# Patient Record
Sex: MALE | Race: WHITE | NOT HISPANIC OR LATINO | Employment: OTHER | URBAN - METROPOLITAN AREA
[De-identification: names, ages, dates, MRNs, and addresses within clinical notes are randomized per-mention and may not be internally consistent; named-entity substitution may affect disease eponyms.]

---

## 2017-01-03 ENCOUNTER — CLINICAL SUPPORT (OUTPATIENT)
Dept: AUDIOLOGY | Facility: CLINIC | Age: 82
End: 2017-01-03
Payer: MEDICARE

## 2017-01-03 DIAGNOSIS — Z46.1 FITTING AND ADJUSTMENT OF HEARING AID: Primary | ICD-10-CM

## 2017-01-03 NOTE — PROGRESS NOTES
Good Murillo Jr. was seen on 01/03/2017 for a hearing aid fitting.  Settings were set to prescribed level 2 based on current audiogram.  Patient was counseled on insertion, cleaning and maintenance. Patient was given a copy of the hearing aid purchase agreement and will pay in full today.  Patient's one month trail period will begin today. Patient has been scheduled for a hearing aid follow up in two weeks.      :  [employer]   Model:  [employer] [id number]  Color:champagne  Speaker link: 1xs  Right aid sn#: [id number]  Left aid sn#:[id number]  Dome size: L open  Repair warranty 3-  L/D warranty: 3-

## 2017-01-11 ENCOUNTER — OFFICE VISIT (OUTPATIENT)
Dept: CARDIOLOGY | Facility: CLINIC | Age: 82
End: 2017-01-11
Payer: MEDICARE

## 2017-01-11 VITALS
BODY MASS INDEX: 21.31 KG/M2 | HEART RATE: 88 BPM | DIASTOLIC BLOOD PRESSURE: 64 MMHG | HEIGHT: 68 IN | WEIGHT: 140.63 LBS | SYSTOLIC BLOOD PRESSURE: 126 MMHG

## 2017-01-11 DIAGNOSIS — E11.69 HYPERLIPIDEMIA ASSOCIATED WITH TYPE 2 DIABETES MELLITUS: ICD-10-CM

## 2017-01-11 DIAGNOSIS — J43.1 PANLOBULAR EMPHYSEMA: ICD-10-CM

## 2017-01-11 DIAGNOSIS — E11.40 TYPE 2 DIABETES, CONTROLLED, WITH NEUROPATHY: ICD-10-CM

## 2017-01-11 DIAGNOSIS — R94.31 ABNORMAL EKG: ICD-10-CM

## 2017-01-11 DIAGNOSIS — C61 PROSTATE CA: ICD-10-CM

## 2017-01-11 DIAGNOSIS — I65.23 BILATERAL CAROTID ARTERY STENOSIS: ICD-10-CM

## 2017-01-11 DIAGNOSIS — E78.5 HYPERLIPIDEMIA ASSOCIATED WITH TYPE 2 DIABETES MELLITUS: ICD-10-CM

## 2017-01-11 DIAGNOSIS — M79.18 MYOFASCIAL PAIN: ICD-10-CM

## 2017-01-11 DIAGNOSIS — I15.2 HYPERTENSION ASSOCIATED WITH DIABETES: Primary | ICD-10-CM

## 2017-01-11 DIAGNOSIS — E11.59 HYPERTENSION ASSOCIATED WITH DIABETES: Primary | ICD-10-CM

## 2017-01-11 DIAGNOSIS — I73.9 PVD (PERIPHERAL VASCULAR DISEASE): ICD-10-CM

## 2017-01-11 PROBLEM — J84.10 GRANULOMATOUS LUNG DISEASE: Status: ACTIVE | Noted: 2017-01-11

## 2017-01-11 PROBLEM — I70.0 CALCIFICATION OF AORTA: Status: ACTIVE | Noted: 2017-01-11

## 2017-01-11 PROBLEM — I27.9 PULMONARY HEART DISEASE: Status: ACTIVE | Noted: 2017-01-11

## 2017-01-11 PROBLEM — I77.1 TORTUOUS AORTA: Status: ACTIVE | Noted: 2017-01-11

## 2017-01-11 PROCEDURE — 1157F ADVNC CARE PLAN IN RCRD: CPT | Mod: S$GLB,,, | Performed by: INTERNAL MEDICINE

## 2017-01-11 PROCEDURE — 99499 UNLISTED E&M SERVICE: CPT | Mod: S$GLB,,, | Performed by: INTERNAL MEDICINE

## 2017-01-11 PROCEDURE — 1160F RVW MEDS BY RX/DR IN RCRD: CPT | Mod: S$GLB,,, | Performed by: INTERNAL MEDICINE

## 2017-01-11 PROCEDURE — 1159F MED LIST DOCD IN RCRD: CPT | Mod: S$GLB,,, | Performed by: INTERNAL MEDICINE

## 2017-01-11 PROCEDURE — 99999 PR PBB SHADOW E&M-EST. PATIENT-LVL III: CPT | Mod: PBBFAC,,, | Performed by: INTERNAL MEDICINE

## 2017-01-11 PROCEDURE — 1126F AMNT PAIN NOTED NONE PRSNT: CPT | Mod: S$GLB,,, | Performed by: INTERNAL MEDICINE

## 2017-01-11 PROCEDURE — 99214 OFFICE O/P EST MOD 30 MIN: CPT | Mod: S$GLB,,, | Performed by: INTERNAL MEDICINE

## 2017-01-11 RX ORDER — OMEPRAZOLE 40 MG/1
40 CAPSULE, DELAYED RELEASE ORAL DAILY
Qty: 30 CAPSULE | Refills: 11 | Status: SHIPPED | OUTPATIENT
Start: 2017-01-11 | End: 2017-01-17 | Stop reason: SDUPTHER

## 2017-01-11 RX ORDER — OMEPRAZOLE 40 MG/1
40 CAPSULE, DELAYED RELEASE ORAL DAILY
COMMUNITY
Start: 2016-12-10 | End: 2017-01-11 | Stop reason: SDUPTHER

## 2017-01-11 NOTE — MR AVS SNAPSHOT
[facility]  [address] [place] [address]  Phone: [phone]  Fax: [phone]                  Good Murillo Jr.   2017 10:45 AM   Office Visit    Description:  Male : 1927   Provider:  Asuncion Goodwin MD   Department:  [facility] - Cardiology           Reason for Visit     Hypertension     Hyperlipidemia     Peripheral Vascular Disease           Diagnoses this Visit        Comments    Hypertension associated with diabetes    -  Primary     Myofascial pain         Abnormal EKG         Hyperlipidemia associated with type 2 diabetes mellitus         PVD (peripheral vascular disease)         Type 2 diabetes, controlled, with neuropathy         Prostate CA         Panlobular emphysema         Bilateral carotid artery stenosis                To Do List           Future Appointments        Provider Department Dept Phone    2017 1:40 PM FIELDS, VISUAL-ONE [facility] [phone]    2017 2:15 PM Patricio Morris MD [facility] [phone]    2017 3:00 PM Devan Eastman MD [facility] [phone]    2017 11:00 AM HRVICK MONTALVO [facility] [phone]    2017 1:30 PM HEARING AIDSPAULETTE [facility] [phone]      Goals (5 Years of Data)     None      Follow-Up and Disposition     Return in about 6 months (around 2017).      [facility]     [facility] On Call Nurse Care Line - 24/7 Assistance  Registered nurses in the [facility] On Call Center provide clinical advisement, health education, appointment booking, and other advisory services.  Call for this free service at [phone].             Medications           Message regarding Medications     Verify the changes and/or additions to your medication regime listed below are the same as discussed with your clinician today.  If any of these changes or additions are incorrect, please notify your healthcare provider.             Verify that the  below list of medications is an accurate representation of the medications you are currently taking.  If none reported, the list may be blank. If incorrect, please contact your healthcare provider. Carry this list with you in case of emergency.           Current Medications     albuterol 90 mcg/actuation inhaler Inhale 2 puffs into the lungs every 6 (six) hours.    alendronate (FOSAMAX) 70 MG tablet Take 1 tablet (70 mg total) by mouth every 7 days.    aspirin 81 mg Tab Take 1 tablet by mouth Daily. Over counter. To help prevent stroke/ heart attack    blood sugar diagnostic Strp 1 strip by Misc.(Non-Drug; Combo Route) route 3 (three) times daily. True Metrix test strips    calcium carbonate (OS-TANGELA) 500 mg calcium (1,250 mg) tablet Take 1 tablet by mouth once daily.    insulin glargine (LANTUS) 100 unit/mL injection Inject 10 Units into the skin once daily.    ipratropium (ATROVENT) 0.06 % nasal spray 2 sprays by Nasal route 3 (three) times daily.    lancets 33 gauge Misc 1 lancet by Misc.(Non-Drug; Combo Route) route 3 (three) times daily.    levothyroxine (SYNTHROID) 25 MCG tablet     loratadine (CLARITIN) 10 mg tablet Take 1 tablet by mouth Once daily as needed.    metformin (GLUCOPHAGE) 1000 MG tablet TAKE ONE TABLET BY MOUTH TWICE DAILY    nateglinide (STARLIX) 60 MG tablet Take 1 tablet (60 mg total) by mouth 3 (three) times daily before meals.    omeprazole (PRILOSEC) 40 MG capsule Take 40 mg by mouth once daily.    pravastatin (PRAVACHOL) 40 MG tablet Take 1 tablet (40 mg total) by mouth once daily.    tamsulosin (FLOMAX) 0.4 mg Cp24 Take 1 capsule (0.4 mg total) by mouth once daily.    TENS unit and electrodes Cmpk 1 Device by Misc.(Non-Drug; Combo Route) route daily as needed.    tiotropium (SPIRIVA WITH HANDIHALER) 18 mcg inhalation capsule Inhale 1 capsule (18 mcg total) into the lungs once daily.           Clinical Reference Information           Vital Signs - Last Recorded  Most recent update:  "1/11/2017 11:04 AM by Natalia Wong MA    BP Pulse Ht Wt BMI    126/64 88 5' 8" (1.727 m) 63.8 kg (140 lb 10.5 oz) 21.39 kg/m2      Blood Pressure          Most Recent Value    BP  126/64      Allergies as of 1/11/2017     Gabapentin      Immunizations Administered on Date of Encounter - 1/11/2017     None      Orders Placed During Today's Visit     Future Labs/Procedures Expected by Expires    CAR Ultrasound doppler carotid bliateral  1/11/2017 (Approximate) 1/11/2018    Comprehensive metabolic panel  7/13/2017 (Approximate) 1/11/2018    Lipid panel  7/13/2017 (Approximate) 1/11/2018    CAR US Ankle Brachial Indices Ext LTD WO Str  As directed 1/11/2018      "

## 2017-01-11 NOTE — PROGRESS NOTES
Subjective:   Patient ID:  Good Murillo Jr. is a 89 y.o. male who presents for follow up of Hypertension; Hyperlipidemia; and Peripheral Vascular Disease      HPI  A 88 yo male with neuropathy htn diabetes hlp  pvd is here for f/u pvd. He ahs been seeing chiropractor for relief. He ahs no other complaints except numbness in legs he ahs no weakness in his legs. He has no other issues with leg pain with ambulation.he ahs chronic back pain he lost weight. Has no chest pain no change in shortness of breath . He feels some uneasiness in his chest at times. He has no other complaints chronically.no discoloration in feet.he ahs significant inflow disease on the rt .michael unchanged.uses inhalers for copd and oxygen intermittently  Past Medical History   Diagnosis Date    Abnormal EKG 8/22/2014    Allergy     COPD (chronic obstructive pulmonary disease)     Diverticulitis     Diverticulosis      hosp/divert bleed    Ex-smoker     Heart murmur     History of pneumothorax      bilat    HTN (hypertension)     Hyperlipidemia     Hyperlipidemia 8/22/2014    Hypothyroid     Iron deficiency anemia      nathan    Osteoporosis, unspecified 1/14 zeenat 1/16    Prostate CA     PVD (peripheral vascular disease) 8/22/2014    Type 2 diabetes, controlled, with neuropathy Diagnosed 1994       Past Surgical History   Procedure Laterality Date    Prostate surgery       brachytherapy    Cataract extraction      Pciol ou Bilateral 01/12/2011 OD/12/08/2010 OS     DR. DELGADILLO       Social History   Substance Use Topics    Smoking status: Former Smoker     Quit date: 8/28/1993    Smokeless tobacco: Never Used    Alcohol use Yes      Comment: Champagne on Sunday       Family History   Problem Relation Age of Onset    Diabetes Mother     Eczema Neg Hx     Lupus Neg Hx     Psoriasis Neg Hx     Melanoma Neg Hx        Current Outpatient Prescriptions   Medication Sig    albuterol 90 mcg/actuation inhaler Inhale 2 puffs into  the lungs every 6 (six) hours.    alendronate (FOSAMAX) 70 MG tablet Take 1 tablet (70 mg total) by mouth every 7 days.    aspirin 81 mg Tab Take 1 tablet by mouth Daily. Over counter. To help prevent stroke/ heart attack    blood sugar diagnostic Strp 1 strip by Misc.(Non-Drug; Combo Route) route 3 (three) times daily. True Metrix test strips    calcium carbonate (OS-TANGELA) 500 mg calcium (1,250 mg) tablet Take 1 tablet by mouth once daily.    insulin glargine (LANTUS) 100 unit/mL injection Inject 10 Units into the skin once daily.    ipratropium (ATROVENT) 0.06 % nasal spray 2 sprays by Nasal route 3 (three) times daily.    lancets 33 gauge Misc 1 lancet by Misc.(Non-Drug; Combo Route) route 3 (three) times daily.    levothyroxine (SYNTHROID) 25 MCG tablet     loratadine (CLARITIN) 10 mg tablet Take 1 tablet by mouth Once daily as needed.    metformin (GLUCOPHAGE) 1000 MG tablet TAKE ONE TABLET BY MOUTH TWICE DAILY    nateglinide (STARLIX) 60 MG tablet Take 1 tablet (60 mg total) by mouth 3 (three) times daily before meals.    omeprazole (PRILOSEC) 40 MG capsule Take 40 mg by mouth once daily.    pravastatin (PRAVACHOL) 40 MG tablet Take 1 tablet (40 mg total) by mouth once daily.    tamsulosin (FLOMAX) 0.4 mg Cp24 Take 1 capsule (0.4 mg total) by mouth once daily.    TENS unit and electrodes Cmpk 1 Device by Misc.(Non-Drug; Combo Route) route daily as needed.    tiotropium (SPIRIVA WITH HANDIHALER) 18 mcg inhalation capsule Inhale 1 capsule (18 mcg total) into the lungs once daily.     No current facility-administered medications for this visit.      Current Outpatient Prescriptions on File Prior to Visit   Medication Sig    albuterol 90 mcg/actuation inhaler Inhale 2 puffs into the lungs every 6 (six) hours.    alendronate (FOSAMAX) 70 MG tablet Take 1 tablet (70 mg total) by mouth every 7 days.    aspirin 81 mg Tab Take 1 tablet by mouth Daily. Over counter. To help prevent stroke/ heart attack     blood sugar diagnostic Strp 1 strip by Misc.(Non-Drug; Combo Route) route 3 (three) times daily. True Metrix test strips    calcium carbonate (OS-TANGELA) 500 mg calcium (1,250 mg) tablet Take 1 tablet by mouth once daily.    insulin glargine (LANTUS) 100 unit/mL injection Inject 10 Units into the skin once daily.    ipratropium (ATROVENT) 0.06 % nasal spray 2 sprays by Nasal route 3 (three) times daily.    lancets 33 gauge Misc 1 lancet by Misc.(Non-Drug; Combo Route) route 3 (three) times daily.    levothyroxine (SYNTHROID) 25 MCG tablet     loratadine (CLARITIN) 10 mg tablet Take 1 tablet by mouth Once daily as needed.    metformin (GLUCOPHAGE) 1000 MG tablet TAKE ONE TABLET BY MOUTH TWICE DAILY    nateglinide (STARLIX) 60 MG tablet Take 1 tablet (60 mg total) by mouth 3 (three) times daily before meals.    pravastatin (PRAVACHOL) 40 MG tablet Take 1 tablet (40 mg total) by mouth once daily.    tamsulosin (FLOMAX) 0.4 mg Cp24 Take 1 capsule (0.4 mg total) by mouth once daily.    TENS unit and electrodes Cmpk 1 Device by Misc.(Non-Drug; Combo Route) route daily as needed.    tiotropium (SPIRIVA WITH HANDIHALER) 18 mcg inhalation capsule Inhale 1 capsule (18 mcg total) into the lungs once daily.     No current facility-administered medications on file prior to visit.      Review of patient's allergies indicates:   Allergen Reactions    Gabapentin      Didn't like starting at 300mg       ROS  Constitution: Negative for weakness and malaise/fatigue. Positive weight loss with decrease appetite.  HENT: Negative for headaches and nosebleeds.   Eyes: Negative for blurred vision and redness.   Cardiovascular: Positive for dyspnea on exertion. Negative for chest pain   claudication , irregular heartbeat, leg swelling, near-syncope, orthopnea, palpitations, paroxysmal nocturnal dyspnia and syncope.   Respiratory: Positive for cough and shortness of breath. Negative for wheezing.   Endocrine: Negative for cold  "intolerance and heat intolerance.   Hematologic/Lymphatic: Negative for bleeding problem. Does not bruise/bleed easily.   Skin: Negative for color change, poor wound healing and rash.   Musculoskeletal: Positive for back pain. Negative for muscle cramps and muscle weakness.   Gastrointestinal: Negative for abdominal pain and heartburn.   Genitourinary: Positive for nocturia. Negative for dysuria and hematuria.   Neurological: Positive for numbness and paresthesias. Negative for dizziness, focal weakness, light-headedness and loss of balance.   Psychiatric/Behavioral: Negative for memory loss. The patient does not have insomnia.   Allergic/Immunologic: Negative for hives.  Objective:   Physical Exam  Vitals:    01/11/17 1101   BP: 126/64   Pulse: 88   Weight: 63.8 kg (140 lb 10.5 oz)   Height: 5' 8" (1.727 m)   Constitutional: He is oriented to person, place, and time. He appears well-developed and well-nourished. No distress.   HENT:   Head: Normocephalic and atraumatic.   Eyes: EOM are normal. Pupils are equal, round, and reactive to light. Right eye exhibits no discharge. Left eye exhibits no discharge.   Neck: Neck supple. No JVD present. No thyromegaly present.   Cardiovascular: Normal rate, irregular rhythm, normal heart sounds . Exam reveals no gallop and no friction rub.   No murmur heard.  Pulses:  Carotid pulses are on the right side with bruit, and on the left side with bruit.  Femoral pulses are 1+ on the right side with bruit, and 3+ on the left side with bruit.  Popliteal pulses are 0+ on the right side, and 2+ on the left side.   Dorsalis pedis pulses are 0 on the right side, and 1+ on the left side.   Posterior tibial pulses are 0 on the right side, and 1+ on the left side.   SUBCLAVIAN BRUITS    Pulmonary/Chest: Effort normal and breath sounds normal. No respiratory distress. He has no wheezes. He has no rales. He exhibits no tenderness.   HYPERINFLATED LUNGS    Abdominal: Soft. Bowel sounds are " normal. He exhibits no distension. There is no tenderness.   Musculoskeletal: Normal range of motion. He exhibits trace edema.   Neurological: He is alert and oriented to person, place, and time. No cranial nerve deficit.   Skin: Skin is warm and dry. No rash noted. He is not diaphoretic.   Psychiatric: He has a normal mood and affect.   Lab Results   Component Value Date    CHOL 169 04/20/2016    CHOL 144 02/05/2015    CHOL 144 02/05/2015     Lab Results   Component Value Date    HDL 42 04/20/2016    HDL 39 (L) 02/05/2015    HDL 39 (L) 02/05/2015     Lab Results   Component Value Date    LDLCALC 107.4 04/20/2016    LDLCALC 89.8 02/05/2015    LDLCALC 89.8 02/05/2015     Lab Results   Component Value Date    TRIG 98 04/20/2016    TRIG 76 02/05/2015    TRIG 76 02/05/2015     Lab Results   Component Value Date    CHOLHDL 24.9 04/20/2016    CHOLHDL 27.1 02/05/2015    CHOLHDL 27.1 02/05/2015       Chemistry        Component Value Date/Time     04/20/2016 0752    K 4.5 04/20/2016 0752     04/20/2016 0752    CO2 25 04/20/2016 0752    BUN 22 04/20/2016 0752    CREATININE 1.2 12/14/2016 1213     (H) 04/20/2016 0752        Component Value Date/Time    CALCIUM 9.3 04/20/2016 0752    ALKPHOS 65 09/04/2013 1135    AST 13 09/04/2013 1135    ALT 10 09/04/2013 1135    BILITOT 0.3 09/04/2013 1135          Lab Results   Component Value Date    TSH 2.212 06/07/2016     Lab Results   Component Value Date    INR 1.0 08/27/2014     Lab Results   Component Value Date    WBC 7.22 04/20/2016    HGB 11.0 (L) 04/20/2016    HCT 34.8 (L) 04/20/2016    MCV 94 04/20/2016     04/20/2016     BMP  Sodium   Date Value Ref Range Status   04/20/2016 142 136 - 145 mmol/L Final     Potassium   Date Value Ref Range Status   04/20/2016 4.5 3.5 - 5.1 mmol/L Final     Chloride   Date Value Ref Range Status   04/20/2016 106 95 - 110 mmol/L Final     CO2   Date Value Ref Range Status   04/20/2016 25 23 - 29 mmol/L Final     BUN, Bld    Date Value Ref Range Status   04/20/2016 22 8 - 23 mg/dL Final     Creatinine   Date Value Ref Range Status   12/14/2016 1.2 0.5 - 1.4 mg/dL Final     Calcium   Date Value Ref Range Status   04/20/2016 9.3 8.7 - 10.5 mg/dL Final     Anion Gap   Date Value Ref Range Status   04/20/2016 11 8 - 16 mmol/L Final     eGFR if    Date Value Ref Range Status   12/14/2016 >60 >60 mL/min/1.73 m^2 Final     eGFR if non    Date Value Ref Range Status   12/14/2016 53 (A) >60 mL/min/1.73 m^2 Final     Comment:     Calculation used to obtain the estimated glomerular filtration  rate (eGFR) is the CKD-EPI equation. Since race is unknown   in our information system, the eGFR values for   -American and Non--American patients are given   for each creatinine result.       CrCl cannot be calculated (Patient has no serum creatinine result on file.).    Assessment:     1. Hypertension associated with diabetes    2. Myofascial pain    3. Abnormal EKG    4. Hyperlipidemia associated with type 2 diabetes mellitus    5. PVD (peripheral vascular disease)    6. Type 2 diabetes, controlled, with neuropathy    7. Prostate CA    8. Panlobular emphysema      Stable clinically no change in exam or pattern of symptoms has no claudication.  Plan:   Carotid michael on f/iu in 6 months   Reassure about leg swelling leg elevation advised.  Continue current therapy  Cardiac low salt diet.  Risk factor modification and excercise program.  F/u in 6 months with lipid cmp

## 2017-01-12 ENCOUNTER — OFFICE VISIT (OUTPATIENT)
Dept: INTERNAL MEDICINE | Facility: CLINIC | Age: 82
End: 2017-01-12
Payer: MEDICARE

## 2017-01-12 ENCOUNTER — OFFICE VISIT (OUTPATIENT)
Dept: OPHTHALMOLOGY | Facility: CLINIC | Age: 82
End: 2017-01-12
Payer: MEDICARE

## 2017-01-12 VITALS
TEMPERATURE: 97 F | HEART RATE: 67 BPM | HEIGHT: 69 IN | SYSTOLIC BLOOD PRESSURE: 158 MMHG | DIASTOLIC BLOOD PRESSURE: 80 MMHG | BODY MASS INDEX: 21 KG/M2 | WEIGHT: 141.75 LBS | OXYGEN SATURATION: 94 %

## 2017-01-12 DIAGNOSIS — D50.9 IRON DEFICIENCY ANEMIA, UNSPECIFIED IRON DEFICIENCY ANEMIA TYPE: ICD-10-CM

## 2017-01-12 DIAGNOSIS — E11.59 HYPERTENSION ASSOCIATED WITH DIABETES: Chronic | ICD-10-CM

## 2017-01-12 DIAGNOSIS — H40.013 OPEN ANGLE WITH BORDERLINE FINDINGS, LOW RISK, BILATERAL: ICD-10-CM

## 2017-01-12 DIAGNOSIS — Z96.1 PSEUDOPHAKIA OF BOTH EYES: ICD-10-CM

## 2017-01-12 DIAGNOSIS — I15.2 HYPERTENSION ASSOCIATED WITH DIABETES: Chronic | ICD-10-CM

## 2017-01-12 DIAGNOSIS — J43.1 PANLOBULAR EMPHYSEMA: ICD-10-CM

## 2017-01-12 DIAGNOSIS — E03.4 HYPOTHYROIDISM DUE TO ACQUIRED ATROPHY OF THYROID: Chronic | ICD-10-CM

## 2017-01-12 DIAGNOSIS — M81.0 OSTEOPOROSIS: ICD-10-CM

## 2017-01-12 DIAGNOSIS — E11.9 DIABETES MELLITUS TYPE 2 WITHOUT RETINOPATHY: Primary | ICD-10-CM

## 2017-01-12 DIAGNOSIS — E11.42 TYPE 2 DIABETES MELLITUS WITH DIABETIC POLYNEUROPATHY, WITH LONG-TERM CURRENT USE OF INSULIN: Primary | ICD-10-CM

## 2017-01-12 DIAGNOSIS — Z79.4 TYPE 2 DIABETES MELLITUS WITH DIABETIC POLYNEUROPATHY, WITH LONG-TERM CURRENT USE OF INSULIN: Primary | ICD-10-CM

## 2017-01-12 DIAGNOSIS — Z85.46 HISTORY OF PROSTATE CANCER: ICD-10-CM

## 2017-01-12 PROCEDURE — 92014 COMPRE OPH EXAM EST PT 1/>: CPT | Mod: S$GLB,,, | Performed by: OPHTHALMOLOGY

## 2017-01-12 PROCEDURE — 92083 EXTENDED VISUAL FIELD XM: CPT | Mod: S$GLB,,, | Performed by: OPHTHALMOLOGY

## 2017-01-12 PROCEDURE — 99499 UNLISTED E&M SERVICE: CPT | Mod: S$GLB,,, | Performed by: OPHTHALMOLOGY

## 2017-01-12 PROCEDURE — 1160F RVW MEDS BY RX/DR IN RCRD: CPT | Mod: S$GLB,,, | Performed by: FAMILY MEDICINE

## 2017-01-12 PROCEDURE — 1157F ADVNC CARE PLAN IN RCRD: CPT | Mod: S$GLB,,, | Performed by: FAMILY MEDICINE

## 2017-01-12 PROCEDURE — 92250 FUNDUS PHOTOGRAPHY W/I&R: CPT | Mod: S$GLB,,, | Performed by: OPHTHALMOLOGY

## 2017-01-12 PROCEDURE — 1159F MED LIST DOCD IN RCRD: CPT | Mod: S$GLB,,, | Performed by: FAMILY MEDICINE

## 2017-01-12 PROCEDURE — 99999 PR PBB SHADOW E&M-EST. PATIENT-LVL III: CPT | Mod: PBBFAC,,, | Performed by: FAMILY MEDICINE

## 2017-01-12 PROCEDURE — 99499 UNLISTED E&M SERVICE: CPT | Mod: S$GLB,,, | Performed by: FAMILY MEDICINE

## 2017-01-12 PROCEDURE — 99999 PR PBB SHADOW E&M-EST. PATIENT-LVL II: CPT | Mod: PBBFAC,,, | Performed by: OPHTHALMOLOGY

## 2017-01-12 PROCEDURE — 99213 OFFICE O/P EST LOW 20 MIN: CPT | Mod: S$GLB,,, | Performed by: FAMILY MEDICINE

## 2017-01-12 NOTE — PROGRESS NOTES
SUBJECTIVE:   Good Murillo Jr. is a 89 y.o. male   Corrected distance visual acuity was 20/30 +2 in the right eye and 20/30 +2 in the left eye.   Chief Complaint   Patient presents with    Glaucoma Suspect        HPI:  HPI     Patient is here for his annual eye exam w/ hvf review and dilated exam ,   patient denies any vision changes.Patient was seen in urgent care  on   12/2016 for acute conjunctivitis  Ou and was given polytrim he is still   using tid ou .        GLAUCOMA SUSPECT  PCIOL OD 01/12/2011 SN60WF+24.0  PCIOL OS 12/08/2010 SN60WF +24.5  DM X 15 YEARS        polytrim tid ou        Last edited by CARLEE Horton on 1/12/2017  2:49 PM.     Assessment /Plan :  1. Diabetes mellitus type 2 without retinopathy No diabetic retinopathy at this time. Reviewed diabetic eye precautions including avoiding tobacco use,  Good glucose control, and importance of regular follow up.    2. Open angle with borderline findings, low risk, bilateral No evidence of glaucoma at this time but based on risk factors recommend to continue monitoring.   3. Pseudophakia of both eyes doing well     Return to clinic in 1 year  or as needed.  With 24-2 HVF, Dilation and SDP's

## 2017-01-12 NOTE — MR AVS SNAPSHOT
[facility]  [address] [place] [address]  Phone: [phone]  Fax: [phone]                  Good Murillo    2017 2:15 PM   Office Visit    Description:  Male : 1927   Provider:  Patricio Morris MD   Department:  [facility] - Ophthalmology           Reason for Visit     Glaucoma Suspect           Diagnoses this Visit        Comments    Diabetes mellitus type 2 without retinopathy    -  Primary     Open angle with borderline findings, low risk, bilateral         Pseudophakia of both eyes                To Do List           Future Appointments        Provider Department Dept Phone    2017 11:00 AM HRA [facility] [phone]    2017 1:30 PM HEARING AIDSPAULETTE [facility] [phone]    2017 12:30 PM Damian Issa Jr., MORENA [facility] - Diabetes Management [phone]    3/20/2017 2:40 PM Carmita Jones DPM [facility] [phone]    2017 8:05 AM LABORATORY, [facility] [phone]      Goals (5 Years of Data)     None      Follow-Up and Disposition     Return in about 1 year (around 2018).      [facility]     [facility] Care [facility] - 24/7 Assistance  Registered nurses in the [facility] Center provide clinical advisement, health education, appointment booking, and other advisory services.  Call for this free service at [phone].             Medications           Message regarding Medications     Verify the changes and/or additions to your medication regime listed below are the same as discussed with your clinician today.  If any of these changes or additions are incorrect, please notify your healthcare provider.             Verify that the below list of medications is an accurate representation of the medications you are currently taking.  If none reported, the list may be blank. If incorrect, please contact your healthcare provider. Carry this list  with you in case of emergency.           Current Medications     albuterol 90 mcg/actuation inhaler Inhale 2 puffs into the lungs every 6 (six) hours.    alendronate (FOSAMAX) 70 MG tablet Take 1 tablet (70 mg total) by mouth every 7 days.    aspirin 81 mg Tab Take 1 tablet by mouth Daily. Over counter. To help prevent stroke/ heart attack    blood sugar diagnostic Strp 1 strip by Misc.(Non-Drug; Combo Route) route 3 (three) times daily. True Metrix test strips    calcium carbonate (OS-TANGELA) 500 mg calcium (1,250 mg) tablet Take 1 tablet by mouth once daily.    insulin glargine (LANTUS) 100 unit/mL injection Inject 10 Units into the skin once daily.    ipratropium (ATROVENT) 0.06 % nasal spray 2 sprays by Nasal route 3 (three) times daily.    lancets 33 gauge Misc 1 lancet by Misc.(Non-Drug; Combo Route) route 3 (three) times daily.    levothyroxine (SYNTHROID) 25 MCG tablet     loratadine (CLARITIN) 10 mg tablet Take 1 tablet by mouth Once daily as needed.    metformin (GLUCOPHAGE) 1000 MG tablet TAKE ONE TABLET BY MOUTH TWICE DAILY    nateglinide (STARLIX) 60 MG tablet Take 1 tablet (60 mg total) by mouth 3 (three) times daily before meals.    omeprazole (PRILOSEC) 40 MG capsule Take 1 capsule (40 mg total) by mouth once daily.    pravastatin (PRAVACHOL) 40 MG tablet Take 1 tablet (40 mg total) by mouth once daily.    tamsulosin (FLOMAX) 0.4 mg Cp24 Take 1 capsule (0.4 mg total) by mouth once daily.    TENS unit and electrodes Cmpk 1 Device by Misc.(Non-Drug; Combo Route) route daily as needed.    tiotropium (SPIRIVA WITH HANDIHALER) 18 mcg inhalation capsule Inhale 1 capsule (18 mcg total) into the lungs once daily.           Clinical Reference Information           Allergies as of 1/12/2017     Gabapentin      Immunizations Administered on Date of Encounter - 1/12/2017     None      Orders Placed During Today's Visit      Normal Orders This Visit    Color Fundus Photography - OU - Both Eyes     [place] Visual  Field - OU - Extended - Both Eyes

## 2017-01-12 NOTE — MR AVS SNAPSHOT
[facility] Internal Medicine  [address] [place] [address]  Phone: [phone]  Fax: [phone]                  Good Murillo Jr.   2017 3:00 PM   Office Visit    Description:  Male : 1927   Provider:  Devan Eastman MD   Department:  [facility] Internal Medicine                To Do List           Future Appointments        Provider Department Dept Phone    2017 11:00 AM TITI [facility] Internal Medicine [phone]    2017 1:30 PM HEARING AIDSPAULETTE [facility] Hearing Aids [phone]    2017 12:30 PM Damian Issa Jr., PA-C [facility] Diabetes Management [phone]    3/20/2017 1:20 PM Devan Eastman MD [facility] Internal Medicine [phone]    3/20/2017 2:40 PM Carmita Jones DPM [facility] Podiatry [phone]      Goals (5 Years of Data)     None      [facility] On Call     [facility] On Call Nurse Care Line -  Assistance  Registered nurses in the [facility] On Call Center provide clinical advisement, health education, appointment booking, and other advisory services.  Call for this free service at [phone].             Medications           Message regarding Medications     Verify the changes and/or additions to your medication regime listed below are the same as discussed with your clinician today.  If any of these changes or additions are incorrect, please notify your healthcare provider.             Verify that the below list of medications is an accurate representation of the medications you are currently taking.  If none reported, the list may be blank. If incorrect, please contact your healthcare provider. Carry this list with you in case of emergency.           Current Medications     albuterol 90 mcg/actuation inhaler Inhale 2 puffs into the lungs every 6 (six) hours.    alendronate (FOSAMAX) 70 MG tablet Take 1 tablet (70 mg total) by mouth every 7 days.    aspirin 81 mg Tab Take 1 tablet by mouth Daily. Over counter. To help prevent stroke/ heart  "attack    blood sugar diagnostic Strp 1 strip by Misc.(Non-Drug; Combo Route) route 3 (three) times daily. True Metrix test strips    calcium carbonate (OS-TANGELA) 500 mg calcium (1,250 mg) tablet Take 1 tablet by mouth once daily.    insulin glargine (LANTUS) 100 unit/mL injection Inject 10 Units into the skin once daily.    ipratropium (ATROVENT) 0.06 % nasal spray 2 sprays by Nasal route 3 (three) times daily.    lancets 33 gauge Misc 1 lancet by Misc.(Non-Drug; Combo Route) route 3 (three) times daily.    levothyroxine (SYNTHROID) 25 MCG tablet     loratadine (CLARITIN) 10 mg tablet Take 1 tablet by mouth Once daily as needed.    metformin (GLUCOPHAGE) 1000 MG tablet TAKE ONE TABLET BY MOUTH TWICE DAILY    nateglinide (STARLIX) 60 MG tablet Take 1 tablet (60 mg total) by mouth 3 (three) times daily before meals.    omeprazole (PRILOSEC) 40 MG capsule Take 1 capsule (40 mg total) by mouth once daily.    pravastatin (PRAVACHOL) 40 MG tablet Take 1 tablet (40 mg total) by mouth once daily.    tamsulosin (FLOMAX) 0.4 mg Cp24 Take 1 capsule (0.4 mg total) by mouth once daily.    TENS unit and electrodes Cmpk 1 Device by Misc.(Non-Drug; Combo Route) route daily as needed.    tiotropium (SPIRIVA WITH HANDIHALER) 18 mcg inhalation capsule Inhale 1 capsule (18 mcg total) into the lungs once daily.           Clinical Reference Information           Vital Signs - Last Recorded  Most recent update: 1/12/2017  3:59 PM by Leslie Cummings LPN    BP Pulse Temp Ht    (!) 158/80 (BP Location: Right arm, Patient Position: Sitting, BP Method: Manual) 67 97.4 °F (36.3 °C) (Tympanic) 5' 9" (1.753 m)    Wt SpO2 BMI    64.3 kg (141 lb 12.1 oz) (!) 94% 20.93 kg/m2      Blood Pressure          Most Recent Value    BP  (!)  158/80      Allergies as of 1/12/2017     Gabapentin      Immunizations Administered on Date of Encounter - 1/12/2017     None      "

## 2017-01-16 ENCOUNTER — TELEPHONE (OUTPATIENT)
Dept: CARDIOLOGY | Facility: CLINIC | Age: 82
End: 2017-01-16

## 2017-01-17 ENCOUNTER — CLINICAL SUPPORT (OUTPATIENT)
Dept: AUDIOLOGY | Facility: CLINIC | Age: 82
End: 2017-01-17
Payer: MEDICARE

## 2017-01-17 ENCOUNTER — OFFICE VISIT (OUTPATIENT)
Dept: INTERNAL MEDICINE | Facility: CLINIC | Age: 82
End: 2017-01-17
Payer: MEDICARE

## 2017-01-17 VITALS
HEIGHT: 69 IN | DIASTOLIC BLOOD PRESSURE: 75 MMHG | SYSTOLIC BLOOD PRESSURE: 139 MMHG | OXYGEN SATURATION: 96 % | HEART RATE: 101 BPM | BODY MASS INDEX: 20.42 KG/M2 | WEIGHT: 137.88 LBS

## 2017-01-17 DIAGNOSIS — G89.29 CHRONIC MIDLINE LOW BACK PAIN WITHOUT SCIATICA: ICD-10-CM

## 2017-01-17 DIAGNOSIS — I27.9 PULMONARY HEART DISEASE: ICD-10-CM

## 2017-01-17 DIAGNOSIS — M54.50 CHRONIC MIDLINE LOW BACK PAIN WITHOUT SCIATICA: ICD-10-CM

## 2017-01-17 DIAGNOSIS — I70.0 CALCIFICATION OF AORTA: ICD-10-CM

## 2017-01-17 DIAGNOSIS — Z85.46 HISTORY OF PROSTATE CANCER: ICD-10-CM

## 2017-01-17 DIAGNOSIS — Z91.89 POTENTIAL FOR COGNITIVE IMPAIRMENT: ICD-10-CM

## 2017-01-17 DIAGNOSIS — Z46.1 FITTING AND ADJUSTMENT OF HEARING AID: Primary | ICD-10-CM

## 2017-01-17 DIAGNOSIS — E11.59 HYPERTENSION ASSOCIATED WITH DIABETES: Chronic | ICD-10-CM

## 2017-01-17 DIAGNOSIS — E78.5 HYPERLIPIDEMIA ASSOCIATED WITH TYPE 2 DIABETES MELLITUS: ICD-10-CM

## 2017-01-17 DIAGNOSIS — R63.4 WEIGHT LOSS, NON-INTENTIONAL: ICD-10-CM

## 2017-01-17 DIAGNOSIS — M81.0 OSTEOPOROSIS: ICD-10-CM

## 2017-01-17 DIAGNOSIS — J84.10 GRANULOMATOUS LUNG DISEASE: ICD-10-CM

## 2017-01-17 DIAGNOSIS — Z00.00 ENCOUNTER FOR PREVENTIVE HEALTH EXAMINATION: Primary | ICD-10-CM

## 2017-01-17 DIAGNOSIS — E03.4 HYPOTHYROIDISM DUE TO ACQUIRED ATROPHY OF THYROID: Chronic | ICD-10-CM

## 2017-01-17 DIAGNOSIS — I70.203 ATHEROSCLEROSIS OF NATIVE ARTERY OF BOTH LOWER EXTREMITIES, WITH UNSPECIFIED PRESENCE OF CLINICAL MANIFESTATION: ICD-10-CM

## 2017-01-17 DIAGNOSIS — E11.42 TYPE 2 DIABETES MELLITUS WITH DIABETIC POLYNEUROPATHY, WITH LONG-TERM CURRENT USE OF INSULIN: ICD-10-CM

## 2017-01-17 DIAGNOSIS — Z79.4 TYPE 2 DIABETES MELLITUS WITH DIABETIC POLYNEUROPATHY, WITH LONG-TERM CURRENT USE OF INSULIN: ICD-10-CM

## 2017-01-17 DIAGNOSIS — D50.9 IRON DEFICIENCY ANEMIA, UNSPECIFIED IRON DEFICIENCY ANEMIA TYPE: ICD-10-CM

## 2017-01-17 DIAGNOSIS — J43.1 PANLOBULAR EMPHYSEMA: ICD-10-CM

## 2017-01-17 DIAGNOSIS — E11.69 HYPERLIPIDEMIA ASSOCIATED WITH TYPE 2 DIABETES MELLITUS: ICD-10-CM

## 2017-01-17 DIAGNOSIS — I15.2 HYPERTENSION ASSOCIATED WITH DIABETES: Chronic | ICD-10-CM

## 2017-01-17 DIAGNOSIS — H40.013 OPEN ANGLE WITH BORDERLINE FINDINGS, LOW RISK, BILATERAL: ICD-10-CM

## 2017-01-17 DIAGNOSIS — I77.9 BILATERAL CAROTID ARTERY DISEASE: ICD-10-CM

## 2017-01-17 PROCEDURE — 99999 PR PBB SHADOW E&M-EST. PATIENT-LVL IV: CPT | Mod: PBBFAC,,, | Performed by: PHYSICIAN ASSISTANT

## 2017-01-17 PROCEDURE — 99499 UNLISTED E&M SERVICE: CPT | Mod: S$GLB,,, | Performed by: PHYSICIAN ASSISTANT

## 2017-01-17 PROCEDURE — G0439 PPPS, SUBSEQ VISIT: HCPCS | Mod: S$GLB,,, | Performed by: PHYSICIAN ASSISTANT

## 2017-01-17 RX ORDER — OMEPRAZOLE 40 MG/1
40 CAPSULE, DELAYED RELEASE ORAL DAILY
Qty: 30 CAPSULE | Refills: 3 | Status: SHIPPED | OUTPATIENT
Start: 2017-01-17 | End: 2017-06-05

## 2017-01-17 NOTE — MR AVS SNAPSHOT
[facility] Internal Medicine  [address] [place] [address]  Phone: [phone]  Fax: [phone]                  Good Murillo    2017 11:00 AM   Office Visit    Description:  Male : 1927   Provider:  Raulito Wetzel PA-C   Department:  [facility] - Internal Medicine           Reason for Visit     Health Risk Assessment           Diagnoses this Visit        Comments    Encounter for preventive health examination    -  Primary     Type 2 diabetes mellitus with diabetic polyneuropathy, with long-term current use of insulin         Pulmonary heart disease         Hypertension associated with diabetes         Osteoporosis         Hyperlipidemia associated with type 2 diabetes mellitus         Granulomatous lung disease         Panlobular emphysema         Bilateral carotid artery disease         Atherosclerosis of native artery of both lower extremities, with unspecified presence of clinical manifestation         Hypothyroidism due to acquired atrophy of thyroid         Calcification of aorta         Iron deficiency anemia, unspecified iron deficiency anemia type         History of prostate cancer         Urinary complication         Weight loss, non-intentional         Chronic midline low back pain without sciatica                To Do List           Future Appointments        Provider Department Dept Phone    2017 1:30 PM HEARING PAULETTE MUÑOZ [facility] Hearing Aids [phone]    2017 10:15 [phone]    2017 10:45 [facility] [phone]    2017 12:30 PM Damian Issa Jr., MORENA [facility] - Diabetes Management [phone]    3/20/2017 1:20 PM Devan Eastman MD [facility] Internal Medicine [phone]      Goals (5 Years of Data)     None       These Medications        Disp Refills Start End    omeprazole (PRILOSEC) 40 MG capsule 30 capsule 3 2017     Take 1 capsule (40 mg total) by mouth  once daily. - Oral    Pharmacy: [address] #: [phone]         [facility] On Call     [facility] On Call Nurse Care Line - 24/7 Assistance  Registered nurses in the [facility] On Call Center provide clinical advisement, health education, appointment booking, and other advisory services.  Call for this free service at [phone].             Medications           Message regarding Medications     Verify the changes and/or additions to your medication regime listed below are the same as discussed with your clinician today.  If any of these changes or additions are incorrect, please notify your healthcare provider.             Verify that the below list of medications is an accurate representation of the medications you are currently taking.  If none reported, the list may be blank. If incorrect, please contact your healthcare provider. Carry this list with you in case of emergency.           Current Medications     albuterol 90 mcg/actuation inhaler Inhale 2 puffs into the lungs every 6 (six) hours.    alendronate (FOSAMAX) 70 MG tablet Take 1 tablet (70 mg total) by mouth every 7 days.    aspirin 81 mg Tab Take 1 tablet by mouth Daily. Over counter. To help prevent stroke/ heart attack    blood sugar diagnostic Strp 1 strip by Misc.(Non-Drug; Combo Route) route 3 (three) times daily. True Metrix test strips    calcium carbonate (OS-TANGELA) 500 mg calcium (1,250 mg) tablet Take 1 tablet by mouth once daily.    insulin glargine (LANTUS) 100 unit/mL injection Inject 10 Units into the skin once daily.    ipratropium (ATROVENT) 0.06 % nasal spray 2 sprays by Nasal route 3 (three) times daily.    lancets 33 gauge Misc 1 lancet by Misc.(Non-Drug; Combo Route) route 3 (three) times daily.    levothyroxine (SYNTHROID) 25 MCG tablet     loratadine (CLARITIN) 10 mg tablet Take 1 tablet by mouth Once daily as needed.    metformin (GLUCOPHAGE) 1000 MG tablet TAKE ONE  "TABLET BY MOUTH TWICE DAILY    nateglinide (STARLIX) 60 MG tablet Take 1 tablet (60 mg total) by mouth 3 (three) times daily before meals.    omeprazole (PRILOSEC) 40 MG capsule Take 1 capsule (40 mg total) by mouth once daily.    pravastatin (PRAVACHOL) 40 MG tablet Take 1 tablet (40 mg total) by mouth once daily.    tamsulosin (FLOMAX) 0.4 mg Cp24 Take 1 capsule (0.4 mg total) by mouth once daily.    TENS unit and electrodes Cmpk 1 Device by Misc.(Non-Drug; Combo Route) route daily as needed.    tiotropium (SPIRIVA WITH HANDIHALER) 18 mcg inhalation capsule Inhale 1 capsule (18 mcg total) into the lungs once daily.           Clinical Reference Information           Vital Signs - Last Recorded  Most recent update: 1/17/2017 11:18 AM by Francisca Perry MA    BP Pulse Ht Wt SpO2 BMI    139/75 101 5' 9" (1.753 m) 62.5 kg (137 lb 14.4 oz) 96% 20.36 kg/m2      Blood Pressure          Most Recent Value    BP  139/75      Allergies as of 1/17/2017     Gabapentin      Immunizations Administered on Date of Encounter - 1/17/2017     None      Instructions      Counseling and Referral of Other Preventative  (Italic type indicates deductible and co-insurance are waived)    Patient Name: Good Murillo  Today's Date: 1/17/2017      SERVICE LIMITATIONS RECOMMENDATION    Vaccines    · Pneumococcal (once after 65)    · Influenza (annually)    · Hepatitis B (if medium/high risk)    · Prevnar 13      Hepatitis B medium/high risk factors:       - End-stage renal disease       - Hemophiliacs who received Factor VII or         IX concentrates       - Clients of institutions for the mentally             retarded       - Persons who live in the same house as          a HepB carrier       - Homosexual men       - Illicit injectable drug abusers     Pneumococcal:5/20/2007 Done, no repeat necessary     Influenza:12/6/2016 Done, repeat in one year     Hepatitis B: N/A Follow with PCP     Prevnar 13:Done 10/28/15    Prostate cancer " screening (annually to age 75)     Prostate specific antigen (PSA) Shared decision making with Provider. Sometimes a co-pay may be required if the patient decides to have this test. The USPSTF no longer recommends prostate cancer screening routinely in medicine: every 1 year    Colorectal cancer screening (to age 75)    · Fecal occult blood test (annual)  · Flexible sigmoidoscopy (5y)  · Screening colonoscopy (10y)  · Barium enema   Sigmoidoscopy 5/16/13. Continue to follow with PCP GI    Diabetes self-management training (no USPSTF recommendations)  Requires referral by treating physician for patient with diabetes or renal disease. 10 hours of initial DSMT sessions of no less than 30 minutes each in a continuous 12-month period. 2 hours of follow-up DSMT in subsequent years.  Continue to follow with PCP    Glaucoma screening (no USPSTF recommendation)  Diabetes mellitus, family history   , age 50 or over    American, age 65 or over  Continue to follow with юлия Morris    Medical nutrition therapy for diabetes or renal disease (no recommended schedule)  Requires referral by treating physician for patient with diabetes or renal disease or kidney transplant within the past 3 years.  Can be provided in same year as diabetes self-management training (DSMT), and CMS recommends medical nutrition therapy take place after DSMT. Up to 3 hours for initial year and 2 hours in subsequent years.  Continue to follow with PCP    Cardiovascular screening blood tests (every 5 years)  · Fasting lipid panel  Order as a panel if possible  Continue to follow with PCP    Diabetes screening tests (at least every 3 years, Medicare covers annually or at 6-month intervals for prediabetic patients)  · Fasting blood sugar (FBS) or glucose tolerance test (GTT)  Patient must be diagnosed with one of the following:       - Hypertension       - Dyslipidemia       - Obesity (BMI 30kg/m2)       - Previous elevated  impaired FBS or GTT       ... or any two of the following:       - Overweight (BMI 25 but <30)       - Family history of diabetes       - Age 65 or older       - History of gestational diabetes or birth of baby weighing more than 9 pounds Continue to follow with PCP    Abdominal aortic aneurysm screening (once)  · Sonogram   Limited to patients who meet one of the following criteria:       - Men who are 65-75 years old and have smoked more than 100 cigarette in their lifetime       - Anyone with a family history of abdominal aortic aneurysm       - Anyone recommended for screening by the USPSTF  Continue to follow with PCP    HIV screening (annually for increased risk patients)  · HIV-1 and HIV-2 by EIA, or SHANIQUA, rapid antibody test or oral mucosa transudate  Patients must be at increased risk for HIV infection per USPSTF guidelines or pregnant. Tests covered annually for patient at increased risk or as requested by the patient. Pregnant patients may receive up to 3 tests during pregnancy.  Risks discussed, screening is not recommended    Smoking cessation counseling (up to 8 sessions per year)  Patients must be asymptomatic of tobacco-related conditions to receive as a preventative service.  does not smoke    Subsequent annual wellness visit  At least 12 months since last AWV  Return in one year     The following information is provided to all patients.  This information is to help you find resources for any of the problems found today that may be affecting your health:                Living healthy guide: www.[facility].[place].gov      Understanding Diabetes: www.diabetes.org      Eating healthy: www.cdc.gov/healthyweight      CDC home safety checklist: www.cdc.gov/steadi/patient.html      Agency on Aging: www.goea.[place].[facility]      Alcoholics anonymous (AA): www.aa.org      Physical Activity: www.ana.nih.gov/kj3pqdi      Tobacco use: www.quitwithusla.org

## 2017-01-17 NOTE — PROGRESS NOTES
"Good Murillo presented for a  Medicare AWV and comprehensive Health Risk Assessment today. The following components were reviewed and updated:    · Medical history  · Family History  · Social history  · Allergies and Current Medications  · Health Risk Assessment  · Health Maintenance  · Care Team     ** See Completed Assessments for Annual Wellness Visit within the encounter summary.**       The following assessments were completed:  · Living Situation  · CAGE  · Depression Screening  · Timed Get Up and Go  · Whisper Test  · Cognitive Function Screening  · Nutrition Screening  · ADL Screening  · PAQ Screening    Vitals:    01/17/17 1113   BP: 139/75   Pulse: 101   SpO2: 96%   Weight: 62.5 kg (137 lb 14.4 oz)   Height: 5' 9" (1.753 m)     Body mass index is 20.36 kg/(m^2).  Physical Exam   Constitutional: He appears well-developed. He is cooperative. No distress.   HENT:   Head: Normocephalic and atraumatic.   Eyes: Conjunctivae and EOM are normal. Right eye exhibits no discharge. Left eye exhibits no discharge.   Neck: Normal range of motion. Neck supple. No tracheal deviation present. No thyromegaly present.   Cardiovascular: Normal rate and regular rhythm.    Pulses:       Radial pulses are 2+ on the right side, and 2+ on the left side.   Pulmonary/Chest: Effort normal and breath sounds normal. No respiratory distress. He has no wheezes.   Abdominal: Soft. Bowel sounds are normal. He exhibits no distension. There is no tenderness. There is no rebound and no guarding.   Musculoskeletal: He exhibits no edema or tenderness.   Mid back pain, - SLR   Neurological: He displays no tremor. No cranial nerve deficit.   Grasp equal both hands,No tremors, or muscle fasciculations noted. Toes downgoing, Paresthesia bilateral LE. Gait: No ataxia. Using rollator   Skin: Skin is warm and dry. No rash noted. He is not diaphoretic. No erythema.   Psychiatric: He has a normal mood and affect. His behavior is normal. Judgment and " thought content normal.         Diagnoses and health risks identified today and associated recommendations/orders:    1. Encounter for preventive health examination    2. Type 2 diabetes mellitus with diabetic polyneuropathy, with long-term current use of insulin  This problem is currently not controlled. ON Metformin, Starlix, Insulin Glargine. Following diabetes education. Bilateral LE paresthesia.   Please follow up with your PCP and diabetes education as planned to discuss adjustments to your treatment plan. appt 2/16/17    3. Pulmonary heart disease  2D echo 5/31/16- est PA systolic pressure 45 mmhg.  Stable. Continue current treatment plan as previously prescribed with your cardiologist, Dr. Goodwin.    4. Hypertension associated with diabetes  Stable.  Continue current treatment plan as previously prescribed with your PCP.    5. Osteoporosis  Dexa 12/15/16. On Fosamax. Please continue to follow with PCP.    6. Hyperlipidemia associated with type 2 diabetes mellitus  Stable. On Pravastatin. Continue current treatment plan as previously prescribed with your PCP.    7. Bilateral carotid artery disease  US carotid 5/31/16- There is 50 - 59% right Internal Carotid stenosis. There is 20 - 39% left Internal Carotid stenosis.  Stable. Continue current treatment plan as previously prescribed with your PCP and cardiology. Updated US scheduled 2/1/17    8. Granulomatous lung disease  CTof chest 4/8/15 . Prior granulomatous disease and a few subpleural nodules left lower lobe.  Stable. Continue current treatment plan as previously prescribed with your pulmonologist, Dr. Dean.    9. Panlobular emphysema  Stable. Continue current treatment plan as previously prescribed with Dr. Dean. Appt 6/15/17    10. Atherosclerosis of native artery of both lower extremities, with unspecified presence of clinical manifestation  ARABELLA 5/31/16. The right ankle brachial index was 0.82 which suggests mild right lower extremity arterial  disease.   The left ankle brachial index was 0.90 which suggests minimal left lower extremity arterial disease.   Continue to follow with PCP and cardiology. Updated ARABELLA scheduled 2/1/17    11. Calcification of aorta, tortuous  CT chest 12/14/16, chest xray 8/25/16. Documented atherosclerosis of the aorta. Pt denies chest pains, sob or palpations.  Stable. Continue current treatment plan as previously prescribed with your PCP.    12. Hypothyroidism due to acquired atrophy of thyroid  Stable. Continue current treatment plan as previously prescribed with your PCP.    13. Iron deficiency anemia, unspecified iron deficiency anemia type  Stable. Continue current treatment plan as previously prescribed with your PCP.    14. History of prostate cancer  Has a history of prostate cancer almost 20 years ago with brachytherapy and XRT with current PSA 0.36.  Stable. Continue current treatment plan as previously prescribed with your PCP and Dr. Montes De Oca.    15. Urinary complication  Intermittent urine leakage. Wears briefs. Pt self cath approx three times per day.  Please continue to follow with Dr. Montes De Oca.    16. Weight loss, non-intentional - due to esophageal stricture  Please continue to follow with PCP and GI Dr. Travis.    17. Chronic midline low back pain without sciatica  Chronic back pain. Hx of compression/ insufficiency fractures. Reports intermittent pain, seen chiropractor.   Please continue follow with PCP.    18. Potential for cognitive impairment  Cognitive function screen score  3. Which includes Clock drawing test and Min-Cog 3 minute recall.  This is a new problem that has been identified during today's visit. Please follow up with your PCP to discuss the next steps.    19. Open angle with borderline findings, low risk, bilateral  Stable. Continue current treatment plan as previously prescribed with your ophthalmologist, Dr. Morris.    Provided [facility] with a 5-10 year written screening schedule and personal  prevention plan. Recommendations were developed using the USPSTF age appropriate recommendations. Education, counseling, and referrals were provided as needed. After Visit Summary printed and given to patient which includes a list of additional screenings\tests needed. Timed Get up an go test was > 12 sec. Pt's time (20 sec) which may indicate increase risk of falls.  Continue to follow with your PCP as scheduled 3/20/17 or sooner if necessary.        Raulito Wetzel PA-C

## 2017-01-17 NOTE — PATIENT INSTRUCTIONS
Counseling and Referral of Other Preventative  (Italic type indicates deductible and co-insurance are waived)    Patient Name: Good Murillo  Today's Date: 1/17/2017      SERVICE LIMITATIONS RECOMMENDATION    Vaccines    · Pneumococcal (once after 65)    · Influenza (annually)    · Hepatitis B (if medium/high risk)    · Prevnar 13      Hepatitis B medium/high risk factors:       - End-stage renal disease       - Hemophiliacs who received Factor VII or         IX concentrates       - Clients of institutions for the mentally             retarded       - Persons who live in the same house as          a HepB carrier       - Homosexual men       - Illicit injectable drug abusers     Pneumococcal:5/20/2007 Done, no repeat necessary     Influenza:12/6/2016 Done, repeat in one year     Hepatitis B: N/A Follow with PCP     Prevnar 13:Done 10/28/15    Prostate cancer screening (annually to age 75)     Prostate specific antigen (PSA) Shared decision making with Provider. Sometimes a co-pay may be required if the patient decides to have this test. The USPSTF no longer recommends prostate cancer screening routinely in medicine: every 1 year    Colorectal cancer screening (to age 75)    · Fecal occult blood test (annual)  · Flexible sigmoidoscopy (5y)  · Screening colonoscopy (10y)  · Barium enema   Sigmoidoscopy 5/16/13. Continue to follow with PCP GI    Diabetes self-management training (no USPSTF recommendations)  Requires referral by treating physician for patient with diabetes or renal disease. 10 hours of initial DSMT sessions of no less than 30 minutes each in a continuous 12-month period. 2 hours of follow-up DSMT in subsequent years.  Continue to follow with PCP    Glaucoma screening (no USPSTF recommendation)  Diabetes mellitus, family history   , age 50 or over    American, age 65 or over  Continue to follow with юлия Morris    Medical nutrition therapy for diabetes or renal disease (no  recommended schedule)  Requires referral by treating physician for patient with diabetes or renal disease or kidney transplant within the past 3 years.  Can be provided in same year as diabetes self-management training (DSMT), and CMS recommends medical nutrition therapy take place after DSMT. Up to 3 hours for initial year and 2 hours in subsequent years.  Continue to follow with PCP    Cardiovascular screening blood tests (every 5 years)  · Fasting lipid panel  Order as a panel if possible  Continue to follow with PCP    Diabetes screening tests (at least every 3 years, Medicare covers annually or at 6-month intervals for prediabetic patients)  · Fasting blood sugar (FBS) or glucose tolerance test (GTT)  Patient must be diagnosed with one of the following:       - Hypertension       - Dyslipidemia       - Obesity (BMI 30kg/m2)       - Previous elevated impaired FBS or GTT       ... or any two of the following:       - Overweight (BMI 25 but <30)       - Family history of diabetes       - Age 65 or older       - History of gestational diabetes or birth of baby weighing more than 9 pounds Continue to follow with PCP    Abdominal aortic aneurysm screening (once)  · Sonogram   Limited to patients who meet one of the following criteria:       - Men who are 65-75 years old and have smoked more than 100 cigarette in their lifetime       - Anyone with a family history of abdominal aortic aneurysm       - Anyone recommended for screening by the USPSTF  Continue to follow with PCP    HIV screening (annually for increased risk patients)  · HIV-1 and HIV-2 by EIA, or SHANIQUA, rapid antibody test or oral mucosa transudate  Patients must be at increased risk for HIV infection per USPSTF guidelines or pregnant. Tests covered annually for patient at increased risk or as requested by the patient. Pregnant patients may receive up to 3 tests during pregnancy.  Risks discussed, screening is not recommended    Smoking cessation  counseling (up to 8 sessions per year)  Patients must be asymptomatic of tobacco-related conditions to receive as a preventative service.  does not smoke    Subsequent annual wellness visit  At least 12 months since last AWV  Return in one year     The following information is provided to all patients.  This information is to help you find resources for any of the problems found today that may be affecting your health:                Living healthy guide: www.[facility].[place]      Understanding Diabetes: www.diabetes.org      Eating healthy: www.cdc.gov/healthyweight      [facility] home safety checklist: www.cdc.gov/steadi/patient.html      Agency on Aging: www.goea.[place]      Alcoholics anonymous (AA): www.aa.org      Physical Activity: www.ana.nih.gov/xi1vfpt      Tobacco use: www.quitwithusla.org

## 2017-01-17 NOTE — PROGRESS NOTES
Pt was not able to change batteries due to dexterity issues.      Exchanged B70's for [id number].    SN:  [id number]          [id number]    Follow up in 2 weeks

## 2017-01-23 NOTE — PROGRESS NOTES
Subjective:       Patient ID: Good Murillo Jr. is a 89 y.o. male.    Chief Complaint: No chief complaint on file.    HPIf.u prob list and wt loss. Mood better as well as weight w remeron see last note. utd pulm  Back pain resolved  Overall feeling kendrick    Past Medical History   Diagnosis Date    Abnormal EKG 2014    Allergy     Arthritis      back    Bilateral carotid artery disease 2017    Cholelithiasis      US retroperitoneal 2016    COPD (chronic obstructive pulmonary disease)     Diabetes with neurologic complications     Diverticulitis     Diverticulosis      hosp/divert bleed    Ex-smoker     Glaucoma      suspect    Heart murmur     History of pneumothorax      bilat    HTN (hypertension)     Hyperlipidemia     Hyperlipidemia 2014    Hypothyroid     Iron deficiency anemia      nathan    Osteoporosis, unspecified  zeenat     Polyneuropathy     Prostate CA     PVD (peripheral vascular disease) 2014    Type 2 diabetes with peripheral circulatory disorder, controlled     Type 2 diabetes, controlled, with neuropathy Diagnosed     Urinary incontinence      Past Surgical History   Procedure Laterality Date    Cataract extraction      Pciol ou Bilateral 2011 OD/2010 OS     DR. DELGADILLO     section      Prostate surgery   approx     brachytherapy     Family History   Problem Relation Age of Onset    Diabetes Mother     Cancer Father      prostate    Stroke Father     Eczema Neg Hx     Lupus Neg Hx     Psoriasis Neg Hx     Melanoma Neg Hx      Social History     Social History    Marital status:      Spouse name: N/A    Number of children: 2    Years of education: N/A     Occupational History    retired      self employed     Social History Main Topics    Smoking status: Former Smoker     Quit date: 1993    Smokeless tobacco: Never Used    Alcohol use Yes      Comment: Champagne on     Drug use: No     Sexual activity: No     Other Topics Concern    None     Social History Narrative    Patient is a  and lives with his friend and sister. He is retired he was self employed.       Review of Systems  Cardiovascular: no chest pain  Chest: no shortness of breath  Abd: no abd pain  Remainder review of systems negative    Objective:      Physical Exam   Constitutional: He appears well-developed and well-nourished.   Cardiovascular: Normal rate and regular rhythm.    Pulmonary/Chest: Effort normal and breath sounds normal.       Assessment:       1. Type 2 diabetes mellitus with diabetic polyneuropathy, with long-term current use of insulin    2. Osteoporosis    3. Iron deficiency anemia, unspecified iron deficiency anemia type    4. Hypothyroidism due to acquired atrophy of thyroid    5. Hypertension associated with diabetes    6. History of prostate cancer    7. Panlobular emphysema        Plan:       **F/u me march same day other appt*  ; monitor wt and if still doing well then extend interval   Cont same meds  See last note at f/u

## 2017-02-01 ENCOUNTER — CLINICAL SUPPORT (OUTPATIENT)
Dept: CARDIOLOGY | Facility: CLINIC | Age: 82
End: 2017-02-01
Payer: MEDICARE

## 2017-02-01 DIAGNOSIS — E11.40 TYPE 2 DIABETES, CONTROLLED, WITH NEUROPATHY: ICD-10-CM

## 2017-02-01 DIAGNOSIS — I65.23 BILATERAL CAROTID ARTERY STENOSIS: ICD-10-CM

## 2017-02-01 DIAGNOSIS — I73.9 PVD (PERIPHERAL VASCULAR DISEASE): ICD-10-CM

## 2017-02-01 LAB
INTERNAL CAROTID STENOSIS: ABNORMAL
VASCULAR ANKLE BRACHIAL INDEX (ABI) RIGHT: 0.86 (ref 0.9–1.2)

## 2017-02-01 PROCEDURE — 93880 EXTRACRANIAL BILAT STUDY: CPT | Mod: S$GLB,,, | Performed by: INTERNAL MEDICINE

## 2017-02-01 PROCEDURE — 93922 UPR/L XTREMITY ART 2 LEVELS: CPT | Mod: S$GLB,,, | Performed by: INTERNAL MEDICINE

## 2017-02-02 ENCOUNTER — TELEPHONE (OUTPATIENT)
Dept: CARDIOLOGY | Facility: CLINIC | Age: 82
End: 2017-02-02

## 2017-02-02 NOTE — TELEPHONE ENCOUNTER
----- Message from Asuncion Goodwin MD sent at 2/1/2017 11:25 PM CST -----  50% rt carotid stenosis will observe  His pvd is unchanged.

## 2017-02-02 NOTE — TELEPHONE ENCOUNTER
Informed patient of carotid u/s results.  All questions were answered, and patient verbalized understanding.

## 2017-02-20 ENCOUNTER — OFFICE VISIT (OUTPATIENT)
Dept: URGENT CARE | Facility: CLINIC | Age: 82
End: 2017-02-20
Payer: MEDICARE

## 2017-02-20 ENCOUNTER — HOSPITAL ENCOUNTER (OUTPATIENT)
Dept: RADIOLOGY | Facility: HOSPITAL | Age: 82
Discharge: HOME | End: 2017-02-20
Attending: NURSE PRACTITIONER
Payer: MEDICARE

## 2017-02-20 VITALS
SYSTOLIC BLOOD PRESSURE: 110 MMHG | BODY MASS INDEX: 19.46 KG/M2 | DIASTOLIC BLOOD PRESSURE: 62 MMHG | TEMPERATURE: 102 F | HEART RATE: 81 BPM | HEIGHT: 69 IN | OXYGEN SATURATION: 93 % | WEIGHT: 131.38 LBS

## 2017-02-20 DIAGNOSIS — R68.89 FLU-LIKE SYMPTOMS: ICD-10-CM

## 2017-02-20 DIAGNOSIS — J11.1 INFLUENZA-LIKE ILLNESS: Primary | ICD-10-CM

## 2017-02-20 LAB
CTP QC/QA: YES
FLUAV AG NPH QL: NEGATIVE
FLUBV AG NPH QL: NEGATIVE

## 2017-02-20 PROCEDURE — 1125F AMNT PAIN NOTED PAIN PRSNT: CPT | Mod: S$GLB,,, | Performed by: NURSE PRACTITIONER

## 2017-02-20 PROCEDURE — 99999 PR PBB SHADOW E&M-EST. PATIENT-LVL V: CPT | Mod: PBBFAC,,, | Performed by: NURSE PRACTITIONER

## 2017-02-20 PROCEDURE — 71020 XR CHEST PA AND LATERAL: CPT | Mod: TC,PO

## 2017-02-20 PROCEDURE — 87804 INFLUENZA ASSAY W/OPTIC: CPT | Mod: QW,S$GLB,, | Performed by: NURSE PRACTITIONER

## 2017-02-20 PROCEDURE — 1159F MED LIST DOCD IN RCRD: CPT | Mod: S$GLB,,, | Performed by: NURSE PRACTITIONER

## 2017-02-20 PROCEDURE — 71020 XR CHEST PA AND LATERAL: CPT | Mod: 26,,, | Performed by: RADIOLOGY

## 2017-02-20 PROCEDURE — 99214 OFFICE O/P EST MOD 30 MIN: CPT | Mod: S$GLB,,, | Performed by: NURSE PRACTITIONER

## 2017-02-20 PROCEDURE — 1157F ADVNC CARE PLAN IN RCRD: CPT | Mod: S$GLB,,, | Performed by: NURSE PRACTITIONER

## 2017-02-20 RX ORDER — CODEINE PHOSPHATE AND GUAIFENESIN 10; 100 MG/5ML; MG/5ML
5 SOLUTION ORAL EVERY 6 HOURS PRN
Qty: 150 ML | Refills: 0 | Status: SHIPPED | OUTPATIENT
Start: 2017-02-20 | End: 2017-03-02

## 2017-02-20 RX ORDER — OSELTAMIVIR PHOSPHATE 75 MG/1
75 CAPSULE ORAL 2 TIMES DAILY
Qty: 10 CAPSULE | Refills: 0 | Status: SHIPPED | OUTPATIENT
Start: 2017-02-20 | End: 2017-02-25

## 2017-02-20 RX ORDER — ACETAMINOPHEN 325 MG/1
975 TABLET ORAL
Status: COMPLETED | OUTPATIENT
Start: 2017-02-20 | End: 2017-02-20

## 2017-02-20 RX ORDER — ACETAMINOPHEN 500 MG
1000 TABLET ORAL
Status: DISCONTINUED | OUTPATIENT
Start: 2017-02-20 | End: 2017-02-20

## 2017-02-20 RX ADMIN — ACETAMINOPHEN 975 MG: 325 TABLET ORAL at 03:02

## 2017-02-20 NOTE — PATIENT INSTRUCTIONS
· Your symptoms are caused by the influenza virus. Viral infections will not improve with antibiotics. If your symptoms persist >10 days without improvement or if you have any new or worsening symptoms, follow up with your primary care provider.  · You are considered contagious for up to 24 hours until after your fever is resolved. Fever is considered any reading > 100.4. Fever many times lasts between 3-7 days with flu viruses. It is very important to remain home from work/school until you are no longer contagious in order to help prevent spreading of the flu virus. Also make sure to take part in frequent hand washing and always cover your cough.  · Getting plenty of rest is very important to fighting infections.  · Increase fluids.   · May apply warm compresses as needed for facial pain and congestion.   · Saline nasal spray to loosen nasal congestion.  · Flonase or Nasacort to reduce inflammation in the sinus cavities.  · You may take an over the counter antihistamine for allergy symptoms such as sneezing, itchy/watery eyes, scratchy throat, or congestion.  · Take Tylenol or Ibuprofen as needed for sore throat, body aches, or fever (fever is considered any temperature > 100.4).  · Don't drive, drink alcohol, or take any other medication or substance that causes sedation while taking cough syrup.   · Go to the ER for any fever that does not improve with Tylenol/Ibuprofen, neck stiffness, rash, severe headache, vision changes, shortness of breath, chest pain, facial swelling, severe facial pain, or any other new and concerning symptoms.       Influenza (Adult)    Influenza is also called the flu. It is a viral illness that affects the air passages of your lungs. It is different from the common cold. The flu can easily be passed from one to person to another. It may be spread through the air by coughing and sneezing. Or it can be spread by touching the sick person and then touching your own eyes, nose, or  mouth.  The flu starts 1 to 3 days after you are exposed to the flu virus. It may last for 1 to 2 weeks. You usually dont need to take antibiotics unless you have a complication. This might be an ear or sinus infection or pneumonia.  Symptoms of the flu may be mild or severe. They can include extreme tiredness (wanting to stay in bed all day), chills, fevers, muscle aches, soreness with eye movement, headache, and a dry, hacking cough.  Home care  Follow these guidelines when caring for yourself at home:  · Avoid being around cigarette smoke, whether yours or other peoples.  · Acetaminophen or ibuprofen will help ease your fever, muscle aches, and headache. Dont give aspirin to anyone younger than 18 who has the flu. Aspirin can harm the liver.  · Nausea and loss of appetite are common with the flu. Eat light meals. Drink 6 to 8 glasses of liquids every day. Good choices are water, sport drinks, soft drinks without caffeine, juices, tea, and soup. Extra fluids will also help loosen secretions in your nose and lungs.  · Over-the-counter cold medicines will not make the flu go away faster. But the medicines may help with coughing, sore throat, and congestion in your nose and sinuses. Dont use a decongestant if you have high blood pressure.  · Stay home until your fever has been gone for at least 24 hours without using medicine to reduce fever.  Follow-up care  Follow up with your healthcare provider, or as advised, if you are not getting better over the next week.  If you are 65 or older, talk with your provider about getting a pneumococcal vaccine every 5 years. You should also get this vaccine if you have chronic asthma or COPD. All adults should get a flu vaccine every fall. Ask your provider about this.  When to seek medical advice  Call your healthcare provider right away if any of these occur:  · Cough with lots of colored sputum (mucus) or blood in your sputum  · Chest pain, shortness of breath, wheezing,  or difficulty breathing  · Severe headache, or face, neck, or ear pain  · New rash with fever  · Fever of 100.4°F (38°C) or higher, or as directed by your healthcare provider  · Confusion, behavior change, or seizure  · Severe weakness or dizziness  · You get a fever or cough after getting better for a few days  Date Last Reviewed: 12/23/2014  © 0443-6247 [employer]. [address] [place] [address], [place] [address]. All rights reserved. This information is not intended as a substitute for professional medical care. Always follow your healthcare professional's instructions.

## 2017-02-20 NOTE — PROGRESS NOTES
"Subjective:      Patient ID: Good Murillo Jr. is a 89 y.o. male.    Chief Complaint: Generalized Body Aches    Influenza   This is a new problem. Episode onset: 2 days ago. The problem occurs constantly. The problem has been unchanged. Associated symptoms include a change in bowel habit (few loose stools 2-3 per day), chills, congestion, coughing (mild, non-productive), fatigue, a fever, headaches and myalgias. Pertinent negatives include no nausea, rash, sore throat or vomiting. Nothing aggravates the symptoms. He has tried acetaminophen for the symptoms. The treatment provided mild relief.     Review of Systems   Constitutional: Positive for chills, fatigue and fever.   HENT: Positive for congestion. Negative for sore throat.    Respiratory: Positive for cough (mild, non-productive).    Gastrointestinal: Positive for change in bowel habit (few loose stools 2-3 per day). Negative for nausea and vomiting.   Musculoskeletal: Positive for myalgias.   Skin: Negative for rash.   Neurological: Positive for headaches.       Objective:     Visit Vitals    /62    Pulse 81    Temp (!) 101.5 °F (38.6 °C)    Ht 5' 9" (1.753 m)    Wt 59.6 kg (131 lb 6.3 oz)    SpO2 (!) 93%    BMI 19.4 kg/m2     Physical Exam   Constitutional: He is oriented to person, place, and time. He appears well-developed and well-nourished. He is active and cooperative. No distress.   HENT:   Head: Normocephalic and atraumatic.   Right Ear: Tympanic membrane normal.   Left Ear: Tympanic membrane normal.   Nose: Nose normal.   Mouth/Throat: Uvula is midline, oropharynx is clear and moist and mucous membranes are normal.   Eyes: Right eye exhibits no discharge. Left eye exhibits no discharge.   Neck: Normal range of motion. Neck supple.   Cardiovascular: Regular rhythm and normal heart sounds.    Pulmonary/Chest: Effort normal and breath sounds normal. He has no wheezes.   Musculoskeletal: Normal range of motion.   Lymphadenopathy:     He has " no cervical adenopathy.   Neurological: He is alert and oriented to person, place, and time.   Skin: Skin is warm. No rash noted. He is not diaphoretic.   Nursing note and vitals reviewed.    Assessment:      1. Influenza-like illness       Plan:   Influenza-like illness  -     POCT Influenza A/B  -     Discontinue: acetaminophen tablet 1,000 mg; Take 2 tablets (1,000 mg total) by mouth one time.  -     X-Ray Chest PA And Lateral; Future; Expected date: 2/20/17  -     acetaminophen tablet 975 mg; Take 3 tablets (975 mg total) by mouth one time.  -     guaifenesin-codeine 100-10 mg/5 ml (TUSSI-ORGANIDIN NR)  mg/5 mL syrup; Take 5 mLs by mouth every 6 (six) hours as needed for Cough.  Dispense: 150 mL; Refill: 0  -     oseltamivir (TAMIFLU) 75 MG capsule; Take 1 capsule (75 mg total) by mouth 2 (two) times daily.  Dispense: 10 capsule; Refill: 0    Rapid flu screen is negative, however, due to high prevalence of flu in the community and high rate of false negatives with flu screen, discussed option for treatment with Tamiflu. Mr. Murillo would like to try treatment.   Chest X-ray does not indicate any evidence of pneumonia.  Advised of drowsiness with cough medication.  Instructions, follow up, and supportive care as per AVS.  Follow up with PCP if not improved or for any new or worsening symptoms.  AVS provided and reviewed with patient including importance of antibiotic compliance, supportive care, follow up, and red flag symptoms. Patient verbalizes understanding and agrees with treatment plan. Discharged from Urgent Care in stable condition.

## 2017-02-20 NOTE — MR AVS SNAPSHOT
[facility]  [address]  [place] [address]  Phone: [phone]  Fax: [phone]                  Good Murillo Jr.   2017 2:00 PM   Office Visit    Description:  Male : 1927   Provider:  Noreen Catalan NP   Department:  [facility]           Reason for Visit     Generalized Body Aches           Diagnoses this Visit        Comments    Influenza-like illness    -  Primary            To Do List           Future Appointments        Provider Department Dept Phone    3/15/2017 12:30 PM Damian Issa Jr., MORENA [facility] - Diabetes Management [phone]    3/20/2017 1:20 PM Devan Eastman MD [facility] [phone]    3/20/2017 2:40 PM Carmita Jones DPM [facility] [phone]    2017 8:05 AM LAB[facility]ATORY, [facility] [phone]    2017 8:10 AM SPECIMEN, [facility] [phone]      Goals (5 Years of Data)     None      Follow-Up and Disposition     Return if symptoms worsen or fail to improve.       These Medications        Disp Refills Start End    guaifenesin-codeine 100-10 mg/5 ml (TUSSI-ORGANIDIN NR)  mg/5 mL syrup 150 mL 0 2017 3/2/2017    Take 5 mLs by mouth every 6 (six) hours as needed for Cough. - Oral    Pharmacy: [facility] [address] [place] [address] Ph #: [phone]       oseltamivir (TAMIFLU) 75 MG capsule 10 capsule 0 2017    Take 1 capsule (75 mg total) by mouth 2 (two) times daily. - Oral    Pharmacy: [facility] [address] Ph #: [phone]         [facility]     [facility] Nurse Care [facility] -  Assistance  Registered nurses in the [facility] provide clinical advisement, health education, appointment booking, and other advisory services.  Call for this free service at [phone].             Medications           Message regarding  Medications     Verify the changes and/or additions to your medication regime listed below are the same as discussed with your clinician today.  If any of these changes or additions are incorrect, please notify your healthcare provider.        START taking these NEW medications        Refills    guaifenesin-codeine 100-10 mg/5 ml (TUSSI-ORGANIDIN NR)  mg/5 mL syrup 0    Sig: Take 5 mLs by mouth every 6 (six) hours as needed for Cough.    Class: Print    Route: Oral    oseltamivir (TAMIFLU) 75 MG capsule 0    Sig: Take 1 capsule (75 mg total) by mouth 2 (two) times daily.    Class: Normal    Route: Oral      These medications were administered today        Dose Freq    acetaminophen tablet 975 mg 975 mg Clinic/HOD 1 time    Sig: Take 3 tablets (975 mg total) by mouth one time.    Class: Normal    Route: Oral           Verify that the below list of medications is an accurate representation of the medications you are currently taking.  If none reported, the list may be blank. If incorrect, please contact your healthcare provider. Carry this list with you in case of emergency.           Current Medications     albuterol 90 mcg/actuation inhaler Inhale 2 puffs into the lungs every 6 (six) hours.    alendronate (FOSAMAX) 70 MG tablet Take 1 tablet (70 mg total) by mouth every 7 days.    aspirin 81 mg Tab Take 1 tablet by mouth Daily. Over counter. To help prevent stroke/ heart attack    blood sugar diagnostic Strp 1 strip by Misc.(Non-Drug; Combo Route) route 3 (three) times daily. True Metrix test strips    calcium carbonate (OS-TANGELA) 500 mg calcium (1,250 mg) tablet Take 1 tablet by mouth once daily.    insulin glargine (LANTUS) 100 unit/mL injection Inject 10 Units into the skin once daily.    ipratropium (ATROVENT) 0.06 % nasal spray 2 sprays by Nasal route 3 (three) times daily.    lancets 33 gauge Misc 1 lancet by Misc.(Non-Drug; Combo Route) route 3 (three) times daily.    levothyroxine (SYNTHROID) 25 MCG  "tablet     loratadine (CLARITIN) 10 mg tablet Take 1 tablet by mouth Once daily as needed.    metformin (GLUCOPHAGE) 1000 MG tablet TAKE ONE TABLET BY MOUTH TWICE DAILY    nateglinide (STARLIX) 60 MG tablet Take 1 tablet (60 mg total) by mouth 3 (three) times daily before meals.    omeprazole (PRILOSEC) 40 MG capsule Take 1 capsule (40 mg total) by mouth once daily.    pravastatin (PRAVACHOL) 40 MG tablet Take 1 tablet (40 mg total) by mouth once daily.    tamsulosin (FLOMAX) 0.4 mg Cp24 Take 1 capsule (0.4 mg total) by mouth once daily.    TENS unit and electrodes Cmpk 1 Device by Misc.(Non-Drug; Combo Route) route daily as needed.    tiotropium (SPIRIVA WITH HANDIHALER) 18 mcg inhalation capsule Inhale 1 capsule (18 mcg total) into the lungs once daily.    guaifenesin-codeine 100-10 mg/5 ml (TUSSI-ORGANIDIN NR)  mg/5 mL syrup Take 5 mLs by mouth every 6 (six) hours as needed for Cough.    oseltamivir (TAMIFLU) 75 MG capsule Take 1 capsule (75 mg total) by mouth 2 (two) times daily.           Clinical Reference Information           Your Vitals Were     BP Pulse Temp Height Weight SpO2    110/62 81 101.5 °F (38.6 °C) 5' 9" (1.753 m) 59.6 kg (131 lb 6.3 oz) 93%    BMI                19.4 kg/m2          Blood Pressure          Most Recent Value    BP  110/62      Allergies as of 2/20/2017     Gabapentin      Immunizations Administered on Date of Encounter - 2/20/2017     None      Orders Placed During Today's Visit      Normal Orders This Visit    POCT Influenza A/B     Future Labs/Procedures Expected by Expires    X-Ray Chest PA And Lateral  2/20/2017 2/21/2018 2/20/2017  2:23 PM - Ruth Travis LPN      Component Results     Component Value Flag Ref Range Units Status    Rapid Influenza A Ag Negative  Negative  Final    Rapid Influenza B Ag Negative  Negative  Final     Acceptable Yes    Final            Administrations This Visit     acetaminophen tablet 975 mg     Admin Date Action " Dose Route Administered By             02/20/2017 Given 975 mg Oral Ruth Travis LPN                      Instructions    · Your symptoms are caused by the influenza virus. Viral infections will not improve with antibiotics. If your symptoms persist >10 days without improvement or if you have any new or worsening symptoms, follow up with your primary care provider.  · You are considered contagious for up to 24 hours until after your fever is resolved. Fever is considered any reading > 100.4. Fever many times lasts between 3-7 days with flu viruses. It is very important to remain home from work/school until you are no longer contagious in order to help prevent spreading of the flu virus. Also make sure to take part in frequent hand washing and always cover your cough.  · Getting plenty of rest is very important to fighting infections.  · Increase fluids.   · May apply warm compresses as needed for facial pain and congestion.   · Saline nasal spray to loosen nasal congestion.  · Flonase or Nasacort to reduce inflammation in the sinus cavities.  · You may take an over the counter antihistamine for allergy symptoms such as sneezing, itchy/watery eyes, scratchy throat, or congestion.  · Take Tylenol or Ibuprofen as needed for sore throat, body aches, or fever (fever is considered any temperature > 100.4).  · Don't drive, drink alcohol, or take any other medication or substance that causes sedation while taking cough syrup.   · Go to the ER for any fever that does not improve with Tylenol/Ibuprofen, neck stiffness, rash, severe headache, vision changes, shortness of breath, chest pain, facial swelling, severe facial pain, or any other new and concerning symptoms.       Influenza (Adult)    Influenza is also called the flu. It is a viral illness that affects the air passages of your lungs. It is different from the common cold. The flu can easily be passed from one to person to another. It may be spread through the air  by coughing and sneezing. Or it can be spread by touching the sick person and then touching your own eyes, nose, or mouth.  The flu starts 1 to 3 days after you are exposed to the flu virus. It may last for 1 to 2 weeks. You usually dont need to take antibiotics unless you have a complication. This might be an ear or sinus infection or pneumonia.  Symptoms of the flu may be mild or severe. They can include extreme tiredness (wanting to stay in bed all day), chills, fevers, muscle aches, soreness with eye movement, headache, and a dry, hacking cough.  Home care  Follow these guidelines when caring for yourself at home:  · Avoid being around cigarette smoke, whether yours or other peoples.  · Acetaminophen or ibuprofen will help ease your fever, muscle aches, and headache. Dont give aspirin to anyone younger than 18 who has the flu. Aspirin can harm the liver.  · Nausea and loss of appetite are common with the flu. Eat light meals. Drink 6 to 8 glasses of liquids every day. Good choices are water, sport drinks, soft drinks without caffeine, juices, tea, and soup. Extra fluids will also help loosen secretions in your nose and lungs.  · Over-the-counter cold medicines will not make the flu go away faster. But the medicines may help with coughing, sore throat, and congestion in your nose and sinuses. Dont use a decongestant if you have high blood pressure.  · Stay home until your fever has been gone for at least 24 hours without using medicine to reduce fever.  Follow-up care  Follow up with your healthcare provider, or as advised, if you are not getting better over the next week.  If you are 65 or older, talk with your provider about getting a pneumococcal vaccine every 5 years. You should also get this vaccine if you have chronic asthma or COPD. All adults should get a flu vaccine every fall. Ask your provider about this.  When to seek medical advice  Call your healthcare provider right away if any of these  occur:  · Cough with lots of colored sputum (mucus) or blood in your sputum  · Chest pain, shortness of breath, wheezing, or difficulty breathing  · Severe headache, or face, neck, or ear pain  · New rash with fever  · Fever of 100.4°F (38°C) or higher, or as directed by your healthcare provider  · Confusion, behavior change, or seizure  · Severe weakness or dizziness  · You get a fever or cough after getting better for a few days  Date Last Reviewed: 12/23/2014  © 7973-1724 [employer]. [address] [place] [address]. All rights reserved. This information is not intended as a substitute for professional medical care. Always follow your healthcare professional's instructions.             Language Assistance Services     ATTENTION: Language assistance services are available, free of charge. Please call [phone].      ATENCIÓN: Si kota friedman, tiene a watters disposición servicios gratuitos de asistencia lingüística. Llame al [phone].     CHÚ Ý: N?u b?n nói Ti?ng Vi?t, có các d?ch v? h? tr? ngôn ng? mi?n phí dành cho b?n. G?i s? [phone].         [facility] complies with applicable Federal civil rights laws and does not discriminate on the basis of race, color, national origin, age, disability, or sex.

## 2017-02-28 ENCOUNTER — TELEPHONE (OUTPATIENT)
Dept: INTERNAL MEDICINE | Facility: CLINIC | Age: 82
End: 2017-02-28

## 2017-02-28 ENCOUNTER — OFFICE VISIT (OUTPATIENT)
Dept: INTERNAL MEDICINE | Facility: CLINIC | Age: 82
End: 2017-02-28
Payer: MEDICARE

## 2017-02-28 ENCOUNTER — HOSPITAL ENCOUNTER (OUTPATIENT)
Dept: RADIOLOGY | Facility: HOSPITAL | Age: 82
Discharge: HOME | End: 2017-02-28
Attending: NURSE PRACTITIONER
Payer: MEDICARE

## 2017-02-28 VITALS
DIASTOLIC BLOOD PRESSURE: 58 MMHG | HEIGHT: 69 IN | WEIGHT: 131.81 LBS | HEART RATE: 68 BPM | TEMPERATURE: 97 F | OXYGEN SATURATION: 93 % | SYSTOLIC BLOOD PRESSURE: 112 MMHG | BODY MASS INDEX: 19.52 KG/M2

## 2017-02-28 DIAGNOSIS — R53.81 MALAISE: ICD-10-CM

## 2017-02-28 DIAGNOSIS — R05.9 COUGH: ICD-10-CM

## 2017-02-28 DIAGNOSIS — R63.0 DECREASED APPETITE: ICD-10-CM

## 2017-02-28 DIAGNOSIS — R05.9 COUGH: Primary | ICD-10-CM

## 2017-02-28 DIAGNOSIS — J43.9 PULMONARY EMPHYSEMA, UNSPECIFIED EMPHYSEMA TYPE: ICD-10-CM

## 2017-02-28 PROCEDURE — 99499 UNLISTED E&M SERVICE: CPT | Mod: S$GLB,,, | Performed by: NURSE PRACTITIONER

## 2017-02-28 PROCEDURE — 71020 XR CHEST PA AND LATERAL: CPT | Mod: TC,PO

## 2017-02-28 PROCEDURE — 71020 XR CHEST PA AND LATERAL: CPT | Mod: 26,,, | Performed by: RADIOLOGY

## 2017-02-28 PROCEDURE — 1126F AMNT PAIN NOTED NONE PRSNT: CPT | Mod: S$GLB,,, | Performed by: NURSE PRACTITIONER

## 2017-02-28 PROCEDURE — 99213 OFFICE O/P EST LOW 20 MIN: CPT | Mod: S$GLB,,, | Performed by: NURSE PRACTITIONER

## 2017-02-28 PROCEDURE — 99999 PR PBB SHADOW E&M-EST. PATIENT-LVL IV: CPT | Mod: PBBFAC,,, | Performed by: NURSE PRACTITIONER

## 2017-02-28 PROCEDURE — 1159F MED LIST DOCD IN RCRD: CPT | Mod: S$GLB,,, | Performed by: NURSE PRACTITIONER

## 2017-02-28 PROCEDURE — 1160F RVW MEDS BY RX/DR IN RCRD: CPT | Mod: S$GLB,,, | Performed by: NURSE PRACTITIONER

## 2017-02-28 PROCEDURE — 1157F ADVNC CARE PLAN IN RCRD: CPT | Mod: S$GLB,,, | Performed by: NURSE PRACTITIONER

## 2017-02-28 NOTE — MR AVS SNAPSHOT
[facility]  [address] [place] [address]  Phone: [phone]  Fax: [phone]                  Good Murillo Jr.   2017 2:00 PM   Office Visit    Description:  Male : 1927   Provider:  ZULY Perez   Department:  [facility] Internal [facility]           Reason for Visit     Cough     Nasal Congestion     Fatigue           Diagnoses this Visit        Comments    Cough    -  Primary     Malaise         Pulmonary emphysema, unspecified emphysema type         Decreased appetite                To Do List           Future Appointments        Provider Department Dept Phone    2017 3:30 PM LABORATORY, [facility] [phone]    2017 3:40 PM SPECIMEN, [facility] [phone]    3/15/2017 12:30 PM Damian Issa Jr., MORE[facility] [phone]    3/20/2017 1:20 PM Devan Eastman MD [facility] [phone]    3/20/2017 2:40 PM Carmita Jones DPM [facility] Pod[facility] [phone]      Goals (5 Years of Data)     None      Follow-Up and Disposition     Return if symptoms worsen or fail to improve.      [facility]     [facility] Care [facility] - 24 Assistance  Registered nurses in the [facility] provide clinical advisement, health education, appointment booking, and other advisory services.  Call for this free service at [phone].             Medications           Message regarding Medications     Verify the changes and/or additions to your medication regime listed below are the same as discussed with your clinician today.  If any of these changes or additions are incorrect, please notify your healthcare provider.             Verify that the below list of medications is an accurate representation of the medications you are currently taking.  If none reported, the list may be blank. If incorrect, please contact your healthcare provider. Carry this list  with you in case of emergency.           Current Medications     albuterol 90 mcg/actuation inhaler Inhale 2 puffs into the lungs every 6 (six) hours.    alendronate (FOSAMAX) 70 MG tablet Take 1 tablet (70 mg total) by mouth every 7 days.    aspirin 81 mg Tab Take 1 tablet by mouth Daily. Over counter. To help prevent stroke/ heart attack    blood sugar diagnostic Strp 1 strip by Misc.(Non-Drug; Combo Route) route 3 (three) times daily. True Metrix test strips    calcium carbonate (OS-TANGELA) 500 mg calcium (1,250 mg) tablet Take 1 tablet by mouth once daily.    guaifenesin-codeine 100-10 mg/5 ml (TUSSI-ORGANIDIN NR)  mg/5 mL syrup Take 5 mLs by mouth every 6 (six) hours as needed for Cough.    insulin glargine (LANTUS) 100 unit/mL injection Inject 10 Units into the skin once daily.    ipratropium (ATROVENT) 0.06 % nasal spray 2 sprays by Nasal route 3 (three) times daily.    lancets 33 gauge Misc 1 lancet by Misc.(Non-Drug; Combo Route) route 3 (three) times daily.    levothyroxine (SYNTHROID) 25 MCG tablet     loratadine (CLARITIN) 10 mg tablet Take 1 tablet by mouth Once daily as needed.    metformin (GLUCOPHAGE) 1000 MG tablet TAKE ONE TABLET BY MOUTH TWICE DAILY    nateglinide (STARLIX) 60 MG tablet Take 1 tablet (60 mg total) by mouth 3 (three) times daily before meals.    omeprazole (PRILOSEC) 40 MG capsule Take 1 capsule (40 mg total) by mouth once daily.    pravastatin (PRAVACHOL) 40 MG tablet Take 1 tablet (40 mg total) by mouth once daily.    tamsulosin (FLOMAX) 0.4 mg Cp24 Take 1 capsule (0.4 mg total) by mouth once daily.    TENS unit and electrodes Cmpk 1 Device by Misc.(Non-Drug; Combo Route) route daily as needed.    tiotropium (SPIRIVA WITH HANDIHALER) 18 mcg inhalation capsule Inhale 1 capsule (18 mcg total) into the lungs once daily.           Clinical Reference Information           Your Vitals Were     BP                   112/58 (BP Location: Right arm, Patient Position: Sitting)            Blood Pressure          Most Recent Value    BP  (!)  112/58      Allergies as of 2/28/2017     Gabapentin      Immunizations Administered on Date of Encounter - 2/28/2017     None      Orders Placed During Today's Visit     Future Labs/Procedures Expected by Expires    CBC auto differential  2/28/2017 4/29/2018    CULTURE, URINE  2/28/2017 4/29/2018    Urinalysis  2/28/2017 3/3/2017    X-Ray Chest PA And Lateral  2/28/2017 2/28/2018    Basic metabolic panel  8/27/2017 2/28/2018      Language Assistance Services     ATTENTION: Language assistance services are available, free of charge. Please call [phone].      ATENCIÓN: Si habla espedvin, tiene a watters disposición servicios gratuitos de asistencia lingüística. Llame al [phone].     CHÚ Ý: N?u b?n nói Ti?ng Vi?t, có các d?ch v? h? tr? ngôn ng? mi?n phí dành cho b?n. G?i s? [phone].         [facility] complies with applicable Federal civil rights laws and does not discriminate on the basis of race, color, national origin, age, disability, or sex.

## 2017-02-28 NOTE — PROGRESS NOTES
Subjective:   Patient ID: Good Murillo Jr. is a 89 y.o. male.    Chief Complaint: Cough; Nasal Congestion; and Fatigue    HPI Comments: Pt. Presents today with his care taker for several complaints. He was seen by  on 2/20/17 for c/o cough and fever. CXR did not reveal any pneumonia and flu test was negative. However, based on his symptoms he was tx with tamiflu. He presents today b/c he is still having a cough - productive but unable to cough up anything. Taking guaf/codeine for the cough. Pt had some confusion for few minutes the other night - resolved and no further occurrences. Occurred after taking the cough med w/ codeine. Has hx of COPD - on chronic home 02. His caretaker is concerned b/c his appetite has decreased and not drinking as much as he normally does. Reports adequate urinary out put with no urin. Symptoms.     Has had no more fever. Feels like the cough is about the same as it has been.     Review of Systems   Constitutional: Positive for appetite change, chills and fatigue. Negative for fever.   HENT: Positive for postnasal drip. Negative for congestion, ear pain, rhinorrhea, sinus pressure and sore throat.    Respiratory: Positive for cough and shortness of breath. Negative for chest tightness and wheezing.    Cardiovascular: Negative for chest pain.   Gastrointestinal: Negative for abdominal pain, diarrhea, nausea and vomiting.   Genitourinary: Negative for difficulty urinating, dysuria, flank pain, frequency, hematuria and urgency.   Musculoskeletal: Negative for myalgias.   Neurological: Negative for dizziness, syncope, speech difficulty, light-headedness and headaches.   Psychiatric/Behavioral: Positive for confusion (caretaker reports some confustion after cough med - it has resolved).       Objective:      Physical Exam   Constitutional: He is oriented to person, place, and time. He appears well-developed and well-nourished. No distress.   HENT:   Head: Normocephalic and atraumatic.    Right Ear: Tympanic membrane, external ear and ear canal normal.   Left Ear: Tympanic membrane, external ear and ear canal normal.   Nose: Mucosal edema present. Right sinus exhibits no maxillary sinus tenderness and no frontal sinus tenderness. Left sinus exhibits no maxillary sinus tenderness and no frontal sinus tenderness.   Mouth/Throat: Uvula is midline and oropharynx is clear and moist.   Cardiovascular: Normal rate, regular rhythm and normal heart sounds.    Pulmonary/Chest: Effort normal and breath sounds normal. No respiratory distress. He has no wheezes. He has no rales.   Musculoskeletal: Normal range of motion.   Lymphadenopathy:     He has no cervical adenopathy.   Neurological: He is alert and oriented to person, place, and time.   Skin: Skin is warm and dry. He is not diaphoretic.   Psychiatric: He has a normal mood and affect. His behavior is normal. Judgment and thought content normal.   Nursing note and vitals reviewed.      Assessment:       1. Cough    2. Malaise    3. Pulmonary emphysema, unspecified emphysema type    4. Decreased appetite        Plan:   Cough - has completed tamiflu. No more fever  -     X-Ray Chest PA And Lateral; Future; Expected date: 2/28/17  -    stop the guaf/codeine and take plain mucinex since he had some confusion with the prior cough med      Malaise  -     CBC auto differential; Future; Expected date: 2/28/17  -     Basic metabolic panel; Future; Expected date: 8/27/17  -     Urinalysis; Future; Expected date: 2/28/17  -     CULTURE, URINE; Future; Expected date: 2/28/17  -     Increase fluids     Pulmonary emphysema, unspecified emphysema type    Decreased appetite    To ER if symptoms worsen    F/u with me on Friday        Return if symptoms worsen or fail to improve.

## 2017-03-02 ENCOUNTER — TELEPHONE (OUTPATIENT)
Dept: INTERNAL MEDICINE | Facility: CLINIC | Age: 82
End: 2017-03-02

## 2017-03-02 NOTE — TELEPHONE ENCOUNTER
----- Message from Trinidad Quinones sent at 3/2/2017 10:45 AM CST -----  Contact: pt caregiver Daniel Cade is calling Nurse staff regarding a conference meeting with the Doctor to discuss patient conditions for Next March 10 th.with a time. The daughter Antonio [place] and would need to know in advance if this will be okay. Call back Daniel [phone]  thanks

## 2017-03-02 NOTE — TELEPHONE ENCOUNTER
----- Message from Staci Chao sent at 3/2/2017  2:58 PM CST -----  Contact: pt   Pt returning nurses call,,, please call back at [phone]

## 2017-03-02 NOTE — TELEPHONE ENCOUNTER
----- Message from Delilah Velez sent at 3/2/2017 12:32 PM CST -----  Contact: Daniel- Care [phone]   Would like to schedule a consult appt  with the doctor, concerning patient's care.  Would like to be worked in for an appt for the daughter to come in and speak with the doctor on March 16th .   Would like to know the time of appt.  Please call back @ [phone].  Thanks-[facility]

## 2017-03-15 ENCOUNTER — LAB VISIT (OUTPATIENT)
Dept: LAB | Facility: HOSPITAL | Age: 82
End: 2017-03-15
Attending: PEDIATRICS
Payer: MEDICARE

## 2017-03-15 ENCOUNTER — OFFICE VISIT (OUTPATIENT)
Dept: DIABETES | Facility: CLINIC | Age: 82
End: 2017-03-15
Payer: MEDICARE

## 2017-03-15 VITALS
WEIGHT: 131.38 LBS | DIASTOLIC BLOOD PRESSURE: 80 MMHG | SYSTOLIC BLOOD PRESSURE: 156 MMHG | BODY MASS INDEX: 19.91 KG/M2 | HEIGHT: 68 IN

## 2017-03-15 DIAGNOSIS — Z79.4 TYPE 2 DIABETES MELLITUS WITH DIABETIC POLYNEUROPATHY, WITH LONG-TERM CURRENT USE OF INSULIN: Primary | ICD-10-CM

## 2017-03-15 DIAGNOSIS — E11.42 TYPE 2 DIABETES MELLITUS WITH DIABETIC POLYNEUROPATHY, WITH LONG-TERM CURRENT USE OF INSULIN: Primary | ICD-10-CM

## 2017-03-15 DIAGNOSIS — Z79.4 TYPE 2 DIABETES MELLITUS WITH DIABETIC POLYNEUROPATHY, WITH LONG-TERM CURRENT USE OF INSULIN: ICD-10-CM

## 2017-03-15 DIAGNOSIS — E11.42 TYPE 2 DIABETES MELLITUS WITH DIABETIC POLYNEUROPATHY, WITH LONG-TERM CURRENT USE OF INSULIN: ICD-10-CM

## 2017-03-15 DIAGNOSIS — E11.9 TYPE II OR UNSPECIFIED TYPE DIABETES MELLITUS WITHOUT MENTION OF COMPLICATION, NOT STATED AS UNCONTROLLED: ICD-10-CM

## 2017-03-15 DIAGNOSIS — R63.4 UNEXPLAINED WEIGHT LOSS: ICD-10-CM

## 2017-03-15 DIAGNOSIS — E11.9 TYPE II OR UNSPECIFIED TYPE DIABETES MELLITUS WITHOUT MENTION OF COMPLICATION, NOT STATED AS UNCONTROLLED: Primary | ICD-10-CM

## 2017-03-15 LAB
ANION GAP SERPL CALC-SCNC: 6 MMOL/L
BASOPHILS # BLD AUTO: 0.02 K/UL
BASOPHILS NFR BLD: 0.3 %
BUN SERPL-MCNC: 21 MG/DL
CALCIUM SERPL-MCNC: 9.7 MG/DL
CHLORIDE SERPL-SCNC: 101 MMOL/L
CHOLEST/HDLC SERPL: 4.9 {RATIO}
CO2 SERPL-SCNC: 32 MMOL/L
CREAT SERPL-MCNC: 1 MG/DL
DIFFERENTIAL METHOD: ABNORMAL
EOSINOPHIL # BLD AUTO: 0.1 K/UL
EOSINOPHIL NFR BLD: 1.3 %
ERYTHROCYTE [DISTWIDTH] IN BLOOD BY AUTOMATED COUNT: 14.2 %
EST. GFR  (AFRICAN AMERICAN): >60 ML/MIN/1.73 M^2
EST. GFR  (NON AFRICAN AMERICAN): >60 ML/MIN/1.73 M^2
GLUCOSE SERPL-MCNC: 150 MG/DL
GLUCOSE SERPL-MCNC: 232 MG/DL (ref 70–110)
HCT VFR BLD AUTO: 35.8 %
HDL/CHOLESTEROL RATIO: 20.6 %
HDLC SERPL-MCNC: 199 MG/DL
HDLC SERPL-MCNC: 41 MG/DL
HGB BLD-MCNC: 11.3 G/DL
LDLC SERPL CALC-MCNC: 136.2 MG/DL
LYMPHOCYTES # BLD AUTO: 2 K/UL
LYMPHOCYTES NFR BLD: 25.7 %
MCH RBC QN AUTO: 30.3 PG
MCHC RBC AUTO-ENTMCNC: 31.6 %
MCV RBC AUTO: 96 FL
MONOCYTES # BLD AUTO: 0.7 K/UL
MONOCYTES NFR BLD: 8.5 %
NEUTROPHILS # BLD AUTO: 5 K/UL
NEUTROPHILS NFR BLD: 64.1 %
NONHDLC SERPL-MCNC: 158 MG/DL
PLATELET # BLD AUTO: 592 K/UL
PMV BLD AUTO: 10.5 FL
POTASSIUM SERPL-SCNC: 4.2 MMOL/L
PROT SERPL-MCNC: 7.3 G/DL
RBC # BLD AUTO: 3.73 M/UL
SODIUM SERPL-SCNC: 139 MMOL/L
TRIGL SERPL-MCNC: 109 MG/DL
TSH SERPL DL<=0.005 MIU/L-ACNC: 2.68 UIU/ML
WBC # BLD AUTO: 7.78 K/UL

## 2017-03-15 PROCEDURE — 1159F MED LIST DOCD IN RCRD: CPT | Mod: S$GLB,,, | Performed by: PHYSICIAN ASSISTANT

## 2017-03-15 PROCEDURE — 99214 OFFICE O/P EST MOD 30 MIN: CPT | Mod: S$GLB,,, | Performed by: PHYSICIAN ASSISTANT

## 2017-03-15 PROCEDURE — 99499 UNLISTED E&M SERVICE: CPT | Mod: S$GLB,,, | Performed by: PHYSICIAN ASSISTANT

## 2017-03-15 PROCEDURE — 36415 COLL VENOUS BLD VENIPUNCTURE: CPT | Mod: PO

## 2017-03-15 PROCEDURE — 1160F RVW MEDS BY RX/DR IN RCRD: CPT | Mod: S$GLB,,, | Performed by: PHYSICIAN ASSISTANT

## 2017-03-15 PROCEDURE — 80048 BASIC METABOLIC PNL TOTAL CA: CPT

## 2017-03-15 PROCEDURE — 85025 COMPLETE CBC W/AUTO DIFF WBC: CPT

## 2017-03-15 PROCEDURE — 99999 PR PBB SHADOW E&M-EST. PATIENT-LVL III: CPT | Mod: PBBFAC,,, | Performed by: PHYSICIAN ASSISTANT

## 2017-03-15 PROCEDURE — 84155 ASSAY OF PROTEIN SERUM: CPT

## 2017-03-15 PROCEDURE — 80061 LIPID PANEL: CPT

## 2017-03-15 PROCEDURE — 82948 REAGENT STRIP/BLOOD GLUCOSE: CPT | Mod: S$GLB,,, | Performed by: PHYSICIAN ASSISTANT

## 2017-03-15 PROCEDURE — 84443 ASSAY THYROID STIM HORMONE: CPT

## 2017-03-15 PROCEDURE — 1157F ADVNC CARE PLAN IN RCRD: CPT | Mod: S$GLB,,, | Performed by: PHYSICIAN ASSISTANT

## 2017-03-15 PROCEDURE — 83036 HEMOGLOBIN GLYCOSYLATED A1C: CPT

## 2017-03-15 NOTE — PROGRESS NOTES
Subjective:      Patient ID: Good Murillo Jr. is a 89 y.o. male.    PCP: Devan Eastman MD      Good Murillo is a pleasant 89 y.o. male accompanied by his daughter and presenting to follow up on diabetes mellitus. He has had diabetes for 20 or more years. His last visit in Diabetes Management was 12/16/2016 Since that time he has had no improvement in his symptoms. His blood sugar range fasting has been 200+ and 2 hour post meal has been not taking, and he has been monitoring 1-2 times per day as directed. His current concerns are glycemic control and weight loss.     He informs me that his daughter will be his proxy for his medical care. He is aware that his memory is fading and he has significant problem with recall of recent memory. They are headed to the 's office later today to finalize the paperwork to complete the task. I have advised his daughter to sign up for [facility] in order to be more informed about his care and to be able to communicate with his caregivers.     He denies any hospital admissions, emergency room visits, hypoglycemia, syncope, diaphoresis, chest pain, or dyspnea.    He has maintained 131 pounds since last visit. His BMI is 19.98    His blood sugar in the clinic today was:   Lab Results   Component Value Date    POCGLU 232 (A) 03/15/2017     We discussed the American diabetes Association recommendations:  hemoglobin A1c below 7.0%; all diabetics should be on statins unless contraindicated; one aspirin daily unless contraindicated; fasting blood sugar between 80 and 130 mg/dL; postprandial blood sugar below 180 mg/dl; prevention of hypoglycemia, may adjust goals to higher levels if persistent; ACE or ARB therapy if not contraindicated; and maintain in an ideal body weight with BMI below 25.    Good is compliant most of the time with DM medications.     Good is compliant most of the time with lifestyle modifications to include activity and meal planning.       STANDARDS OF  CARE:  Eye doctor: Dr. Barrera, last exam 1/12/2017  Dental exam: Recommend regular exams; denies gums bleeding.  Podiatry doctor:     ACTIVITY LEVEL: He exercises rarely.  MEAL PLANNING: Number of meals per day - 3. Number of snacks per day - 2.  Per dietary recall, patient is not limiting carbohydrates, saturated fats and sodium.   BLOOD GLUCOSE TESTING: Self-monitoring with   SOCIAL HISTORY: . Lives with family. retired no tobacco use     The following results were reviewed with patient.    Lab Results   Component Value Date    WBC 8.50 02/28/2017    HGB 11.2 (L) 02/28/2017    HCT 33.2 (L) 02/28/2017     (H) 02/28/2017    CHOL 169 04/20/2016    TRIG 98 04/20/2016    HDL 42 04/20/2016    ALT 10 09/04/2013    AST 13 09/04/2013     02/28/2017    K 4.6 02/28/2017     02/28/2017    CREATININE 1.1 02/28/2017    ESTGFRAFRICA >60 02/28/2017    EGFRNONAA 59 (A) 02/28/2017    BUN 20 02/28/2017    CO2 26 02/28/2017    TSH 2.212 06/07/2016    PSA 0.36 04/20/2016    INR 1.0 08/27/2014     (H) 02/28/2017       Lab Results   Component Value Date    HGBA1C 8.0 (H) 11/04/2016    HGBA1C 7.9 (H) 04/20/2016    HGBA1C 7.8 (H) 10/21/2015       Lab Results   Component Value Date    FREET4 0.89 04/20/2016     Lab Results   Component Value Date    TSH 2.212 06/07/2016     Lab Results   Component Value Date    IRON 71 04/02/2015    TIBC 377 04/02/2015    FERRITIN 23 04/20/2016       Lab Results   Component Value Date    EQTCDYJD60 710 04/02/2015       Lab Results   Component Value Date    CALCIUM 9.2 02/28/2017           Review of patient's allergies indicates:   Allergen Reactions    Gabapentin      Didn't like starting at 300mg       Past Medical History:   Diagnosis Date    Abnormal EKG 8/22/2014    Allergy     Arthritis     back    Bilateral carotid artery disease 1/17/2017    Cholelithiasis     US retroperitoneal 12/2016    COPD (chronic obstructive pulmonary disease)     Diabetes with  neurologic complications     Diverticulitis     Diverticulosis     hosp/divert bleed    Ex-smoker     Glaucoma     suspect    Heart murmur     History of pneumothorax     bilat    HTN (hypertension)     Hyperlipidemia     Hyperlipidemia 8/22/2014    Hypothyroid     Iron deficiency anemia     nathan    Osteoporosis, unspecified 12/16 zeenat 12/18    Polyneuropathy     Prostate CA     PVD (peripheral vascular disease) 8/22/2014    Type 2 diabetes with peripheral circulatory disorder, controlled     Type 2 diabetes, controlled, with neuropathy Diagnosed 1994    Urinary incontinence        Review of Systems   Constitutional: Negative.  Negative for activity change, appetite change, chills, diaphoresis, fatigue, fever and unexpected weight change.   HENT: Negative.  Negative for dental problem, facial swelling, hearing loss, nosebleeds, trouble swallowing and voice change.    Eyes: Negative.  Negative for photophobia, pain, discharge, redness, itching and visual disturbance.   Respiratory: Negative.  Negative for cough, choking, chest tightness, shortness of breath and wheezing.    Cardiovascular: Negative.  Negative for chest pain, palpitations and leg swelling.   Gastrointestinal: Negative.  Negative for abdominal distention, abdominal pain, blood in stool, constipation, diarrhea, nausea and vomiting.   Endocrine: Negative.  Negative for cold intolerance, heat intolerance, polydipsia, polyphagia and polyuria.   Genitourinary: Negative.  Negative for decreased urine volume, difficulty urinating, dysuria, frequency, scrotal swelling, testicular pain and urgency.   Musculoskeletal: Negative.  Negative for arthralgias, back pain, gait problem, joint swelling, myalgias, neck pain and neck stiffness.   Skin: Negative.  Negative for color change, pallor, rash and wound.   Allergic/Immunologic: Negative.  Negative for environmental allergies, food allergies and immunocompromised state.   Neurological: Negative.  " Negative for dizziness, tremors, seizures, syncope, facial asymmetry, speech difficulty, weakness, light-headedness, numbness and headaches.   Hematological: Negative.  Negative for adenopathy. Does not bruise/bleed easily.   Psychiatric/Behavioral: Negative.  Negative for agitation, behavioral problems, confusion, decreased concentration, dysphoric mood, hallucinations, self-injury, sleep disturbance and suicidal ideas. The patient is not nervous/anxious and is not hyperactive.         Decreased recent memory      Objective:     Vitals - 1 value per visit 2/20/2017 2/28/2017 3/15/2017   SYSTOLIC 110 112 156   DIASTOLIC 62 58 80   PULSE 81 68 -   TEMPERATURE 101.5 97.1 -   SPO2 93 93 -   Weight (lb) 131.39 131.84 131.39   HEIGHT 5' 9" 5' 9" 5' 8"   BODY MASS INDEX 19.4 19.47 19.98   VISIT REPORT - - -   Pain Score  5 0 -       Physical Exam   Constitutional: He is oriented to person, place, and time. He appears well-developed and well-nourished. He is cooperative.  Non-toxic appearance. He does not have a sickly appearance. He does not appear ill. No distress. He is not intubated.   HENT:   Head: Normocephalic and atraumatic. Not macrocephalic and not microcephalic. Head is without raccoon's eyes, without Brantley's sign, without abrasion, without contusion, without laceration, without right periorbital erythema and without left periorbital erythema. Hair is normal.   Right Ear: External ear normal. No lacerations. No drainage, swelling or tenderness. No foreign bodies. No mastoid tenderness. Tympanic membrane is not injected, not scarred, not perforated, not erythematous, not retracted and not bulging. Tympanic membrane mobility is normal. No middle ear effusion. No hemotympanum. No decreased hearing is noted.   Left Ear: External ear normal. No lacerations. No drainage, swelling or tenderness. No foreign bodies. No mastoid tenderness. Tympanic membrane is not injected, not scarred, not perforated, not erythematous, " not retracted and not bulging. Tympanic membrane mobility is normal.  No middle ear effusion. No hemotympanum. No decreased hearing is noted.   Nose: Nose normal.   Mouth/Throat: Oropharynx is clear and moist.   Eyes: EOM and lids are normal. Pupils are equal, round, and reactive to light. Right eye exhibits no chemosis, no discharge, no exudate and no hordeolum. No foreign body present in the right eye. Left eye exhibits no chemosis, no discharge, no exudate and no hordeolum. No foreign body present in the left eye. Right conjunctiva is not injected. Right conjunctiva has no hemorrhage. Left conjunctiva is not injected. Left conjunctiva has no hemorrhage. No scleral icterus. Right eye exhibits normal extraocular motion and no nystagmus. Left eye exhibits normal extraocular motion and no nystagmus. Right pupil is round and reactive. Left pupil is round and reactive. Pupils are equal.   Neck: Normal range of motion, full passive range of motion without pain and phonation normal. Neck supple. Normal carotid pulses, no hepatojugular reflux and no JVD present. No tracheal tenderness, no spinous process tenderness and no muscular tenderness present. Carotid bruit is not present. No rigidity. No tracheal deviation, no edema, no erythema and normal range of motion present. No thyroid mass and no thyromegaly present.   Cardiovascular: Normal rate, regular rhythm, normal heart sounds and intact distal pulses.   No extrasystoles are present. PMI is not displaced.  Exam reveals no gallop, no friction rub and no decreased pulses.    No murmur heard.  Pulses:       Carotid pulses are 2+ on the right side, and 2+ on the left side.       Radial pulses are 2+ on the right side, and 2+ on the left side.        Dorsalis pedis pulses are 2+ on the right side, and 2+ on the left side.        Posterior tibial pulses are 2+ on the right side, and 2+ on the left side.   Pulmonary/Chest: Effort normal and breath sounds normal. No accessory  muscle usage or stridor. No apnea, no tachypnea and no bradypnea. He is not intubated. No respiratory distress. He has no decreased breath sounds. He has no wheezes. He has no rhonchi. He has no rales. He exhibits no tenderness.   Abdominal: Soft. Bowel sounds are normal. He exhibits no shifting dullness, no distension, no pulsatile liver, no fluid wave, no abdominal bruit, no ascites, no pulsatile midline mass and no mass. There is no hepatosplenomegaly, splenomegaly or hepatomegaly. There is no tenderness. There is no rigidity, no rebound, no guarding, no CVA tenderness and no tenderness at McBurney's point. No hernia. Hernia confirmed negative in the ventral area.   Musculoskeletal: Normal range of motion. He exhibits no edema or tenderness.        Right foot: There is normal range of motion and no deformity.        Left foot: There is normal range of motion and no deformity.   Feet:   Right Foot:   Protective Sensation: 6 sites tested. 6 sites sensed.   Skin Integrity: Negative for ulcer, blister, skin breakdown, erythema, warmth, callus or dry skin.   Left Foot:   Protective Sensation: 6 sites tested. 6 sites sensed.   Skin Integrity: Negative for ulcer, blister, skin breakdown, erythema, warmth, callus or dry skin.   Lymphadenopathy:        Head (right side): No submental, no submandibular, no tonsillar, no preauricular, no posterior auricular and no occipital adenopathy present.        Head (left side): No submental, no submandibular, no tonsillar, no preauricular, no posterior auricular and no occipital adenopathy present.     He has no cervical adenopathy.        Right cervical: No superficial cervical, no deep cervical and no posterior cervical adenopathy present.       Left cervical: No superficial cervical, no deep cervical and no posterior cervical adenopathy present.   Neurological: He is alert and oriented to person, place, and time. He has normal reflexes. He displays no atrophy and no tremor. No  cranial nerve deficit or sensory deficit. He exhibits normal muscle tone. He displays no seizure activity. Coordination and gait normal.   Reflex Scores:       Bicep reflexes are 2+ on the right side and 2+ on the left side.       Brachioradialis reflexes are 2+ on the right side and 2+ on the left side.       Patellar reflexes are 2+ on the right side and 2+ on the left side.  Skin: Skin is warm and dry. No rash noted. No erythema. No pallor.   Psychiatric: He has a normal mood and affect. His mood appears not anxious. His affect is not angry, not blunt, not labile and not inappropriate. His speech is not rapid and/or pressured, not delayed, not tangential and not slurred. He is not agitated, not aggressive, not hyperactive, not slowed, not withdrawn, not actively hallucinating and not combative. Thought content is not paranoid and not delusional. Cognition and memory are not impaired. He does not express impulsivity or inappropriate judgment. He does not exhibit a depressed mood. He expresses no homicidal and no suicidal ideation. He expresses no suicidal plans and no homicidal plans. He is communicative. He exhibits normal recent memory and normal remote memory. He is attentive.     Assessment:     1. Type 2 diabetes mellitus with diabetic polyneuropathy, with long-term current use of insulin       Plan:     Good Murillo is seen today for   1. Type 2 diabetes mellitus with diabetic polyneuropathy, with long-term current use of insulin    2. Unexplained weight loss      We have discussed the etiology and treatment options associated with the diagnosis as well as alternatives. He has elected the following treatments.     Type 2 diabetes mellitus with diabetic polyneuropathy, with long-term current use of insulin  -     POCT glucose  -     Hemoglobin A1c; Future; Expected date: 3/15/17  - Will await A1c result to determine treatment modification    Unexplained weight loss  -     PROTEIN, TOTAL; Future; Expected date:  3/15/17  - Will also have labs ordered by Dr. Eastman last November completed  - Will follow up with Dr. Eastman in 24-48 hours      1.) Patient was instructed to monitor blood glucose twice daily, fasting, and 2 hour post meal; if on Multiple Daily Injections (MDI) he will need to have pre-meal blood glucose as well. Reminded to bring BG meter or record to each visit for review.  2.) Reviewed pathophysiology of type 2 diabetes, complications related to the disease, importance of annual dilated eye exam and self daily foot examination.  3.) Continue medications as prescribed Lantus; Starlix; Metformin. [facility] MyChart or Phone review in 1 week with BG records for adjustment of medication.  4.) Reviewed carb counting, portion control, importance of spacing meals throughout the day to prevent post prandial elevations. Recommended low saturated fat, low sodium diet to aid in control of hypertension and cholesterol.  5.) Discussed activity, benefits, methods, and precautions. Recommended patient start/continue some form of exercise and increase as tolerated to 60 minutes per day to facilitate weight loss and aid in control of BGs. Also reminded patient of WHO recommendation of 10,000 steps daily as a goal.   6.) A1C, TSH, Lipid Panel, CMP with eGFR and Micro/Creatinine per ADA protocol.  7.) Return to clinic in 3 months for follow up. Advised patient to call clinic with any questions or concerns.    A total of 40 minutes was spent in face to face time, of which 50 % was spent in counseling patient on disease process, complications, treatment, and side effects of medications.    The patient was explained the above plan and given opportunity to ask questions.  He understands, chooses and consents to this plan and accepts all the risks, which include but are not limited to the risks mentioned above.   He understands the alternative of having no testing, interventions or treatments at this time. He left content and without  further questions.

## 2017-03-15 NOTE — MR AVS SNAPSHOT
[facility]  [address] [place] [address]  Phone: [phone]  Fax: [phone]                  Good Murillo    3/15/2017 11:30 AM   Office Visit    Description:  Male : 1927   Provider:  aDmian Issa Jr., PAREINA   Department:  [facility] - [facility]           Reason for Visit     Diabetes Mellitus           Diagnoses this Visit        Comments    Type 2 diabetes mellitus with diabetic polyneuropathy, with long-term current use of insulin    -  Primary     Unexplained weight loss                To Do List           Future Appointments        Provider Department Dept Phone    3/15/2017 2:15 PM LABORATORY, [facility] [phone]    3/16/2017 9:00 AM Devan Eastman MD [facility] [phone]    3/20/2017 1:20 PM Devan Eastman MD [facility] [phone]    3/20/2017 2:40 PM Carmita Jones DPM [facility] [phone]    4/10/2017 2:00 PM Devan Eastman MD [facility] [phone]      Goals (5 Years of Data)     None      [facility]     [facility] Care Line - 24/7 Assistance  Registered nurses in the [facility] provide clinical advisement, health education, appointment booking, and other advisory services.  Call for this free service at [phone].             Medications           Message regarding Medications     Verify the changes and/or additions to your medication regime listed below are the same as discussed with your clinician today.  If any of these changes or additions are incorrect, please notify your healthcare provider.        STOP taking these medications     ipratropium (ATROVENT) 0.06 % nasal spray 2 sprays by Nasal route 3 (three) times daily.           Verify that the below list of medications is an accurate representation of the medications you are currently taking.  If none reported, the list may be blank. If incorrect, please contact  "your healthcare provider. Carry this list with you in case of emergency.           Current Medications     albuterol 90 mcg/actuation inhaler Inhale 2 puffs into the lungs every 6 (six) hours.    alendronate (FOSAMAX) 70 MG tablet Take 1 tablet (70 mg total) by mouth every 7 days.    aspirin 81 mg Tab Take 1 tablet by mouth Daily. Over counter. To help prevent stroke/ heart attack    blood sugar diagnostic Strp 1 strip by Misc.(Non-Drug; Combo Route) route 3 (three) times daily. True Metrix test strips    calcium carbonate (OS-TANGELA) 500 mg calcium (1,250 mg) tablet Take 1 tablet by mouth once daily.    insulin glargine (LANTUS) 100 unit/mL injection Inject 10 Units into the skin once daily.    lancets 33 gauge Misc 1 lancet by Misc.(Non-Drug; Combo Route) route 3 (three) times daily.    levothyroxine (SYNTHROID) 25 MCG tablet     loratadine (CLARITIN) 10 mg tablet Take 1 tablet by mouth Once daily as needed.    metformin (GLUCOPHAGE) 1000 MG tablet TAKE ONE TABLET BY MOUTH TWICE DAILY    nateglinide (STARLIX) 60 MG tablet Take 1 tablet (60 mg total) by mouth 3 (three) times daily before meals.    omeprazole (PRILOSEC) 40 MG capsule Take 1 capsule (40 mg total) by mouth once daily.    pravastatin (PRAVACHOL) 40 MG tablet Take 1 tablet (40 mg total) by mouth once daily.    tamsulosin (FLOMAX) 0.4 mg Cp24 Take 1 capsule (0.4 mg total) by mouth once daily.    TENS unit and electrodes Cmpk 1 Device by Misc.(Non-Drug; Combo Route) route daily as needed.    tiotropium (SPIRIVA WITH HANDIHALER) 18 mcg inhalation capsule Inhale 1 capsule (18 mcg total) into the lungs once daily.           Clinical Reference Information           Your Vitals Were     BP Height Weight BMI       156/80 (BP Location: Right arm, Patient Position: Sitting, BP Method: Manual) 5' 8" (1.727 m) 59.6 kg (131 lb 6.3 oz) 19.98 kg/m2       Blood Pressure          Most Recent Value    BP  (!)  156/80      Allergies as of 3/15/2017     Gabapentin    "   Immunizations Administered on Date of Encounter - 3/15/2017     None      Orders Placed During Today's Visit      Normal Orders This Visit    POCT glucose     Future Labs/Procedures Expected by Expires    Hemoglobin A1c  3/15/2017 5/14/2018    PROTEIN, TOTAL  3/15/2017 5/14/2018         3/15/2017 11:58 AM - Adelina Rea LPN      Component Results     Component Value Flag Ref Range Units Status    POC Glucose 232 (A) 70 - 110 mg/dL Final            Language Assistance Services     ATTENTION: Language assistance services are available, free of charge. Please call [phone].      ATENCIÓN: Si habla español, tiene a watters disposición servicios gratuitos de asistencia lingüística. Llame al [phone].     CHÚ Ý: N?u b?n nói Ti?ng Vi?t, có các d?ch v? h? tr? ngôn ng? mi?n phí dành cho b?n. G?i s? [phone].         [facility] complies with applicable Federal civil rights laws and does not discriminate on the basis of race, color, national origin, age, disability, or sex.

## 2017-03-16 ENCOUNTER — OFFICE VISIT (OUTPATIENT)
Dept: INTERNAL MEDICINE | Facility: CLINIC | Age: 82
End: 2017-03-16
Payer: MEDICARE

## 2017-03-16 VITALS
SYSTOLIC BLOOD PRESSURE: 138 MMHG | TEMPERATURE: 97 F | DIASTOLIC BLOOD PRESSURE: 84 MMHG | HEIGHT: 69 IN | BODY MASS INDEX: 19.37 KG/M2 | OXYGEN SATURATION: 95 % | HEART RATE: 99 BPM | WEIGHT: 130.75 LBS

## 2017-03-16 DIAGNOSIS — E11.42 TYPE 2 DIABETES MELLITUS WITH DIABETIC POLYNEUROPATHY, WITH LONG-TERM CURRENT USE OF INSULIN: Primary | ICD-10-CM

## 2017-03-16 DIAGNOSIS — Z79.4 TYPE 2 DIABETES MELLITUS WITH DIABETIC POLYNEUROPATHY, WITH LONG-TERM CURRENT USE OF INSULIN: Primary | ICD-10-CM

## 2017-03-16 DIAGNOSIS — R63.4 LOSS OF WEIGHT: ICD-10-CM

## 2017-03-16 DIAGNOSIS — J43.9 PULMONARY EMPHYSEMA, UNSPECIFIED EMPHYSEMA TYPE: ICD-10-CM

## 2017-03-16 DIAGNOSIS — E03.4 HYPOTHYROIDISM DUE TO ACQUIRED ATROPHY OF THYROID: Chronic | ICD-10-CM

## 2017-03-16 LAB
ESTIMATED AVG GLUCOSE: 183 MG/DL
HBA1C MFR BLD HPLC: 8 %

## 2017-03-16 PROCEDURE — 99999 PR PBB SHADOW E&M-EST. PATIENT-LVL II: CPT | Mod: PBBFAC,,, | Performed by: FAMILY MEDICINE

## 2017-03-16 PROCEDURE — 1159F MED LIST DOCD IN RCRD: CPT | Mod: S$GLB,,, | Performed by: FAMILY MEDICINE

## 2017-03-16 PROCEDURE — 99499 UNLISTED E&M SERVICE: CPT | Mod: S$GLB,,, | Performed by: FAMILY MEDICINE

## 2017-03-16 PROCEDURE — 1160F RVW MEDS BY RX/DR IN RCRD: CPT | Mod: S$GLB,,, | Performed by: FAMILY MEDICINE

## 2017-03-16 PROCEDURE — 99214 OFFICE O/P EST MOD 30 MIN: CPT | Mod: S$GLB,,, | Performed by: FAMILY MEDICINE

## 2017-03-16 PROCEDURE — 1157F ADVNC CARE PLAN IN RCRD: CPT | Mod: S$GLB,,, | Performed by: FAMILY MEDICINE

## 2017-03-16 NOTE — PROGRESS NOTES
Subjective:       Patient ID: Good Murillo Jr. is a . male.    Chief Complaint: Multiple issues see below;daught here today    HPI Type 2 diabetes wneurp: a1c pnd ; in dm clinic saw ory yest. Tolerating medicine. No hypoglycemia  Wt stable x bit dec this time after flu illness recently dec appet;mood ok  Chronic low back pain no change ultram helps. has TENs. Saw dr sawyer; last visit ok  Copd stable no new sob. No cp. (?lung mass not seen on dec ct)      hypothy:nl tsh     P[isidro ca hx s/p brachytxutd dr fair  htn w dm no meds  hyperchl w dm severiano     Chronic anemia/ iron def dx yrs ago dr nathan ; egd via dr damian fall 16    Flu sympt resolving          Past Medical History   Diagnosis Date    Allergy     Hyperlipidemia     COPD (chronic obstructive pulmonary disease)     Osteoporosis, unspecified 1/14 zeenat 1/16    Prostate ca     Iron deficiency anemia      nathan    Ex-smoker     History of pneumothorax      bilat    Type 2 diabetes, controlled, with neuropathy     PVD (peripheral vascular disease) 8/22/2014    Hyperlipidemia 8/22/2014    Abnormal EKG 8/22/2014    HTN (hypertension)     Hypothyroid      Past Surgical History   Procedure Laterality Date    Prostate surgery       brachytherapy     Family History   Problem Relation Age of Onset    Diabetes Mother         Review of Systems  Cardiovascular: no chest pain  Chest: no shortness of breath  Abd: no abd pain  Remainder review of systems negative    Objective:    daught from [place]; aide/friend here  Physical Exam   Constitutional: He is oriented to person, place, and time. He appears well-developed and well-nourished.   HENT:   Head: Normocephalic and atraumatic.   Right Ear: External ear normal.   Left Ear: External ear normal.   Nose: Nose normal.     Eyes: Conjunctivae and EOM are normal. Pupils are equal, round, and reactive to light. No scleral icterus.   Neck: Normal range of motion. Neck supple. Carotid bruit is not  present.   Cardiovascular: Normal rate, regular rhythm and normal heart sounds.  Exam reveals no gallop and no friction rub.    No murmur heard.  Pulmonary/Chest: Effort normal and breath sounds normal. He has no wheezes.   .   Musculoskeletal: Normal range of motion. He exhibits no tenderness.   Lymphadenopathy:     He has no cervical adenopathy.   Neurological: He is alert and oriented to person, place, and time. No cranial nerve deficit. Coordination normal.   Skin: Skin is warm and dry. No rash noted. No erythema.   Psychiatric: He has a normal mood and affect. His behavior is normal. Judgment and thought content normal.   Nursing note and vitals reviewed.        Assessment:    t ype 2 diabetes, controlled, with neuropathy   1. BPH (benign prostatic hyperplasia)    2. Iron deficiency anemia    3. Anemia, unspecified anemia type    4. Essential hypertension    5. COPD (chronic obstructive pulmonary disease)    6. htn w dm  hyperchol w dm       Plan:       F/u dr daniels prost ca/bph whendue  *F/u pulm -when due  F/u card when due  f/u dm clinic when due    Cancel 3/20 visit  Cancel 4/10 visit  F/u 4 weeks monitor wt then extend interval if stable    Reviewed wt trend last 2 yrs steady upper 130s/140 but drop w recent uri

## 2017-03-16 NOTE — MR AVS SNAPSHOT
[facility] Internal Medicine  [address] [facility] [place] [address]  Phone: [phone]  Fax: [phone]                  Good Murillo Jr.   3/16/2017 9:00 AM   Office Visit    Description:  Male : 1927   Provider:  Devan Eastman MD   Department:  [facility] - Internal Medicine           Diagnoses this Visit        Comments    Type 2 diabetes mellitus with diabetic polyneuropathy, with long-term current use of insulin    -  Primary     Pulmonary emphysema, unspecified emphysema type         Hypothyroidism due to acquired atrophy of thyroid         Loss of weight                To Do List           Future Appointments        Provider Department Dept Phone    3/20/2017 2:40 PM Carmita Jones DPM [facility] Podiatry [phone]    2017 10:20 AM Devan Eastman MD [facility] Internal Medicine [phone]    2017 8:05 AM LAB[facility]ATORY, [facility] [phone]    2017 8:10 AM SPECIMEN, [facility] [phone]    6/15/2017 1:20 PM PULMONARY LAB, [facility] Pulmonary Function [facility] [phone]      Goals (5 Years of Data)     None      Follow-Up and Disposition     Follow-up and Disposition History      [facility] On Call     [facility] Care Line - 24/7 Assistance  Registered nurses in the [facility] provide clinical advisement, health education, appointment booking, and other advisory services.  Call for this free service at [phone].             Medications           Message regarding Medications     Verify the changes and/or additions to your medication regime listed below are the same as discussed with your clinician today.  If any of these changes or additions are incorrect, please notify your healthcare provider.             Verify that the below list of medications is an accurate representation of the medications you are currently taking.  If none reported, the list may be blank. If incorrect, please contact your  "healthcare provider. Carry this list with you in case of emergency.           Current Medications     albuterol 90 mcg/actuation inhaler Inhale 2 puffs into the lungs every 6 (six) hours.    alendronate (FOSAMAX) 70 MG tablet Take 1 tablet (70 mg total) by mouth every 7 days.    aspirin 81 mg Tab Take 1 tablet by mouth Daily. Over counter. To help prevent stroke/ heart attack    blood sugar diagnostic Strp 1 strip by Misc.(Non-Drug; Combo Route) route 3 (three) times daily. True Metrix test strips    calcium carbonate (OS-TANGELA) 500 mg calcium (1,250 mg) tablet Take 1 tablet by mouth once daily.    insulin glargine (LANTUS) 100 unit/mL injection Inject 10 Units into the skin once daily.    lancets 33 gauge Misc 1 lancet by Misc.(Non-Drug; Combo Route) route 3 (three) times daily.    levothyroxine (SYNTHROID) 25 MCG tablet     loratadine (CLARITIN) 10 mg tablet Take 1 tablet by mouth Once daily as needed.    metformin (GLUCOPHAGE) 1000 MG tablet TAKE ONE TABLET BY MOUTH TWICE DAILY    nateglinide (STARLIX) 60 MG tablet Take 1 tablet (60 mg total) by mouth 3 (three) times daily before meals.    omeprazole (PRILOSEC) 40 MG capsule Take 1 capsule (40 mg total) by mouth once daily.    pravastatin (PRAVACHOL) 40 MG tablet Take 1 tablet (40 mg total) by mouth once daily.    tamsulosin (FLOMAX) 0.4 mg Cp24 Take 1 capsule (0.4 mg total) by mouth once daily.    TENS unit and electrodes Cmpk 1 Device by Misc.(Non-Drug; Combo Route) route daily as needed.    tiotropium (SPIRIVA WITH HANDIHALER) 18 mcg inhalation capsule Inhale 1 capsule (18 mcg total) into the lungs once daily.           Clinical Reference Information           Your Vitals Were     BP Pulse Temp Height    138/84 (BP Location: Right arm, Patient Position: Sitting, BP Method: Manual) 99 96.7 °F (35.9 °C) (Tympanic) 5' 9" (1.753 m)    Weight SpO2 BMI    59.3 kg (130 lb 11.7 oz) 95% 19.31 kg/m2      Blood Pressure          Most Recent Value    BP  138/84    "   Allergies as of 3/16/2017     Gabapentin      Immunizations Administered on Date of Encounter - 3/16/2017     None      Language Assistance Services     ATTENTION: Language assistance services are available, free of charge. Please call [phone].      ATENCIÓN: Si kota friedman, tiene a watters disposición servicios gratuitos de asistencia lingüística. Llame al [phone].     CHÚ Ý: N?u b?n nói Ti?ng Vi?t, có các d?ch v? h? tr? ngôn ng? mi?n phí dành cho b?n. G?i s? [phone].         [facility] - [facility] complies with applicable Federal civil rights laws and does not discriminate on the basis of race, color, national origin, age, disability, or sex.

## 2017-03-20 ENCOUNTER — CLINICAL SUPPORT (OUTPATIENT)
Dept: AUDIOLOGY | Facility: CLINIC | Age: 82
End: 2017-03-20
Payer: MEDICARE

## 2017-03-20 ENCOUNTER — OFFICE VISIT (OUTPATIENT)
Dept: PODIATRY | Facility: CLINIC | Age: 82
End: 2017-03-20
Payer: MEDICARE

## 2017-03-20 ENCOUNTER — OFFICE VISIT (OUTPATIENT)
Dept: INTERNAL MEDICINE | Facility: CLINIC | Age: 82
End: 2017-03-20
Payer: MEDICARE

## 2017-03-20 VITALS
HEART RATE: 102 BPM | TEMPERATURE: 96 F | BODY MASS INDEX: 19.43 KG/M2 | SYSTOLIC BLOOD PRESSURE: 142 MMHG | HEIGHT: 69 IN | DIASTOLIC BLOOD PRESSURE: 68 MMHG | WEIGHT: 131.19 LBS | OXYGEN SATURATION: 96 %

## 2017-03-20 VITALS
HEIGHT: 69 IN | BODY MASS INDEX: 19.39 KG/M2 | WEIGHT: 130.94 LBS | DIASTOLIC BLOOD PRESSURE: 71 MMHG | SYSTOLIC BLOOD PRESSURE: 141 MMHG | HEART RATE: 107 BPM

## 2017-03-20 DIAGNOSIS — E11.42 TYPE 2 DIABETES MELLITUS WITH DIABETIC POLYNEUROPATHY, WITH LONG-TERM CURRENT USE OF INSULIN: ICD-10-CM

## 2017-03-20 DIAGNOSIS — E11.59 HYPERTENSION ASSOCIATED WITH DIABETES: Chronic | ICD-10-CM

## 2017-03-20 DIAGNOSIS — E78.5 HYPERLIPIDEMIA ASSOCIATED WITH TYPE 2 DIABETES MELLITUS: ICD-10-CM

## 2017-03-20 DIAGNOSIS — E11.69 HYPERLIPIDEMIA ASSOCIATED WITH TYPE 2 DIABETES MELLITUS: ICD-10-CM

## 2017-03-20 DIAGNOSIS — K13.70 MOUTH LESION: ICD-10-CM

## 2017-03-20 DIAGNOSIS — I15.2 HYPERTENSION ASSOCIATED WITH DIABETES: Chronic | ICD-10-CM

## 2017-03-20 DIAGNOSIS — I73.9 PERIPHERAL VASCULAR DISEASE: ICD-10-CM

## 2017-03-20 DIAGNOSIS — L98.9 SKIN LESION: Primary | ICD-10-CM

## 2017-03-20 DIAGNOSIS — Z79.4 TYPE 2 DIABETES MELLITUS WITH DIABETIC POLYNEUROPATHY, WITH LONG-TERM CURRENT USE OF INSULIN: ICD-10-CM

## 2017-03-20 DIAGNOSIS — Z46.1 ENCOUNTER FOR FITTING AND ADJUSTMENT OF HEARING AID OF BOTH EARS: Primary | ICD-10-CM

## 2017-03-20 DIAGNOSIS — E11.42 DM TYPE 2 WITH DIABETIC PERIPHERAL NEUROPATHY: ICD-10-CM

## 2017-03-20 DIAGNOSIS — B35.1 DERMATOPHYTOSIS OF NAIL: Primary | ICD-10-CM

## 2017-03-20 PROCEDURE — 99999 PR PBB SHADOW E&M-EST. PATIENT-LVL III: CPT | Mod: PBBFAC,,, | Performed by: PODIATRIST

## 2017-03-20 PROCEDURE — 1126F AMNT PAIN NOTED NONE PRSNT: CPT | Mod: S$GLB,,, | Performed by: PODIATRIST

## 2017-03-20 PROCEDURE — 99999 PR PBB SHADOW E&M-EST. PATIENT-LVL IV: CPT | Mod: PBBFAC,,, | Performed by: PHYSICIAN ASSISTANT

## 2017-03-20 PROCEDURE — 99499 UNLISTED E&M SERVICE: CPT | Mod: S$GLB,,, | Performed by: PODIATRIST

## 2017-03-20 PROCEDURE — 1126F AMNT PAIN NOTED NONE PRSNT: CPT | Mod: S$GLB,,, | Performed by: PHYSICIAN ASSISTANT

## 2017-03-20 PROCEDURE — 1159F MED LIST DOCD IN RCRD: CPT | Mod: S$GLB,,, | Performed by: PODIATRIST

## 2017-03-20 PROCEDURE — 99213 OFFICE O/P EST LOW 20 MIN: CPT | Mod: S$GLB,,, | Performed by: PHYSICIAN ASSISTANT

## 2017-03-20 PROCEDURE — 1157F ADVNC CARE PLAN IN RCRD: CPT | Mod: S$GLB,,, | Performed by: PHYSICIAN ASSISTANT

## 2017-03-20 PROCEDURE — 1159F MED LIST DOCD IN RCRD: CPT | Mod: S$GLB,,, | Performed by: PHYSICIAN ASSISTANT

## 2017-03-20 PROCEDURE — 1157F ADVNC CARE PLAN IN RCRD: CPT | Mod: S$GLB,,, | Performed by: PODIATRIST

## 2017-03-20 PROCEDURE — 1160F RVW MEDS BY RX/DR IN RCRD: CPT | Mod: S$GLB,,, | Performed by: PODIATRIST

## 2017-03-20 PROCEDURE — 1160F RVW MEDS BY RX/DR IN RCRD: CPT | Mod: S$GLB,,, | Performed by: PHYSICIAN ASSISTANT

## 2017-03-20 PROCEDURE — 99499 UNLISTED E&M SERVICE: CPT | Mod: S$GLB,,, | Performed by: PHYSICIAN ASSISTANT

## 2017-03-20 PROCEDURE — 11721 DEBRIDE NAIL 6 OR MORE: CPT | Mod: Q8,S$GLB,, | Performed by: PODIATRIST

## 2017-03-20 PROCEDURE — 99213 OFFICE O/P EST LOW 20 MIN: CPT | Mod: 25,S$GLB,, | Performed by: PODIATRIST

## 2017-03-20 RX ORDER — PRAVASTATIN SODIUM 40 MG/1
40 TABLET ORAL DAILY
Qty: 90 TABLET | Refills: 3 | Status: SHIPPED | OUTPATIENT
Start: 2017-03-20 | End: 2018-02-09 | Stop reason: SDUPTHER

## 2017-03-20 RX ORDER — TRIAMCINOLONE ACETONIDE 1 MG/G
PASTE DENTAL
Qty: 5 G | Refills: 0 | Status: SHIPPED | OUTPATIENT
Start: 2017-03-20 | End: 2017-08-18

## 2017-03-20 NOTE — MR AVS SNAPSHOT
[facility] - Podiatry  [address] [facility] [place] [address]  Phone: [phone]  Fax: [phone]                  Good Murillo Jr.   3/20/2017 2:40 PM   Office Visit    Description:  Male : 1927   Provider:  Carmita Jones DPM   Department[facility]  [facility] - Podiatry           Reason for Visit     Routine Foot Care                To Do List           Future Appointments        Provider Department Dept Phone    3/27/2017 11:00 AM HEARING AIDSPAULETTE [facility] Hearing Aids [phone]    2017 10:20 AM Devan Eastman MD [facility] Internal Medicine [phone]    2017 8:05 AM LABORATORY, [facility] [phone]    2017 8:10 AM SPECIMEN, [facility] [phone]    6/15/2017 1:20 PM PULMONARY LAB, [facility] Pulmonary Function Svcs [phone]      Goals (5 Years of Data)     None      [facility] On Call     [facility] On Call Nurse Care Line - / Assistance  Registered nurses in the [facility] provide clinical advisement, health education, appointment booking, and other advisory services.  Call for this free service at [phone].             Medications           Message regarding Medications     Verify the changes and/or additions to your medication regime listed below are the same as discussed with your clinician today.  If any of these changes or additions are incorrect, please notify your healthcare provider.             Verify that the below list of medications is an accurate representation of the medications you are currently taking.  If none reported, the list may be blank. If incorrect, please contact your healthcare provider. Carry this list with you in case of emergency.           Current Medications     albuterol 90 mcg/actuation inhaler Inhale 2 puffs into the lungs every 6 (six) hours.    alendronate (FOSAMAX) 70 MG tablet Take 1 tablet (70 mg total) by mouth every 7 days.    aspirin 81 mg Tab Take 1 tablet by mouth  "Daily. Over counter. To help prevent stroke/ heart attack    blood sugar diagnostic Strp 1 strip by Misc.(Non-Drug; Combo Route) route 3 (three) times daily. True Metrix test strips    calcium carbonate (OS-TANGELA) 500 mg calcium (1,250 mg) tablet Take 1 tablet by mouth once daily.    insulin glargine (LANTUS) 100 unit/mL injection Inject 10 Units into the skin once daily.    lancets 33 gauge Misc 1 lancet by Misc.(Non-Drug; Combo Route) route 3 (three) times daily.    levothyroxine (SYNTHROID) 25 MCG tablet     loratadine (CLARITIN) 10 mg tablet Take 1 tablet by mouth Once daily as needed.    metformin (GLUCOPHAGE) 1000 MG tablet TAKE ONE TABLET BY MOUTH TWICE DAILY    nateglinide (STARLIX) 60 MG tablet Take 1 tablet (60 mg total) by mouth 3 (three) times daily before meals.    omeprazole (PRILOSEC) 40 MG capsule Take 1 capsule (40 mg total) by mouth once daily.    pravastatin (PRAVACHOL) 40 MG tablet Take 1 tablet (40 mg total) by mouth once daily.    tamsulosin (FLOMAX) 0.4 mg Cp24 Take 1 capsule (0.4 mg total) by mouth once daily.    TENS unit and electrodes Cmpk 1 Device by Misc.(Non-Drug; Combo Route) route daily as needed.    tiotropium (SPIRIVA WITH HANDIHALER) 18 mcg inhalation capsule Inhale 1 capsule (18 mcg total) into the lungs once daily.    triamcinolone acetonide 0.1% (KENALOG) 0.1 % paste Apply to mouth ulcer BID           Clinical Reference Information           Your Vitals Were     BP Pulse Height Weight BMI    141/71 (BP Location: Right arm, Patient Position: Sitting, BP Method: Automatic) 107 5' 9" (1.753 m) 59.4 kg (130 lb 15.3 oz) 19.34 kg/m2      Blood Pressure          Most Recent Value    BP  (!)  141/71      Allergies as of 3/20/2017     Gabapentin      Immunizations Administered on Date of Encounter - 3/20/2017     None      Language Assistance Services     ATTENTION: Language assistance services are available, free of charge. Please call [phone].      ATENCIÓN: Marvin friedman, " tiene a watters disposición servicios gratuitos de asistencia lingüística. Llame al [phone].     NORMA Ý: N?u b?n nói Ti?ng Vi?t, có các d?ch v? h? tr? ngôn ng? mi?n phí dành cho b?n. G?i s? [phone].         [facility] - Pod[facility] complies with applicable Federal civil rights laws and does not discriminate on the basis of race, color, national origin, age, disability, or sex.

## 2017-03-20 NOTE — MR AVS SNAPSHOT
[facility] Internal Medicine  [address] [place] [address]  Phone: [phone]  Fax: [phone]                  Good Murillo Jr.   3/20/2017 10:40 AM   Office Visit    Description:  Male : 1927   Provider:  HERBER Carrera   Department:  [facility] Internal Medicine           Diagnoses this Visit        Comments    Skin lesion    -  Primary            To Do List           Future Appointments        Provider Department Dept Phone    3/20/2017 2:40 PM Carmita Jones DPM [facility] Podiatry [phone]    3/27/2017 11:00 AM HEARING AIDS, [place] [facility] Hearing Aids [phone]    2017 10:20 AM Devan Eastman MD [facility] Internal Medicine [phone]    2017 8:05 AM LABORATORY, [facility] [phone]    2017 8:10 AM SPECIMEN, [facility] [phone]      Goals (5 Years of Data)     None      [facility] On Call     [facility] On Call Nurse Care Line -  Assistance  Registered nurses in the [facility] provide clinical advisement, health education, appointment booking, and other advisory services.  Call for this free service at [phone].             Medications           Message regarding Medications     Verify the changes and/or additions to your medication regime listed below are the same as discussed with your clinician today.  If any of these changes or additions are incorrect, please notify your healthcare provider.             Verify that the below list of medications is an accurate representation of the medications you are currently taking.  If none reported, the list may be blank. If incorrect, please contact your healthcare provider. Carry this list with you in case of emergency.           Current Medications     albuterol 90 mcg/actuation inhaler Inhale 2 puffs into the lungs every 6 (six) hours.    alendronate (FOSAMAX) 70 MG tablet Take 1 tablet (70 mg total) by mouth every 7 days.    aspirin 81 mg Tab Take  "1 tablet by mouth Daily. Over counter. To help prevent stroke/ heart attack    blood sugar diagnostic Strp 1 strip by Misc.(Non-Drug; Combo Route) route 3 (three) times daily. True Metrix test strips    calcium carbonate (OS-TANGELA) 500 mg calcium (1,250 mg) tablet Take 1 tablet by mouth once daily.    insulin glargine (LANTUS) 100 unit/mL injection Inject 10 Units into the skin once daily.    lancets 33 gauge Misc 1 lancet by Misc.(Non-Drug; Combo Route) route 3 (three) times daily.    levothyroxine (SYNTHROID) 25 MCG tablet     loratadine (CLARITIN) 10 mg tablet Take 1 tablet by mouth Once daily as needed.    metformin (GLUCOPHAGE) 1000 MG tablet TAKE ONE TABLET BY MOUTH TWICE DAILY    nateglinide (STARLIX) 60 MG tablet Take 1 tablet (60 mg total) by mouth 3 (three) times daily before meals.    omeprazole (PRILOSEC) 40 MG capsule Take 1 capsule (40 mg total) by mouth once daily.    pravastatin (PRAVACHOL) 40 MG tablet Take 1 tablet (40 mg total) by mouth once daily.    tamsulosin (FLOMAX) 0.4 mg Cp24 Take 1 capsule (0.4 mg total) by mouth once daily.    TENS unit and electrodes Cmpk 1 Device by Misc.(Non-Drug; Combo Route) route daily as needed.    tiotropium (SPIRIVA WITH HANDIHALER) 18 mcg inhalation capsule Inhale 1 capsule (18 mcg total) into the lungs once daily.           Clinical Reference Information           Your Vitals Were     BP Pulse Temp Height    142/68 (BP Location: Right arm, Patient Position: Sitting, BP Method: Manual) 102 96.3 °F (35.7 °C) (Tympanic) 5' 9" (1.753 m)    Weight SpO2 BMI    59.5 kg (131 lb 2.8 oz) 96% 19.37 kg/m2      Blood Pressure          Most Recent Value    BP  (!)  142/68      Allergies as of 3/20/2017     Gabapentin      Immunizations Administered on Date of Encounter - 3/20/2017     None      Orders Placed During Today's Visit      Normal Orders This Visit    Ambulatory referral to Dermatology       Language Assistance Services     ATTENTION: Language assistance services are " available, free of charge. Please call [phone].      ATENCIÓN: Si habla elder, tiene a watters disposición servicios gratuitos de asistencia lingüística. Llame al [phone].     CHÚ Ý: N?u b?n nói Ti?ng Vi?t, có các d?ch v? h? tr? ngôn ng? mi?n phí dành cho b?n. G?i s? [phone].         [facility] - Internal Medicine complies with applicable Federal civil rights laws and does not discriminate on the basis of race, color, national origin, age, disability, or sex.

## 2017-03-20 NOTE — PROGRESS NOTES
PODIATRY NOTE  CHIEF COMPLAINT  Chief Complaint   Patient presents with    Routine Foot Care     DM2 Last visit with PCP Dr. Eastman 3/20/17         HPI    SUBJECTIVE: Good Murillo Jr. is a 89 y.o. male who  has a past medical history of Abnormal EKG (8/22/2014); Allergy; Arthritis; Bilateral carotid artery disease (1/17/2017); Cholelithiasis; COPD (chronic obstructive pulmonary disease); Diabetes with neurologic complications; Diverticulitis; Diverticulosis; Ex-smoker; Glaucoma; Heart murmur; History of pneumothorax; HTN (hypertension); Hyperlipidemia; Hyperlipidemia (8/22/2014); Hypothyroid; Iron deficiency anemia; Osteoporosis, unspecified (12/16 zeenat 12/18); Polyneuropathy; Prostate CA; PVD (peripheral vascular disease) (8/22/2014); Type 2 diabetes with peripheral circulatory disorder, controlled; Type 2 diabetes, controlled, with neuropathy (Diagnosed 1994); and Urinary incontinence. Goodpresents to clinic for high risk diabetic foot exam and care.  Good admits numbness, burning, and/or tingling sensations in their feet. Patient admits to painful toenails aggravated by increased weight bearing, shoe gear, and pressure. States pain is relieved with routine debridements. Patient has no other pedal complaints at this time.    This patient has documented high risk feet requiring routine maintenance secondary to diabetes mellitis and those secondary complications of diabetes, as mentioned.    HgA1c:   Hemoglobin A1C   Date Value Ref Range Status   03/15/2017 8.0 (H) 4.5 - 6.2 % Final     Comment:     According to ADA guidelines, hemoglobin A1C <7.0% represents  optimal control in non-pregnant diabetic patients.  Different  metrics may apply to specific populations.   Standards of Medical Care in Diabetes - 2016.  For the purpose of screening for the presence of diabetes:  <5.7%     Consistent with the absence of diabetes  5.7-6.4%  Consistent with increasing risk for diabetes   (prediabetes)  >or=6.5%  Consistent  "with diabetes  Currently no consensus exists for use of hemoglobin A1C  for diagnosis of diabetes for children.     11/04/2016 8.0 (H) 4.5 - 6.2 % Final     Comment:     According to ADA guidelines, hemoglobin A1C <7.0% represents  optimal control in non-pregnant diabetic patients.  Different  metrics may apply to specific populations.   Standards of Medical Care in Diabetes - 2016.  For the purpose of screening for the presence of diabetes:  <5.7%     Consistent with the absence of diabetes  5.7-6.4%  Consistent with increasing risk for diabetes   (prediabetes)  >or=6.5%  Consistent with diabetes  Currently no consensus exists for use of hemoglobin A1C  for diagnosis of diabetes for children.     04/20/2016 7.9 (H) 4.5 - 6.2 % Final           REVIEW OF SYSTEMS  General: Denies any fever or chills  Chest: Denies shortness of breath, wheezing, coughing, or sputum production  Heart: Denies chest pain.  As noted above and per history of current illness above, otherwise negative in the remainder of the 14 systems.     PHYSICAL EXAM  Vitals:    03/20/17 1435   BP: (!) 141/71   Pulse: 107   Weight: 59.4 kg (130 lb 15.3 oz)   Height: 5' 9" (1.753 m)   PainSc: 0-No pain       GEN:  This patient is well-developed, well-nourished and appears stated age, well-oriented to person, place and time, and cooperative and pleasant on today's visit.      LOWER EXTREMITY  Vascular:   · DP pedal pulse 0/4 b/l, PT pedal pulse 1/4 b/l  · Skin temperature warm to warm from prox to distally  · CFT <5 secs b/l  · There is no edema noted b/l.     Dermatologic:   · Thickened, dystrophic, elongated toenails with subungal debris 1-5 b/l.   · No open skin lesions noted  · No erythema or drainage noted b/l.  · Webspaces are C/D/I B/L.  · There is no hyperkeratotic tissue noted.  · Skin texture and turgor dry  · There is no pedal hair growth noted    Neurologic:  · Vibratory sensation diminished at level of Hallux IPJ b/l    · Protective sensation " absent at 0/10 sites upon examination with [place] Weinsten 5.07 g monofilament.   · Propioception intact at 1st MTPJ b/l.   · Babinski reflex absent b/l. Light touch and sharp/dull sensation intact b/l.    Musculoskeletal/Orthopedic:  · No symptomatic structural abnormalities noted.   · Muscle strength is 5/5 for foot inverters, everters, plantarflexors, and dorsiflexors. Muscle tone is normal.  · Pain free range of motion in all four quadrants with stiffness and limitation b/l  · Foot type: rectus with intact medial longituidinal arch         ASSESSMENT  1. Dermatophytosis of nail  2. PVD   3. DM 2 with neurological manifestations      Plan:  -Discuss presenting problems, etiology, pathologic processes and management options with patient today.   -I counseled the patient on their conditions, their implications and medical management. An in depth discussion on diabetic management, risk prevention, amputation prevention verbally and provided educational literature in written format.  -With patient's permission, nails were aggressively reduced and debrided x 10 to their soft tissue attachment mechanically and with electric , removing all offending nail and debris. Patient relates relief following the procedure. Patient will continue to monitor the areas daily, inspect feet, wear protective shoe gear when ambulatory, moisturizer to maintain skin integrity.  - Shoe inspection. Diabetic Foot Education. Patient reminded of the importance of good nutrition and blood sugar control to help prevent podiatric complications of diabetes. Patient instructed on proper foot hygeine. We discussed wearing proper shoe gear, daily foot inspections, never walking without protective shoe gear, never putting sharp instruments to feet, routine podiatric nail visits every 2-3 months.        Future Appointments  Date Time Provider Department Center   3/27/2017 11:00 AM HEARING PAULETTE MUÑOZ [facility] HEAR [facility]   4/13/2017 10:20 AM Devan SANDOVAL  MD [facility] IM [facility]   5/8/2017 8:05 AM LABORATORY, [facility] LAB [facility]   5/8/2017 8:10 AM SPECIMEN, [facility] SPECLAB [facility]   6/15/2017 1:20 PM PULMONARY LAB, [facility] PULMLAB [facility]   6/15/2017 1:40 PM Agustin Dean MD [facility] PULMSVC [facility]   6/19/2017 1:20 PM Carmita Jones DPM [facility] POD [facility]   7/19/2017 11:00 AM Damian Issa Jr., PA-C [facility] DIABETE [facility]   12/1/2017 9:40 AM Kwaku Montes De Oca IV, MD ON UROLOGY [place]   1/15/2018 1:30 PM FIELDS, VISUAL-JYOTHI [facility] OPHTHAL [facility]   1/15/2018 2:00 PM Patricio Morris MD [facility] OPHTHAL [facility]         Report Electronically Signed By:  Carmita Jones DPM   Podiatric Medicine & Surgery  [facility]  3/20/2017

## 2017-03-20 NOTE — PROGRESS NOTES
Pt has been very ill and has not been in for a follow up.  Turned global up to 90% and during counseling, he found that to be too much volume increase at one time.  Adjusted down to 80%. (Pt was at 70% upon arrival today.)    Pt will return in ONE WEEK for follow up.

## 2017-03-27 ENCOUNTER — CLINICAL SUPPORT (OUTPATIENT)
Dept: AUDIOLOGY | Facility: CLINIC | Age: 82
End: 2017-03-27
Payer: MEDICARE

## 2017-03-27 DIAGNOSIS — Z46.1 FITTING AND ADJUSTMENT OF HEARING AID: Primary | ICD-10-CM

## 2017-03-27 NOTE — PROGRESS NOTES
Return for credit- approved by Gretchen Magdaleno in  at [employer].    Pt returned today for follow up with dead hearing aids.  He doesn't know how to charge them.  Pt has history of no show visits and lack of familial support.  His family lives out of town.  Will submit for return of hearing aid payment.

## 2017-04-03 ENCOUNTER — TELEPHONE (OUTPATIENT)
Dept: INTERNAL MEDICINE | Facility: CLINIC | Age: 82
End: 2017-04-03

## 2017-04-03 NOTE — TELEPHONE ENCOUNTER
To discuss his care should have form signed giving her permission to discuss care ; also can get verbal consent from him to answer these questions  Flying ok  Living in [facility]

## 2017-04-03 NOTE — TELEPHONE ENCOUNTER
----- Message from Franciscavirgen Felton sent at 4/3/2017 12:21 PM CDT -----  Contact: Carlene Duque (Daughter) [phone]  Pt's daughter who accompanied pt to his last apt with Dr Eastman 3/16/17  has few questions for Dr Eastman.  1.  Ms Concepcion's first concern is, does not want to violate his HIPPA rights. Can pt's care be discussed with her, can she ask for advice  Or does something have to be signed or verbally approved?    Ms Concepcion is  Contemplating asking her father to live with her in [place].  She is requesting Dr Eastman's opinion given the patient's advanced age and health condition.    A. Is flying recommended at his age  B. Is relocating to [place] (altitude,climate)advisable?  If Dr Eastman or staff would call her back, she would greatly appreciate it.    Pt can be reached at [phone]    Thanks  geri

## 2017-04-03 NOTE — TELEPHONE ENCOUNTER
Spoke with pt daughter and informed will send message to Dr. Eastman. She was informed Dr. Eastman is out of the office this afternoon.

## 2017-04-05 RX ORDER — LEVOTHYROXINE SODIUM 25 UG/1
TABLET ORAL
Qty: 30 TABLET | Refills: 11 | Status: SHIPPED | OUTPATIENT
Start: 2017-04-05 | End: 2017-06-15 | Stop reason: SDUPTHER

## 2017-04-13 ENCOUNTER — OFFICE VISIT (OUTPATIENT)
Dept: INTERNAL MEDICINE | Facility: CLINIC | Age: 82
End: 2017-04-13
Payer: MEDICARE

## 2017-04-13 VITALS
DIASTOLIC BLOOD PRESSURE: 68 MMHG | HEART RATE: 103 BPM | HEIGHT: 69 IN | BODY MASS INDEX: 19.26 KG/M2 | SYSTOLIC BLOOD PRESSURE: 122 MMHG | TEMPERATURE: 98 F | WEIGHT: 130.06 LBS | OXYGEN SATURATION: 96 %

## 2017-04-13 DIAGNOSIS — E03.4 HYPOTHYROIDISM DUE TO ACQUIRED ATROPHY OF THYROID: Chronic | ICD-10-CM

## 2017-04-13 DIAGNOSIS — E78.5 HYPERLIPIDEMIA ASSOCIATED WITH TYPE 2 DIABETES MELLITUS: ICD-10-CM

## 2017-04-13 DIAGNOSIS — Z79.4 TYPE 2 DIABETES MELLITUS WITH DIABETIC POLYNEUROPATHY, WITH LONG-TERM CURRENT USE OF INSULIN: Primary | ICD-10-CM

## 2017-04-13 DIAGNOSIS — E11.69 HYPERLIPIDEMIA ASSOCIATED WITH TYPE 2 DIABETES MELLITUS: ICD-10-CM

## 2017-04-13 DIAGNOSIS — E11.42 TYPE 2 DIABETES MELLITUS WITH DIABETIC POLYNEUROPATHY, WITH LONG-TERM CURRENT USE OF INSULIN: Primary | ICD-10-CM

## 2017-04-13 DIAGNOSIS — J43.9 PULMONARY EMPHYSEMA, UNSPECIFIED EMPHYSEMA TYPE: ICD-10-CM

## 2017-04-13 PROCEDURE — 1160F RVW MEDS BY RX/DR IN RCRD: CPT | Mod: S$GLB,,, | Performed by: FAMILY MEDICINE

## 2017-04-13 PROCEDURE — 1157F ADVNC CARE PLAN IN RCRD: CPT | Mod: S$GLB,,, | Performed by: FAMILY MEDICINE

## 2017-04-13 PROCEDURE — 99999 PR PBB SHADOW E&M-EST. PATIENT-LVL II: CPT | Mod: PBBFAC,,, | Performed by: FAMILY MEDICINE

## 2017-04-13 PROCEDURE — 99214 OFFICE O/P EST MOD 30 MIN: CPT | Mod: S$GLB,,, | Performed by: FAMILY MEDICINE

## 2017-04-13 PROCEDURE — 1159F MED LIST DOCD IN RCRD: CPT | Mod: S$GLB,,, | Performed by: FAMILY MEDICINE

## 2017-04-13 RX ORDER — MIRTAZAPINE 7.5 MG/1
7.5 TABLET, FILM COATED ORAL NIGHTLY
Qty: 30 TABLET | Refills: 5 | Status: SHIPPED | OUTPATIENT
Start: 2017-04-13 | End: 2017-06-05

## 2017-04-13 RX ORDER — MIRTAZAPINE 7.5 MG/1
7.5 TABLET, FILM COATED ORAL NIGHTLY
Qty: 30 TABLET | Refills: 5 | Status: SHIPPED | OUTPATIENT
Start: 2017-04-13 | End: 2017-04-13 | Stop reason: SDUPTHER

## 2017-04-13 NOTE — MR AVS SNAPSHOT
[facility] Internal Medicine  [address] [facility] [address]  Phone: [phone]  Fax: [phone]                  Good Murillo Jr.   2017 10:20 AM   Office Visit    Description:  Male : 1927   Provider:  Devan Eastman MD   Department:  [facility] - Internal Medicine           Diagnoses this Visit        Comments    Type 2 diabetes mellitus with diabetic polyneuropathy, with long-term current use of insulin    -  Primary     Pulmonary emphysema, unspecified emphysema type         Hypothyroidism due to acquired atrophy of thyroid         Hyperlipidemia associated with type 2 diabetes mellitus                To Do List           Future Appointments        Provider Department Dept Phone    2017 8:05 AM LABORATORY, [facility] [phone]    2017 8:10 AM SPECIMEN, [facility] [phone]    2017 11:00 AM Devan Eastman MD [facility] Internal Medicine [phone]    6/15/2017 1:20 PM PULMONARY LAB, [facility] [phone]    6/15/2017 1:40 PM Agustin Dean MD [facility] Pulmonary Services [phone]      Goals (5 Years of Data)     None      Follow-Up and Disposition     Return in about 6 weeks (around 2017).    Follow-up and Disposition History       These Medications        Disp Refills Start End    mirtazapine (REMERON) 7.5 MG Tab 30 tablet 5 2017    Take 1 tablet (7.5 mg total) by mouth every evening. - Oral    Pharmacy: [facility] [address] [place] [address] Ph #: [phone]         [facility]     [facility] Care [facility] -  Assistance  Unless otherwise directed by your provider, please contact [facility], our nurse care line that is available for  assistance.     Registered nurses in the [facility] provide: appointment scheduling, clinical advisement, health education, and other advisory services.  Call:  [phone] (toll free)               Medications           Message regarding Medications     Verify the changes and/or additions to your medication regime listed below are the same as discussed with your clinician today.  If any of these changes or additions are incorrect, please notify your healthcare provider.        START taking these NEW medications        Refills    mirtazapine (REMERON) 7.5 MG Tab 5    Sig: Take 1 tablet (7.5 mg total) by mouth every evening.    Class: Normal    Route: Oral           Verify that the below list of medications is an accurate representation of the medications you are currently taking.  If none reported, the list may be blank. If incorrect, please contact your healthcare provider. Carry this list with you in case of emergency.           Current Medications     albuterol 90 mcg/actuation inhaler Inhale 2 puffs into the lungs every 6 (six) hours.    alendronate (FOSAMAX) 70 MG tablet Take 1 tablet (70 mg total) by mouth every 7 days.    aspirin 81 mg Tab Take 1 tablet by mouth Daily. Over counter. To help prevent stroke/ heart attack    blood sugar diagnostic Strp 1 strip by Misc.(Non-Drug; Combo Route) route 3 (three) times daily. True Metrix test strips    calcium carbonate (OS-TANGELA) 500 mg calcium (1,250 mg) tablet Take 1 tablet by mouth once daily.    insulin glargine (LANTUS) 100 unit/mL injection Inject 10 Units into the skin once daily.    lancets 33 gauge Misc 1 lancet by Misc.(Non-Drug; Combo Route) route 3 (three) times daily.    levothyroxine (SYNTHROID) 25 MCG tablet     levothyroxine (SYNTHROID) 25 MCG tablet TAKE ONE TABLET BY MOUTH ONCE DAILY    loratadine (CLARITIN) 10 mg tablet Take 1 tablet by mouth Once daily as needed.    metformin (GLUCOPHAGE) 1000 MG tablet TAKE ONE TABLET BY MOUTH TWICE DAILY    mirtazapine (REMERON) 7.5 MG Tab Take 1 tablet (7.5 mg total) by mouth every evening.    nateglinide (STARLIX) 60 MG tablet Take 1 tablet (60 mg total) by mouth 3  "(three) times daily before meals.    omeprazole (PRILOSEC) 40 MG capsule Take 1 capsule (40 mg total) by mouth once daily.    pravastatin (PRAVACHOL) 40 MG tablet Take 1 tablet (40 mg total) by mouth once daily.    tamsulosin (FLOMAX) 0.4 mg Cp24 Take 1 capsule (0.4 mg total) by mouth once daily.    TENS unit and electrodes Cmpk 1 Device by Misc.(Non-Drug; Combo Route) route daily as needed.    tiotropium (SPIRIVA WITH HANDIHALER) 18 mcg inhalation capsule Inhale 1 capsule (18 mcg total) into the lungs once daily.    triamcinolone acetonide 0.1% (KENALOG) 0.1 % paste Apply to mouth ulcer BID           Clinical Reference Information           Your Vitals Were     BP Pulse Temp Height Weight SpO2    122/68 (BP Location: Right arm, Patient Position: Sitting, BP Method: Manual) 103 98.1 °F (36.7 °C) (Tympanic) 5' 9" (1.753 m) 59 kg (130 lb 1.1 oz) 96%    BMI                19.21 kg/m2          Blood Pressure          Most Recent Value    BP  122/68      Allergies as of 4/13/2017     Gabapentin      Immunizations Administered on Date of Encounter - 4/13/2017     None      Language Assistance Services     ATTENTION: Language assistance services are available, free of charge. Please call [phone].      ATENCIÓN: Si loanla elder, tiene a watters disposición servicios gratuitos de asistencia lingüística. Llame al [phone].     CHÚ Ý: N?u b?n nói Ti?ng Vi?t, có các d?ch v? h? tr? ngôn ng? mi?n phí dành cho b?n. G?i s? [phone].         [facility] - Internal Medicine complies with applicable Federal civil rights laws and does not discriminate on the basis of race, color, national origin, age, disability, or sex.        "

## 2017-04-13 NOTE — PROGRESS NOTES
Subjective:       Patient ID: Good Murillo Jr. is a . male.    Chief Complaint: Multiple issues see below;daught here today  HPI Type 2 diabetes wneurp: a1c 8 ok for age  ; in dm clinicw [place] . Tolerating medicine. No hypoglycemia  Wt stable x was bit declast  time after flu illness ;mood ok;he would like to resume remeron as discussed  Chronic low back pain no change ultram helps. has TENs. Saw dr sawyer;   Copd stable no new sob. No cp. (lung mass not seen on dec ct)      hypothy:nl tsh     P[isidro ca hx s/p brachytxutd dr fair  htn w dm no meds  hyperchl w dm severiano     Chronic anemia/ iron def dx yrs ago dr nathan ; egd via dr damian fall 16 and abd ct (which led to chest ct)              Past Medical History   Diagnosis Date    Allergy     Hyperlipidemia     COPD (chronic obstructive pulmonary disease)     Osteoporosis, unspecified 1/14 zeenat 1/16    Prostate ca     Iron deficiency anemia      venita    Ex-smoker     History of pneumothorax      bilat    Type 2 diabetes, controlled, with neuropathy     PVD (peripheral vascular disease) 8/22/2014    Hyperlipidemia 8/22/2014    Abnormal EKG 8/22/2014    HTN (hypertension)     Hypothyroid      Past Surgical History   Procedure Laterality Date    Prostate surgery       brachytherapy     Family History   Problem Relation Age of Onset    Diabetes Mother         Review of Systems  Cardiovascular: no chest pain  Chest: no shortness of breath  Abd: no abd pain  Remainder review of systems negative    Objective:     Physical Exam   Constitutional: He is oriented to person, place, and time. He appears well-developed and well-nourished.   HENT:   Head: Normocephalic and atraumatic.   Right Ear: External ear normal.   Left Ear: External ear normal.   Nose: Nose normal.     Eyes: Conjunctivae and EOM are normal. Pupils are equal, round, and reactive to light. No scleral icterus.   Neck: Normal range of motion. Neck supple. Carotid bruit is not present.    Cardiovascular: Normal rate, regular rhythm and normal heart sounds.  Exam reveals no gallop and no friction rub.    No murmur heard.  Pulmonary/Chest: Effort normal and breath sounds normal. He has no wheezes.   .   Musculoskeletal: Normal range of motion. He exhibits no tenderness.   Lymphadenopathy:     He has no cervical adenopathy.   Neurological: He is alert and oriented to person, place, and time. No cranial nerve deficit. Coordination normal.   Skin: Skin is warm and dry. No rash noted. No erythema.   Psychiatric: He has a normal mood and affect. His behavior is normal. Judgment and thought content normal.   Nursing note and vitals reviewed.        Assessment:    t ype 2 diabetes, controlled, with neuropathy   1. BPH (benign prostatic hyperplasia)    2. Iron deficiency anemia    3. Anemia, unspecified anemia type    4. Essential hypertension    5. COPD (chronic obstructive pulmonary disease)    6. htn w dm  hyperchol w dm     Wt loss  Plan:       F/u dr daniels prost ca/bph whendue  *F/u pulm -when due  F/u card when due  f/u dm clinic when due  He desires something to help him gain wt; remeron    F/u 6 weeks monitor wt then extend interval if stable

## 2017-04-25 ENCOUNTER — TELEPHONE (OUTPATIENT)
Dept: INTERNAL MEDICINE | Facility: CLINIC | Age: 82
End: 2017-04-25

## 2017-05-08 ENCOUNTER — LAB VISIT (OUTPATIENT)
Dept: LAB | Facility: HOSPITAL | Age: 82
End: 2017-05-08
Attending: FAMILY MEDICINE
Payer: MEDICARE

## 2017-05-08 DIAGNOSIS — E03.9 ACQUIRED HYPOTHYROIDISM: ICD-10-CM

## 2017-05-08 DIAGNOSIS — D50.9 IRON DEFICIENCY ANEMIA, UNSPECIFIED IRON DEFICIENCY ANEMIA TYPE: ICD-10-CM

## 2017-05-08 DIAGNOSIS — E11.40 TYPE 2 DIABETES, CONTROLLED, WITH NEUROPATHY: ICD-10-CM

## 2017-05-08 LAB
ANION GAP SERPL CALC-SCNC: 9 MMOL/L
BASOPHILS # BLD AUTO: 0.03 K/UL
BASOPHILS NFR BLD: 0.3 %
BUN SERPL-MCNC: 21 MG/DL
CALCIUM SERPL-MCNC: 10.1 MG/DL
CHLORIDE SERPL-SCNC: 102 MMOL/L
CHOLEST/HDLC SERPL: 4 {RATIO}
CO2 SERPL-SCNC: 30 MMOL/L
CREAT SERPL-MCNC: 1.1 MG/DL
DIFFERENTIAL METHOD: ABNORMAL
EOSINOPHIL # BLD AUTO: 0.2 K/UL
EOSINOPHIL NFR BLD: 1.7 %
ERYTHROCYTE [DISTWIDTH] IN BLOOD BY AUTOMATED COUNT: 14.8 %
EST. GFR  (AFRICAN AMERICAN): >60 ML/MIN/1.73 M^2
EST. GFR  (NON AFRICAN AMERICAN): 59.2 ML/MIN/1.73 M^2
GLUCOSE SERPL-MCNC: 202 MG/DL
HCT VFR BLD AUTO: 35.8 %
HDL/CHOLESTEROL RATIO: 25.2 %
HDLC SERPL-MCNC: 159 MG/DL
HDLC SERPL-MCNC: 40 MG/DL
HGB BLD-MCNC: 12.1 G/DL
LDLC SERPL CALC-MCNC: 97.6 MG/DL
LYMPHOCYTES # BLD AUTO: 1.9 K/UL
LYMPHOCYTES NFR BLD: 17.6 %
MCH RBC QN AUTO: 30 PG
MCHC RBC AUTO-ENTMCNC: 33.8 %
MCV RBC AUTO: 89 FL
MONOCYTES # BLD AUTO: 1.1 K/UL
MONOCYTES NFR BLD: 9.7 %
NEUTROPHILS # BLD AUTO: 7.7 K/UL
NEUTROPHILS NFR BLD: 70.5 %
NONHDLC SERPL-MCNC: 119 MG/DL
PLATELET # BLD AUTO: 429 K/UL
PMV BLD AUTO: 10 FL
POTASSIUM SERPL-SCNC: 4.8 MMOL/L
RBC # BLD AUTO: 4.03 M/UL
SODIUM SERPL-SCNC: 141 MMOL/L
TRIGL SERPL-MCNC: 107 MG/DL
TSH SERPL DL<=0.005 MIU/L-ACNC: 3.78 UIU/ML
WBC # BLD AUTO: 10.86 K/UL

## 2017-05-08 PROCEDURE — 84443 ASSAY THYROID STIM HORMONE: CPT

## 2017-05-08 PROCEDURE — 83036 HEMOGLOBIN GLYCOSYLATED A1C: CPT

## 2017-05-08 PROCEDURE — 80061 LIPID PANEL: CPT

## 2017-05-08 PROCEDURE — 36415 COLL VENOUS BLD VENIPUNCTURE: CPT | Mod: PO

## 2017-05-08 PROCEDURE — 80048 BASIC METABOLIC PNL TOTAL CA: CPT

## 2017-05-08 PROCEDURE — 85025 COMPLETE CBC W/AUTO DIFF WBC: CPT

## 2017-05-09 LAB
ESTIMATED AVG GLUCOSE: 189 MG/DL
HBA1C MFR BLD HPLC: 8.2 %

## 2017-06-02 RX ORDER — INSULIN GLARGINE 100 [IU]/ML
INJECTION, SOLUTION SUBCUTANEOUS
Qty: 15 ML | Refills: 0 | Status: SHIPPED | OUTPATIENT
Start: 2017-06-02 | End: 2018-01-30 | Stop reason: SDUPTHER

## 2017-06-05 ENCOUNTER — OFFICE VISIT (OUTPATIENT)
Dept: INTERNAL MEDICINE | Facility: CLINIC | Age: 82
End: 2017-06-05
Payer: MEDICARE

## 2017-06-05 ENCOUNTER — LAB VISIT (OUTPATIENT)
Dept: LAB | Facility: HOSPITAL | Age: 82
End: 2017-06-05
Attending: NURSE PRACTITIONER
Payer: MEDICARE

## 2017-06-05 VITALS
HEART RATE: 109 BPM | BODY MASS INDEX: 18.77 KG/M2 | TEMPERATURE: 97 F | HEIGHT: 69 IN | WEIGHT: 126.75 LBS | DIASTOLIC BLOOD PRESSURE: 74 MMHG | SYSTOLIC BLOOD PRESSURE: 130 MMHG | OXYGEN SATURATION: 94 %

## 2017-06-05 DIAGNOSIS — K92.1 BLOOD IN STOOL: Primary | ICD-10-CM

## 2017-06-05 DIAGNOSIS — Z87.19 HISTORY OF DIVERTICULITIS: ICD-10-CM

## 2017-06-05 DIAGNOSIS — K57.91 DIVERTICULOSIS OF INTESTINE WITH BLEEDING, UNSPECIFIED INTESTINAL TRACT LOCATION: ICD-10-CM

## 2017-06-05 DIAGNOSIS — R10.32 LLQ DISCOMFORT: ICD-10-CM

## 2017-06-05 DIAGNOSIS — K64.9 BLEEDING HEMORRHOID: ICD-10-CM

## 2017-06-05 DIAGNOSIS — K92.1 BLOOD IN STOOL: ICD-10-CM

## 2017-06-05 LAB
BASOPHILS # BLD AUTO: 0.02 K/UL
BASOPHILS NFR BLD: 0.2 %
DIFFERENTIAL METHOD: ABNORMAL
EOSINOPHIL # BLD AUTO: 0.1 K/UL
EOSINOPHIL NFR BLD: 1.2 %
ERYTHROCYTE [DISTWIDTH] IN BLOOD BY AUTOMATED COUNT: 14.8 %
HCT VFR BLD AUTO: 32.7 %
HGB BLD-MCNC: 10.5 G/DL
LYMPHOCYTES # BLD AUTO: 2.2 K/UL
LYMPHOCYTES NFR BLD: 21.5 %
MCH RBC QN AUTO: 29.2 PG
MCHC RBC AUTO-ENTMCNC: 32.1 %
MCV RBC AUTO: 91 FL
MONOCYTES # BLD AUTO: 0.7 K/UL
MONOCYTES NFR BLD: 6.6 %
NEUTROPHILS # BLD AUTO: 7.3 K/UL
NEUTROPHILS NFR BLD: 70.5 %
PLATELET # BLD AUTO: 355 K/UL
PMV BLD AUTO: 9.8 FL
RBC # BLD AUTO: 3.59 M/UL
WBC # BLD AUTO: 10.32 K/UL

## 2017-06-05 PROCEDURE — 85025 COMPLETE CBC W/AUTO DIFF WBC: CPT | Mod: PO

## 2017-06-05 PROCEDURE — 36415 COLL VENOUS BLD VENIPUNCTURE: CPT | Mod: PO

## 2017-06-05 PROCEDURE — 99215 OFFICE O/P EST HI 40 MIN: CPT | Mod: S$GLB,,, | Performed by: NURSE PRACTITIONER

## 2017-06-05 PROCEDURE — 99999 PR PBB SHADOW E&M-EST. PATIENT-LVL IV: CPT | Mod: PBBFAC,,, | Performed by: NURSE PRACTITIONER

## 2017-06-05 PROCEDURE — 1159F MED LIST DOCD IN RCRD: CPT | Mod: S$GLB,,, | Performed by: NURSE PRACTITIONER

## 2017-06-05 RX ORDER — HYDROCORTISONE 10 MG/G
1 CREAM TOPICAL 2 TIMES DAILY
Qty: 28.4 G | Refills: 0 | Status: SHIPPED | OUTPATIENT
Start: 2017-06-05 | End: 2017-06-15

## 2017-06-05 RX ORDER — PANTOPRAZOLE SODIUM 40 MG/1
40 TABLET, DELAYED RELEASE ORAL DAILY
Qty: 20 TABLET | Refills: 0 | Status: SHIPPED | OUTPATIENT
Start: 2017-06-05 | End: 2018-01-24 | Stop reason: SDUPTHER

## 2017-06-05 RX ORDER — AMOXICILLIN AND CLAVULANATE POTASSIUM 875; 125 MG/1; MG/1
1 TABLET, FILM COATED ORAL 2 TIMES DAILY
Qty: 20 TABLET | Refills: 0 | Status: SHIPPED | OUTPATIENT
Start: 2017-06-05 | End: 2017-06-15

## 2017-06-05 NOTE — PROGRESS NOTES
Subjective:       Patient ID: Good Murillo Jr. is a 89 y.o. male.    Chief Complaint: Rectal Bleeding      Pt presents with c/o of blood in stool x 3 days. He reports that today there appeared to be more blood and there were clots in it. He describes the blood as bright red with dark streaks. He denies pain with BMs. He denies constipation and/or diarrhea. He has a hx of hemorrhoids and diverticulitis. No hx of PUD or GI bleed. There are new medications aside from a green dietary supplement (patriot green) - x 1 month for energy, He is not excessively using NSAIDS. There is no abd pain, fever, chills, urinary symptoms, headaches, increased weakness, fatigue, blurred vision, dizziness, cp, sob, palpitations or any other acute symptoms. His daughter is present with him today.         Review of Systems   Gastrointestinal: Positive for blood in stool.   All other systems reviewed and are negative.      Objective:      Physical Exam   Constitutional: He is oriented to person, place, and time. He appears well-developed.   HENT:   Head: Normocephalic.   Hearing aids in place    Eyes: Conjunctivae and EOM are normal. Pupils are equal, round, and reactive to light.   Neck: Normal range of motion. Neck supple.   Cardiovascular: Normal rate, regular rhythm and normal heart sounds.    Pulmonary/Chest: Effort normal and breath sounds normal.   Abdominal: Soft. Bowel sounds are normal. He exhibits no distension and no mass. There is tenderness in the left lower quadrant. There is no rigidity, no rebound, no guarding, no CVA tenderness, no tenderness at McBurney's point and negative Allison's sign. No hernia.   Musculoskeletal: Normal range of motion.   Neurological: He is alert and oriented to person, place, and time.   Skin:   Grade 2-3 reducible hemorrhoid present on rectal exam. There is no bleeding currently. It is not painful to touch or thrombosed. He is wearing a brief.    Psychiatric: He has a normal mood and affect.        Assessment:       1. Blood in stool    2. History of diverticulitis    3. Diverticulosis of intestine with bleeding, unspecified intestinal tract location    4. LLQ discomfort    5. Bleeding hemorrhoid        Plan:   Blood in stool  -     Occult blood x 1, stool; Future; Expected date: 06/05/2017  -     Stool culture; Future; Expected date: 06/05/2017  -     CBC auto differential; Future; Expected date: 06/05/2017  -     pantoprazole (PROTONIX) 40 MG tablet; Take 1 tablet (40 mg total) by mouth once daily.  Dispense: 20 tablet; Refill: 0    History of diverticulitis  -     amoxicillin-clavulanate 875-125mg (AUGMENTIN) 875-125 mg per tablet; Take 1 tablet by mouth 2 (two) times daily.  Dispense: 20 tablet; Refill: 0    Diverticulosis of intestine with bleeding, unspecified intestinal tract location    LLQ discomfort  -     amoxicillin-clavulanate 875-125mg (AUGMENTIN) 875-125 mg per tablet; Take 1 tablet by mouth 2 (two) times daily.  Dispense: 20 tablet; Refill: 0    Bleeding hemorrhoid  -     hydrocortisone (PROCTO-MELISSA) 1 % crpe; Place 1 applicator rectally 2 (two) times daily.  Dispense: 28.4 g; Refill: 0      As above   Stool for OCB and culture  CBC stat  Start Protonix for prophylaxis  Augmentin BID x 10 days- will tx for potential diverticular inflammation  Procto pack for hemorrhoid-warm sitz baths.   Will have patient follow up in 2-3 days to recheck symptoms and repeat CBC  ER for abrupt worsening of symptoms  An excess of 40 mins was spent developing above plan of care with patient and daughter.

## 2017-06-06 ENCOUNTER — TELEPHONE (OUTPATIENT)
Dept: INTERNAL MEDICINE | Facility: CLINIC | Age: 82
End: 2017-06-06

## 2017-06-06 ENCOUNTER — LAB VISIT (OUTPATIENT)
Dept: LAB | Facility: HOSPITAL | Age: 82
End: 2017-06-06
Attending: NURSE PRACTITIONER
Payer: MEDICARE

## 2017-06-06 DIAGNOSIS — K92.1 BLOOD IN STOOL: ICD-10-CM

## 2017-06-06 PROCEDURE — 87046 STOOL CULTR AEROBIC BACT EA: CPT

## 2017-06-06 PROCEDURE — 87045 FECES CULTURE AEROBIC BACT: CPT

## 2017-06-06 PROCEDURE — 82272 OCCULT BLD FECES 1-3 TESTS: CPT

## 2017-06-06 PROCEDURE — 87427 SHIGA-LIKE TOXIN AG IA: CPT | Mod: 59

## 2017-06-06 NOTE — TELEPHONE ENCOUNTER
Spoke with pt. And he states that he is taking prilosec.   Also he needs to follow up with a physician here in the next 2-3 days. His blood count did drop a bit so this will need to be re-evaluated at the visit in 2-3 days. If he begins to experience any severe abdominal pain, increased blood in stool, fevers, dizziness, nausea, vomiting, chest pain, shortness of breath or any other acute symptoms he should report to the ER for immediate evaluation.  Can we see if patient is still taking prilosec? If not I would like for him to take protonix. Also he needs to follow up with a physician here in the next 2-3 days. His blood count did drop a bit so this will need to be re-evaluated at the visit in 2-3 days. If he begins to experience any severe abdominal pain, increased blood in stool, fevers, dizziness, nausea, vomiting, chest pain, shortness of breath or any other acute symptoms he should report to the ER for immediate evaluation.  Scheduled an appointment with Dr. Shelton

## 2017-06-06 NOTE — TELEPHONE ENCOUNTER
----- Message from Kennedi Wild NP sent at 6/5/2017  4:25 PM CDT -----  Can we see if patient is still taking prilosec? If not I would like for him to take protonix. Also he needs to follow up with a physician here in the next 2-3 days. His blood count did drop a bit so this will need to be re-evaluated at the visit in 2-3 days. If he begins to experience any severe abdominal pain, increased blood in stool, fevers, dizziness, nausea, vomiting, chest pain, shortness of breath or any other acute symptoms he should report to the ER for immediate evaluation.

## 2017-06-07 ENCOUNTER — OFFICE VISIT (OUTPATIENT)
Dept: INTERNAL MEDICINE | Facility: CLINIC | Age: 82
End: 2017-06-07
Payer: MEDICARE

## 2017-06-07 VITALS
SYSTOLIC BLOOD PRESSURE: 154 MMHG | HEIGHT: 68 IN | TEMPERATURE: 96 F | HEART RATE: 96 BPM | DIASTOLIC BLOOD PRESSURE: 73 MMHG | BODY MASS INDEX: 19.18 KG/M2 | WEIGHT: 126.56 LBS

## 2017-06-07 DIAGNOSIS — R33.8 ACUTE RETENTION OF URINE: ICD-10-CM

## 2017-06-07 DIAGNOSIS — G89.29 CHRONIC MIDLINE LOW BACK PAIN WITHOUT SCIATICA: Primary | ICD-10-CM

## 2017-06-07 DIAGNOSIS — J43.9 PULMONARY EMPHYSEMA, UNSPECIFIED EMPHYSEMA TYPE: ICD-10-CM

## 2017-06-07 DIAGNOSIS — G47.34 NOCTURNAL HYPOXEMIA: Primary | ICD-10-CM

## 2017-06-07 DIAGNOSIS — M54.50 CHRONIC MIDLINE LOW BACK PAIN WITHOUT SCIATICA: Primary | ICD-10-CM

## 2017-06-07 DIAGNOSIS — Z79.4 TYPE 2 DIABETES MELLITUS WITH DIABETIC POLYNEUROPATHY, WITH LONG-TERM CURRENT USE OF INSULIN: ICD-10-CM

## 2017-06-07 DIAGNOSIS — J44.9 CHRONIC OBSTRUCTIVE PULMONARY DISEASE, UNSPECIFIED COPD TYPE: ICD-10-CM

## 2017-06-07 DIAGNOSIS — E11.42 TYPE 2 DIABETES MELLITUS WITH DIABETIC POLYNEUROPATHY, WITH LONG-TERM CURRENT USE OF INSULIN: ICD-10-CM

## 2017-06-07 DIAGNOSIS — R19.5 OCCULT BLOOD IN STOOLS: ICD-10-CM

## 2017-06-07 LAB
E COLI SXT1 STL QL IA: NEGATIVE
E COLI SXT2 STL QL IA: NEGATIVE
OB PNL STL: POSITIVE

## 2017-06-07 PROCEDURE — 99499 UNLISTED E&M SERVICE: CPT | Mod: S$GLB,,, | Performed by: FAMILY MEDICINE

## 2017-06-07 PROCEDURE — 99213 OFFICE O/P EST LOW 20 MIN: CPT | Mod: S$GLB,,, | Performed by: FAMILY MEDICINE

## 2017-06-07 PROCEDURE — 99999 PR PBB SHADOW E&M-EST. PATIENT-LVL III: CPT | Mod: PBBFAC,,, | Performed by: FAMILY MEDICINE

## 2017-06-07 PROCEDURE — 1159F MED LIST DOCD IN RCRD: CPT | Mod: S$GLB,,, | Performed by: FAMILY MEDICINE

## 2017-06-07 NOTE — TELEPHONE ENCOUNTER
----- Message from Nyla Marie PharmD sent at 6/7/2017  5:11 PM CDT -----  Mr. Murillo is requesting refills for Metformin 1000mg and Tamsulosin 0.4mg to be sent to [facility] ([facility]).    Thanks

## 2017-06-07 NOTE — PROGRESS NOTES
Subjective:       Patient ID: Good Murillo Jr. is a 89 y.o. male.    Chief Complaint: Follow-up    F/U:       Pt is here for follow-up with his blood in stools and is not having any at this time. Pt does have HTN and DM which last A1C is a 8.9. Pt is on Lantus 15 unit      Review of Systems   Constitutional: Negative.    Respiratory: Negative.    Cardiovascular: Negative.    Genitourinary: Negative.    Neurological: Negative.    Hematological: Negative.    Psychiatric/Behavioral: Negative.        Objective:      Physical Exam   Constitutional: He is oriented to person, place, and time. He appears well-developed and well-nourished.   Neck: Normal range of motion. Neck supple.   Cardiovascular: Normal rate and regular rhythm.    Pulmonary/Chest: Effort normal and breath sounds normal.   Abdominal: Soft. Bowel sounds are normal.   Neurological: He is alert and oriented to person, place, and time.   Skin: Skin is warm and dry.       Assessment:       1. Chronic midline low back pain without sciatica    2. Type 2 diabetes mellitus with diabetic polyneuropathy, with long-term current use of insulin    3. Occult blood in stools        Plan:       Chronic midline low back pain without sciatica  Comments:  Will do as needed muscle relaxer as needed.    Type 2 diabetes mellitus with diabetic polyneuropathy, with long-term current use of insulin  Comments:  Will continue to watch his sugars     Occult blood in stools  Comments:  Has resolved at this point

## 2017-06-08 ENCOUNTER — TELEPHONE (OUTPATIENT)
Dept: PULMONOLOGY | Facility: CLINIC | Age: 82
End: 2017-06-08

## 2017-06-08 DIAGNOSIS — J44.9 CHRONIC OBSTRUCTIVE PULMONARY DISEASE, UNSPECIFIED COPD TYPE: Primary | ICD-10-CM

## 2017-06-08 RX ORDER — METFORMIN HYDROCHLORIDE 1000 MG/1
1000 TABLET ORAL 2 TIMES DAILY
Qty: 180 TABLET | Refills: 1 | Status: SHIPPED | OUTPATIENT
Start: 2017-06-08 | End: 2017-11-18 | Stop reason: SDUPTHER

## 2017-06-08 RX ORDER — TAMSULOSIN HYDROCHLORIDE 0.4 MG/1
0.4 CAPSULE ORAL DAILY
Qty: 30 CAPSULE | Refills: 11 | OUTPATIENT
Start: 2017-06-08 | End: 2017-06-26 | Stop reason: SDUPTHER

## 2017-06-08 NOTE — TELEPHONE ENCOUNTER
----- Message from Sylwia Hartley sent at 6/8/2017  4:37 PM CDT -----  Contact: pt daughter-  States the patient would like his oxygen tank picked up. States the patient was advised he need orders from the doctor. Please adv/call [phone].//thanks. cw

## 2017-06-08 NOTE — TELEPHONE ENCOUNTER
Patient evaluated for oxygen at night years ago  Meet criteria but patient never wanted to use oxyge  Now planning on going to [place]  Will probably need oxygen at night in [place] as he needs oxygen at night in [place].  Called two numbers and left message.    Overnight O2 sat and 6 min walk for oxygen qualification ordered.If patient wants to be evaluated he will need on office visit with these test results

## 2017-06-08 NOTE — LETTER
June 8, 2017    Good Murillo Jr.  [address] [facility] [address]       [facility] Pul[facility]  [id number] [place] [address]  Phone: [phone]  Fax: [phone] Dear Mr. Murillo:    Enclosed is the order to discontinue oxygen. Please provide this to your [employer] provider.    If you have any questions or concerns, please don't hesitate to call.    Sincerely,        Agustin Dean MD

## 2017-06-09 LAB — BACTERIA STL CULT: NORMAL

## 2017-06-12 ENCOUNTER — TELEPHONE (OUTPATIENT)
Dept: INTERNAL MEDICINE | Facility: CLINIC | Age: 82
End: 2017-06-12

## 2017-06-12 NOTE — TELEPHONE ENCOUNTER
----- Message from Janiya Huntley MA sent at 6/12/2017 10:44 AM CDT -----  Contact: Patients daughter, Antonio Duque  Pt's wife check on status of paperwork   ----- Message -----  From: Sheela Beebe  Sent: 6/12/2017   8:25 AM  To: Nikita GONZALEZ Staff    Patient is checking on the status of a POA paper work she dropped off last week, please call her back at [phone]. Thank you

## 2017-06-14 NOTE — TELEPHONE ENCOUNTER
Patient attests to self monitoring on glucose, able to dress himself, and do self hygiene without a problem

## 2017-06-15 ENCOUNTER — PROCEDURE VISIT (OUTPATIENT)
Dept: PULMONOLOGY | Facility: CLINIC | Age: 82
End: 2017-06-15
Payer: MEDICARE

## 2017-06-15 ENCOUNTER — OFFICE VISIT (OUTPATIENT)
Dept: PULMONOLOGY | Facility: CLINIC | Age: 82
End: 2017-06-15
Payer: MEDICARE

## 2017-06-15 VITALS
WEIGHT: 129 LBS | DIASTOLIC BLOOD PRESSURE: 70 MMHG | BODY MASS INDEX: 19.55 KG/M2 | OXYGEN SATURATION: 94 % | HEART RATE: 96 BPM | HEIGHT: 68 IN | SYSTOLIC BLOOD PRESSURE: 114 MMHG

## 2017-06-15 VITALS — WEIGHT: 129.19 LBS | BODY MASS INDEX: 19.58 KG/M2 | HEIGHT: 68 IN

## 2017-06-15 DIAGNOSIS — J44.9 CHRONIC OBSTRUCTIVE PULMONARY DISEASE, UNSPECIFIED COPD TYPE: ICD-10-CM

## 2017-06-15 DIAGNOSIS — G47.36 NOCTURNAL HYPOXEMIA DUE TO EMPHYSEMA: ICD-10-CM

## 2017-06-15 DIAGNOSIS — J44.9 CHRONIC OBSTRUCTIVE PULMONARY DISEASE, UNSPECIFIED COPD TYPE: Primary | ICD-10-CM

## 2017-06-15 DIAGNOSIS — J43.9 NOCTURNAL HYPOXEMIA DUE TO EMPHYSEMA: ICD-10-CM

## 2017-06-15 DIAGNOSIS — J43.1 PANLOBULAR EMPHYSEMA: ICD-10-CM

## 2017-06-15 PROCEDURE — 94010 BREATHING CAPACITY TEST: CPT | Mod: 59,S$GLB,, | Performed by: INTERNAL MEDICINE

## 2017-06-15 PROCEDURE — 94620 PR PULMONARY STRESS TESTING,SIMPLE: CPT | Mod: S$GLB,,, | Performed by: INTERNAL MEDICINE

## 2017-06-15 PROCEDURE — 1126F AMNT PAIN NOTED NONE PRSNT: CPT | Mod: S$GLB,,, | Performed by: INTERNAL MEDICINE

## 2017-06-15 PROCEDURE — 1159F MED LIST DOCD IN RCRD: CPT | Mod: S$GLB,,, | Performed by: INTERNAL MEDICINE

## 2017-06-15 PROCEDURE — 99499 UNLISTED E&M SERVICE: CPT | Mod: S$GLB,,, | Performed by: INTERNAL MEDICINE

## 2017-06-15 PROCEDURE — 99999 PR PBB SHADOW E&M-EST. PATIENT-LVL V: CPT | Mod: PBBFAC,,, | Performed by: INTERNAL MEDICINE

## 2017-06-15 PROCEDURE — 99215 OFFICE O/P EST HI 40 MIN: CPT | Mod: 25,S$GLB,, | Performed by: INTERNAL MEDICINE

## 2017-06-15 PROCEDURE — 94762 N-INVAS EAR/PLS OXIMTRY CONT: CPT | Mod: 59,S$GLB,, | Performed by: INTERNAL MEDICINE

## 2017-06-15 RX ORDER — FLUTICASONE FUROATE AND VILANTEROL 200; 25 UG/1; UG/1
1 POWDER RESPIRATORY (INHALATION) DAILY
Qty: 180 EACH | Refills: 3 | Status: SHIPPED | OUTPATIENT
Start: 2017-06-15 | End: 2017-12-07

## 2017-06-15 RX ORDER — ALBUTEROL SULFATE 90 UG/1
2 AEROSOL, METERED RESPIRATORY (INHALATION) EVERY 6 HOURS
Qty: 3 INHALER | Refills: 3 | Status: SHIPPED | OUTPATIENT
Start: 2017-06-15 | End: 2017-12-07 | Stop reason: SDUPTHER

## 2017-06-15 RX ORDER — OMEPRAZOLE 40 MG/1
CAPSULE, DELAYED RELEASE ORAL
COMMUNITY
Start: 2017-06-11 | End: 2017-09-06 | Stop reason: ALTCHOICE

## 2017-06-15 RX ORDER — TIOTROPIUM BROMIDE 18 UG/1
18 CAPSULE ORAL; RESPIRATORY (INHALATION) DAILY
Qty: 90 CAPSULE | Refills: 3 | Status: SHIPPED | OUTPATIENT
Start: 2017-06-15 | End: 2017-11-18 | Stop reason: SDUPTHER

## 2017-06-15 NOTE — PROCEDURES
"[facility]  Six Minute Walk     SUMMARY     Ordering Provider: Dr. Dean      Performing nurse/tech/RT: ANIYAH Ruiz  Diagnosis: COPD  Height: 5' 8" (172.7 cm)  Weight: 58.6 kg (129 lb 3 oz)  BMI (Calculated): 19.7   Patient Race:             Phase Oxygen Assessment Supplemental O2 Heart   Rate Blood Pressure Sari Dyspnea Scale Rating   Resting 98 % Room Air 104 bpm 155/73 2   Exercise        Minute        1 98 % Room Air 103 bpm     2 94 % Room Air 109 bpm     3 94 % Room Air 110 bpm     4 95 % Room Air 112 bpm     5 95 % Room Air 112 bpm     6  95 % Room Air 108 bpm 188/87 1   Recovery        Minute        1 97 % Room Air 104 bpm     2 96 % Room Air 97 bpm     3 97 % Room Air 94 bpm     4 97 % Room Air 95 bpm 164/86 1     Six Minute Walk Summary  6MWT Status: completed without stopping  Patient Reported: No complaints     Interpretation:  Did the patient stop or pause?: No            Total Time Walked (Calculated): 360 seconds  Final Partial Lap Distance (feet): 50 feet  Total Distance Meters (Calculated): 259.08 meters  Predicted Distance Meters (Calculated): 448.42 meters  Percentage of Predicted (Calculated): 57.78  Peak VO2 (Calculated): 11.75  Mets: 3.36        Interpretation:  Total distance walked in six minutes is moderately reduced indicating a reduction in overall  functional capacity. There was no significant oxygen desaturation. Clinical correlation suggested.    Agustin Dean MD           "

## 2017-06-15 NOTE — PROGRESS NOTES
Subjective:       Patient ID: Good Murillo Jr. is a 89 y.o. male.    Chief Complaint: He       No chief complaint on file.    HPI   COPD  He presents for evaluation and treatment of COPD. The patient is not currently have symptoms / an exacerbation. The patient has COPD for approximately 10 years. Symptoms in previous episodes have included dyspnea, cough, wheezing and sinus congestion, and typically last 2 weeks. Previous episodes have been exacerbated by moderate activity. Current treatment includes albuterol inhaler and ipratropium inhaler, which generally provides some relief of symptoms.   He uses 1 pillows at night. Patient currently is on oxygen at 2 L/min per nasal cannula..uses prn.  The patient is having constitutional symptoms, denying fever, chills, anorexia, but has  weight loss - 154 to 128. The patient has been hospitalized for this condition before. He quit smoking approximately 25 years ago. The patient is experiencing exercise intolerance (difficulty walking 1 blocks on flat ground)    Wants to re qualify for oxygen      Past Medical History:   Diagnosis Date    Abnormal EKG 8/22/2014    Allergy     Arthritis     back    Bilateral carotid artery disease 1/17/2017    Cholelithiasis     US retroperitoneal 12/2016    COPD (chronic obstructive pulmonary disease)     Diabetes with neurologic complications     Diverticulitis     Diverticulosis     hosp/divert bleed    Ex-smoker     Glaucoma     suspect    Heart murmur     History of pneumothorax     bilat    HTN (hypertension)     Hyperlipidemia     Hyperlipidemia 8/22/2014    Hypothyroid     Iron deficiency anemia     nathan    Osteoporosis, unspecified 12/16 zeenat 12/18    Polyneuropathy     Prostate CA     PVD (peripheral vascular disease) 8/22/2014    Type 2 diabetes with peripheral circulatory disorder, controlled     Type 2 diabetes, controlled, with neuropathy Diagnosed 1994    Urinary incontinence      Past Surgical  History:   Procedure Laterality Date    CATARACT EXTRACTION       SECTION      PCIOL OU Bilateral 2011 OD/2010 OS    DR. DELGADILLO    PROSTATE SURGERY  1990s approx    brachytherapy     Social History     Social History    Marital status:      Spouse name: N/A    Number of children: 2    Years of education: N/A     Occupational History    retired      self employed     Social History Main Topics    Smoking status: Former Smoker     Quit date: 1993    Smokeless tobacco: Never Used    Alcohol use Yes      Comment: Champagne on     Drug use: No    Sexual activity: No     Other Topics Concern    Not on file     Social History Narrative    Patient is a  and lives with his friend and sister. He is retired he was self employed.     Review of Systems   Constitutional: Positive for fatigue. Negative for fever.   HENT: Positive for postnasal drip, rhinorrhea and congestion.    Eyes: Negative for redness and itching.   Respiratory: Positive for cough, sputum production, shortness of breath, dyspnea on extertion, use of rescue inhaler and Paroxysmal Nocturnal Dyspnea.    Cardiovascular: Negative for chest pain, palpitations and leg swelling.   Genitourinary: Negative for difficulty urinating and hematuria.   Endocrine: Negative for cold intolerance and heat intolerance.    Skin: Negative for rash.   Gastrointestinal: Negative for nausea and abdominal pain.   Neurological: Negative for dizziness, syncope, weakness and light-headedness.   Hematological: Negative for adenopathy. Does not bruise/bleed easily.   Psychiatric/Behavioral: Negative for sleep disturbance. The patient is not nervous/anxious.        Objective:      Physical Exam   Constitutional: He is oriented to person, place, and time. He appears well-developed and well-nourished.   HENT:   Head: Normocephalic and atraumatic.   Eyes: Conjunctivae are normal. Pupils are equal, round, and reactive to light.   Neck:  Neck supple. No JVD present. No tracheal deviation present. No thyromegaly present.   Cardiovascular: Normal rate and regular rhythm.    No murmur heard.  Pulmonary/Chest: He has decreased breath sounds. He has wheezes in the right lower field and the left lower field. He has no rhonchi. He has no rales.   Abdominal: Soft. Bowel sounds are normal.   Musculoskeletal: Normal range of motion. He exhibits no edema or tenderness.   Lymphadenopathy:     He has no cervical adenopathy.   Neurological: He is alert and oriented to person, place, and time.   Skin: Skin is warm and dry.   Nursing note and vitals reviewed.    Personal Diagnostic Review  Chest x-ray: hyperinflation    Spirometry: severe obstruction    6 Minute Walk  Interpretation:  Total distance walked in six minutes is moderately reduced indicating a reduction in overall  functional capacity. There was no significant oxygen desaturation. Clinical correlation suggested.    Agustin Dean MD  No flowsheet data found.      Assessment:       1. Chronic obstructive pulmonary disease, unspecified COPD type    2. 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 Panlobular emphysema        Outpatient Encounter Prescriptions as of 6/15/2017   Medication Sig Dispense Refill    albuterol 90 mcg/actuation inhaler Inhale 2 puffs into the lungs every 6 (six) hours. 3 Inhaler 3    alendronate (FOSAMAX) 70 MG tablet Take 1 tablet (70 mg total) by mouth every 7 days. 12 tablet 3    amoxicillin-clavulanate 875-125mg (AUGMENTIN) 875-125 mg per tablet Take 1 tablet by mouth 2 (two) times daily. 20 tablet 0    aspirin 81 mg Tab Take 1 tablet by mouth Daily. Over counter. To help prevent stroke/ heart attack      blood sugar diagnostic Strp 1 strip by Misc.(Non-Drug; Combo Route) route 3 (three) times daily. True Metrix test strips 102 strip 11    calcium carbonate (OS-TANGELA) 500 mg calcium (1,250 mg) tablet Take 1 tablet by mouth once daily.      hydrocortisone (PROCTO-MELISSA) 1 % crpe  Place 1 applicator rectally 2 (two) times daily. 28.4 g 0    lancets 33 gauge Misc 1 lancet by Misc.(Non-Drug; Combo Route) route 3 (three) times daily. 100 each 11    LANTUS SOLOSTAR 100 unit/mL (3 mL) InPn pen INJECT 10 UNITS SUBCUTANEOUSLY ONCE DAILY 15 mL 0    levothyroxine (SYNTHROID) 25 MCG tablet       loratadine (CLARITIN) 10 mg tablet Take 1 tablet by mouth Once daily as needed.      metformin (GLUCOPHAGE) 1000 MG tablet Take 1 tablet (1,000 mg total) by mouth 2 (two) times daily. 180 tablet 1    nateglinide (STARLIX) 60 MG tablet Take 1 tablet (60 mg total) by mouth 3 (three) times daily before meals. (Patient taking differently: Take 60 mg by mouth 2 (two) times daily before meals. ) 270 tablet 3    omeprazole (PRILOSEC) 40 MG capsule       pantoprazole (PROTONIX) 40 MG tablet Take 1 tablet (40 mg total) by mouth once daily. 20 tablet 0    pravastatin (PRAVACHOL) 40 MG tablet Take 1 tablet (40 mg total) by mouth once daily. 90 tablet 3    tamsulosin (FLOMAX) 0.4 mg Cp24 Take 1 capsule (0.4 mg total) by mouth once daily. 30 capsule 11    TENS unit and electrodes Cmpk 1 Device by Misc.(Non-Drug; Combo Route) route daily as needed. 1 each 0    tiotropium (SPIRIVA WITH HANDIHALER) 18 mcg inhalation capsule Inhale 1 capsule (18 mcg total) into the lungs once daily. 90 capsule 3    triamcinolone acetonide 0.1% (KENALOG) 0.1 % paste Apply to mouth ulcer BID 5 g 0    [DISCONTINUED] albuterol 90 mcg/actuation inhaler Inhale 2 puffs into the lungs every 6 (six) hours. 1 Inhaler 12    [DISCONTINUED] levothyroxine (SYNTHROID) 25 MCG tablet TAKE ONE TABLET BY MOUTH ONCE DAILY 30 tablet 11    [DISCONTINUED] tiotropium (SPIRIVA WITH HANDIHALER) 18 mcg inhalation capsule Inhale 1 capsule (18 mcg total) into the lungs once daily. 90 capsule 3    fluticasone-vilanterol (BREO ELLIPTA) 200-25 mcg/dose DsDv diskus inhaler Inhale 1 puff into the lungs once daily. Controller 180 each 3    [DISCONTINUED]  insulin glargine (LANTUS) 100 unit/mL injection Inject 10 Units into the skin once daily.      [DISCONTINUED] ipratropium (ATROVENT) 0.06 % nasal spray 2 sprays by Nasal route 3 (three) times daily. 15 mL 12    [DISCONTINUED] metformin (GLUCOPHAGE) 1000 MG tablet TAKE ONE TABLET BY MOUTH TWICE DAILY 180 tablet 1    [DISCONTINUED] omeprazole (PRILOSEC) 40 MG capsule Take 1 capsule (40 mg total) by mouth once daily. 30 capsule 3    [DISCONTINUED] pravastatin (PRAVACHOL) 40 MG tablet Take 1 tablet (40 mg total) by mouth once daily. 90 tablet 3    [DISCONTINUED] tamsulosin (FLOMAX) 0.4 mg Cp24 Take 1 capsule (0.4 mg total) by mouth once daily. 30 capsule 11     No facility-administered encounter medications on file as of 6/15/2017.      Orders Placed This Encounter   Procedures    Six Minute Walk Test to qualify for Home Oxygen     Standing Status:   Future     Number of Occurrences:   1     Standing Expiration Date:   6/15/2018    PULSE OXIMETRY OVERNIGHT     Order Specific Question:   Reason for Test?     Answer:   Daytime Drowsiness     Comments:   Room Air     Order Specific Question:   Symptoms:     Answer:   Daytime Fatigue     Comments:   Nocturnal hypoxemia     Plan:       Requested Prescriptions     Signed Prescriptions Disp Refills    albuterol 90 mcg/actuation inhaler 3 Inhaler 3     Sig: Inhale 2 puffs into the lungs every 6 (six) hours.    tiotropium (SPIRIVA WITH HANDIHALER) 18 mcg inhalation capsule 90 capsule 3     Sig: Inhale 1 capsule (18 mcg total) into the lungs once daily.    fluticasone-vilanterol (BREO ELLIPTA) 200-25 mcg/dose DsDv diskus inhaler 180 each 3     Sig: Inhale 1 puff into the lungs once daily. 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  Dispense: 3 Inhaler; Refill: 3  -     tiotropium (SPIRIVA WITH HANDIHALER) 18 mcg inhalation capsule; Inhale 1 capsule (18 mcg total) into the lungs once daily.  Dispense: 90 capsule; Refill: 3    Other orders  -     fluticasone-vilanterol (BREO ELLIPTA) 200-25 mcg/dose DsDv diskus inhaler; Inhale 1 puff into the lungs once daily. Controller  Dispense: 180 each; Refill: 3               Return in about 6 months (around 12/15/2017) for CXR .    MEDICAL DECISION MAKING: Moderate to high complexity.  Overall, the multiple problems listed are of moderate to high severity that may impact quality of life and activities of daily living. Side effects of medications, treatment plan as well as options and alternatives reviewed and discussed with patient. There was counseling of patient concerning these issues.    Total time spent in face to face counseling and coordination of care - 40  minutes over 50% of time was used in discussion of prognosis, risks, benefits of treatment, instructions and compliance with regimen . Discussion with other physicians or health care providers (DME, NP, pharmacy, respiratory therapy) occurred.

## 2017-06-15 NOTE — PATIENT INSTRUCTIONS
[facility] [place].  Customer Service: [phone]  Call: [phone]  Fax: [phone]  Billing Inquiries: [phone] or [phone]          Fluticasone; Vilanterol inhalation powder  What is this medicine?  FLUTICASONE; VILANTEROL (floo TIK a sone; vye RICARDO ter ol) inhalation is a combination of two medicines that decrease inflammation and help to open up the airways of your lungs. It is for chronic obstructive pulmonary disease (COPD), including chronic bronchitis or emphysema. It is also used for asthma in adults to help control symptoms. Do NOT use for an acute asthma attack or COPD attack.  How should I use this medicine?  This medicine is inhaled through the mouth. It is used once per day. Follow the directions on the prescription label. Do not use a spacer device with this inhaler. Take your medicine at regular intervals. Do not take your medicine more often than directed. Do not stop taking except on your doctor's advice. Make sure that you are using your inhaler correctly. Ask you doctor or health care provider if you have any questions.  A special MedGuide will be given to you by the pharmacist with each prescription and refill. Be sure to read this information carefully each time.  Talk to your pediatrician regarding the use of this medicine in children. Special care may be needed. This medicine is not approved for use in children under 18 years of age.  What side effects may I notice from receiving this medicine?  Side effects that you should report to your doctor or health care professional as soon as possible:  · allergic reactions like skin rash or hives, swelling of the face, lips, or tongue  · breathing problems right after inhaling your medicine  · changes in vision  · chest pain  · fast, irregular heartbeat  · feeling faint or lightheaded, falls  · fever or chills  · nausea, vomiting  · tiredness  Side effects that usually do not require medical attention  (Report these to your doctor or health care professional if they continue or are bothersome.):  · cough  · headache  · nervousness  · sore throat  · tremor  What may interact with this medicine?  Do not take this medicine with any of the following medications:  · cisapride  · dofetilide  · dronedarone  · MAOIs like Carbex, Eldepryl, Marplan, Nardil, and Parnate  · pimozide  · thioridazine  · ziprasidone  This medicine may also interact with the following medications:  · antiviral medicines for HIV or AIDS  · beta-blockers like metoprolol and propranolol  · certain medicines for depression, anxiety, or psychotic disturbances  · diuretics  · medicines for colds  · medicines for fungal infections like ketoconazole and itraconazole  · other medicines for breathing problems  · other medicines that prolong the QT interval (cause an abnormal heart rhythm)  What if I miss a dose?  If you miss a dose, use it as soon as you can. If it is almost time for your next dose, use only that dose and continue with your regular schedule. Do not use double or extra doses.  Where should I keep my medicine?  Keep out of the reach of children.  Store at room temperature between 15 and 30 degrees C (59 and 86 degrees F). Store in a dry place away from direct heat or sunlight. Throw away 6 weeks after you remove the inhaler from the foil tray, or after the dose indicator reads 0, whichever comes first. Throw away any unopened packages after the expiration date.  What should I tell my health care provider before I take this medicine?  They need to know if you have any of these conditions:  · bone problems  · immune system problems  · diabetes  · heart disease or irregular heartbeat  · high blood pressure  · infection  · pheochromocytoma  · seizures  · thyroid disease  · an unusual or allergic reaction to fluticasone, vilanterol, milk proteins, corticosteroids, other medicines, foods, dyes, or preservatives  · pregnant or trying to get  pregnant  · breast-feeding  What should I watch for while using this medicine?  Visit your doctor or health care professional for regular checkups. Tell your doctor or health care professional if your symptoms do not get better.  If your symptoms get worse or if you need your short-acting inhalers more often, call your doctor right away. Do not use this medicine more than every 24 hours.  If you are going to have surgery tell your doctor or health care professional that you are using this medicine. Try not to come in contact with people with the chicken pox or measles. If you do, call your doctor.  Date Last Reviewed:   NOTE:This sheet is a summary. It may not cover all possible information. If you have questions about this medicine, talk to your doctor, pharmacist, or health care provider. Copyright© 2016 [employer]        Discharge Instructions: Using Oxygen at Home  Your healthcare provider has prescribed oxygen to help make breathing easier for you. You were shown in the hospital how to use your oxygen unit. Here are some guidelines on safely using oxygen at home. Do all steps each time you use your oxygen unit.  General tips  These include the following:  · Wash your hands before and after using your oxygen.  · If you have questions after discharge, work with your healthcare provider and medical supply company to help you determine what is best for you.  · Keep the equipment and tubing clean to help prevent breathing in germs. If you need information about cleaning or maintaining your oxygen equipment, the medical supply company can help.  Step 1: Check your supply  · Pressurize your oxygen tank.  · Check the oxygen gauge on the tank to be sure you have enough. Your medical supply company will tell you when to call. Or, they will deliver your oxygen on a regular schedule.  · If you have a humidifier bottle, check the water level. When the level is at or below 1/2 full, refill it with sterile or distilled  water. Ask your medical supply company representative how often you should change your humidifier bottle, if you have one. It is important in preventing germs.  Step 2: Attach the tubing  · Attach the cannula tubing to your oxygen source as you have been shown.  · Be sure the tubing is not bent or blocked.  Step 3: Set your prescribed flow rate  · Set the oxygen to flow at the rate your healthcare provider has prescribed.  · Never change this rate unless your provider tells you to.  Step 4: Insert the cannula  · Insert the nasal cannula (nose tube) into your nose and breathe through your nose normally.  · If you are not sure whether the oxygen is flowing, place the cannula in a glass of water. If the water bubbles, the oxygen is flowing.  Use oxygen safely  Tips for safe oxygen use include the following:  · Avoid open flames. This includes cigarettes, matches, candles, fireplaces, gas burners, pipes, or anything else that could start a fire.  · Don't smoke or be around others who are smoking.  · Keep oxygen tanks at least 5 feet from gas stoves, space heaters, electric or gas heaters, or any heat source.  · Keep the door to the room open so that air circulates and it is not stuffy.  · Protect your oxygen tank from being knocked over. Store the oxygen tank upright in a secure, approved storage device.  · Turn the tank off right away if it is knocked over and makes a hissing noise. If the regulator breaks or you cannot safely turn the tank off, remove the tubing and leave the room. Then call the supply company or the fire department for help right away.  · Be careful not to trip over the tubing of your oxygen tank.  · Don't use lotions or creams that contain petroleum jelly. This substance can be flammable when mixed with oxygen.  · Turn oxygen off when you are not using it.  · Always follow the instructions for safe use as recommended by your medical supply company.  · Make sure you know what to do in an emergency.  Your emergency numbers should include 911 (or your area's emergency number), your healthcare provider, and your medical supply company.        When to call the healthcare provider  Call your healthcare provider right away if you have any of the following:  · Pale skin or a blue tint to your lips or fingernails  · Increased shortness of breath, wheezing, or other changes from your usual breathing, even with oxygen in place  · Confusion, restlessness or more anxiety than usual  · Chest pain   Date Last Reviewed: 5/1/2016 © 2000-2016 [employer] [address] [place] [address]. All rights reserved. This information is not intended as a substitute for professional medical care. Always follow your healthcare professional's instructions.        Using Oxygen Safely  Using prescribed oxygen can help you avoid shortness of breath, improve sleep, and mental alertness, and help you to be more active. To reduce the chances of fire and other hazards, you need to follow important safety guidelines when using your oxygen unit. Remember these Dos and Donts:    Oxygen DOs  Suggestions of what to do include the following:   · Do keep sources of flame at least 5 feet away from where your oxygen unit is being used or stored. This includes cigarettes, matches, candles, fireplaces, gas burners, pipes, or anything else that could start a fire. Never smoke or use an open flame when wearing oxygen.  · Do keep the oxygen unit at least 5 feet away from sources of heat such as space heaters, electric or gas heaters, steam pipes, furnaces, and radiators.  · Do ask the medical equipment company if you should keep the oxygen unit away from other appliances, such as TVs and radios.  · Do turn off the oxygen unit completely when its not in use.  · Do have a fire extinguisher nearby. Make sure you and others in your household know how to use it.  · Do be careful not to trip over the oxygen tubing.  Oxygen  DONTs  Suggestions of what not to do include the following:   · Dont smoke, and dont allow others to smoke near you. Post a No Smoking sign in your home.  · Dont use aerosol sprays such as air fresheners or hairspray near the oxygen unit. Aerosols are very flammable.  · Dont use vapor rubs, petroleum jelly, or oil-based hand lotion. These are flammable. Use water-based products instead.  · Dont use oxygen while cooking with gas. Ask the medical equipment company about other types of cooking.  · Dont oil the oxygen unit. And dont use it with oily or greasy hands.  · Dont place a liquid oxygen unit on its side. The oxygen inside can evaporate. Oxygen should always be stored upright in a secured device.  Date Last Reviewed: 5/1/2016  © 8771-3430 [employer]. [address] [place] [address], [place] [address]. All rights reserved. This information is not intended as a substitute for professional medical care. Always follow your healthcare professional's instructions.       Carlene Duque   [address] [place]  [address]

## 2017-06-16 NOTE — PROCEDURES
[facility]  [address] * [place] [address]  Telephone: [phone]  Test date: 06/15/17 Start: 06/15/17 15:40:11 Good Murillo  Doctor: Jermaine End: 06/15/17 20:52:35 [id number]  Oximetry: Summary Report  Comments: Overnight study breathing room air.  Recording time: 05:12:24 Highest pulse: 111 Highest SpO2: 96%  Excluded samplin:23:36 Lowest pulse: 74 Lowest SpO2: 76%  Total valid samplin:48:48 Mean pulse: 88 Mean SpO2: 87.7%  1 S.D.: 5.5 1 S.D.: 5.0  Time with SpO2<90: 2:28:04, 51.3%  Time with SpO2<80: 0:48:12, 16.7%  Time with SpO2<70: 0:00:00, 0.0%  Time with SpO2<60: 0:00:00, 0.0%  Time with SpO2<88: 1:15:28, 26.1%  Time with SpO2 =>90: 2:20:44, 48.7%  Time with SpO2=>80 & <90: 1:39:52, 34.6%  Time with SpO2=>70 & <80: 0:48:12, 16.7%  Time with SpO2=>60 & <70: 0:00:00, 0.0%  The longest continuous time with saturation <=88 was 00:19:00, which started at  06/15/17 16:25:03.  A desaturation event was defined as a decrease of saturation by 4 or more.  No events were excluded due to artifact.  There were 8 desaturation events over 3 minutes duration.  There were 48 desaturation events of less than 3 minutes duration during which:  The mean high was 92.8%. The mean low was 87.2%.  The number of these events that were:  > 0 & <10 seconds: 2 > 0 seconds: 48  =>10 & <20 seconds: 8 =>10 seconds: 46  =>20 & <30 seconds: 4 =>20 seconds: 38  =>30 & <40 seconds: 4 =>30 seconds: 34  =>40 & <50 seconds: 5 =>40 seconds: 30  =>50 & <60 seconds: 2 =>50 seconds: 25  =>60 seconds: 23 =>60 seconds: 23  The mean length of desaturation events that were >=10 sec & <=3 mins was: 64.3 sec.  Desaturation event index (events >=10 sec per sampled hour): 9.6  Desaturation event index (events >= 0 sec per sampled hour): 10.0  © 7075-3695 [employer] Oximetry version Standard BH6587.  Oximeter: Respironics 920M Plus memory, 4 second resolution.  [facility]  [address] [place] [address] Ave. * [place]  [place] [address]  Telephone: [phone]  Test date: 06/15/17 Start: 06/15/17 15:40:11 Good Murillo  Doctor: Jermaine End: 06/15/17 20:52:35 [id number]  Oximetry: % Times Graphic Report  Comments: Overnight study breathing room air.  SATURATION  100  95  90  85  80  75  70  65  60  55  50  45  40  0 1 2 3 4 5 7 10 15 20 25  PERCENT OF TOTAL TIME  SATURATION  100  95  90  85  80  75  70  65  60  55  50  45  40  0 5 10 15 20 30 40 60 80 100  CUMULATIVE % TIME  © 5087-0516 [employer] Oximetry version Standard [id number].  Oximeter: NaviExpert 920M Plus memory, 4 second resolution.  [facility] [address] * [place] [address]  Telephone: [phone]  Test date: 06/15/17 Start: 06/15/17 15:40:11 Good Murillo  Doctor: Jermaine End: 06/15/17 20:52:35 [id number]  Oximetry: % Times Text Report  Comments: Overnight study breathing room air.  SpO2:100 0.0 % time  SpO2: 99 0.0 % time SpO2: 89 16.9 % time SpO2: 79 3.6 % time  SpO2: 98 0.0 % time SpO2: 88 8.3 % time SpO2: 78 6.3 % time  SpO2: 97 0.0 % time SpO2: 87 3.3 % time SpO2: 77 6.0 % time  SpO2: 96 0.1 % time SpO2: 86 1.3 % time SpO2: 76 0.8 % time  SpO2: 95 1.4 % time SpO2: 85 1.1 % time SpO2: 75 % time  SpO2: 94 2.1 % time SpO2: 84 1.5 % time SpO2: 74 % time  SpO2: 93 3.3 % time SpO2: 83 0.7 % time SpO2: 73 % time  SpO2: 92 8.5 % time SpO2: 82 0.6 % time SpO2: 72 % time  SpO2: 91 13.2 % time SpO2: 81 0.3 % time SpO2: 71 % time  SpO2: 90 20.1 % time SpO2: 80 0.7 % time SpO2: 70 % time  Total 90's 48.7 % time Total 80's 34.6 % time Total 70's 16.7 % time  SpO2: 69 % time SpO2: 59 % time SpO2: 49 % time  SpO2: 68 % time SpO2: 58 % time SpO2: 48 % time  SpO2: 67 % time SpO2: 57 % time SpO2: 47 % time  SpO2: 66 % time SpO2: 56 % time SpO2: 46 % time  SpO2: 65 % time SpO2: 55 % time SpO2: 45 % time  SpO2: 64 % time SpO2: 54 % time SpO2: 44 % time  SpO2: 63 % time SpO2: 53 % time SpO2: 43 % time  SpO2: 62 % time SpO2: 52 % time SpO2: 42 % time  SpO2:  61 % time SpO2: 51 % time SpO2: 41 % time  SpO2: 60 % time SpO2: 50 % time SpO2: 40 % time  Total 60's 0.0 % time Total 50's 0.0 % time Total 40's 0.0 % time  SpO2 <40 0.0 % time  Pulse range > 199 0.0 % time  Pulse range 180 - 199 0.0 % time  Pulse range 160 - 179 0.0 % time  Pulse range 140 - 159 0.0 % time  Pulse range 120 - 139 0.0 % time  Pulse range 100 - 119 4.5 % time  Pulse range 90 - 99 27.8 % time  Pulse range 80 - 89 67.0 % time  Pulse range 70 - 79 0.7 % time  Pulse range 60 - 69 0.0 % time  Pulse range 50 - 59 0.0 % time  Pulse range < 50 0.0 % time  © 6730-5124 [employer] Oximetry version Standard ST9568.  Oximeter: Respironics 920M Plus memory, 4 second resolution.  [facility] [address] * [place] [address]  Telephone: [phone]  Test date: 06/15/17 Start: 06/15/17 15:40:11 Good Murillo  Doctor: Jermaine End: 06/15/17 20:52:35 [id number]  Oximetry: Desaturation Report  Comments: Overnight study breathing room air.  The following lists the 40 desaturation events having the lowest drops. The event  durations had to be within 3 minutes to qualify, and the event onset was defined as  a decrease in SpO2 by 4 or more.  Saturation: Pulse Range:  Start date Start time End time Duration Onset Low Low High  1 06/15/17 15:42:07 15:44:11 00:02:04 95 84 92 - 106  2 06/15/17 15:45:31 15:46:51 00:01:20 94 82 94 - 98  3 06/15/17 17:03:03 17:03:39 00:00:36 93 87 83 - 92  4 06/15/17 17:03:59 17:04:51 00:00:52 94 86 84 - 91  5 06/15/17 17:26:55 17:27:19 00:00:24 86 81 85 - 86  6 06/15/17 17:29:51 17:31:23 00:01:32 85 79 85 - 93  7 06/15/17 17:53:39 17:54:23 00:00:44 92 86 84 - 88  8 06/15/17 18:10:07 18:11:35 00:01:28 96 88 84 - 95  9 06/15/17 18:14:15 18:14:39 00:00:24 94 87 100 - 107  10 06/15/17 18:15:55 18:15:59 00:00:04 92 87 98 - 98  11 06/15/17 18:16:19 18:18:39 00:02:20 92 87 97 - 110  12 06/15/17 18:18:55 18:20:27 00:01:32 93 86 105 - 109  13 06/15/17 18:24:35 18:24:51  00:00:16 93 89 94 - 97  14 06/15/17 18:25:47 18:26:19 00:00:32 93 89 97 - 100  15 06/15/17 18:26:43 18:28:35 00:01:52 93 89 95 - 110  16 06/15/17 18:29:43 18:31:19 00:01:36 94 89 85 - 92  17 06/15/17 18:34:07 18:35:03 00:00:56 94 89 87 - 93  18 06/15/17 18:35:39 18:35:51 00:00:12 94 88 88 - 92  19 06/15/17 18:37:11 18:38:27 00:01:16 93 89 85 - 90  20 06/15/17 18:39:23 18:40:27 00:01:04 95 89 86 - 93  21 06/15/17 18:43:11 18:44:51 00:01:40 95 89 81 - 88  22 06/15/17 18:47:11 18:48:31 00:01:20 95 88 81 - 89  23 06/15/17 18:48:51 18:49:07 00:00:16 93 87 82 - 85  24 06/15/17 18:49:51 18:51:11 00:01:20 91 87 81 - 88  25 06/15/17 18:54:35 18:54:47 00:00:12 93 89 79 - 84  26 06/15/17 18:55:07 18:55:47 00:00:40 94 88 81 - 86  27 06/15/17 19:23:31 19:23:43 00:00:12 93 87 84 - 88  28 06/15/17 19:24:19 19:25:27 00:01:08 93 89 80 - 86  29 06/15/17 19:55:31 19:57:43 00:02:12 92 88 82 - 87  30 06/15/17 19:58:19 20:00:51 00:02:32 92 88 80 - 89  31 06/15/17 20:01:59 20:02:59 00:01:00 93 87 80 - 89  32 06/15/17 20:03:27 20:04:59 00:01:32 92 87 80 - 88  33 06/15/17 20:13:39 20:15:31 00:01:52 92 85 81 - 86  34 06/15/17 20:15:47 20:16:15 00:00:28 89 85 80 - 85  35 06/15/17 20:16:31 20:16:43 00:00:12 89 85 79 - 83  36 06/15/17 20:19:07 20:20:27 00:01:20 92 84 90 - 103  37 06/15/17 20:23:31 20:24:59 00:01:28 91 87 82 - 89  38 06/15/17 20:25:07 20:25:11 00:00:04 91 87 88 - 89  39 06/15/17 20:30:11 20:32:35 00:02:24 95 87 92 - 107  40 06/15/17 20:47:11 20:47:51 00:00:40 94 86 91 - 99  © 8013-6657 [employer] Oximetry version Standard [id number].  Oximeter: Respironics 920M Plus memory, 4 second resolution.  [facility]  [address] [place] [address]. * [place] [address]  Telephone: [phone]  Test date: 06/15/17 Start: 06/15/17 15:40:11 Good Murillo  Doctor: Jermaine End: 06/15/17 20:52:35 [id number]  Oximetry: 8 hours per page  Comments: Overnight study breathing room air.  HOUR 06/15/17 HOUR 06/15/17  16:00 20:00  16:30  20:30  17:00 21:00  17:30 21:30  18:00 22:00  18:30 22:30  19:00 23:00  19:30 23:30  60 80  PULSE  100 120 60 80  PULSE  70 80 100 120  SpO2  90 100 70 80  SpO2  90 100  © 9379-2075 [employer] Oximetry version Standard [id number].  Oximeter: Respironics 920M Plus memory, 4 second resolution.  [facility] [address] * [place] [address]  Telephone: [phone]  Test date: 06/15/17 Start: 06/15/17 15:40:11 Good Murillo  Doctor: Jermaine End: 06/15/17 20:52:35 [id number]  Oximetry: 2 hours lowest average SpO2  Comments: Overnight study breathing room air.  HOUR 06/15/17 HOUR 06/15/17  15:45 16:45  16:00 17:00  16:15 17:15  16:30 17:30  60 80  PULSE  100 120 60 80  PULSE  70 80 100 120  SpO2  90 100 70 80  SpO2  90 100  © 9744-4971 [employer] Oximetry version Standard TL6794.  Oximeter: Respironics 920M Plus memory, 4 second resolution.    Overnight Oxygen Saturation Study:    INTERPRETATION:  Conditions of Test: Room air stable outpatient.  Interpretation of results of overnight oxygen saturation study. This was a technically  adequate study. Overnight oxygen saturation study is abnormal with O2 saturation less than 88%.   Time with SpO2<88: 1:15:28, 26.1% of the study period. Lowest oxygen saturation recorded was 76% .  Clinical correlation suggested.  Agustin Dean MD    Medicare Criteria Comments:   Oximetry test results suggest the patient does  fall under Medicare Group 1 Criteria.   (Arterial oxygen saturation at or below 88% for at least 5 minutes taken during sleep)    Details about Medicare Group Criteria coverage can be found at http://www.cms.hhs.gov/manuals/downloads/

## 2017-06-19 ENCOUNTER — PATIENT MESSAGE (OUTPATIENT)
Dept: INTERNAL MEDICINE | Facility: CLINIC | Age: 82
End: 2017-06-19

## 2017-06-19 ENCOUNTER — TELEPHONE (OUTPATIENT)
Dept: INTERNAL MEDICINE | Facility: CLINIC | Age: 82
End: 2017-06-19

## 2017-06-19 NOTE — TELEPHONE ENCOUNTER
----- Message from Nona Garcia sent at 6/16/2017  1:26 PM CDT -----  Contact: [phone]  Patient returning call. Please call back at [phone].      Thanks,  Nona Garcia

## 2017-06-20 ENCOUNTER — PATIENT MESSAGE (OUTPATIENT)
Dept: INTERNAL MEDICINE | Facility: CLINIC | Age: 82
End: 2017-06-20

## 2017-06-21 ENCOUNTER — OFFICE VISIT (OUTPATIENT)
Dept: PODIATRY | Facility: CLINIC | Age: 82
End: 2017-06-21
Payer: MEDICARE

## 2017-06-21 VITALS
HEART RATE: 102 BPM | SYSTOLIC BLOOD PRESSURE: 152 MMHG | WEIGHT: 129.19 LBS | HEIGHT: 68 IN | BODY MASS INDEX: 19.58 KG/M2 | DIASTOLIC BLOOD PRESSURE: 64 MMHG

## 2017-06-21 DIAGNOSIS — R20.2 NUMBNESS AND TINGLING OF BOTH LEGS: ICD-10-CM

## 2017-06-21 DIAGNOSIS — B35.1 DERMATOPHYTOSIS OF NAIL: Primary | ICD-10-CM

## 2017-06-21 DIAGNOSIS — R20.0 NUMBNESS AND TINGLING OF BOTH LEGS: ICD-10-CM

## 2017-06-21 DIAGNOSIS — I73.9 PERIPHERAL VASCULAR DISEASE: ICD-10-CM

## 2017-06-21 DIAGNOSIS — R26.81 GAIT INSTABILITY: ICD-10-CM

## 2017-06-21 DIAGNOSIS — E11.42 DM TYPE 2 WITH DIABETIC PERIPHERAL NEUROPATHY: ICD-10-CM

## 2017-06-21 PROCEDURE — 99999 PR PBB SHADOW E&M-EST. PATIENT-LVL IV: CPT | Mod: PBBFAC,,, | Performed by: PODIATRIST

## 2017-06-21 PROCEDURE — 99213 OFFICE O/P EST LOW 20 MIN: CPT | Mod: 25,S$GLB,, | Performed by: PODIATRIST

## 2017-06-21 PROCEDURE — 1159F MED LIST DOCD IN RCRD: CPT | Mod: S$GLB,,, | Performed by: PODIATRIST

## 2017-06-21 PROCEDURE — 11721 DEBRIDE NAIL 6 OR MORE: CPT | Mod: Q9,S$GLB,, | Performed by: PODIATRIST

## 2017-06-21 PROCEDURE — 99499 UNLISTED E&M SERVICE: CPT | Mod: S$GLB,,, | Performed by: PODIATRIST

## 2017-06-21 NOTE — PROGRESS NOTES
PODIATRY NOTE    CHIEF COMPLAINT  Chief Complaint   Patient presents with    Routine Foot Care     at risk foot/nail care PCP Dr. Shelton([place]) 4/13/17         HPI  SUBJECTIVE: Good Murillo Jr. is a 89 y.o. male who  has a past medical history of Abnormal EKG (8/22/2014); Allergy; Arthritis; Bilateral carotid artery disease (1/17/2017); Cholelithiasis; COPD (chronic obstructive pulmonary disease); Diabetes with neurologic complications; Diverticulitis; Diverticulosis; Ex-smoker; Glaucoma; Heart murmur; History of pneumothorax; HTN (hypertension); Hyperlipidemia; Hyperlipidemia (8/22/2014); Hypothyroid; Iron deficiency anemia; Osteoporosis, unspecified (12/16 zeenat 12/18); Polyneuropathy; Prostate CA; PVD (peripheral vascular disease) (8/22/2014); Type 2 diabetes with peripheral circulatory disorder, controlled; Type 2 diabetes, controlled, with neuropathy (Diagnosed 1994); and Urinary incontinence. Goodpresents to clinic for high risk diabetic foot exam and care.  Good admits numbness, burning, and/or tingling sensations in their feet. Patient admits to painful toenails aggravated by increased weight bearing, shoe gear, and pressure. States pain is relieved with routine debridements.    Pt also complains about numbness and tingling to both legs. He has diffculty walking and uses cane or walker for assistance. He denies any recent falls. He has seen physiatry in the past and would like referral. He does have neuropathy. He has tried gabapentin in the past, but discontinued due to side effects of hallucinations. Patient has no other pedal complaints at this time.    This patient has documented high risk feet requiring routine maintenance secondary to diabetes mellitis and those secondary complications of diabetes, as mentioned.    HgA1c:   Hemoglobin A1C   Date Value Ref Range Status   05/08/2017 8.2 (H) 4.5 - 6.2 % Final     Comment:     According to ADA guidelines, hemoglobin A1C <7.0% represents  optimal control in  non-pregnant diabetic patients.  Different  metrics may apply to specific populations.   Standards of Medical Care in Diabetes - 2016.  For the purpose of screening for the presence of diabetes:  <5.7%     Consistent with the absence of diabetes  5.7-6.4%  Consistent with increasing risk for diabetes   (prediabetes)  >or=6.5%  Consistent with diabetes  Currently no consensus exists for use of hemoglobin A1C  for diagnosis of diabetes for children.     03/15/2017 8.0 (H) 4.5 - 6.2 % Final     Comment:     According to ADA guidelines, hemoglobin A1C <7.0% represents  optimal control in non-pregnant diabetic patients.  Different  metrics may apply to specific populations.   Standards of Medical Care in Diabetes - 2016.  For the purpose of screening for the presence of diabetes:  <5.7%     Consistent with the absence of diabetes  5.7-6.4%  Consistent with increasing risk for diabetes   (prediabetes)  >or=6.5%  Consistent with diabetes  Currently no consensus exists for use of hemoglobin A1C  for diagnosis of diabetes for children.     11/04/2016 8.0 (H) 4.5 - 6.2 % Final     Comment:     According to ADA guidelines, hemoglobin A1C <7.0% represents  optimal control in non-pregnant diabetic patients.  Different  metrics may apply to specific populations.   Standards of Medical Care in Diabetes - 2016.  For the purpose of screening for the presence of diabetes:  <5.7%     Consistent with the absence of diabetes  5.7-6.4%  Consistent with increasing risk for diabetes   (prediabetes)  >or=6.5%  Consistent with diabetes  Currently no consensus exists for use of hemoglobin A1C  for diagnosis of diabetes for children.             REVIEW OF SYSTEMS  General: Denies any fever or chills  Chest: Denies shortness of breath, wheezing, coughing, or sputum production  Heart: Denies chest pain.  As noted above and per history of current illness above, otherwise negative in the remainder of the 14 systems.     PHYSICAL EXAM  Vitals:     "06/21/17 1307   BP: (!) 152/64   Pulse: 102   Weight: 58.6 kg (129 lb 3 oz)   Height: 5' 8" (1.727 m)       GEN:  This patient is well-developed, well-nourished and appears stated age, well-oriented to person, place and time, and cooperative and pleasant on today's visit.      LOWER EXTREMITY  Vascular:   · DP pedal pulse 0/4 b/l, PT pedal pulse 1/4 b/l  · Skin temperature warm to warm from prox to distally  · CFT <5 secs b/l  · There is no edema noted b/l.     Dermatologic:   · Thickened, dystrophic, elongated toenails with subungal debris 1-5 b/l.   · No open skin lesions noted  · No erythema or drainage noted b/l.  · Webspaces are C/D/I B/L.  · There is no hyperkeratotic tissue noted.  · Skin texture and turgor dry  · There is no pedal hair growth noted    Neurologic:  · Vibratory sensation diminished at level of Hallux IPJ b/l    · Protective sensation absent at 0/10 sites upon examination with [place] Weinsten 5.07 g monofilament.   · Propioception intact at 1st MTPJ b/l.   · Babinski reflex absent b/l. Light touch and sharp/dull sensation intact b/l.    Musculoskeletal/Orthopedic:  · No symptomatic structural abnormalities noted.   · Muscle strength is 5/5 for foot inverters, everters, plantarflexors, and dorsiflexors. Muscle tone is normal.  · Pain free range of motion in all four quadrants with stiffness and limitation b/l  · Foot type: rectus with intact medial longituidinal arch         ASSESSMENT  Dermatophytosis of nail    Gait instability    Numbness and tingling of both legs    Peripheral vascular disease    DM type 2 with diabetic peripheral neuropathy            Plan:  -Discuss presenting problems, etiology, pathologic processes and management options with patient today.   -I counseled the patient on their conditions, their implications and medical management. An in depth discussion on diabetic management, risk prevention, amputation prevention verbally and provided educational literature in written " format.  -With patient's permission, nails were aggressively reduced and debrided x 10 to their soft tissue attachment mechanically and with electric , removing all offending nail and debris. Patient relates relief following the procedure. Patient will continue to monitor the areas daily, inspect feet, wear protective shoe gear when ambulatory, moisturizer to maintain skin integrity.  - Shoe inspection. Diabetic Foot Education. Patient reminded of the importance of good nutrition and blood sugar control to help prevent podiatric complications of diabetes. Patient instructed on proper foot hygeine. We discussed wearing proper shoe gear, daily foot inspections, never walking without protective shoe gear, never putting sharp instruments to feet, routine podiatric nail visits every 2-3 months.    -will schedule appt with Dr. Marquez for gait instability, neuropathy, hx of lumbar back pain.      Future Appointments  Date Time Provider Department Center   6/22/2017 10:00 AM Shala Marquez MD [facility] PHYS [place]   7/19/2017 11:00 AM Damian Issa Jr., PA-C [facility] DIABETE [place]   9/28/2017 1:20 PM Carmita Jones DPM [facility] POD [place]   12/1/2017 9:40 AM Kwaku Montes De Oca IV, MD ON UROLOGY [place]   12/7/2017 1:00 PM [facility] XR2 [facility] XRAY [place]   12/7/2017 1:20 PM Agustin Dean MD [facility] PULMSVC [place]   1/15/2018 1:30 PM FIELDS, VISUAL-ONE [facility] OPHTHAL [place]   1/15/2018 2:00 PM Patricio Morris MD [facility] OPHTHAL [place]         Report Electronically Signed By:  Carmita Jones DPM   Podiatric Medicine & Surgery  [facility]  6/21/2017

## 2017-06-22 ENCOUNTER — OFFICE VISIT (OUTPATIENT)
Dept: PHYSICAL MEDICINE AND REHAB | Facility: CLINIC | Age: 82
End: 2017-06-22
Payer: MEDICARE

## 2017-06-22 VITALS
DIASTOLIC BLOOD PRESSURE: 53 MMHG | WEIGHT: 129 LBS | SYSTOLIC BLOOD PRESSURE: 123 MMHG | BODY MASS INDEX: 19.55 KG/M2 | HEART RATE: 88 BPM | HEIGHT: 68 IN | RESPIRATION RATE: 14 BRPM

## 2017-06-22 DIAGNOSIS — E11.42 TYPE 2 DIABETES MELLITUS WITH DIABETIC POLYNEUROPATHY, WITH LONG-TERM CURRENT USE OF INSULIN: Primary | ICD-10-CM

## 2017-06-22 DIAGNOSIS — Z79.4 TYPE 2 DIABETES MELLITUS WITH DIABETIC POLYNEUROPATHY, WITH LONG-TERM CURRENT USE OF INSULIN: Primary | ICD-10-CM

## 2017-06-22 PROCEDURE — 1159F MED LIST DOCD IN RCRD: CPT | Mod: S$GLB,,, | Performed by: PHYSICAL MEDICINE & REHABILITATION

## 2017-06-22 PROCEDURE — 99214 OFFICE O/P EST MOD 30 MIN: CPT | Mod: S$GLB,,, | Performed by: PHYSICAL MEDICINE & REHABILITATION

## 2017-06-22 PROCEDURE — 99499 UNLISTED E&M SERVICE: CPT | Mod: S$GLB,,, | Performed by: PHYSICAL MEDICINE & REHABILITATION

## 2017-06-22 PROCEDURE — 99999 PR PBB SHADOW E&M-EST. PATIENT-LVL III: CPT | Mod: PBBFAC,,, | Performed by: PHYSICAL MEDICINE & REHABILITATION

## 2017-06-22 PROCEDURE — 1125F AMNT PAIN NOTED PAIN PRSNT: CPT | Mod: S$GLB,,, | Performed by: PHYSICAL MEDICINE & REHABILITATION

## 2017-06-22 NOTE — PATIENT INSTRUCTIONS
Your Diabetes Foot Care Program    Every day you depend on your feet to keep you moving. But when you have diabetes, your feet need special care. Even a small foot problem can become very serious. So dont take your feet for granted. By working with your diabetes healthcare team, you can learn how to protect your feet and keep them healthy.  Evaluating your feet  An evaluation helps your healthcare provider check the condition of your feet. The evaluation includes a review of your diabetes history and overall health. It may also include a foot exam, X-rays, or other tests. These can help show problems beneath the skin that you cant see or feel.  Medical history  You will be asked about your overall health and any history of foot problems. Youll also discuss your diabetes history, such as whether your blood sugar level has changed over time. It also includes questions about sensations of pain, tingling, pins and needles, or numbness. Your healthcare provider will also want to know if you have high blood pressure and heart disease, or if you smoke. Be sure to mention any medicines (including over-the-counter), supplements, or herbal remedies you take.  Foot exam  A foot exam checks the condition of different parts of your foot. First, your skin and nails are examined for any signs of infection. Blood flow is checked by feeling for the pulses in each foot. You may also have tests to study the nerves in the foot. These include using a small filament (wire) to see how sensitive your feet are. In certain cases, you will be asked to walk a short distance to check for bone, joint, and muscle problems.  Diagnostic tests  If needed, your healthcare provider will suggest certain tests to learn more about your feet. These include:  · Doppler tests to measure blood flow in the feet and lower leg.  · X-rays, which can show bone or joint problems.  · Other imaging tests, such as an MRI (magnetic resonance imaging), bone scan,  and CT (computed tomography) scan. These can help show bone infections.  · Other tests, such as vascular tests, which study the blood flow in your feet and legs. You may also have nerve studies to learn how sensitive your feet are.  Creating a foot care program  Based on the evaluation, your healthcare provider will create a foot care program for you. Your program may be as simple as starting a daily self-care routine and changing the types of shoes your wear. It may also involve treating minor foot problems, such as a corn or blister. In some cases, surgery will be needed to treat an infection or mechanical problems, such as hammer toes.  Preventing problems  When you have diabetes, its easier to prevent problems than to treat them later on. So see your healthcare team for regular checkups and foot care. Your healthcare team can also help you learn more about caring for your feet at home. For example, you may be told to avoid walking barefoot. Or you may be told that special footwear is needed to protect your feet.  Have regular checkups  Foot problems can develop quickly. So be sure to follow your healthcare teams schedule for regular checkups. During office visits, take off your shoes and socks as soon as you get in the exam room. Ask your healthcare provider to examine your feet for problems. This will make it easier to find and treat small skin irritations before they get worse. Regular checkups can also help keep track of the blood flow and feeling in your feet. If you have neuropathy (lack of feeling in your feet), you will need to have checkups more often.  Learn about self-care  The more you know about diabetes and your feet, the easier it will be to prevent problems. Members of your healthcare team can teach you how to inspect your feet and teach you to look for warning signs. They can also give you other foot care tips. During office visits, be sure to ask any questions you have.  Date Last Reviewed:  7/1/2016  © 5881-2911 [employer]. [address] [place] [address], [place] [address]. All rights reserved. This information is not intended as a substitute for professional medical care. Always follow your healthcare professional's instructions.        Diabetes: Keeping Feet Healthy     Have your feet checked every time you see your healthcare provider, and at least once a year.     Diabetes can damage nerves in your feet and cause neuropathy. This condition makes it hard for you to feel injuries or sore spots. Diabetes can also change blood flow, making it harder for small problems, like a blister, to heal properly. In fact, minor injuries can quickly become serious infections that send you to the hospital. Practice self-care to protect your feet and keep them healthy.   Take special care  Here are tips for taking special care of your feet:  · Inspect your feet daily for problems such as redness, blisters, cracks, dry skin, or numbness. Use a mirror to see the bottoms of your feet. Or, ask for help.  · Manage your diabetes. Monitor and control your blood sugar. Take all your medicines as prescribed.  · Avoid walking barefoot, even indoors. Always wear socks inside your shoes.   · Wash your feet with warm water and mild soap. Dry well, especially between toes.  · Dont treat corns or calluses yourself. Talk to your healthcare provider or podiatrist (a healthcare provider who specializes in foot care) if you need assistance trimming your toenails.  · Use moisturizing cream or lotion if you have dry skin, but dont use it between toes.  · Dont use heating pads on your feet. If you have neuropathy, you could get a burn and not feel it.  · Stop smoking. Smoking restricts blood flow and can make it harder for wounds to heal.  · Don't use sharp blades to trim your nails. Use a nail clipper and file instead.   Have regular checkups  Foot problems can develop quickly. So be sure to follow your healthcare teams  schedule for regular checkups. During office visits, take off your shoes and socks as soon as you get in the exam room. Ask your healthcare provider to examine your feet for problems. This will make it easier to find and treat small skin irritations before they get worse. Regular checkups can also help keep track of the blood flow and feeling in your feet. If you have neuropathy, you may need to have checkups more often.  Wear proper footwear  Wearing proper footwear is very important. If areas of your feet have been damaged by too much pressure, your healthcare provider may recommend changing your footwear. In some cases, avoiding high heels or tight work boots may be all thats needed. Or, your healthcare provider may recommend special shoes or custom inserts. These help protect your feet and keep existing irritations from getting worse. If you need special footwear, ask your healthcare provider if you qualify for Medicare's custom-molded and extra-depth diabetic shoe and insert program.   Make sure shoes and socks fit  Any pair of shoes--new or old--should feel comfortable as soon as you put them on. There shouldnt be any rubbing when you walk. Wear the right shoe for any activity. For instance, a running shoe is designed to keep your feet injury-free while jogging. Buy shoes at the end of the day, when your feet are larger. Make sure they provide support without feeling too loose. Make sure your socks fit, too. Wear soft, seamless, well-padded socks for activity. Cotton or microfiber socks are best to help to absorb sweat. To protect your feet, avoid shoes that are open-toed or open-heeled. If you have questions about what kinds of shoes and socks are best, talk to your healthcare team.  Get regular exercise  Regular exercise improves blood flow in your feet. It also increases foot strength and flexibility. Gentle exercises, like walking or riding a stationary bicycle, are best. You can also do special foot  "exercises. Just be sure to talk with your healthcare provider before starting any exercise program. Also mention if any exercise causes pain, redness, or other signs of foot problems.     Note: If you have any kind of break in the skin of your foot or ankle, keep the area clean. Then call your healthcare provider--especially if the area doesnt appear to be healing.   Date Last Reviewed: 6/1/2016  © 8332-8781 [employer]. [address] [place] [address], [place] [address]. All rights reserved. This information is not intended as a substitute for professional medical care. Always follow your healthcare professional's instructions.        Exercises to Prevent Falls  Certain types of exercises may help make you less likely to fall. Try the ones below. Or do other exercises that your health care provider suggests. Depending on your health, you may need to start slowly. Don't let that stop you. Even small amounts of exercise can help you. Be sure to talk to your health care provider before starting any exercise program.    Improve balance  Many types of exercise can help improve balance. Juvenal chi and yoga are good examples. Here's another one to try. You can do it anytime and almost anywhere.  · Stand next to a counter or solid support.  · Push yourself up onto your tiptoes.  · Hold for 5 seconds. If you start to lose your balance, hold on to the counter.  · Rest and repeat 5 times. Work up to holding for 20 to 30 seconds, if you can.    Increase flexibility  Being more flexible makes it easier for you to move around safely. Try exercises like the seated hamstring stretch.  · Sit in a chair and put one foot on a stool.  · Straighten your leg and reach with both hands down either side of your leg. Reach as far down your leg as you can.  · Hold for about 20 seconds.  · Go back to the starting position. Then repeat 5 times. Switch legs.    Build strength  "Resistance" exercises help build strength. You can do them " without equipment. Or you can use weights, elastic bands, or special machines. One such exercise is called the biceps curl. You can hold a 1-pound weight or even a can of soup. Do this exercise at least 3 times a week. Strive for every day.  · Sit up straight in a chair.  · Keep your elbow close to your body and your wrist straight.  · Bend your arm, moving your hand up to your shoulder. Then slowly lower your arm.  · Repeat 5 times. Switch to the other arm.    Build your staying power  Aerobic exercises make your heart and lungs stronger so you can keep moving longer. Walking and swimming are two of the best types of exercises you can do. Using a stationary bike is great, too. Find an aerobic exercise that you enjoy. Start slowly and build up. Even 5 minutes is helpful. Aim for a goal of 30 minutes, at least 3 times a week. You don't have to do 30 minutes in 1 session. Break it up and walk a little throughout the day.     More helpful tips  · Start easy. Slowly work up to doing more.  · Talk with your health care provider about the best exercises for you.  · Call senior centers or health clubs about exercise programs.  · If needed, have a family member watch you walk every so often to check your stability.  · Exercise with a friend. Choose an activity you both enjoy.  · Consider joe chi or yoga to strengthen your balance.  · Try exercises that you can do anytime, anywhere. Here are 2 examples. Have someone with you when you first try these:  ¨ Practice walking by placing 1 foot right in front of the other.  ¨ Stand up and sit down 10 times. Repeat this throughout the day.   Date Last Reviewed: 6/13/2015 © 2000-2016 [employer]. [address] [place] [address], [place] [address]. All rights reserved. This information is not intended as a substitute for professional medical care. Always follow your healthcare professional's instructions.        Preventing Falls in the Home  As you get older, falls are more  likely for many reasons. One reason is because your reaction time slows. Your muscles and joints may also get stiffer, making them less flexible and therefore more prone to injury. Illness, medicines, and vision changes can also affect your balance. This increases your risk of falling. A fall could leave you unable to live on your own. To make your home safer, follow these tips:   Floors  Make floors safer by doing the following:   · Put nonskid pads under area rugs.  · Remove throw rugs.  · Replace worn floor coverings.  · Tack carpets firmly to each step on carpeted stairs. Put nonskid strips on the edges of uncarpeted stairs.  · Keep floors and stairs free of clutter and cords.  · Arrange furniture so there are clear pathways.  · Clean up any spills right away.  Bathrooms  Make bathrooms safer by doing the following:   ·   Install grab bars in the tub or shower.  · Apply nonskid strips or put a nonskid rubber mat in the tub or shower.  · Sit on a bath chair to bathe.  · Use bathmats with nonskid backing.  Lighting  Tips to light your way:   · Keep a flashlight in each room.  · Put a nightlight along the pathway between the bedroom and the bathroom.  Date Last Reviewed: 8/1/2016  © 8239-6128 [employer]. [address] [place] [address], [place] [id number]. All rights reserved. This information is not intended as a substitute for professional medical care. Always follow your healthcare professional's instructions.        Preventing Falls: How to Prepare and What to Do  Falling is not something you want to think about. But it can make a big difference to plan ahead. If you're prepared, you'll know how to get help. And you'll be less likely to panic if you fall. This means you'll be able to do what's needed to get help right away.    How to prepare  · Have someone check on you daily.  · Keep a list of emergency numbers near the phone.  · Always have a way to call for help. Keep a cell phone with you at all times.  Or talk with your healthcare provider about how to set up a home monitoring service. This involves wearing a small device around your neck or wrist. If you fall, you can press the button on the device. This alerts emergency responders.  · Talk with your healthcare provider about an exercise program that's right for you. Regular exercise may reduce the risk of falling and the risk for injury related to a fall.  · It's important to have good lighting in your home. Avoid using throw rugs, because they can raise your risk of tripping and falling. Add grab bars in the bathroom to help reduce the risk of falling. Small changes can make your home safer. Talk with your healthcare provider about making your home safer.  What to do if you fall  Above all, try to stay calm:  · If you start to fall, try to relax your body. This will reduce the impact of the fall.  · After you fall, press your monitor button, or phone for help.  · Don't rush to get up. First, make sure you're not hurt.  · Roll onto your side, then crawl to a chair. Pull yourself up onto the chair slowly.  · You should be checked if you struck your head, lost consciousness, were confused afterward, or have any other concerns for injury.  · Be sure to tell your doctor that you fell.  A note to family and friends  If you're with a loved one when he or she starts to fall, don't try to stop the fall. Ease the person to the floor carefully, so neither of you gets hurt. Don't leave the person alone. And don't try to move him or her. Put a pillow under his or her head. Check for injuries. If help is needed right away, be sure to call 911.   Date Last Reviewed: 6/13/2015  © 8451-8314 [employer]. [address] [place] [address], [place] [address]. All rights reserved. This information is not intended as a substitute for professional medical care. Always follow your healthcare professional's instructions.        Preventing Falls: Moving Safely Outside  Moving safely  outside your home can be a challenge. Take care when walking up and down stairs and curbs. And be sure to wear sturdy, comfortable shoes and pay attention to where you step. Here are more tips to keep you from falling.     When stepping off a curb with a walker, lower the walker onto the street first, then step off the curb.   Using curbs and stairs  Curbs, steps, or uneven pavement can trip you. Take care when near them:  · Check the height of a curb before stepping up or down. Be careful with uneven and cut-out sections of curbs.  · Don't rush when crossing the street. Watch for changes in pavement height.  · On stairs, grasp the handrail and take one step at a time. If you ever feel dizzy on stairs, sit down until you feel better.  Wearing shoes that keep you safe  When you shop for shoes, keep these things in mind:  · Choose shoes with rubber or nonskid soles. Athletic shoes are a good choice.  · Choose flats or shoes with low heels. Avoid high heels or platforms.  · All footwear should be sturdy and well-fitting. Don't wear flip-flops or backless shoes or slippers.  · Don't walk around in stocking feet. Shoes are your safest bet, even when indoors. If you like, keep 1 pair of shoes just for indoors.  Moving objects from place to place  Carrying objects can be hard, especially if you use a cane or walker. These tips can make it easier:  · Use a rolling cart to carry things like groceries.  · Wear clothes with large pockets for carrying small objects or wear a shalini pack.  · Divide large loads into smaller loads. That way, you can always keep 1 hand free for grasping railings.  · Don't carry objects that block your view. That's a sure way to trip.   Date Last Reviewed: 6/13/2015  © 7434-7505 [employer]. [address] [place] [address]. All rights reserved. This information is not intended as a substitute for professional medical care. Always follow your healthcare professional's  instructions.        Preventing Falls: Staying Active  Staying active is one of the best things you can do to prevent falls. Keep in mind that doing too little can be as risky as doing too much. That's because not being active can make you weaker and more likely to fall. But how much can you do safely? Start easy. Slowly work up to doing more. Talk to your health care provider about safe ways for you to stay active.    Stay active, stay connected  Staying connected with other people can help lower your risk of falls. One way it does this is by helping to keep you from feeling isolated and depressed. Find a social activity you enjoy. Make it part of your weekly or daily routine:  · Join a club or visit a senior center.  · Go to [employer] services.  · Organize a potJaco Solarsik or game of cards.  · Garden with your neighbor.  · Have a friend join you to go walking outdoors or in the mall.    How exercise helps  The list of benefits from exercise just keeps getting longer. And, as you age, you can keep reaping those rewards. Balance, flexibility, strength, and endurance all come from exercise. They all play a role in preventing falls. It's never too late to start exercising. Try organized activities. You can find these at senior centers, health clubs, or even at a Confucianism, temple, or [employer]. This approach may work best if you've never exercised in the past or if you need company to get motivated. But you can exercise on your own if you prefer. Try an exercise video or walk in the park.  Date Last Reviewed: 6/13/2015  © 0687-0722 [employer]. [address] [place] [address], [place] [address]. All rights reserved. This information is not intended as a substitute for professional medical care. Always follow your healthcare professional's instructions.

## 2017-06-22 NOTE — PROGRESS NOTES
PM&R CLINIC NOTE:    Chief Complaint   Patient presents with    Back Pain    Numbness     bilateral leg    Weakness     bilateral leg     HPI: This is a 89 y.o.  male being seen in clinic today for bilateral leg numbness and impaired gait.  His DM neuropathy has been bothering him a lot more lately.  He feels unsteady at times but remains active.   History obtained from patient    Review of Systems:     General- denies lethargy, weight change, fever, chills  Head/neck- denies swallowing difficulties  ENT- denies hearing changes  Cardiovascular-denies chest pain  Pulmonary- denies shortness of breath  GI- denies constipation or bowel incontinence  - denies bladder incontinence  Skin- denies wounds or rashes  Musculoskeletal- denies weakness, denies pain  Neurologic- + numbness and tingling  Psychiatric- denies depressive or psychotic features, denies anxiety  Lymphatic-denies swelling  Endocrine- denies hypoglycemic symptoms/DM history    Physical Examination:  General: Well developed, well nourished male, NAD    Spinal Examination: CERVICAL  Active ROM is within normal limits.  Inspection: No deformity of spinal alignment.    Spinal Examination: LUMBAR or THORACIC  Active ROM is within normal limits.  Inspection: No deformity of spinal alignment.      Bilateral Upper and Lower Extremities:  Shoulder/Elbow/Wrist/Hand ROM wnl  Hip/Knee/Ankle ROM wnl  Bilateral Extremities show normal capillary refill.  No signs of cyanosis, rubor, edema, skin changes, or dysvascular changes of appendages.  Nails appear intact.  4+/5 strength bilateral lower exts    Sensation intact to light touch bilateral lower exts, mild dysesthesias at left ankle/foot, right foot    Neurological Exam:  Cranial Nerves:  II-XII grossly intact    Gait: ambulating slowly with rollator walker    IMPRESSION/PLAN: This is a 89 y.o.  male with DM neuropathy, mild gait/balance impairment, h/o thoracic myofascial pain    1. Handouts on fall prevention,  balance, exercise provided  2. Continue rollator walker  3. Try topical compound-green from [facility]  4. Fu olesya Marquez M.D.  [facility]

## 2017-06-26 DIAGNOSIS — R33.8 ACUTE RETENTION OF URINE: ICD-10-CM

## 2017-06-26 RX ORDER — TAMSULOSIN HYDROCHLORIDE 0.4 MG/1
CAPSULE ORAL
Qty: 90 CAPSULE | Refills: 2 | Status: SHIPPED | OUTPATIENT
Start: 2017-06-26 | End: 2017-11-22

## 2017-06-29 LAB
PRE FEV1 FVC: 48.9 % (ref 60–79.36)
PRE FEV1: 1.23 L (ref 1.49–2.98)
PRE FVC: 2.51 L (ref 2.42–4.17)
PRE PEF: 3.7 L/S (ref 3.37–7.74)

## 2017-07-03 ENCOUNTER — PATIENT MESSAGE (OUTPATIENT)
Dept: PULMONOLOGY | Facility: CLINIC | Age: 82
End: 2017-07-03

## 2017-07-06 ENCOUNTER — TELEPHONE (OUTPATIENT)
Dept: INTERNAL MEDICINE | Facility: CLINIC | Age: 82
End: 2017-07-06

## 2017-07-06 NOTE — TELEPHONE ENCOUNTER
----- Message from Staci Chao sent at 7/5/2017  2:13 PM CDT -----  Contact: pt   Pt calling about get an injection fir B-12,,, please call pt back at [phone]

## 2017-07-10 DIAGNOSIS — E53.8 VITAMIN B12 DEFICIENCY: Primary | ICD-10-CM

## 2017-07-11 ENCOUNTER — PATIENT MESSAGE (OUTPATIENT)
Dept: PULMONOLOGY | Facility: CLINIC | Age: 82
End: 2017-07-11

## 2017-07-11 ENCOUNTER — TELEPHONE (OUTPATIENT)
Dept: PHARMACY | Facility: CLINIC | Age: 82
End: 2017-07-11

## 2017-07-11 NOTE — TELEPHONE ENCOUNTER
Patient called requesting a prescription on injectable B-12 medication.      Patient indicated that he is anemic and was supposed to speak to Dr. Shelton about it on his last visit.  He would like a prescription to be phoned in.

## 2017-07-12 ENCOUNTER — TELEPHONE (OUTPATIENT)
Dept: PULMONOLOGY | Facility: CLINIC | Age: 82
End: 2017-07-12

## 2017-07-12 DIAGNOSIS — G47.34 NOCTURNAL HYPOXEMIA: Primary | ICD-10-CM

## 2017-07-12 DIAGNOSIS — J96.11 CHRONIC RESPIRATORY FAILURE WITH HYPOXIA: ICD-10-CM

## 2017-07-12 DIAGNOSIS — J44.9 CHRONIC OBSTRUCTIVE PULMONARY DISEASE, UNSPECIFIED COPD TYPE: ICD-10-CM

## 2017-07-12 NOTE — TELEPHONE ENCOUNTER
Spoke with pt's daughter, she would like a prescription for portable oxygen. I explained that pt did not qualify for portable oxygen based on his recent stress test. Pt did however qualify for oxygen with sleep but he does not want the big concentrator.  States they are willing to pay out of pocket for it themselves. They just want to have in case of an emergency or in case pt needs to travel. Please advise.

## 2017-07-12 NOTE — TELEPHONE ENCOUNTER
----- Message from Alethea Muro sent at 7/12/2017 11:05 AM CDT -----  Contact: Antonio/yolanda  Please call pt daughter @ [phone] regarding a script for oxygen concentrator.

## 2017-07-13 ENCOUNTER — PATIENT MESSAGE (OUTPATIENT)
Dept: INTERNAL MEDICINE | Facility: CLINIC | Age: 82
End: 2017-07-13

## 2017-07-13 ENCOUNTER — TELEPHONE (OUTPATIENT)
Dept: PULMONOLOGY | Facility: CLINIC | Age: 82
End: 2017-07-13

## 2017-07-13 ENCOUNTER — TELEPHONE (OUTPATIENT)
Dept: UROLOGY | Facility: CLINIC | Age: 82
End: 2017-07-13

## 2017-07-13 NOTE — TELEPHONE ENCOUNTER
Spoke with pt's daughter, informed her that Dr. Dean did renew  Rx for oxygen and I will put in mail for her. She verbalized understanding.

## 2017-07-13 NOTE — TELEPHONE ENCOUNTER
prescription for portable oxygen   Patient needs portable device for travel    Subjective:       Patient ID: Good Murillo Jr. is a 89 y.o. male.    Chief Complaint: He       No chief complaint on file.    HPI   COPD  He presents for evaluation and treatment of COPD. The patient is not currently have symptoms / an exacerbation. The patient has COPD for approximately 10 years. Symptoms in previous episodes have included dyspnea, cough, wheezing and sinus congestion, and typically last 2 weeks. Previous episodes have been exacerbated by moderate activity. Current treatment includes albuterol inhaler and ipratropium inhaler, which generally provides some relief of symptoms.   He uses 1 pillows at night. Patient currently is on oxygen at 2 L/min per nasal cannula..uses prn.  The patient is having constitutional symptoms, denying fever, chills, anorexia, but has  weight loss - 154 to 128. The patient has been hospitalized for this condition before. He quit smoking approximately 25 years ago. The patient is experiencing exercise intolerance (difficulty walking 1 blocks on flat ground)    Wants to re qualify for oxygen      Past Medical History:   Diagnosis Date    Abnormal EKG 8/22/2014    Allergy     Arthritis     back    Bilateral carotid artery disease 1/17/2017    Cholelithiasis     US retroperitoneal 12/2016    COPD (chronic obstructive pulmonary disease)     Diabetes with neurologic complications     Diverticulitis     Diverticulosis     hosp/divert bleed    Ex-smoker     Glaucoma     suspect    Heart murmur     History of pneumothorax     bilat    HTN (hypertension)     Hyperlipidemia     Hyperlipidemia 8/22/2014    Hypothyroid     Iron deficiency anemia     nathan    Osteoporosis, unspecified 12/16 zeenat 12/18    Polyneuropathy     Prostate CA     PVD (peripheral vascular disease) 8/22/2014    Type 2 diabetes with peripheral circulatory disorder, controlled     Type 2 diabetes, controlled,  with neuropathy Diagnosed     Urinary incontinence      Past Surgical History:   Procedure Laterality Date    CATARACT EXTRACTION       SECTION      PCIOL OU Bilateral 2011 OD/2010 OS    DR. DELGADILLO    PROSTATE SURGERY   approx    brachytherapy     Social History     Social History    Marital status:      Spouse name: N/A    Number of children: 2    Years of education: N/A     Occupational History    retired      self employed     Social History Main Topics    Smoking status: Former Smoker     Quit date: 1993    Smokeless tobacco: Never Used    Alcohol use Yes      Comment: Champagne on     Drug use: No    Sexual activity: No     Other Topics Concern    Not on file     Social History Narrative    Patient is a  and lives with his friend and sister. He is retired he was self employed.     Review of Systems   Constitutional: Positive for fatigue. Negative for fever.   HENT: Positive for postnasal drip, rhinorrhea and congestion.    Eyes: Negative for redness and itching.   Respiratory: Positive for cough, sputum production, shortness of breath, dyspnea on extertion, use of rescue inhaler and Paroxysmal Nocturnal Dyspnea.    Cardiovascular: Negative for chest pain, palpitations and leg swelling.   Genitourinary: Negative for difficulty urinating and hematuria.   Endocrine: Negative for cold intolerance and heat intolerance.    Skin: Negative for rash.   Gastrointestinal: Negative for nausea and abdominal pain.   Neurological: Negative for dizziness, syncope, weakness and light-headedness.   Hematological: Negative for adenopathy. Does not bruise/bleed easily.   Psychiatric/Behavioral: Negative for sleep disturbance. The patient is not nervous/anxious.        Objective:      Physical Exam   Constitutional: He is oriented to person, place, and time. He appears well-developed and well-nourished.   HENT:   Head: Normocephalic and atraumatic.   Eyes:  Conjunctivae are normal. Pupils are equal, round, and reactive to light.   Neck: Neck supple. No JVD present. No tracheal deviation present. No thyromegaly present.   Cardiovascular: Normal rate and regular rhythm.    No murmur heard.  Pulmonary/Chest: He has decreased breath sounds. He has wheezes in the right lower field and the left lower field. He has no rhonchi. He has no rales.   Abdominal: Soft. Bowel sounds are normal.   Musculoskeletal: Normal range of motion. He exhibits no edema or tenderness.   Lymphadenopathy:     He has no cervical adenopathy.   Neurological: He is alert and oriented to person, place, and time.   Skin: Skin is warm and dry.   Nursing note and vitals reviewed.    Personal Diagnostic Review  Chest x-ray: hyperinflation    Spirometry: severe obstruction    6 Minute Walk  Interpretation:  Total distance walked in six minutes is moderately reduced indicating a reduction in overall  functional capacity. There was no significant oxygen desaturation. Clinical correlation suggested.    Agustin Dean MD  No flowsheet data found.      Assessment:       No diagnosis found.    Outpatient Encounter Prescriptions as of 7/12/2017   Medication Sig Dispense Refill    albuterol 90 mcg/actuation inhaler Inhale 2 puffs into the lungs every 6 (six) hours. 3 Inhaler 3    alendronate (FOSAMAX) 70 MG tablet Take 1 tablet (70 mg total) by mouth every 7 days. 12 tablet 3    aspirin 81 mg Tab Take 1 tablet by mouth Daily. Over counter. To help prevent stroke/ heart attack      blood sugar diagnostic Strp 1 strip by Misc.(Non-Drug; Combo Route) route 3 (three) times daily. True Metrix test strips 102 strip 11    calcium carbonate (OS-TANGELA) 500 mg calcium (1,250 mg) tablet Take 1 tablet by mouth once daily.      fluticasone-vilanterol (BREO ELLIPTA) 200-25 mcg/dose DsDv diskus inhaler Inhale 1 puff into the lungs once daily. Controller 180 each 3    lancets 33 gauge Misc 1 lancet by Misc.(Non-Drug; Combo  Route) route 3 (three) times daily. 100 each 11    LANTUS SOLOSTAR 100 unit/mL (3 mL) InPn pen INJECT 10 UNITS SUBCUTANEOUSLY ONCE DAILY 15 mL 0    levothyroxine (SYNTHROID) 25 MCG tablet       loratadine (CLARITIN) 10 mg tablet Take 1 tablet by mouth Once daily as needed.      metformin (GLUCOPHAGE) 1000 MG tablet Take 1 tablet (1,000 mg total) by mouth 2 (two) times daily. 180 tablet 1    nateglinide (STARLIX) 60 MG tablet Take 1 tablet (60 mg total) by mouth 3 (three) times daily before meals. (Patient taking differently: Take 60 mg by mouth 2 (two) times daily before meals. ) 270 tablet 3    omeprazole (PRILOSEC) 40 MG capsule       pantoprazole (PROTONIX) 40 MG tablet Take 1 tablet (40 mg total) by mouth once daily. 20 tablet 0    pravastatin (PRAVACHOL) 40 MG tablet Take 1 tablet (40 mg total) by mouth once daily. 90 tablet 3    tamsulosin (FLOMAX) 0.4 mg Cp24 TAKE ONE CAPSULE BY MOUTH ONCE DAILY 90 capsule 2    TENS unit and electrodes Cmpk 1 Device by Misc.(Non-Drug; Combo Route) route daily as needed. 1 each 0    tiotropium (SPIRIVA WITH HANDIHALER) 18 mcg inhalation capsule Inhale 1 capsule (18 mcg total) into the lungs once daily. 90 capsule 3    triamcinolone acetonide 0.1% (KENALOG) 0.1 % paste Apply to mouth ulcer BID 5 g 0     No facility-administered encounter medications on file as of 2017.      No orders of the defined types were placed in this encounter.    Plan:       Requested Prescriptions      No prescriptions requested or ordered in this encounter     There are no diagnoses linked to this encounter.    [facility]  [address] * [place] [address]  Telephone: [phone]  Test date: 06/15/17 Start: 06/15/17 15:40:11 Good Murillo  Doctor: Jermaine End: 06/15/17 20:52:35 [id number]  Oximetry: Summary Report  Comments: Overnight study breathing room air.  Recording time: 05:12:24 Highest pulse: 111 Highest SpO2: 96%  Excluded samplin:23:36 Lowest pulse: 74  Lowest SpO2: 76%  Total valid samplin:48:48 Mean pulse: 88 Mean SpO2: 87.7%  1 S.D.: 5.5 1 S.D.: 5.0  Time with SpO2<90: 2:28:04, 51.3%  Time with SpO2<80: 0:48:12, 16.7%  Time with SpO2<70: 0:00:00, 0.0%  Time with SpO2<60: 0:00:00, 0.0%  Time with SpO2<88: 1:15:28, 26.1%  Time with SpO2 =>90: 2:20:44, 48.7%  Time with SpO2=>80 & <90: 1:39:52, 34.6%  Time with SpO2=>70 & <80: 0:48:12, 16.7%  Time with SpO2=>60 & <70: 0:00:00, 0.0%  The longest continuous time with saturation <=88 was 00:19:00, which started at  06/15/17 16:25:03.  A desaturation event was defined as a decrease of saturation by 4 or more.  No events were excluded due to artifact.  There were 8 desaturation events over 3 minutes duration.  There were 48 desaturation events of less than 3 minutes duration during which:  The mean high was 92.8%. The mean low was 87.2%.  The number of these events that were:  > 0 & <10 seconds: 2 > 0 seconds: 48  =>10 & <20 seconds: 8 =>10 seconds: 46  =>20 & <30 seconds: 4 =>20 seconds: 38  =>30 & <40 seconds: 4 =>30 seconds: 34  =>40 & <50 seconds: 5 =>40 seconds: 30  =>50 & <60 seconds: 2 =>50 seconds: 25  =>60 seconds: 23 =>60 seconds: 23  The mean length of desaturation events that were >=10 sec & <=3 mins was: 64.3 sec.  Desaturation event index (events >=10 sec per sampled hour): 9.6  Desaturation event index (events >= 0 sec per sampled hour): 10.0  © 4309-0349 [employer] Oximetry version Standard [id number].  Oximeter: [employer] 920M Plus memory, 4 second resolution.  [facility]  [address] * [place] [address]  Telephone: [phone]  Test date: 06/15/17 Start: 06/15/17 15:40:11 Good Murillo  Doctor: Jermaine End: 06/15/17 20:52:35 [id number]  Oximetry: % Times Graphic Report  Comments: Overnight study breathing room air.  SATURATION  100  95  90  85  80  75  70  65  60  55  50  45  40  0 1 2 3 4 5 7 10 15 20 25  PERCENT OF TOTAL  TIME  SATURATION  100  95  90  85  80  75  70  65  60  55  50  45  40  0 5 10 15 20 30 40 60 80 100  CUMULATIVE % TIME  © 9153-8178 [employer] Oximetry version Standard [id number].  Oximeter: [employer]ics 920M Plus memory, 4 second resolution.  [facility]  [address] * [place] [address]  Telephone: [phone]  Test date: 06/15/17 Start: 06/15/17 15:40:11 Good Murillo  Doctor: Jermaine End: 06/15/17 20:52:35 [id number]  Oximetry: % Times Text Report  Comments: Overnight study breathing room air.  SpO2:100 0.0 % time  SpO2: 99 0.0 % time SpO2: 89 16.9 % time SpO2: 79 3.6 % time  SpO2: 98 0.0 % time SpO2: 88 8.3 % time SpO2: 78 6.3 % time  SpO2: 97 0.0 % time SpO2: 87 3.3 % time SpO2: 77 6.0 % time  SpO2: 96 0.1 % time SpO2: 86 1.3 % time SpO2: 76 0.8 % time  SpO2: 95 1.4 % time SpO2: 85 1.1 % time SpO2: 75 % time  SpO2: 94 2.1 % time SpO2: 84 1.5 % time SpO2: 74 % time  SpO2: 93 3.3 % time SpO2: 83 0.7 % time SpO2: 73 % time  SpO2: 92 8.5 % time SpO2: 82 0.6 % time SpO2: 72 % time  SpO2: 91 13.2 % time SpO2: 81 0.3 % time SpO2: 71 % time  SpO2: 90 20.1 % time SpO2: 80 0.7 % time SpO2: 70 % time  Total 90's 48.7 % time Total 80's 34.6 % time Total 70's 16.7 % time  SpO2: 69 % time SpO2: 59 % time SpO2: 49 % time  SpO2: 68 % time SpO2: 58 % time SpO2: 48 % time  SpO2: 67 % time SpO2: 57 % time SpO2: 47 % time  SpO2: 66 % time SpO2: 56 % time SpO2: 46 % time  SpO2: 65 % time SpO2: 55 % time SpO2: 45 % time  SpO2: 64 % time SpO2: 54 % time SpO2: 44 % time  SpO2: 63 % time SpO2: 53 % time SpO2: 43 % time  SpO2: 62 % time SpO2: 52 % time SpO2: 42 % time  SpO2: 61 % time SpO2: 51 % time SpO2: 41 % time  SpO2: 60 % time SpO2: 50 % time SpO2: 40 % time  Total 60's 0.0 % time Total 50's 0.0 % time Total 40's 0.0 % time  SpO2 <40 0.0 % time  Pulse range > 199 0.0 % time  Pulse range 180 - 199 0.0 % time  Pulse range 160 - 179 0.0 % time  Pulse range 140 - 159 0.0 % time  Pulse range 120 - 139 0.0  % time  Pulse range 100 - 119 4.5 % time  Pulse range 90 - 99 27.8 % time  Pulse range 80 - 89 67.0 % time  Pulse range 70 - 79 0.7 % time  Pulse range 60 - 69 0.0 % time  Pulse range 50 - 59 0.0 % time  Pulse range < 50 0.0 % time  © 5072-6991 [employer] Oximetry version Standard [id number].  Oximeter: Respironics 920M Plus memory, 4 second resolution.  [facility]  [address] * [place] [address]  Telephone: [phone]  Test date: 06/15/17 Start: 06/15/17 15:40:11 Good Murillo: Jermaine End: 06/15/17 20:52:35 [id number]  Oximetry: Desaturation Report  Comments: Overnight study breathing room air.  The following lists the 40 desaturation events having the lowest drops. The event  durations had to be within 3 minutes to qualify, and the event onset was defined as  a decrease in SpO2 by 4 or more.  Saturation: Pulse Range:  Start date Start time End time Duration Onset Low Low High  1 06/15/17 15:42:07 15:44:11 00:02:04 95 84 92 - 106  2 06/15/17 15:45:31 15:46:51 00:01:20 94 82 94 - 98  3 06/15/17 17:03:03 17:03:39 00:00:36 93 87 83 - 92  4 06/15/17 17:03:59 17:04:51 00:00:52 94 86 84 - 91  5 06/15/17 17:26:55 17:27:19 00:00:24 86 81 85 - 86  6 06/15/17 17:29:51 17:31:23 00:01:32 85 79 85 - 93  7 06/15/17 17:53:39 17:54:23 00:00:44 92 86 84 - 88  8 06/15/17 18:10:07 18:11:35 00:01:28 96 88 84 - 95  9 06/15/17 18:14:15 18:14:39 00:00:24 94 87 100 - 107  10 06/15/17 18:15:55 18:15:59 00:00:04 92 87 98 - 98  11 06/15/17 18:16:19 18:18:39 00:02:20 92 87 97 - 110  12 06/15/17 18:18:55 18:20:27 00:01:32 93 86 105 - 109  13 06/15/17 18:24:35 18:24:51 00:00:16 93 89 94 - 97  14 06/15/17 18:25:47 18:26:19 00:00:32 93 89 97 - 100  15 06/15/17 18:26:43 18:28:35 00:01:52 93 89 95 - 110  16 06/15/17 18:29:43 18:31:19 00:01:36 94 89 85 - 92  17 06/15/17 18:34:07 18:35:03 00:00:56 94 89 87 - 93  18 06/15/17 18:35:39 18:35:51 00:00:12 94 88 88 - 92  19 06/15/17 18:37:11 18:38:27 00:01:16 93 89  85 - 90  20 06/15/17 18:39:23 18:40:27 00:01:04 95 89 86 - 93  21 06/15/17 18:43:11 18:44:51 00:01:40 95 89 81 - 88  22 06/15/17 18:47:11 18:48:31 00:01:20 95 88 81 - 89  23 06/15/17 18:48:51 18:49:07 00:00:16 93 87 82 - 85  24 06/15/17 18:49:51 18:51:11 00:01:20 91 87 81 - 88  25 06/15/17 18:54:35 18:54:47 00:00:12 93 89 79 - 84  26 06/15/17 18:55:07 18:55:47 00:00:40 94 88 81 - 86  27 06/15/17 19:23:31 19:23:43 00:00:12 93 87 84 - 88  28 06/15/17 19:24:19 19:25:27 00:01:08 93 89 80 - 86  29 06/15/17 19:55:31 19:57:43 00:02:12 92 88 82 - 87  30 06/15/17 19:58:19 20:00:51 00:02:32 92 88 80 - 89  31 06/15/17 20:01:59 20:02:59 00:01:00 93 87 80 - 89  32 06/15/17 20:03:27 20:04:59 00:01:32 92 87 80 - 88  33 06/15/17 20:13:39 20:15:31 00:01:52 92 85 81 - 86  34 06/15/17 20:15:47 20:16:15 00:00:28 89 85 80 - 85  35 06/15/17 20:16:31 20:16:43 00:00:12 89 85 79 - 83  36 06/15/17 20:19:07 20:20:27 00:01:20 92 84 90 - 103  37 06/15/17 20:23:31 20:24:59 00:01:28 91 87 82 - 89  38 06/15/17 20:25:07 20:25:11 00:00:04 91 87 88 - 89  39 06/15/17 20:30:11 20:32:35 00:02:24 95 87 92 - 107  40 06/15/17 20:47:11 20:47:51 00:00:40 94 86 91 - 99  © 0508-5306 [employer] Oximetry version Standard [id number].  Oximeter: Respironics 920M Plus memory, 4 second resolution.  [facility] [address] * [place] [address]  Telephone: [phone]  Test date: 06/15/17 Start: 06/15/17 15:40:11 Good Murillo  Doctor: Jermaine End: 06/15/17 20:52:35 [id number]  Oximetry: 8 hours per page  Comments: Overnight study breathing room air.  HOUR 06/15/17 HOUR 06/15/17  16:00 20:00  16:30 20:30  17:00 21:00  17:30 21:30  18:00 22:00  18:30 22:30  19:00 23:00  19:30 23:30  60 80  PULSE  100 120 60 80  PULSE  70 80 100 120  SpO2  90 100 70 80  SpO2  90 100  © 8398-7879 [employer] Oximetry version Standard [id number].  Oximeter: Respironics 920M Plus memory, 4 second resolution.  [facility] [address] * [place] [address]  Telephone: [phone]  Test date: 06/15/17 Start: 06/15/17 15:40:11 Good Murillo  Doctor: Jermaine End: 06/15/17 20:52:35 [id number]  Oximetry: 2 hours lowest average SpO2  Comments: Overnight study breathing room air.  HOUR 06/15/17 HOUR 06/15/17  15:45 16:45  16:00 17:00  16:15 17:15  16:30 17:30  60 80  PULSE  100 120 60 80  PULSE  70 80 100 120  SpO2  90 100 70 80  SpO2  90 100  © 5184-3982 [employer] Oximetry version Standard BO6173.  Oximeter: [employer] 920M Plus memory, 4 second resolution.     Overnight Oxygen Saturation Study:     INTERPRETATION:  Conditions of Test: Room air stable outpatient.  Interpretation of results of overnight oxygen saturation study. This was a technically  adequate study. Overnight oxygen saturation study is abnormal with O2 saturation less than 88%.   Time with SpO2<88: 1:15:28, 26.1% of the study period. Lowest oxygen saturation recorded was 76% .  Clinical correlation suggested.  Agustin Dean MD          No Follow-up on file.    MEDICAL DECISION MAKING: Moderate to high complexity.  Overall, the multiple problems listed are of moderate to high severity that may impact quality of life and activities of daily living. Side effects of medications, treatment plan as well as options and alternatives reviewed and discussed with patient. There was counseling of patient concerning these issues.    Total time spent in face to face counseling and coordination of care - 40  minutes over 50% of time was used in discussion of prognosis, risks, benefits of treatment, instructions and compliance with regimen . Discussion with other physicians or health care providers (DME, NP, pharmacy, respiratory therapy) occurred.

## 2017-07-13 NOTE — TELEPHONE ENCOUNTER
----- Message from Alethea Muro sent at 7/12/2017 11:03 AM CDT -----  Contact: Antonio/daughter  Please call daughter @[phone], pt medication Flomax, states it causing incontinent, daughter is concerned and have questions

## 2017-07-13 NOTE — TELEPHONE ENCOUNTER
Patient's daughter states patient is on Flomax but is experiencing episodes of incontinence. Patient is unhappy that he is having to wear diapers and would like to know if he should discontinue the Flomax. 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 Please advise.

## 2017-07-14 ENCOUNTER — LAB VISIT (OUTPATIENT)
Dept: LAB | Facility: HOSPITAL | Age: 82
End: 2017-07-14
Attending: FAMILY MEDICINE
Payer: MEDICARE

## 2017-07-14 DIAGNOSIS — E53.8 VITAMIN B12 DEFICIENCY: ICD-10-CM

## 2017-07-14 LAB — VIT B12 SERPL-MCNC: 322 PG/ML

## 2017-07-14 PROCEDURE — 82607 VITAMIN B-12: CPT

## 2017-07-14 PROCEDURE — 36415 COLL VENOUS BLD VENIPUNCTURE: CPT | Mod: PO

## 2017-07-15 DIAGNOSIS — E53.8 VITAMIN B12 DEFICIENCY: Primary | ICD-10-CM

## 2017-07-15 RX ORDER — CYANOCOBALAMIN 1000 UG/ML
1000 INJECTION, SOLUTION INTRAMUSCULAR; SUBCUTANEOUS ONCE
Status: DISCONTINUED | OUTPATIENT
Start: 2017-07-17 | End: 2017-07-27

## 2017-07-17 DIAGNOSIS — E53.8 VITAMIN B12 DEFICIENCY: Primary | ICD-10-CM

## 2017-07-17 RX ORDER — CYANOCOBALAMIN 1000 UG/ML
1000 INJECTION, SOLUTION INTRAMUSCULAR; SUBCUTANEOUS ONCE
Status: DISCONTINUED | OUTPATIENT
Start: 2017-07-20 | End: 2017-07-27

## 2017-07-18 ENCOUNTER — PATIENT MESSAGE (OUTPATIENT)
Dept: UROLOGY | Facility: CLINIC | Age: 82
End: 2017-07-18

## 2017-07-18 NOTE — TELEPHONE ENCOUNTER
---Message from Dr. Montes De Oca---  Probably incontinent because he is too full , not doing CIC.  This was not my advice.  Prob needs a visit/PVR.  It isn't the flomax.....     Notified patient's daughter of Dr. Montes De Oca's recommendations. Daughter will speak with patient and call back.

## 2017-07-26 ENCOUNTER — TELEPHONE (OUTPATIENT)
Dept: INTERNAL MEDICINE | Facility: CLINIC | Age: 82
End: 2017-07-26

## 2017-07-26 ENCOUNTER — PATIENT MESSAGE (OUTPATIENT)
Dept: UROLOGY | Facility: CLINIC | Age: 82
End: 2017-07-26

## 2017-07-26 DIAGNOSIS — E53.8 VITAMIN B12 DEFICIENCY: Primary | ICD-10-CM

## 2017-07-26 RX ORDER — CYANOCOBALAMIN 1000 UG/ML
1000 INJECTION, SOLUTION INTRAMUSCULAR; SUBCUTANEOUS ONCE
Status: COMPLETED | OUTPATIENT
Start: 2017-07-26 | End: 2017-07-27

## 2017-07-26 NOTE — TELEPHONE ENCOUNTER
----- Message from Elsy Felton sent at 7/26/2017  2:21 PM CDT -----  Contact: pt   Call pt regarding wanting to come in today for a B-12 injection.    ..[phone] (home)

## 2017-07-27 ENCOUNTER — CLINICAL SUPPORT (OUTPATIENT)
Dept: INTERNAL MEDICINE | Facility: CLINIC | Age: 82
End: 2017-07-27
Payer: MEDICARE

## 2017-07-27 ENCOUNTER — PATIENT MESSAGE (OUTPATIENT)
Dept: PULMONOLOGY | Facility: CLINIC | Age: 82
End: 2017-07-27

## 2017-07-27 PROCEDURE — 96372 THER/PROPH/DIAG INJ SC/IM: CPT | Mod: S$GLB,,, | Performed by: FAMILY MEDICINE

## 2017-07-27 PROCEDURE — 99999 PR PBB SHADOW E&M-EST. PATIENT-LVL II: CPT | Mod: PBBFAC,,,

## 2017-07-27 RX ADMIN — CYANOCOBALAMIN 1000 MCG: 1000 INJECTION, SOLUTION INTRAMUSCULAR; SUBCUTANEOUS at 10:07

## 2017-07-27 NOTE — PROGRESS NOTES
Patient presents to clinic for cyanocobalamin injection IM in the right Deltoid. Patient informed that after injection he would remain in clinic 15 minutes. Patient tolerated well, voiced no complaints

## 2017-08-03 DIAGNOSIS — G47.34 NOCTURNAL HYPOXEMIA: Primary | ICD-10-CM

## 2017-08-03 NOTE — PROGRESS NOTES
Orders for overnight O2 sat placed    Did not get the overnight O2 sat and office note and approval for delivery from patient  to [employer] [facility] within the 30 day window  . Needs a new office visit and overnight O2 sat reading

## 2017-08-03 NOTE — Clinical Note
Did not get the overnight O2 sat and office note and approval for delivery from patient  to [facility] [employer] [employer] company within the 30 day window. Need to repeat overnigth o2 sat and office visit

## 2017-08-04 ENCOUNTER — TELEPHONE (OUTPATIENT)
Dept: PULMONOLOGY | Facility: CLINIC | Age: 82
End: 2017-08-04

## 2017-08-04 ENCOUNTER — OFFICE VISIT (OUTPATIENT)
Dept: URGENT CARE | Facility: CLINIC | Age: 82
End: 2017-08-04
Payer: MEDICARE

## 2017-08-04 ENCOUNTER — LAB VISIT (OUTPATIENT)
Dept: LAB | Facility: HOSPITAL | Age: 82
End: 2017-08-04
Attending: PHYSICIAN ASSISTANT
Payer: MEDICARE

## 2017-08-04 VITALS
HEART RATE: 97 BPM | TEMPERATURE: 99 F | WEIGHT: 131.38 LBS | HEIGHT: 68 IN | BODY MASS INDEX: 19.91 KG/M2 | SYSTOLIC BLOOD PRESSURE: 120 MMHG | DIASTOLIC BLOOD PRESSURE: 86 MMHG | OXYGEN SATURATION: 95 %

## 2017-08-04 DIAGNOSIS — R53.83 FATIGUE, UNSPECIFIED TYPE: Primary | ICD-10-CM

## 2017-08-04 DIAGNOSIS — R82.90 ABNORMAL URINE ODOR: ICD-10-CM

## 2017-08-04 DIAGNOSIS — D51.9 VITAMIN B12 DEFICIENCY ANEMIA: ICD-10-CM

## 2017-08-04 DIAGNOSIS — D64.9 ANEMIA, UNSPECIFIED TYPE: ICD-10-CM

## 2017-08-04 DIAGNOSIS — R53.83 FATIGUE, UNSPECIFIED TYPE: ICD-10-CM

## 2017-08-04 DIAGNOSIS — N39.0 URINARY TRACT INFECTION WITHOUT HEMATURIA, SITE UNSPECIFIED: ICD-10-CM

## 2017-08-04 DIAGNOSIS — G62.9 NEUROPATHY: ICD-10-CM

## 2017-08-04 DIAGNOSIS — D64.9 NORMOCYTIC ANEMIA: ICD-10-CM

## 2017-08-04 LAB
ANION GAP SERPL CALC-SCNC: 12 MMOL/L
BASOPHILS # BLD AUTO: 0.02 K/UL
BASOPHILS NFR BLD: 0.2 %
BUN SERPL-MCNC: 24 MG/DL
CALCIUM SERPL-MCNC: 10.1 MG/DL
CHLORIDE SERPL-SCNC: 100 MMOL/L
CO2 SERPL-SCNC: 28 MMOL/L
CREAT SERPL-MCNC: 1.1 MG/DL
DIFFERENTIAL METHOD: ABNORMAL
EOSINOPHIL # BLD AUTO: 0.2 K/UL
EOSINOPHIL NFR BLD: 2 %
ERYTHROCYTE [DISTWIDTH] IN BLOOD BY AUTOMATED COUNT: 14.1 %
EST. GFR  (AFRICAN AMERICAN): >60 ML/MIN/1.73 M^2
EST. GFR  (NON AFRICAN AMERICAN): 59 ML/MIN/1.73 M^2
FERRITIN SERPL-MCNC: 28 NG/ML
FOLATE SERPL-MCNC: 15.2 NG/ML
GLUCOSE SERPL-MCNC: 107 MG/DL
HCT VFR BLD AUTO: 30.4 %
HGB BLD-MCNC: 9.8 G/DL
IRON SERPL-MCNC: 38 UG/DL
LYMPHOCYTES # BLD AUTO: 2.3 K/UL
LYMPHOCYTES NFR BLD: 25.1 %
MCH RBC QN AUTO: 29 PG
MCHC RBC AUTO-ENTMCNC: 32.2 G/DL
MCV RBC AUTO: 90 FL
MONOCYTES # BLD AUTO: 1.2 K/UL
MONOCYTES NFR BLD: 13.3 %
NEUTROPHILS # BLD AUTO: 5.5 K/UL
NEUTROPHILS NFR BLD: 59.4 %
PLATELET # BLD AUTO: 429 K/UL
PMV BLD AUTO: 9.8 FL
POTASSIUM SERPL-SCNC: 4.8 MMOL/L
RBC # BLD AUTO: 3.38 M/UL
SATURATED IRON: 9 %
SODIUM SERPL-SCNC: 140 MMOL/L
TOTAL IRON BINDING CAPACITY: 423 UG/DL
TRANSFERRIN SERPL-MCNC: 286 MG/DL
WBC # BLD AUTO: 9.23 K/UL

## 2017-08-04 PROCEDURE — 83540 ASSAY OF IRON: CPT

## 2017-08-04 PROCEDURE — 99499 UNLISTED E&M SERVICE: CPT | Mod: S$GLB,,, | Performed by: PHYSICIAN ASSISTANT

## 2017-08-04 PROCEDURE — 82728 ASSAY OF FERRITIN: CPT

## 2017-08-04 PROCEDURE — 1126F AMNT PAIN NOTED NONE PRSNT: CPT | Mod: S$GLB,,, | Performed by: PHYSICIAN ASSISTANT

## 2017-08-04 PROCEDURE — 99999 PR PBB SHADOW E&M-EST. PATIENT-LVL IV: CPT | Mod: PBBFAC,,, | Performed by: PHYSICIAN ASSISTANT

## 2017-08-04 PROCEDURE — 1159F MED LIST DOCD IN RCRD: CPT | Mod: S$GLB,,, | Performed by: PHYSICIAN ASSISTANT

## 2017-08-04 PROCEDURE — 85025 COMPLETE CBC W/AUTO DIFF WBC: CPT | Mod: PO

## 2017-08-04 PROCEDURE — 82746 ASSAY OF FOLIC ACID SERUM: CPT

## 2017-08-04 PROCEDURE — 80048 BASIC METABOLIC PNL TOTAL CA: CPT | Mod: PO

## 2017-08-04 PROCEDURE — 3008F BODY MASS INDEX DOCD: CPT | Mod: S$GLB,,, | Performed by: PHYSICIAN ASSISTANT

## 2017-08-04 PROCEDURE — 36415 COLL VENOUS BLD VENIPUNCTURE: CPT | Mod: PO

## 2017-08-04 PROCEDURE — 1157F ADVNC CARE PLAN IN RCRD: CPT | Mod: S$GLB,,, | Performed by: PHYSICIAN ASSISTANT

## 2017-08-04 PROCEDURE — 99214 OFFICE O/P EST MOD 30 MIN: CPT | Mod: S$GLB,,, | Performed by: PHYSICIAN ASSISTANT

## 2017-08-04 RX ORDER — CIPROFLOXACIN 250 MG/1
250 TABLET, FILM COATED ORAL 2 TIMES DAILY
Qty: 14 TABLET | Refills: 0 | Status: SHIPPED | OUTPATIENT
Start: 2017-08-04 | End: 2017-08-11

## 2017-08-04 NOTE — TELEPHONE ENCOUNTER
Spoke with pt's daughter, states she was able to purchase pt oxygen through another company in [place] and had it shipped to pt so there is no need for a appt at this time.

## 2017-08-04 NOTE — TELEPHONE ENCOUNTER
----- Message from Agustin Dean MD sent at 8/3/2017  4:40 PM CDT -----   Call patient for follow up for oxygen prescription   schedule with NP  Needs overnight O2 sat and a office visit  Overnight O2 sat ordered  ----- Message -----  From: Kezia Jarvis  Sent: 2017   4:17 PM  To: Agustin Dean MD    Mr. Murillo's daughter has been trying to decide what kind of equipment that she wants to get for her father and called today for a delivery.  His testing  on 07/15/2017, which was 2 days after I received the prescription.  I am going to need updated testing for this patient.  Thank you, Kezia

## 2017-08-04 NOTE — TELEPHONE ENCOUNTER
----- Message from Agustin Dean MD sent at 8/3/2017  4:47 PM CDT -----  Did not get the overnight O2 sat and office note and approval for delivery from patient  to [facility] within the 30 day window. Need to repeat overnigth o2 sat and office visit

## 2017-08-04 NOTE — PROGRESS NOTES
"Subjective:       Patient ID: Good Murillo Jr. is a 89 y.o. male.    Chief Complaint: Sinus Problem    Patient presents to discuss multiple medical complaints including worsening fatigue, sinus congestion and phlegm, swelling in ankles, and worsening neuropathy.      Sinus Problem   This is a new problem. The current episode started 1 to 4 weeks ago (for about 1 month since living in his new assisted living). The problem is unchanged. There has been no fever. He is experiencing no pain. Associated symptoms include congestion (sensation of phlegm "glob" in his throat), sinus pressure and a sore throat. Pertinent negatives include no chills, coughing, ear pain, headaches, shortness of breath, sneezing or swollen glands. Past treatments include saline sprays. The treatment provided no relief.   Fatigue   This is a new (for the past several weeks has been getting worse) problem. The current episode started 1 to 4 weeks ago. The problem occurs constantly. The problem has been gradually worsening. Associated symptoms include congestion (sensation of phlegm "glob" in his throat), fatigue, numbness (increased numbness and pain of bilateral feet as well as now hands), a sore throat and urinary symptoms (recently started self cath about 2 weeks ago, has noticed some stronger odor to urine lately). Pertinent negatives include no abdominal pain, anorexia, arthralgias, chest pain, chills, coughing, fever, headaches, myalgias, nausea, rash, swollen glands or vomiting. Nothing aggravates the symptoms. He has tried nothing for the symptoms.   Edema   This is a new (new swelling to his legs) problem. The current episode started in the past 7 days. The problem occurs constantly. The problem has been unchanged (mild swelling most noticeable when he removes his socks, still able to wear usual shoes). Associated symptoms include congestion (sensation of phlegm "glob" in his throat), fatigue, numbness (increased numbness and pain of " "bilateral feet as well as now hands), a sore throat and urinary symptoms (recently started self cath about 2 weeks ago, has noticed some stronger odor to urine lately). Pertinent negatives include no abdominal pain, anorexia, arthralgias, chest pain, chills, coughing, fever, headaches, myalgias, nausea, rash, swollen glands or vomiting. Nothing aggravates the symptoms. He has tried nothing for the symptoms.     Review of Systems   Constitutional: Positive for fatigue. Negative for chills and fever.   HENT: Positive for congestion (sensation of phlegm "glob" in his throat), postnasal drip, sinus pressure and sore throat. Negative for ear discharge, ear pain, nosebleeds, rhinorrhea and sneezing.    Eyes: Negative for pain and discharge.   Respiratory: Negative for cough, chest tightness, shortness of breath and wheezing.    Cardiovascular: Positive for leg swelling. Negative for chest pain and palpitations.   Gastrointestinal: Negative for abdominal pain, anorexia, nausea and vomiting.   Genitourinary:        Has urinary retention, per Urologist started back to self cath about 2-3 weeks ago. No pain, hematuria, or suprapubic pain but has noticed a strong odor   Musculoskeletal: Negative for arthralgias and myalgias.   Skin: Negative for rash.   Neurological: Positive for numbness (increased numbness and pain of bilateral feet as well as now hands). Negative for headaches.       Objective:      /86   Pulse 97   Temp 98.7 °F (37.1 °C)   Ht 5' 8" (1.727 m)   Wt 59.6 kg (131 lb 6.3 oz)   SpO2 95%   BMI 19.98 kg/m²   Physical Exam   Constitutional: He is oriented to person, place, and time. He appears well-developed and well-nourished. No distress.   HENT:   Head: Normocephalic and atraumatic.   Right Ear: External ear normal.   Left Ear: External ear normal.   Nose: Nose normal.   Eyes: Conjunctivae and EOM are normal. Right eye exhibits no discharge. Left eye exhibits no discharge.   Neck: Normal range of " motion. Neck supple.   Cardiovascular: Normal rate, regular rhythm, normal heart sounds and intact distal pulses.  Exam reveals no gallop and no friction rub.    No murmur heard.  Pulmonary/Chest: Effort normal and breath sounds normal. No respiratory distress. He has no wheezes. He has no rales.   Abdominal: Soft. Bowel sounds are normal. He exhibits no distension. There is no tenderness. There is no rebound, no guarding and no CVA tenderness.   Musculoskeletal: He exhibits edema (trace edema to bilateral LE to mid shin).   Neurological: He is alert and oriented to person, place, and time.   Skin: Skin is warm and dry. Ecchymosis (few eccymotic areas to extremities) noted. No rash noted. He is not diaphoretic. No erythema.   Vitals reviewed.      Assessment:       1. Fatigue, unspecified type    2. Neuropathy    3. Anemia, unspecified type    4. Vitamin B12 deficiency anemia     5. Abnormal urine odor    6. Urinary tract infection without hematuria, site unspecified    7. Normocytic anemia        Plan:       Fatigue, unspecified type  -     CBC auto differential; Future; Expected date: 08/04/2017  -     Basic metabolic panel; Future; Expected date: 08/04/2017  -     Folate; Future; Expected date: 08/04/2017  -     Iron and TIBC; Future; Expected date: 08/04/2017  -     Ferritin; Future; Expected date: 08/04/2017  -     Urinalysis; Future; Expected date: 08/04/2017    Neuropathy  -     Folate; Future; Expected date: 08/04/2017    Anemia, unspecified type  -     Folate; Future; Expected date: 08/04/2017  -     Iron and TIBC; Future; Expected date: 08/04/2017  -     Ferritin; Future; Expected date: 08/04/2017    Vitamin B12 deficiency anemia   -     Folate; Future; Expected date: 08/04/2017    Abnormal urine odor  -     Urine culture; Future; Expected date: 08/04/2017    Urinary tract infection without hematuria, site unspecified  -     ciprofloxacin HCl (CIPRO) 250 MG tablet; Take 1 tablet (250 mg total) by mouth 2  (two) times daily.  Dispense: 14 tablet; Refill: 0    Normocytic anemia    Multiple complaints at visit today. Most concerning is evidence of UTI likely as a result of recent restart self cath (3x per day). +UA will send for culture and empirically start cipro dosed 250 mg due to CrCl of 37. Also noted to have acute worsening of his chronic anemia. S/p vitamin B12 injection a couple of weeks ago but will also check folate/iron studies especially given evidence of occult blood in stool on prior studies. No gross blood loss in stools per patient.    Advised flonase daily for sinus symptoms. No suggestion for bacterial infection at present.    Advised patient to follow up with PCP regarding worsening neuropathy (perhaps too soon to tell following B12 replacement?) as well as worsening anemia. Follow up scheduled for next week due to Hgb now 9.8.      Heather Trant PA-C [facility]

## 2017-08-04 NOTE — PATIENT INSTRUCTIONS
Bladder Infection, Male (Adult)    You have a bladder infection.  Urine is normally free of bacteria. But bacteria can get into the urinary tract from the skin around the rectum or it may travel in the blood from elsewhere in the body.  This is called a urinary tract infection (UTI). An infection can occur anywhere in the urinary tract. It could be in a kidney (pyelonephritis)or in the bladder (cystitis) and urethra (urethritis). The urethra is the tube that drains the urine from the bladder through the tip of the penis.  The most common place for a UTI is in the bladder. This is called a bladder infection. Most bladder infections are easily treated. They are not serious unless the infection spreads up to the kidney.  The terms bladder infection, UTI, and cystitis are often used to describe the same thing, but they arent always the same. Cystitis is an inflammation of the bladder. The most common cause of cystitis is an infection.   Keep in mind:  · Infections in the urine are called UTIs.  · Cystitis is usually caused by a UTI.  · Not all UTIs and cases of cystitis are bladder infections.  · Bladder infections are the most common type of cystitis.  Symptoms of a bladder infection  The infection causes inflammation in the urethra and bladder. This inflammation causes many of the symptoms. The most common symptoms of a bladder infection are:  · Pain or burning when urinating  · Having to go more often than usual  · Feeling like you need to go right away  · Only a small amount comes out  · Blood in urine  · Discomfort in your belly (abdomen), usually in the lower abdomen, above the pubic bone  · Cloudy, strong, or bad smelling urine  · Unable to urinate (retention)  · Urinary incontinence  · Fever  · Loss of appetite  Older adults may also feel confused.  Causes of a bladder infection  Bladder infections are not contagious. You can't get one from someone else, from a toilet seat, or from sharing a bath.  The most  common cause of bladder infections is bacteria from the bowels. The bacteria get onto the skin around the opening of the urethra. From there they can get into the urine and travel up to the bladder. This causes inflammation and an infection. This usually happens because of:  · An enlarged prostate  · Poor cleaning of the genitals  · Procedures that put a tube in your bladder, like a Moraes catheter  · Bowel incontinence  · Older age  · Not emptying your bladder (The urine stays there, giving the bacteria a chance to grow.)  · Dehydration (This allows urine to stay in the bladder longer.)  · Constipation (This can cause the bowels to push on the bladder or urethra and keep the bladder from emptying.)  Treatment  Bladder infections are treated with antibiotics. They usually clear up quickly without complications. Treatment helps prevent a more serious kidney infection.  Medicines  Medicines can help in the treatment of a bladder infection:  · You may have been given phenazopyridine to ease burning when you urinate. It will cause your urine to be bright orange. It can stain clothing.  · You may have been prescribed antibiotics. Take this medicine until you have finished it, even if you feel better. Taking all of the medicine will make sure the infection has cleared.  You can use acetaminophen or ibuprofen for pain, fever, or discomfort, unless another medicine was prescribed. You can also alternate them, or use both together. They work differently and are a different class of medicines, so taking them together is not an overdose. If you have chronic liver or kidney disease, talk with your healthcare provider before using these medicines. Also talk with your provider if youve had a stomach ulcer or GI bleeding or are taking blood thinner medicines.  Home care  Here are some guidelines to help you care for yourself at home:  · Drink plenty of fluids, unless your healthcare provider told you not to. Fluids will prevent  dehydration and flush out your bladder.  · Use good personal hygiene. Wipe from front to back after using the toilet, and clean your penis regularly. If you arent circumcised, retract the foreskin when cleaning.  · Urinate more frequently, and dont try to hold it in for long periods of time, if possible.  · Wear loose-fitting clothes and cotton underwear. Avoid tight-fitting pants. This helps keep you clean and dry.  · Change your diet to prevent constipation. This means eating more fresh foods and more fiber, and less junk and fatty foods.  · Avoid sex until your symptoms are gone.  · Avoid caffeine, alcohol, and spicy foods. These can irritate the bladder.  Follow-up care  Follow up with your healthcare provider, or as advised if all symptoms have not cleared up within 5 days. It is important to keep your follow-up appointment. You can talk with your provider to see if you need more tests of the urinary tract. This is especially important if you have infections that keep coming back.  If a culture was done, you will be told if your treatment needs to be changed. If directed, you can call to find out the results.  If X-rays were taken, you will be told of any findings that may affect your care.  Call 911  Call 911 if any of these occur:  · Trouble breathing  · Difficulty waking up  · Feeling confused  · Fainting or loss of consciousness  · Rapid heart rate  When to seek medical advice  Call your healthcare provider right away if any of these occur:  · Fever of 100.4ºF (38ºC) or higher, or as directed by your healthcare provider  · Your symptoms dont improve after 2 days of treatment  · Back or abdominal pain that gets worse  · Repeated vomiting, or you arent able to keep medicine down  · Weakness or dizziness  Date Last Reviewed: 10/1/2016  © 7143-7609 [employer]. [address] [place] [address], [place] [address]. All rights reserved. This information is not intended as a substitute for professional  medical care. Always follow your healthcare professional's instructions.      Follow up with your PCP in 3-4 days to discuss further evaluation of your anemia.

## 2017-08-05 ENCOUNTER — TELEPHONE (OUTPATIENT)
Dept: URGENT CARE | Facility: CLINIC | Age: 82
End: 2017-08-05

## 2017-08-05 NOTE — TELEPHONE ENCOUNTER
Attempted to contact patient regarding test results, stated he was eating lunch and could I please call back in 30 minutes. Informed would try to reach him later this afternoon.

## 2017-08-05 NOTE — TELEPHONE ENCOUNTER
Spoke with patient regarding his lab results. Folate normal, ferritin is within normal but on the low side of the range so encouraged patient to discuss with Dr. Shelton as well as the worsening anemia. Continue Cipro and we will follow up on urine culture result. He expressed understanding and has no further questions at this time.

## 2017-08-07 LAB — BACTERIA UR CULT: NORMAL

## 2017-08-09 ENCOUNTER — OFFICE VISIT (OUTPATIENT)
Dept: INTERNAL MEDICINE | Facility: CLINIC | Age: 82
End: 2017-08-09
Payer: MEDICARE

## 2017-08-09 VITALS
WEIGHT: 133.81 LBS | HEART RATE: 82 BPM | HEIGHT: 68 IN | TEMPERATURE: 97 F | BODY MASS INDEX: 20.28 KG/M2 | DIASTOLIC BLOOD PRESSURE: 72 MMHG | SYSTOLIC BLOOD PRESSURE: 158 MMHG

## 2017-08-09 DIAGNOSIS — Z79.4 TYPE 2 DIABETES MELLITUS WITH DIABETIC POLYNEUROPATHY, WITH LONG-TERM CURRENT USE OF INSULIN: ICD-10-CM

## 2017-08-09 DIAGNOSIS — E53.8 VITAMIN B12 DEFICIENCY: ICD-10-CM

## 2017-08-09 DIAGNOSIS — E11.42 TYPE 2 DIABETES MELLITUS WITH DIABETIC POLYNEUROPATHY, WITH LONG-TERM CURRENT USE OF INSULIN: ICD-10-CM

## 2017-08-09 DIAGNOSIS — D50.9 IRON DEFICIENCY ANEMIA, UNSPECIFIED IRON DEFICIENCY ANEMIA TYPE: Primary | ICD-10-CM

## 2017-08-09 PROCEDURE — 1126F AMNT PAIN NOTED NONE PRSNT: CPT | Mod: S$GLB,,, | Performed by: FAMILY MEDICINE

## 2017-08-09 PROCEDURE — 1159F MED LIST DOCD IN RCRD: CPT | Mod: S$GLB,,, | Performed by: FAMILY MEDICINE

## 2017-08-09 PROCEDURE — 3008F BODY MASS INDEX DOCD: CPT | Mod: S$GLB,,, | Performed by: FAMILY MEDICINE

## 2017-08-09 PROCEDURE — 99499 UNLISTED E&M SERVICE: CPT | Mod: S$GLB,,, | Performed by: FAMILY MEDICINE

## 2017-08-09 PROCEDURE — 99999 PR PBB SHADOW E&M-EST. PATIENT-LVL III: CPT | Mod: PBBFAC,,, | Performed by: FAMILY MEDICINE

## 2017-08-09 PROCEDURE — 1157F ADVNC CARE PLAN IN RCRD: CPT | Mod: S$GLB,,, | Performed by: FAMILY MEDICINE

## 2017-08-09 PROCEDURE — 99214 OFFICE O/P EST MOD 30 MIN: CPT | Mod: S$GLB,,, | Performed by: FAMILY MEDICINE

## 2017-08-09 RX ORDER — FERROUS SULFATE 325(65) MG
325 TABLET ORAL DAILY
Qty: 90 TABLET | Refills: 1 | COMMUNITY
Start: 2017-08-09 | End: 2019-05-16 | Stop reason: SDUPTHER

## 2017-08-09 NOTE — PROGRESS NOTES
Subjective:       Patient ID: Good Murillo Jr. is a 89 y.o. male.    Chief Complaint: Follow-up    F/U:       Pt is feeling better since last visit. He was found to have B12 lower normal as well as iron issues. Pt got a 1000 mcg b12. Pt also has out of controlled DM with his last A1C at 8.9. Pt is on Lantus and Starlix. Pt is complaining of neuropathy      Review of Systems   Constitutional: Negative.    Respiratory: Negative.    Cardiovascular: Negative.    Musculoskeletal: Negative.    Psychiatric/Behavioral: Negative.        Objective:      Physical Exam   Constitutional: He is oriented to person, place, and time. He appears well-developed and well-nourished.   Cardiovascular: Normal rate and regular rhythm.    Pulmonary/Chest: Effort normal and breath sounds normal.   Abdominal: Soft. Bowel sounds are normal.   Neurological: He is alert and oriented to person, place, and time.       Assessment:       1. Iron deficiency anemia, unspecified iron deficiency anemia type    2. Type 2 diabetes mellitus with diabetic polyneuropathy, with long-term current use of insulin    3. Vitamin B12 deficiency        Plan:       Iron deficiency anemia, unspecified iron deficiency anemia type  Comments:  Will place pt on iron tabs and repeat labs in 3 months    Type 2 diabetes mellitus with diabetic polyneuropathy, with long-term current use of insulin  Comments:  Pt is seeing DM Education with last A1C at 8.2    Vitamin B12 deficiency  Comments:  Will do 1000mcg next month and have continue on B12 oral med    Other orders  -     ferrous sulfate 325 mg (65 mg iron) Tab tablet; Take 1 tablet (325 mg total) by mouth once daily.  Dispense: 90 tablet; Refill: 1

## 2017-08-10 ENCOUNTER — OFFICE VISIT (OUTPATIENT)
Dept: DIABETES | Facility: CLINIC | Age: 82
End: 2017-08-10
Payer: MEDICARE

## 2017-08-10 VITALS
SYSTOLIC BLOOD PRESSURE: 100 MMHG | BODY MASS INDEX: 19.98 KG/M2 | DIASTOLIC BLOOD PRESSURE: 64 MMHG | WEIGHT: 131.81 LBS | HEIGHT: 68 IN

## 2017-08-10 DIAGNOSIS — E11.42 TYPE 2 DIABETES MELLITUS WITH DIABETIC POLYNEUROPATHY, WITH LONG-TERM CURRENT USE OF INSULIN: Primary | ICD-10-CM

## 2017-08-10 DIAGNOSIS — Z79.4 TYPE 2 DIABETES MELLITUS WITH DIABETIC POLYNEUROPATHY, WITH LONG-TERM CURRENT USE OF INSULIN: Primary | ICD-10-CM

## 2017-08-10 LAB — GLUCOSE SERPL-MCNC: 290 MG/DL (ref 70–110)

## 2017-08-10 PROCEDURE — 99999 PR PBB SHADOW E&M-EST. PATIENT-LVL III: CPT | Mod: PBBFAC,,, | Performed by: PHYSICIAN ASSISTANT

## 2017-08-10 PROCEDURE — 1157F ADVNC CARE PLAN IN RCRD: CPT | Mod: S$GLB,,, | Performed by: PHYSICIAN ASSISTANT

## 2017-08-10 PROCEDURE — 82948 REAGENT STRIP/BLOOD GLUCOSE: CPT | Mod: S$GLB,,, | Performed by: PHYSICIAN ASSISTANT

## 2017-08-10 PROCEDURE — 1159F MED LIST DOCD IN RCRD: CPT | Mod: S$GLB,,, | Performed by: PHYSICIAN ASSISTANT

## 2017-08-10 PROCEDURE — 99499 UNLISTED E&M SERVICE: CPT | Mod: S$GLB,,, | Performed by: PHYSICIAN ASSISTANT

## 2017-08-10 PROCEDURE — 3008F BODY MASS INDEX DOCD: CPT | Mod: S$GLB,,, | Performed by: PHYSICIAN ASSISTANT

## 2017-08-10 PROCEDURE — 99214 OFFICE O/P EST MOD 30 MIN: CPT | Mod: S$GLB,,, | Performed by: PHYSICIAN ASSISTANT

## 2017-08-10 NOTE — PROGRESS NOTES
Subjective:      Patient ID: Good Murillo Jr. is a 89 y.o. male.    PCP: Brandt Shelton MD      Good Murillo is a pleasant 89 y.o. male presenting to follow up on diabetes mellitus. He has had diabetes for 20 or more years.  He is at high risk for hypoglycemia because of age, renal impairment, and chronic illness.  He is maintained on basal insulin with pre-meal Starlix which is His average blood sugar in the 190 range.  He has only been checking his fasting blood sugars, therefore I am sure his hemoglobin A1c will be elevated. His last visit in Diabetes Management was 3/15/2017 Since that time he has had moderate improvement in his glycemia. His blood sugar range fasting recently has been elevated but he has had UTI, and he has been monitoring 2-3 times per day as directed. His current concerns are glycemic control.    His glucometer down load reveals he has had 28 data points between 7/12/17 - 8/10/17 0 of which were after meals. His average blood glucose is 191 mg/dl and his average reading per day is 0.9.  He is within the target range (ITR) 29%; above target range (ATR) 71%; and below target range (BTR) 0%; his standard deviation is 29. His average is trending down over the past 30 days.    He denies any hospital admissions, emergency room visits, hypoglycemia, syncope, diaphoresis, chest pain, or dyspnea.    He has lost 2 pounds since last visit. His BMI is 20.05    His blood sugar in the clinic today was:   Lab Results   Component Value Date    POCGLU 290 (A) 08/10/2017     We discussed the American diabetes Association recommendations:  hemoglobin A1c below 7.0%; all diabetics should be on statins unless contraindicated; one aspirin daily unless contraindicated; fasting blood sugar between 80 and 130 mg/dL; postprandial blood sugar below 180 mg/dl; prevention of hypoglycemia, may adjust goals to higher levels if persistent; ACE or ARB therapy if not contraindicated; and maintain in an ideal body weight with  BMI below 25.    Good is compliant most of the time with DM medications.     Good is compliant most of the time with lifestyle modifications to include activity and meal planning.       STANDARDS OF CARE:  Eye doctor: Dr. Barrera, last exam 1/12/2017  Dental exam: Recommend regular exams; denies gums bleeding.  Podiatry doctor:     ACTIVITY LEVEL: He exercises rarely.  MEAL PLANNING: Number of meals per day - 3. Number of snacks per day - 2.  Per dietary recall, patient is not limiting carbohydrates, saturated fats and sodium.   BLOOD GLUCOSE TESTING: Self-monitoring with   SOCIAL HISTORY: . Lives with family. retired no tobacco use    The following results were reviewed with patient.    Lab Results   Component Value Date    WBC 9.23 08/04/2017    HGB 9.8 (L) 08/04/2017    HCT 30.4 (L) 08/04/2017     (H) 08/04/2017    CHOL 159 05/08/2017    TRIG 107 05/08/2017    HDL 40 05/08/2017    ALT 10 09/04/2013    AST 13 09/04/2013     08/04/2017    K 4.8 08/04/2017     08/04/2017    CREATININE 1.1 08/04/2017    ESTGFRAFRICA >60 08/04/2017    EGFRNONAA 59 (A) 08/04/2017    BUN 24 (H) 08/04/2017    CO2 28 08/04/2017    TSH 3.783 05/08/2017    PSA 0.36 04/20/2016    INR 1.0 08/27/2014     08/04/2017       Lab Results   Component Value Date    HGBA1C 8.2 (H) 05/08/2017    HGBA1C 8.0 (H) 03/15/2017    HGBA1C 8.0 (H) 11/04/2016     Lab Results   Component Value Date    FREET4 0.89 04/20/2016     Lab Results   Component Value Date    TSH 3.783 05/08/2017     Lab Results   Component Value Date    IRON 38 (L) 08/04/2017    TIBC 423 08/04/2017    FERRITIN 28 08/04/2017     Lab Results   Component Value Date    BZRYEGPS46 322 07/14/2017     Lab Results   Component Value Date    CALCIUM 10.1 08/04/2017     Review of patient's allergies indicates:   Allergen Reactions    Gabapentin      Didn't like starting at 300mg     Past Medical History:   Diagnosis Date    Abnormal EKG 8/22/2014    Allergy      Arthritis     back    Bilateral carotid artery disease 1/17/2017    Cholelithiasis     US retroperitoneal 12/2016    COPD (chronic obstructive pulmonary disease)     Diabetes with neurologic complications     Diverticulitis     Diverticulosis     hosp/divert bleed    Ex-smoker     Glaucoma     suspect    Heart murmur     History of pneumothorax     bilat    HTN (hypertension)     Hyperlipidemia     Hyperlipidemia 8/22/2014    Hypothyroid     Iron deficiency anemia     nathan    Osteoporosis, unspecified 12/16 zeenat 12/18    Polyneuropathy     Prostate CA     PVD (peripheral vascular disease) 8/22/2014    Type 2 diabetes with peripheral circulatory disorder, controlled     Type 2 diabetes, controlled, with neuropathy Diagnosed 1994    Urinary incontinence        Review of Systems   Constitutional: Negative.  Negative for activity change, appetite change, chills, diaphoresis, fatigue, fever and unexpected weight change.   HENT: Negative.  Negative for dental problem, facial swelling, hearing loss, nosebleeds, trouble swallowing and voice change.    Eyes: Negative.  Negative for photophobia, pain, discharge, redness, itching and visual disturbance.   Respiratory: Negative.  Negative for cough, choking, chest tightness, shortness of breath and wheezing.    Cardiovascular: Negative.  Negative for chest pain, palpitations and leg swelling.   Gastrointestinal: Negative.  Negative for abdominal distention, abdominal pain, blood in stool, constipation, diarrhea, nausea and vomiting.   Endocrine: Negative.  Negative for cold intolerance, heat intolerance, polydipsia, polyphagia and polyuria.   Genitourinary: Negative.  Negative for decreased urine volume, difficulty urinating, dysuria, frequency, scrotal swelling, testicular pain and urgency.   Musculoskeletal: Negative.  Negative for arthralgias, back pain, gait problem, joint swelling, myalgias, neck pain and neck stiffness.   Skin: Negative.  Negative  "for color change, pallor, rash and wound.   Allergic/Immunologic: Negative.  Negative for environmental allergies, food allergies and immunocompromised state.   Neurological: Negative.  Negative for dizziness, tremors, seizures, syncope, facial asymmetry, speech difficulty, weakness, light-headedness, numbness and headaches.   Hematological: Negative.  Negative for adenopathy. Does not bruise/bleed easily.   Psychiatric/Behavioral: Negative.  Negative for agitation, behavioral problems, confusion, decreased concentration, dysphoric mood, hallucinations, self-injury, sleep disturbance and suicidal ideas. The patient is not nervous/anxious and is not hyperactive.       Objective:     Vitals - 1 value per visit 8/4/2017 8/9/2017 8/10/2017   SYSTOLIC 120 158 100   DIASTOLIC 86 72 64   PULSE 97 82 -   TEMPERATURE 98.7 96.5 -   RESPIRATIONS - - -   SPO2 95 - -   Weight (lb) 131.39 133.82 131.84   Weight (kg) 59.6 60.7 59.8   HEIGHT 5' 8" 5' 8" 5' 8"   BODY MASS INDEX 19.98 20.35 20.05   VISIT REPORT - - -   Pain Score  0 0 -   Some recent data might be hidden     Physical Exam   Constitutional: He is oriented to person, place, and time. He appears well-developed and well-nourished. He is cooperative.  Non-toxic appearance. He does not have a sickly appearance. He does not appear ill. No distress. He is not intubated.   HENT:   Head: Normocephalic and atraumatic. Not macrocephalic and not microcephalic. Head is without raccoon's eyes, without Branltey's sign, without abrasion, without contusion, without laceration, without right periorbital erythema and without left periorbital erythema. Hair is normal.   Right Ear: External ear normal. No lacerations. No drainage, swelling or tenderness. No foreign bodies. No mastoid tenderness. Tympanic membrane is not injected, not scarred, not perforated, not erythematous, not retracted and not bulging. Tympanic membrane mobility is normal. No middle ear effusion. No hemotympanum. No " decreased hearing is noted.   Left Ear: External ear normal. No lacerations. No drainage, swelling or tenderness. No foreign bodies. No mastoid tenderness. Tympanic membrane is not injected, not scarred, not perforated, not erythematous, not retracted and not bulging. Tympanic membrane mobility is normal.  No middle ear effusion. No hemotympanum. No decreased hearing is noted.   Nose: Nose normal.   Mouth/Throat: Oropharynx is clear and moist.   Eyes: EOM and lids are normal. Pupils are equal, round, and reactive to light. Right eye exhibits no chemosis, no discharge, no exudate and no hordeolum. No foreign body present in the right eye. Left eye exhibits no chemosis, no discharge, no exudate and no hordeolum. No foreign body present in the left eye. Right conjunctiva is not injected. Right conjunctiva has no hemorrhage. Left conjunctiva is not injected. Left conjunctiva has no hemorrhage. No scleral icterus. Right eye exhibits normal extraocular motion and no nystagmus. Left eye exhibits normal extraocular motion and no nystagmus. Right pupil is round and reactive. Left pupil is round and reactive. Pupils are equal.   Neck: Normal range of motion, full passive range of motion without pain and phonation normal. Neck supple. Normal carotid pulses, no hepatojugular reflux and no JVD present. No tracheal tenderness, no spinous process tenderness and no muscular tenderness present. Carotid bruit is not present. No neck rigidity. No tracheal deviation, no edema, no erythema and normal range of motion present. No thyroid mass and no thyromegaly present.   Cardiovascular: Normal rate, regular rhythm, normal heart sounds and intact distal pulses.   No extrasystoles are present. PMI is not displaced.  Exam reveals no gallop, no friction rub and no decreased pulses.    No murmur heard.  Pulses:       Carotid pulses are 2+ on the right side, and 2+ on the left side.       Radial pulses are 2+ on the right side, and 2+ on the  left side.        Dorsalis pedis pulses are 2+ on the right side, and 2+ on the left side.        Posterior tibial pulses are 2+ on the right side, and 2+ on the left side.   Pulmonary/Chest: Effort normal and breath sounds normal. No accessory muscle usage or stridor. No apnea, no tachypnea and no bradypnea. He is not intubated. No respiratory distress. He has no decreased breath sounds. He has no wheezes. He has no rhonchi. He has no rales. He exhibits no tenderness.   Abdominal: Soft. Bowel sounds are normal. He exhibits no shifting dullness, no distension, no pulsatile liver, no fluid wave, no abdominal bruit, no ascites, no pulsatile midline mass and no mass. There is no hepatosplenomegaly, splenomegaly or hepatomegaly. There is no tenderness. There is no rigidity, no rebound, no guarding, no CVA tenderness and no tenderness at McBurney's point. No hernia. Hernia confirmed negative in the ventral area.   Musculoskeletal: Normal range of motion. He exhibits no edema or tenderness.        Right foot: There is normal range of motion and no deformity.        Left foot: There is normal range of motion and no deformity.   Feet:   Right Foot:   Protective Sensation: 6 sites tested. 6 sites sensed.   Skin Integrity: Negative for ulcer, blister, skin breakdown, erythema, warmth, callus or dry skin.   Left Foot:   Protective Sensation: 6 sites tested. 6 sites sensed.   Skin Integrity: Negative for ulcer, blister, skin breakdown, erythema, warmth, callus or dry skin.   Lymphadenopathy:        Head (right side): No submental, no submandibular, no tonsillar, no preauricular, no posterior auricular and no occipital adenopathy present.        Head (left side): No submental, no submandibular, no tonsillar, no preauricular, no posterior auricular and no occipital adenopathy present.     He has no cervical adenopathy.        Right cervical: No superficial cervical, no deep cervical and no posterior cervical adenopathy present.        Left cervical: No superficial cervical, no deep cervical and no posterior cervical adenopathy present.   Neurological: He is alert and oriented to person, place, and time. He has normal reflexes. He displays no atrophy and no tremor. No cranial nerve deficit or sensory deficit. He exhibits normal muscle tone. He displays no seizure activity. Coordination and gait normal.   Reflex Scores:       Bicep reflexes are 2+ on the right side and 2+ on the left side.       Brachioradialis reflexes are 2+ on the right side and 2+ on the left side.       Patellar reflexes are 2+ on the right side and 2+ on the left side.  Skin: Skin is warm and dry. No rash noted. No erythema. No pallor.   Psychiatric: He has a normal mood and affect. His mood appears not anxious. His affect is not angry, not blunt, not labile and not inappropriate. His speech is not rapid and/or pressured, not delayed, not tangential and not slurred. He is not agitated, not aggressive, not hyperactive, not slowed, not withdrawn, not actively hallucinating and not combative. Thought content is not paranoid and not delusional. Cognition and memory are not impaired. He does not express impulsivity or inappropriate judgment. He does not exhibit a depressed mood. He expresses no homicidal and no suicidal ideation. He expresses no suicidal plans and no homicidal plans. He is communicative. He exhibits normal recent memory and normal remote memory. He is attentive.     Assessment:     1. Type 2 diabetes mellitus with diabetic polyneuropathy, with long-term current use of insulin       Plan:   Good Murillo is seen today for   1. Type 2 diabetes mellitus with diabetic polyneuropathy, with long-term current use of insulin      We have discussed the etiology and treatment options associated with the diagnosis as well as alternatives. He has elected the following treatments.     Type 2 diabetes mellitus with diabetic polyneuropathy, with long-term current use of  insulin  -     POCT glucose  -     Hemoglobin A1c; Future; Expected date: 08/10/2017    1.) Patient was instructed to monitor blood glucose twice daily, fasting, and 2 hour post meal; if on Multiple Daily Injections (MDI) he will need to have pre-meal blood glucose as well. Reminded to bring BG meter or record to each visit for review.  2.) Reviewed pathophysiology of diabetes, complications related to the disease, importance of annual dilated eye exam and self daily foot examination.  3.) Continue medications as prescribed Lantus; Starlix; Metformin. [facility] MyChart or Phone review in 1 week with [facility] records for adjustment of medication.  4.) Advised patient to continue to follow up with Dietician/CDE as directed.  5.) Discussed activity, benefits, methods, and precautions. Recommended patient start/continue some form of exercise and increase as tolerated to 60 minutes per day to facilitate weight loss and aid in control of BGs. Also reminded patient of WHO recommendation of 10,000 steps daily as a goal.   6.) A1C, POCT-glucose  7.) Return to clinic in 3 months for follow up. Advised patient to call clinic with any questions or concerns.    A total of 30 minutes was spent in face to face time, of which 50 % was spent in counseling patient on disease process, complications, treatment, and side effects of medications.    The patient was explained the above plan and given opportunity to ask questions.  He understands, chooses and consents to this plan and accepts all the risks, which include but are not limited to the risks mentioned above.   He understands the alternative of having no testing, interventions or treatments at this time. He left content and without further questions.

## 2017-08-18 ENCOUNTER — OFFICE VISIT (OUTPATIENT)
Dept: CARDIOLOGY | Facility: CLINIC | Age: 82
End: 2017-08-18
Payer: MEDICARE

## 2017-08-18 ENCOUNTER — CLINICAL SUPPORT (OUTPATIENT)
Dept: CARDIOLOGY | Facility: CLINIC | Age: 82
End: 2017-08-18
Payer: MEDICARE

## 2017-08-18 VITALS
DIASTOLIC BLOOD PRESSURE: 70 MMHG | HEIGHT: 68 IN | WEIGHT: 130.06 LBS | SYSTOLIC BLOOD PRESSURE: 160 MMHG | BODY MASS INDEX: 19.71 KG/M2 | HEART RATE: 80 BPM

## 2017-08-18 DIAGNOSIS — E03.4 HYPOTHYROIDISM DUE TO ACQUIRED ATROPHY OF THYROID: Chronic | ICD-10-CM

## 2017-08-18 DIAGNOSIS — E11.59 HYPERTENSION ASSOCIATED WITH DIABETES: Chronic | ICD-10-CM

## 2017-08-18 DIAGNOSIS — I27.9 PULMONARY HEART DISEASE: ICD-10-CM

## 2017-08-18 DIAGNOSIS — I77.9 CAROTID ARTERY DISEASE, UNSPECIFIED LATERALITY: Primary | ICD-10-CM

## 2017-08-18 DIAGNOSIS — G89.29 CHRONIC MIDLINE LOW BACK PAIN WITHOUT SCIATICA: ICD-10-CM

## 2017-08-18 DIAGNOSIS — I70.203 ATHEROSCLEROSIS OF NATIVE ARTERY OF BOTH LOWER EXTREMITIES, WITH UNSPECIFIED PRESENCE OF CLINICAL MANIFESTATION: Primary | ICD-10-CM

## 2017-08-18 DIAGNOSIS — J43.9 PULMONARY EMPHYSEMA, UNSPECIFIED EMPHYSEMA TYPE: ICD-10-CM

## 2017-08-18 DIAGNOSIS — I77.9 CAROTID ARTERY DISEASE, UNSPECIFIED LATERALITY: ICD-10-CM

## 2017-08-18 DIAGNOSIS — M54.50 CHRONIC MIDLINE LOW BACK PAIN WITHOUT SCIATICA: ICD-10-CM

## 2017-08-18 DIAGNOSIS — Z79.4 TYPE 2 DIABETES MELLITUS WITH DIABETIC POLYNEUROPATHY, WITH LONG-TERM CURRENT USE OF INSULIN: ICD-10-CM

## 2017-08-18 DIAGNOSIS — I15.2 HYPERTENSION ASSOCIATED WITH DIABETES: Chronic | ICD-10-CM

## 2017-08-18 DIAGNOSIS — R19.5 OCCULT BLOOD IN STOOLS: ICD-10-CM

## 2017-08-18 DIAGNOSIS — E11.69 HYPERLIPIDEMIA ASSOCIATED WITH TYPE 2 DIABETES MELLITUS: ICD-10-CM

## 2017-08-18 DIAGNOSIS — E11.42 TYPE 2 DIABETES MELLITUS WITH DIABETIC POLYNEUROPATHY, WITH LONG-TERM CURRENT USE OF INSULIN: ICD-10-CM

## 2017-08-18 DIAGNOSIS — E78.5 HYPERLIPIDEMIA ASSOCIATED WITH TYPE 2 DIABETES MELLITUS: ICD-10-CM

## 2017-08-18 DIAGNOSIS — I77.9 BILATERAL CAROTID ARTERY DISEASE: ICD-10-CM

## 2017-08-18 PROCEDURE — 93000 ELECTROCARDIOGRAM COMPLETE: CPT | Mod: S$GLB,,, | Performed by: NUCLEAR MEDICINE

## 2017-08-18 PROCEDURE — 1157F ADVNC CARE PLAN IN RCRD: CPT | Mod: S$GLB,,, | Performed by: INTERNAL MEDICINE

## 2017-08-18 PROCEDURE — 99214 OFFICE O/P EST MOD 30 MIN: CPT | Mod: S$GLB,,, | Performed by: INTERNAL MEDICINE

## 2017-08-18 PROCEDURE — 3008F BODY MASS INDEX DOCD: CPT | Mod: S$GLB,,, | Performed by: INTERNAL MEDICINE

## 2017-08-18 PROCEDURE — 99499 UNLISTED E&M SERVICE: CPT | Mod: S$GLB,,, | Performed by: INTERNAL MEDICINE

## 2017-08-18 PROCEDURE — 1159F MED LIST DOCD IN RCRD: CPT | Mod: S$GLB,,, | Performed by: INTERNAL MEDICINE

## 2017-08-18 PROCEDURE — 99999 PR PBB SHADOW E&M-EST. PATIENT-LVL III: CPT | Mod: PBBFAC,,, | Performed by: INTERNAL MEDICINE

## 2017-08-18 NOTE — PROGRESS NOTES
Subjective:   Patient ID:  Good Mruillo Jr. is a 89 y.o. male who presents for follow up of Follow-up      HPI  A 89 yo male with  H/o diabetes neuropathy htn pvd copd is here for f/u pvd. He has no new issues cardiac wise  except that he has been losing weight despite eating appropriately he has no no new complaints of chest pain and shortness of breath he has been losing his fat under the skin his veins are showing up. He ahs more neuropathy symptoms uses his walker has no recent falls.no claudication symptoms. Uses oxygen at nite   Past Medical History:   Diagnosis Date    Abnormal EKG 2014    Allergy     Arthritis     back    Bilateral carotid artery disease 2017    Cholelithiasis     US retroperitoneal 2016    COPD (chronic obstructive pulmonary disease)     Diabetes with neurologic complications     Diverticulitis     Diverticulosis     hosp/divert bleed    Ex-smoker     Glaucoma     suspect    Heart murmur     History of pneumothorax     bilat    HTN (hypertension)     Hyperlipidemia     Hyperlipidemia 2014    Hypothyroid     Iron deficiency anemia     nathan    Osteoporosis, unspecified  zeenat     Polyneuropathy     Prostate CA     PVD (peripheral vascular disease) 2014    Type 2 diabetes with peripheral circulatory disorder, controlled     Type 2 diabetes, controlled, with neuropathy Diagnosed     Urinary incontinence        Past Surgical History:   Procedure Laterality Date    CATARACT EXTRACTION       SECTION      PCIOL OU Bilateral 2011 OD/2010 OS    DR. DELGADILLO    PROSTATE SURGERY   approx    brachytherapy       Social History   Substance Use Topics    Smoking status: Former Smoker     Quit date: 1993    Smokeless tobacco: Never Used    Alcohol use Yes      Comment: Champagne on        Family History   Problem Relation Age of Onset    Diabetes Mother     Cancer Father      prostate    Stroke  Father     Eczema Neg Hx     Lupus Neg Hx     Psoriasis Neg Hx     Melanoma Neg Hx        Current Outpatient Prescriptions   Medication Sig    albuterol 90 mcg/actuation inhaler Inhale 2 puffs into the lungs every 6 (six) hours.    aspirin 81 mg Tab Take 1 tablet by mouth Daily. Over counter. To help prevent stroke/ heart attack    calcium carbonate (OS-TANGELA) 500 mg calcium (1,250 mg) tablet Take 1 tablet by mouth once daily.    ferrous sulfate 325 mg (65 mg iron) Tab tablet Take 1 tablet (325 mg total) by mouth once daily.    fluticasone-vilanterol (BREO ELLIPTA) 200-25 mcg/dose DsDv diskus inhaler Inhale 1 puff into the lungs once daily. Controller    levothyroxine (SYNTHROID) 25 MCG tablet     loratadine (CLARITIN) 10 mg tablet Take 1 tablet by mouth Once daily as needed.    metformin (GLUCOPHAGE) 1000 MG tablet Take 1 tablet (1,000 mg total) by mouth 2 (two) times daily.    nateglinide (STARLIX) 60 MG tablet Take 1 tablet (60 mg total) by mouth 3 (three) times daily before meals. (Patient taking differently: Take 60 mg by mouth 2 (two) times daily before meals. )    omeprazole (PRILOSEC) 40 MG capsule     pantoprazole (PROTONIX) 40 MG tablet Take 1 tablet (40 mg total) by mouth once daily.    pravastatin (PRAVACHOL) 40 MG tablet Take 1 tablet (40 mg total) by mouth once daily.    tiotropium (SPIRIVA WITH HANDIHALER) 18 mcg inhalation capsule Inhale 1 capsule (18 mcg total) into the lungs once daily.    alendronate (FOSAMAX) 70 MG tablet Take 1 tablet (70 mg total) by mouth every 7 days.    blood sugar diagnostic Strp 1 strip by Misc.(Non-Drug; Combo Route) route 3 (three) times daily. True Metrix test strips    lancets 33 gauge Misc 1 lancet by Misc.(Non-Drug; Combo Route) route 3 (three) times daily.    LANTUS SOLOSTAR 100 unit/mL (3 mL) InPn pen INJECT 10 UNITS SUBCUTANEOUSLY ONCE DAILY    tamsulosin (FLOMAX) 0.4 mg Cp24 TAKE ONE CAPSULE BY MOUTH ONCE DAILY    TENS unit and electrodes  Cmpk 1 Device by Misc.(Non-Drug; Combo Route) route daily as needed.     No current facility-administered medications for this visit.      Current Outpatient Prescriptions on File Prior to Visit   Medication Sig    albuterol 90 mcg/actuation inhaler Inhale 2 puffs into the lungs every 6 (six) hours.    aspirin 81 mg Tab Take 1 tablet by mouth Daily. Over counter. To help prevent stroke/ heart attack    calcium carbonate (OS-TANGELA) 500 mg calcium (1,250 mg) tablet Take 1 tablet by mouth once daily.    ferrous sulfate 325 mg (65 mg iron) Tab tablet Take 1 tablet (325 mg total) by mouth once daily.    fluticasone-vilanterol (BREO ELLIPTA) 200-25 mcg/dose DsDv diskus inhaler Inhale 1 puff into the lungs once daily. Controller    levothyroxine (SYNTHROID) 25 MCG tablet     loratadine (CLARITIN) 10 mg tablet Take 1 tablet by mouth Once daily as needed.    metformin (GLUCOPHAGE) 1000 MG tablet Take 1 tablet (1,000 mg total) by mouth 2 (two) times daily.    nateglinide (STARLIX) 60 MG tablet Take 1 tablet (60 mg total) by mouth 3 (three) times daily before meals. (Patient taking differently: Take 60 mg by mouth 2 (two) times daily before meals. )    omeprazole (PRILOSEC) 40 MG capsule     pantoprazole (PROTONIX) 40 MG tablet Take 1 tablet (40 mg total) by mouth once daily.    pravastatin (PRAVACHOL) 40 MG tablet Take 1 tablet (40 mg total) by mouth once daily.    tiotropium (SPIRIVA WITH HANDIHALER) 18 mcg inhalation capsule Inhale 1 capsule (18 mcg total) into the lungs once daily.    alendronate (FOSAMAX) 70 MG tablet Take 1 tablet (70 mg total) by mouth every 7 days.    blood sugar diagnostic Strp 1 strip by Misc.(Non-Drug; Combo Route) route 3 (three) times daily. True Metrix test strips    lancets 33 gauge Misc 1 lancet by Misc.(Non-Drug; Combo Route) route 3 (three) times daily.    LANTUS SOLOSTAR 100 unit/mL (3 mL) InPn pen INJECT 10 UNITS SUBCUTANEOUSLY ONCE DAILY    tamsulosin (FLOMAX) 0.4 mg Cp24  "TAKE ONE CAPSULE BY MOUTH ONCE DAILY    TENS unit and electrodes Cmpk 1 Device by Misc.(Non-Drug; Combo Route) route daily as needed.    [DISCONTINUED] triamcinolone acetonide 0.1% (KENALOG) 0.1 % paste Apply to mouth ulcer BID     No current facility-administered medications on file prior to visit.      Review of patient's allergies indicates:   Allergen Reactions    Gabapentin      Didn't like starting at 300mg       ROS  Constitution: Negative for weakness and malaise/fatigue. Positive weight loss   HENT: Negative for headaches and nosebleeds.   Eyes: Negative for blurred vision and redness.   Cardiovascular: Positive for dyspnea on exertion. Negative for chest pain   claudication , irregular heartbeat, leg swelling, near-syncope, orthopnea, palpitations, paroxysmal nocturnal dyspnia and syncope.   Respiratory: Positive for cough and shortness of breath has sinus drainage and mucus production . Negative for wheezing.   Endocrine: Negative for cold intolerance and heat intolerance.   Hematologic/Lymphatic: Negative for bleeding problem. Does not bruise/bleed easily.   Skin: Negative for color change, poor wound healing and rash.   Musculoskeletal: Positive for back pain. Negative for muscle cramps and muscle weakness.   Gastrointestinal: Negative for abdominal pain and heartburn.   Genitourinary: Positive for nocturia. Negative for dysuria and hematuria.   Neurological: Positive for numbness and paresthesias. Negative for dizziness, focal weakness, light-headedness and loss of balance.   Psychiatric/Behavioral: Negative for memory loss. The patient does not have insomnia.   Allergic/Immunologic: Negative for hives.  Objective:   Physical Exam  Vitals:    08/18/17 1034   BP: (!) 160/70   BP Location: Right arm   Pulse: 80   Weight: 59 kg (130 lb 1.1 oz)   Height: 5' 8" (1.727 m)   Constitutional: He is oriented to person, place, and time. He appears well-developed and well-nourished. No distress.   HENT:   Head: " Normocephalic and atraumatic.   Eyes: EOM are normal. Pupils are equal, round, and reactive to light. Right eye exhibits no discharge. Left eye exhibits no discharge.   Neck: Neck supple. No JVD present. No thyromegaly present.   Cardiovascular: Normal rate, irregular rhythm, normal heart sounds . Exam reveals no gallop and no friction rub.   No murmur heard.  Pulses:  Carotid pulses are on the right side with bruit, and on the left side with bruit.  Femoral pulses are 1+ on the right side with bruit, and 3+ on the left side with bruit.  Popliteal pulses are 0+ on the right side, and 2+ on the left side.   Dorsalis pedis pulses are 0 on the right side, and 1+ on the left side.   Posterior tibial pulses are 0 on the right side, and 1+ on the left side.   SUBCLAVIAN BRUITS    Pulmonary/Chest: Effort normal and breath sounds normal. No respiratory distress. He has no wheezes. He has no rales. He exhibits no tenderness.   HYPERINFLATED LUNGS    Abdominal: Soft. Bowel sounds are normal. He exhibits no distension. There is no tenderness.   Musculoskeletal: Normal range of motion. He exhibits trace edema.   Neurological: He is alert and oriented to person, place, and time. No cranial nerve deficit.   Skin: Skin is warm and dry. No rash noted. He is not diaphoretic.   Psychiatric: He has a normal mood and affect  Lab Results   Component Value Date    CHOL 159 05/08/2017    CHOL 199 03/15/2017    CHOL 169 04/20/2016     Lab Results   Component Value Date    HDL 40 05/08/2017    HDL 41 03/15/2017    HDL 42 04/20/2016     Lab Results   Component Value Date    LDLCALC 97.6 05/08/2017    LDLCALC 136.2 03/15/2017    LDLCALC 107.4 04/20/2016     Lab Results   Component Value Date    TRIG 107 05/08/2017    TRIG 109 03/15/2017    TRIG 98 04/20/2016     Lab Results   Component Value Date    CHOLHDL 25.2 05/08/2017    CHOLHDL 20.6 03/15/2017    CHOLHDL 24.9 04/20/2016       Chemistry        Component Value Date/Time      08/04/2017 1151    K 4.8 08/04/2017 1151     08/04/2017 1151    CO2 28 08/04/2017 1151    BUN 24 (H) 08/04/2017 1151    CREATININE 1.1 08/04/2017 1151     08/04/2017 1151        Component Value Date/Time    CALCIUM 10.1 08/04/2017 1151    ALKPHOS 65 09/04/2013 1135    AST 13 09/04/2013 1135    ALT 10 09/04/2013 1135    BILITOT 0.3 09/04/2013 1135    ESTGFRAFRICA >60 08/04/2017 1151    EGFRNONAA 59 (A) 08/04/2017 1151          Lab Results   Component Value Date    TSH 3.783 05/08/2017     Lab Results   Component Value Date    INR 1.0 08/27/2014     Lab Results   Component Value Date    WBC 9.23 08/04/2017    HGB 9.8 (L) 08/04/2017    HCT 30.4 (L) 08/04/2017    MCV 90 08/04/2017     (H) 08/04/2017     BMP  Sodium   Date Value Ref Range Status   08/04/2017 140 136 - 145 mmol/L Final     Potassium   Date Value Ref Range Status   08/04/2017 4.8 3.5 - 5.1 mmol/L Final     Chloride   Date Value Ref Range Status   08/04/2017 100 95 - 110 mmol/L Final     CO2   Date Value Ref Range Status   08/04/2017 28 23 - 29 mmol/L Final     BUN, Bld   Date Value Ref Range Status   08/04/2017 24 (H) 8 - 23 mg/dL Final     Creatinine   Date Value Ref Range Status   08/04/2017 1.1 0.5 - 1.4 mg/dL Final     Calcium   Date Value Ref Range Status   08/04/2017 10.1 8.7 - 10.5 mg/dL Final     Anion Gap   Date Value Ref Range Status   08/04/2017 12 8 - 16 mmol/L Final     eGFR if    Date Value Ref Range Status   08/04/2017 >60 >60 mL/min/1.73 m^2 Final     eGFR if non    Date Value Ref Range Status   08/04/2017 59 (A) >60 mL/min/1.73 m^2 Final     Comment:     Calculation used to obtain the estimated glomerular filtration  rate (eGFR) is the CKD-EPI equation. Since race is unknown   in our information system, the eGFR values for   -American and Non--American patients are given   for each creatinine result.       CrCl cannot be calculated (Patient's most recent sCr result is older  than the maximum 7 days allowed.).    Assessment:     1. Atherosclerosis of native artery of both lower extremities, with unspecified presence of clinical manifestation    2. Hypertension associated with diabetes    3. Hypothyroidism due to acquired atrophy of thyroid    4. Pulmonary emphysema, unspecified emphysema type    5. Type 2 diabetes mellitus with diabetic polyneuropathy, with long-term current use of insulin    6. Hyperlipidemia associated with type 2 diabetes mellitus    7. Chronic midline low back pain without sciatica    8. Pulmonary heart disease    9. Bilateral carotid artery disease    10. Occult blood in stools      Asymptomatic cardiovascular wise.his main issue is weight loss concerned there is malignancy behind it despite his adequate po intake. Will defer to pcp.  Plan:     Continue current therapy  Cardiac low salt diet.  Risk factor modification and excercise program.  F/u in 6 months with lipid cmp

## 2017-08-30 NOTE — TELEPHONE ENCOUNTER
----- Message from Nyla Marie PharmD sent at 8/30/2017  3:08 PM CDT -----  Mr. Murillo is requesting a refill on alendronate 70mg tablets to be sent to [facility].    Thanks

## 2017-08-31 ENCOUNTER — CLINICAL SUPPORT (OUTPATIENT)
Dept: DIABETES | Facility: CLINIC | Age: 82
End: 2017-08-31
Payer: MEDICARE

## 2017-08-31 VITALS — HEIGHT: 68 IN | BODY MASS INDEX: 20.38 KG/M2 | WEIGHT: 134.5 LBS

## 2017-08-31 DIAGNOSIS — E11.65 TYPE 2 DIABETES MELLITUS WITH HYPERGLYCEMIA, UNSPECIFIED LONG TERM INSULIN USE STATUS: Primary | ICD-10-CM

## 2017-08-31 PROCEDURE — 99999 PR PBB SHADOW E&M-EST. PATIENT-LVL III: CPT | Mod: PBBFAC,,, | Performed by: DIETITIAN, REGISTERED

## 2017-08-31 PROCEDURE — G0108 DIAB MANAGE TRN  PER INDIV: HCPCS | Mod: S$GLB,,, | Performed by: DIETITIAN, REGISTERED

## 2017-08-31 RX ORDER — ALENDRONATE SODIUM 70 MG/1
70 TABLET ORAL
Qty: 12 TABLET | Refills: 3 | Status: SHIPPED | OUTPATIENT
Start: 2017-08-31 | End: 2018-08-20 | Stop reason: SDUPTHER

## 2017-08-31 RX ORDER — CALCIUM CITRATE/VITAMIN D3 200MG-6.25
1 TABLET ORAL 3 TIMES DAILY
Qty: 100 STRIP | Refills: 10 | Status: SHIPPED | OUTPATIENT
Start: 2017-08-31 | End: 2018-08-20 | Stop reason: SDUPTHER

## 2017-08-31 NOTE — PROGRESS NOTES
Diabetes Education  Author: Adriana Torres RD, CDE  Date: 8/31/2017    Referring Provider: No ref. provider found  89 y.o. male in clinic today for diabetes education. Patient has taken diabetes education courses in the past. T2DM diagnosis over 10 years now. Patient is currently taking Lantus 10 units every morning, metformin 1000 mg BID, Starlix 60 mg before meals for diabetes. Patient has been working to gain weight since having a significant unintentional weight loss in the past year. He has been living in independent living [facility] for one month now and has since gained ~5 lbs intentionally. Patient reports fasting BG trending up during this time as well. Last A1c:   A1C:   Hemoglobin A1C   Date Value Ref Range Status   05/08/2017 8.2 (H) 4.5 - 6.2 % Final     Comment:     According to ADA guidelines, hemoglobin A1C <7.0% represents  optimal control in non-pregnant diabetic patients.  Different  metrics may apply to specific populations.   Standards of Medical Care in Diabetes - 2016.  For the purpose of screening for the presence of diabetes:  <5.7%     Consistent with the absence of diabetes  5.7-6.4%  Consistent with increasing risk for diabetes   (prediabetes)  >or=6.5%  Consistent with diabetes  Currently no consensus exists for use of hemoglobin A1C  for diagnosis of diabetes for children.           Diabetes Education Visit  Diabetes Education Record Assessment/Progress: Initial    Diabetes Type  Diabetes Type : Type II    Diabetes History  Diabetes Diagnosis: >10 years    Nutrition  Meal Planning: water, drinks regular soda, snacks between meal (Meals provided at [facility])  Meal Plan 24 Hour Recall - Breakfast: oatmeal, sausage jeff, hard boiled egg, wheat toast, assorted muffin, margarine, jelly, coffee, water juice  Meal Plan 24 Hour Recall - Lunch: red beans & sausage, lemon baked tilapia, twice baked potato, fried okra, smothered squash, cornbread  Meal Plan 24 Hour Recall -  Dinner: Smoothie - vegetable, cottage cheese, peanut butter    Monitoring   Self Monitoring : QD - 162 this morning  Blood Glucose Logs: No    Exercise   Exercise Type:  (Patient uses rolling walker; has trampoline with railing to hold on to - uses at time)  Intensity: Low  Frequency: Daily  Duration: 30 min    Current Diabetes Treatment   Current Treatment: Diet, Insulin, Oral Medication, Exercise    Social History  Preferred Learning Method: Face to Face  Primary Support: Self (Lives in independent living community. )  Occupation: Retired - ran [employer]  Smoking Status: Ex Smoker      Barriers to Change  Barriers to Change: None  Learning Challenges : None    Readiness to Learn   Readiness to Learn : Acceptance    Cultural Influences  Cultural Influences: No    Diabetes Education Assessment/Progress  Acute Complications (preventing, detecting, and treating acute complications): Discussion, Competent (verbalizes/demonstrates), Individual Session  Diabetes Disease Process (diabetes disease process and treatment options): Discussion, Competent (verbalizes/demonstrates), Individual Session  Physical Activity (incorporating physical activity into one's lifestyle): Discussion, Competent (verbalizes/demonstrates), Individual Session  Medications (states correct name, dose, onset, peak, duration, side effects & timing of meds): Discussion, Competent (verbalizes/demonstrates), Individual Session  Monitoring (monitoring blood glucose/other parameters & using results): Discussion, Competent (verbalizes/demonstrates), Individual Session  Goal Setting and Problem Solving (verbalizes behavior change strategies & sets realistic goals): Discussion, Competent (verbalizes/demonstrates), Individual Session  Behavior Change (developing personal strategies to health & behavior change): Discussion, Comnpetent (verbalizes/demonstrates), Individual Session  Psychosocial Issues (developing personal srategies to address psychosocial  concerns): Discussion, Competent (verbalizes/demonstrates), Individual Session     Diabetes Care Plan/Intervention  Education Plan/Intervention: Individual Follow-Up DSMT  Worked with patient on modifying diet to balance BG control with effort to gain lean body mass. Written materials provided. 6 month recall for continued mgt/support.        Education Units of Time   Time Spent: 60 min      Health Maintenance Topics with due status: Not Due       Topic Last Completion Date    DEXA SCAN 12/15/2016    Eye Exam 01/12/2017    Lipid Panel 05/08/2017    Hemoglobin A1c 05/08/2017    Urine Microalbumin 05/08/2017    Foot Exam 08/10/2017     Health Maintenance Due   Topic Date Due    TETANUS VACCINE  07/19/1945    Zoster Vaccine  07/19/1987    Influenza Vaccine  08/01/2017

## 2017-09-06 ENCOUNTER — OFFICE VISIT (OUTPATIENT)
Dept: INTERNAL MEDICINE | Facility: CLINIC | Age: 82
End: 2017-09-06
Payer: MEDICARE

## 2017-09-06 ENCOUNTER — LAB VISIT (OUTPATIENT)
Dept: LAB | Facility: HOSPITAL | Age: 82
End: 2017-09-06
Attending: FAMILY MEDICINE
Payer: MEDICARE

## 2017-09-06 VITALS
HEIGHT: 68 IN | WEIGHT: 131.38 LBS | SYSTOLIC BLOOD PRESSURE: 122 MMHG | TEMPERATURE: 96 F | HEART RATE: 99 BPM | DIASTOLIC BLOOD PRESSURE: 64 MMHG | BODY MASS INDEX: 19.91 KG/M2

## 2017-09-06 DIAGNOSIS — J43.9 PULMONARY EMPHYSEMA, UNSPECIFIED EMPHYSEMA TYPE: Primary | ICD-10-CM

## 2017-09-06 DIAGNOSIS — E11.42 TYPE 2 DIABETES MELLITUS WITH DIABETIC POLYNEUROPATHY, WITH LONG-TERM CURRENT USE OF INSULIN: ICD-10-CM

## 2017-09-06 DIAGNOSIS — E03.4 HYPOTHYROIDISM DUE TO ACQUIRED ATROPHY OF THYROID: Chronic | ICD-10-CM

## 2017-09-06 DIAGNOSIS — Z78.9 SELF-CATHETERIZES URINARY BLADDER: ICD-10-CM

## 2017-09-06 DIAGNOSIS — E78.5 HYPERLIPIDEMIA ASSOCIATED WITH TYPE 2 DIABETES MELLITUS: ICD-10-CM

## 2017-09-06 DIAGNOSIS — Z11.1 PPD SCREENING TEST: ICD-10-CM

## 2017-09-06 DIAGNOSIS — E11.59 HYPERTENSION ASSOCIATED WITH DIABETES: Chronic | ICD-10-CM

## 2017-09-06 DIAGNOSIS — I15.2 HYPERTENSION ASSOCIATED WITH DIABETES: Chronic | ICD-10-CM

## 2017-09-06 DIAGNOSIS — E53.8 VITAMIN B12 DEFICIENCY: ICD-10-CM

## 2017-09-06 DIAGNOSIS — Z79.4 TYPE 2 DIABETES MELLITUS WITH DIABETIC POLYNEUROPATHY, WITH LONG-TERM CURRENT USE OF INSULIN: ICD-10-CM

## 2017-09-06 DIAGNOSIS — E11.69 HYPERLIPIDEMIA ASSOCIATED WITH TYPE 2 DIABETES MELLITUS: ICD-10-CM

## 2017-09-06 LAB
BACTERIA #/AREA URNS HPF: ABNORMAL /HPF
BILIRUB UR QL STRIP: NEGATIVE
CLARITY UR: ABNORMAL
COLOR UR: YELLOW
GLUCOSE UR QL STRIP: NEGATIVE
HGB UR QL STRIP: ABNORMAL
KETONES UR QL STRIP: NEGATIVE
LEUKOCYTE ESTERASE UR QL STRIP: ABNORMAL
MICROSCOPIC COMMENT: ABNORMAL
NITRITE UR QL STRIP: POSITIVE
PH UR STRIP: 6 [PH] (ref 5–8)
PROT UR QL STRIP: NEGATIVE
RBC #/AREA URNS HPF: 0 /HPF (ref 0–4)
SP GR UR STRIP: 1.02 (ref 1–1.03)
URN SPEC COLLECT METH UR: ABNORMAL
WBC #/AREA URNS HPF: >100 /HPF (ref 0–5)
WBC CLUMPS URNS QL MICRO: ABNORMAL

## 2017-09-06 PROCEDURE — 99499 UNLISTED E&M SERVICE: CPT | Mod: S$GLB,,, | Performed by: FAMILY MEDICINE

## 2017-09-06 PROCEDURE — 87186 SC STD MICRODIL/AGAR DIL: CPT

## 2017-09-06 PROCEDURE — 1159F MED LIST DOCD IN RCRD: CPT | Mod: S$GLB,,, | Performed by: FAMILY MEDICINE

## 2017-09-06 PROCEDURE — 86580 TB INTRADERMAL TEST: CPT | Mod: 59,S$GLB,, | Performed by: FAMILY MEDICINE

## 2017-09-06 PROCEDURE — 87077 CULTURE AEROBIC IDENTIFY: CPT

## 2017-09-06 PROCEDURE — 1126F AMNT PAIN NOTED NONE PRSNT: CPT | Mod: S$GLB,,, | Performed by: FAMILY MEDICINE

## 2017-09-06 PROCEDURE — 87086 URINE CULTURE/COLONY COUNT: CPT

## 2017-09-06 PROCEDURE — 99214 OFFICE O/P EST MOD 30 MIN: CPT | Mod: 25,S$GLB,, | Performed by: FAMILY MEDICINE

## 2017-09-06 PROCEDURE — 3008F BODY MASS INDEX DOCD: CPT | Mod: S$GLB,,, | Performed by: FAMILY MEDICINE

## 2017-09-06 PROCEDURE — 87088 URINE BACTERIA CULTURE: CPT

## 2017-09-06 PROCEDURE — 99999 PR PBB SHADOW E&M-EST. PATIENT-LVL III: CPT | Mod: PBBFAC,,, | Performed by: FAMILY MEDICINE

## 2017-09-06 PROCEDURE — 96372 THER/PROPH/DIAG INJ SC/IM: CPT | Mod: 59,S$GLB,, | Performed by: FAMILY MEDICINE

## 2017-09-06 PROCEDURE — 81000 URINALYSIS NONAUTO W/SCOPE: CPT | Mod: PO

## 2017-09-06 PROCEDURE — 1157F ADVNC CARE PLAN IN RCRD: CPT | Mod: S$GLB,,, | Performed by: FAMILY MEDICINE

## 2017-09-06 RX ORDER — SULFAMETHOXAZOLE AND TRIMETHOPRIM 800; 160 MG/1; MG/1
1 TABLET ORAL 2 TIMES DAILY
Qty: 20 TABLET | Refills: 0 | Status: SHIPPED | OUTPATIENT
Start: 2017-09-06 | End: 2017-10-17 | Stop reason: CLARIF

## 2017-09-06 RX ORDER — CYANOCOBALAMIN 1000 UG/ML
100 INJECTION, SOLUTION INTRAMUSCULAR; SUBCUTANEOUS
Status: DISCONTINUED | OUTPATIENT
Start: 2017-09-06 | End: 2017-09-06

## 2017-09-06 RX ORDER — SULFAMETHOXAZOLE AND TRIMETHOPRIM 800; 160 MG/1; MG/1
1 TABLET ORAL 2 TIMES DAILY
Qty: 20 TABLET | Refills: 0 | Status: SHIPPED | OUTPATIENT
Start: 2017-09-06 | End: 2017-09-06 | Stop reason: SDUPTHER

## 2017-09-06 RX ORDER — CYANOCOBALAMIN 1000 UG/ML
1000 INJECTION, SOLUTION INTRAMUSCULAR; SUBCUTANEOUS
Status: DISCONTINUED | OUTPATIENT
Start: 2017-09-06 | End: 2018-01-24

## 2017-09-06 RX ADMIN — CYANOCOBALAMIN 1000 MCG: 1000 INJECTION, SOLUTION INTRAMUSCULAR; SUBCUTANEOUS at 10:09

## 2017-09-06 NOTE — PROGRESS NOTES
Subjective:       Patient ID: Good Murillo Jr. is a 89 y.o. male.    Chief Complaint: Annual Exam and Medication Problem    F/U:      Pt is a 89 year old with Dm, HTN, COPD as well as other chronic medical issue which at this point are stable. Pt is applying for [facility]. Pt is stable.      Review of Systems   Constitutional: Negative.    HENT: Negative.    Respiratory: Negative.    Cardiovascular: Negative.    Gastrointestinal: Negative.    Genitourinary: Negative.    Skin: Negative.    Neurological: Negative.    Hematological: Negative.    Psychiatric/Behavioral: Negative.        Objective:      Physical Exam   Constitutional: He is oriented to person, place, and time. He appears well-developed and well-nourished.   HENT:   Head: Normocephalic.   Eyes: EOM are normal. Pupils are equal, round, and reactive to light.   Neck: Normal range of motion. Neck supple. No JVD present. No thyromegaly present.   Cardiovascular: Normal rate and regular rhythm.    Pulmonary/Chest: Effort normal and breath sounds normal.   Abdominal: Soft. Bowel sounds are normal.   Musculoskeletal: Normal range of motion.   Lymphadenopathy:     He has no cervical adenopathy.   Neurological: He is alert and oriented to person, place, and time. He has normal reflexes.   Skin: Skin is warm and dry.   Psychiatric: He has a normal mood and affect. His behavior is normal.       Assessment:       1. Pulmonary emphysema, unspecified emphysema type    2. Hyperlipidemia associated with type 2 diabetes mellitus    3. Hypertension associated with diabetes    4. Type 2 diabetes mellitus with diabetic polyneuropathy, with long-term current use of insulin    5. Hypothyroidism due to acquired atrophy of thyroid    6. PPD screening test    7. Self-catheterizes urinary bladder        Plan:       Pulmonary emphysema, unspecified emphysema type  Comments:  Stable at this time.    Hyperlipidemia associated with type 2 diabetes mellitus  Comments:  Stable  cholesterol not to goal for DM    Hypertension associated with diabetes  Comments:  BP is well controlled    Type 2 diabetes mellitus with diabetic polyneuropathy, with long-term current use of insulin  Comments:  last HgA1C was 8.9. Pt does see DM education    Hypothyroidism due to acquired atrophy of thyroid  Comments:  Stable at this time    PPD screening test  Comments:  Will do PPD test  Orders:  -     POCT TB Skin Test Read    Self-catheterizes urinary bladder  Comments:  Pt self cath.  Orders:  -     Urine culture; Future; Expected date: 09/06/2017  -     Urinalysis; Future; Expected date: 09/06/2017

## 2017-09-08 ENCOUNTER — CLINICAL SUPPORT (OUTPATIENT)
Dept: INTERNAL MEDICINE | Facility: CLINIC | Age: 82
End: 2017-09-08
Payer: MEDICARE

## 2017-09-08 LAB
TB INDURATION - 48 HR READ: 0 MM
TB INDURATION - 72 HR READ: NORMAL MM
TB SKIN TEST - 48 HR READ: NEGATIVE
TB SKIN TEST - 72 HR READ: NORMAL

## 2017-09-08 NOTE — PROGRESS NOTES
Patient presents to clinic for PPD reading. PPD was placed on right arm. Test negative with 0 mm induration.

## 2017-09-09 LAB — BACTERIA UR CULT: NORMAL

## 2017-09-28 ENCOUNTER — PATIENT MESSAGE (OUTPATIENT)
Dept: UROLOGY | Facility: CLINIC | Age: 82
End: 2017-09-28

## 2017-10-08 ENCOUNTER — PATIENT MESSAGE (OUTPATIENT)
Dept: PULMONOLOGY | Facility: CLINIC | Age: 82
End: 2017-10-08

## 2017-10-09 ENCOUNTER — TELEPHONE (OUTPATIENT)
Dept: PULMONOLOGY | Facility: CLINIC | Age: 82
End: 2017-10-09

## 2017-10-09 DIAGNOSIS — J44.89 ASTHMA WITH COPD: Primary | ICD-10-CM

## 2017-10-09 NOTE — LETTER
October 12, 2017      Good Murillo Jr.  [address]  [facility] [address]     [facility]  [address] [place] [id number]  Phone: [phone]  Fax: [phone] Dear Mr. Murillo:    Rx for DURABLE MEDICAL EQUIPMENT -oxygen supplies- printed , signed , faxed to [facility] and mailed to patient. For information call   [facility] for CPAP/Oxygen/Nebulizer supplies.  Customer Service: [phone]  Call: [phone]  Fax: [phone]  Billing Inquiries: [phone] or [phone]        If you have any questions or concerns, please don't hesitate to call.    Sincerely,      Agustin Dean MD

## 2017-10-17 ENCOUNTER — OFFICE VISIT (OUTPATIENT)
Dept: URGENT CARE | Facility: CLINIC | Age: 82
End: 2017-10-17
Payer: MEDICARE

## 2017-10-17 VITALS
DIASTOLIC BLOOD PRESSURE: 62 MMHG | HEIGHT: 68 IN | TEMPERATURE: 96 F | HEART RATE: 97 BPM | SYSTOLIC BLOOD PRESSURE: 134 MMHG | BODY MASS INDEX: 20.55 KG/M2 | OXYGEN SATURATION: 96 % | WEIGHT: 135.56 LBS

## 2017-10-17 DIAGNOSIS — R35.89 FREQUENCY OF URINATION AND POLYURIA: Primary | ICD-10-CM

## 2017-10-17 DIAGNOSIS — R35.0 FREQUENCY OF URINATION AND POLYURIA: Primary | ICD-10-CM

## 2017-10-17 LAB
BILIRUB SERPL-MCNC: NEGATIVE MG/DL
BLOOD URINE, POC: NEGATIVE
COLOR, POC UA: YELLOW
GLUCOSE UR QL STRIP: NORMAL
KETONES UR QL STRIP: NEGATIVE
LEUKOCYTE ESTERASE URINE, POC: NEGATIVE
NITRITE, POC UA: NEGATIVE
PH, POC UA: 5
PROTEIN, POC: ABNORMAL
SPECIFIC GRAVITY, POC UA: 1.01
UROBILINOGEN, POC UA: NORMAL

## 2017-10-17 PROCEDURE — 81002 URINALYSIS NONAUTO W/O SCOPE: CPT | Mod: S$GLB,,, | Performed by: FAMILY MEDICINE

## 2017-10-17 PROCEDURE — 99213 OFFICE O/P EST LOW 20 MIN: CPT | Mod: 25,S$GLB,, | Performed by: FAMILY MEDICINE

## 2017-10-17 PROCEDURE — 99999 PR PBB SHADOW E&M-EST. PATIENT-LVL IV: CPT | Mod: PBBFAC,,, | Performed by: FAMILY MEDICINE

## 2017-10-17 PROCEDURE — 87186 SC STD MICRODIL/AGAR DIL: CPT

## 2017-10-17 PROCEDURE — 87077 CULTURE AEROBIC IDENTIFY: CPT

## 2017-10-17 PROCEDURE — 87088 URINE BACTERIA CULTURE: CPT

## 2017-10-17 PROCEDURE — 87086 URINE CULTURE/COLONY COUNT: CPT

## 2017-10-17 RX ORDER — SULFAMETHOXAZOLE AND TRIMETHOPRIM 800; 160 MG/1; MG/1
1 TABLET ORAL 2 TIMES DAILY
Qty: 14 TABLET | Refills: 0 | Status: SHIPPED | OUTPATIENT
Start: 2017-10-17 | End: 2017-10-24

## 2017-10-17 NOTE — PATIENT INSTRUCTIONS
Take the antibiotic bactrim ds one pill twice daily for a week.  Your urine looks clear. If something shows up in culture in 2 days we will contact you.  Return if symptoms worsen or fail to improve.   Please inform your primary care provider of today's visit

## 2017-10-17 NOTE — PROGRESS NOTES
"Subjective:       Patient ID: Good Murillo Jr. is a 89 y.o. male.    Chief Complaint: Urinary Tract Infection    /62 (BP Location: Right arm, Patient Position: Sitting, BP Method: Medium (Manual))   Pulse 97   Temp 96.4 °F (35.8 °C) (Tympanic)   Ht 5' 8" (1.727 m)   Wt 61.5 kg (135 lb 9.3 oz)   SpO2 96%   BMI 20.62 kg/m²     HPI  Patient presented with several days of polyuria and foul smelling urine. No fever, no dysuria. Uses in and out of catheter at home, reports increased frequency of emptying. Also reports dribbling for the past few days as well.   Hx of recurrent UTIs. Last was 5-6 weeks ago, successfully treated with bactrim ds.  Hx prostate ca s/p treatment 25 years ago, with little complication since    Review of Systems   Constitutional: Negative for activity change and fever. Unexpected weight change: pt thinks he lost a few pounds.   HENT: Negative.    Eyes: Negative.  Negative for visual disturbance.   Respiratory: Negative.  Negative for shortness of breath.    Cardiovascular: Negative.  Negative for chest pain.   Gastrointestinal: Negative.    Genitourinary: Positive for enuresis and frequency. Negative for difficulty urinating, dysuria, flank pain and hematuria.   Musculoskeletal: Back pain: using a teens unit.   Allergic/Immunologic: Negative for environmental allergies and food allergies.   Neurological: Negative.    All other systems reviewed and are negative.      Objective:      Physical Exam   Constitutional: He is oriented to person, place, and time. No distress.   Frail elderly using a walker   HENT:   Head: Normocephalic and atraumatic.   Eyes: EOM are normal. Pupils are equal, round, and reactive to light.   Neck: Normal range of motion.   Cardiovascular: Normal rate.    Pulmonary/Chest: Effort normal. No respiratory distress.   Abdominal: Soft. He exhibits no distension. There is no tenderness. There is no rebound and no guarding.   No CVAT bilaterally  No suprapubic " tenderness   Musculoskeletal: Normal range of motion. He exhibits no edema.   Neurological: He is alert and oriented to person, place, and time. No cranial nerve deficit.   Skin: Skin is warm and dry. No rash noted. He is not diaphoretic.   Psychiatric: He has a normal mood and affect.   Nursing note and vitals reviewed.      Assessment:       1. Frequency of urination and polyuria        Plan:     Good was seen today for urinary tract infection.    Diagnoses and all orders for this visit:    Frequency of urination and polyuria  -     sulfamethoxazole-trimethoprim 800-160mg (BACTRIM DS) 800-160 mg Tab; Take 1 tablet by mouth 2 (two) times daily.  -     POCT URINE DIPSTICK WITHOUT MICROSCOPE  -     Urine culture      Instruction to Patient:  Take the antibiotic bactrim ds one pill twice daily for a week.  Your urine looks clear. If something shows up in culture in 2 days we will contact you.  Return if symptoms worsen or fail to improve.   Please inform your primary care provider of today's visit

## 2017-10-19 ENCOUNTER — PATIENT MESSAGE (OUTPATIENT)
Dept: PULMONOLOGY | Facility: CLINIC | Age: 82
End: 2017-10-19

## 2017-10-20 LAB — BACTERIA UR CULT: NORMAL

## 2017-10-25 ENCOUNTER — TELEPHONE (OUTPATIENT)
Dept: RADIOLOGY | Facility: HOSPITAL | Age: 82
End: 2017-10-25

## 2017-10-26 ENCOUNTER — HOSPITAL ENCOUNTER (OUTPATIENT)
Dept: RADIOLOGY | Facility: HOSPITAL | Age: 82
Discharge: HOME | End: 2017-10-26
Attending: INTERNAL MEDICINE
Payer: MEDICARE

## 2017-10-26 ENCOUNTER — TELEPHONE (OUTPATIENT)
Dept: CARDIOLOGY | Facility: CLINIC | Age: 82
End: 2017-10-26

## 2017-10-26 DIAGNOSIS — I71.9 AORTIC ANEURYSM WITHOUT RUPTURE, UNSPECIFIED PORTION OF AORTA: Primary | ICD-10-CM

## 2017-10-26 DIAGNOSIS — I73.9 PVD (PERIPHERAL VASCULAR DISEASE): ICD-10-CM

## 2017-10-26 PROCEDURE — 76775 US EXAM ABDO BACK WALL LIM: CPT | Mod: TC,PO

## 2017-10-26 PROCEDURE — 76775 US EXAM ABDO BACK WALL LIM: CPT | Mod: 26,,, | Performed by: RADIOLOGY

## 2017-10-26 NOTE — TELEPHONE ENCOUNTER
----- Message from Asuncion Goodwin MD sent at 10/26/2017 10:56 AM CDT -----  Aneurysm 3.4 cm will reepat u/s in 6 months.

## 2017-11-08 ENCOUNTER — LAB VISIT (OUTPATIENT)
Dept: LAB | Facility: HOSPITAL | Age: 82
End: 2017-11-08
Attending: PHYSICIAN ASSISTANT
Payer: MEDICARE

## 2017-11-08 DIAGNOSIS — Z79.4 TYPE 2 DIABETES MELLITUS WITH DIABETIC POLYNEUROPATHY, WITH LONG-TERM CURRENT USE OF INSULIN: ICD-10-CM

## 2017-11-08 DIAGNOSIS — E11.42 TYPE 2 DIABETES MELLITUS WITH DIABETIC POLYNEUROPATHY, WITH LONG-TERM CURRENT USE OF INSULIN: ICD-10-CM

## 2017-11-08 LAB
ALT SERPL W/O P-5'-P-CCNC: 12 U/L
AST SERPL-CCNC: 14 U/L
ESTIMATED AVG GLUCOSE: 200 MG/DL
HBA1C MFR BLD HPLC: 8.6 %

## 2017-11-08 PROCEDURE — 83036 HEMOGLOBIN GLYCOSYLATED A1C: CPT

## 2017-11-08 PROCEDURE — 84450 TRANSFERASE (AST) (SGOT): CPT

## 2017-11-08 PROCEDURE — 36415 COLL VENOUS BLD VENIPUNCTURE: CPT | Mod: PO

## 2017-11-08 PROCEDURE — 84460 ALANINE AMINO (ALT) (SGPT): CPT

## 2017-11-09 ENCOUNTER — OFFICE VISIT (OUTPATIENT)
Dept: UROLOGY | Facility: CLINIC | Age: 82
End: 2017-11-09
Payer: MEDICARE

## 2017-11-09 VITALS
HEIGHT: 68 IN | BODY MASS INDEX: 20.98 KG/M2 | DIASTOLIC BLOOD PRESSURE: 70 MMHG | HEART RATE: 100 BPM | SYSTOLIC BLOOD PRESSURE: 148 MMHG | WEIGHT: 138.44 LBS

## 2017-11-09 DIAGNOSIS — R33.9 URINARY RETENTION: Primary | ICD-10-CM

## 2017-11-09 LAB
BILIRUB SERPL-MCNC: NORMAL MG/DL
BLOOD URINE, POC: NORMAL
COLOR, POC UA: NORMAL
GLUCOSE UR QL STRIP: NORMAL
KETONES UR QL STRIP: NORMAL
LEUKOCYTE ESTERASE URINE, POC: NORMAL
NITRITE, POC UA: NORMAL
PH, POC UA: 5
PROTEIN, POC: NORMAL
SPECIFIC GRAVITY, POC UA: 1.01
UROBILINOGEN, POC UA: NORMAL

## 2017-11-09 PROCEDURE — 99999 PR PBB SHADOW E&M-EST. PATIENT-LVL III: CPT | Mod: PBBFAC,,, | Performed by: UROLOGY

## 2017-11-09 PROCEDURE — 81002 URINALYSIS NONAUTO W/O SCOPE: CPT | Mod: S$GLB,,, | Performed by: UROLOGY

## 2017-11-09 PROCEDURE — 99214 OFFICE O/P EST MOD 30 MIN: CPT | Mod: 25,S$GLB,, | Performed by: UROLOGY

## 2017-11-09 PROCEDURE — 99499 UNLISTED E&M SERVICE: CPT | Mod: S$GLB,,, | Performed by: UROLOGY

## 2017-11-09 NOTE — PROGRESS NOTES
"Chief Complaint: Urinary Retention    HPI:   11/9/17: CIC 4-5 times a day.  Drinking tea every morning.  No problem passing the catheter.  Hasn't taken flomax for quite some time.  Feels his stream is pretty good now - detention.  Gets a fair amount when catheterizing.  Drinks tea in the afternoon also.  Drinks a couple glasses of water in evening.  Using 4-5 pads a day.  12/1/16: CIC 2-3 times a day since our last visit.  Empties a lot to the pad overnight after PM CIC.  Went to [facility] for low back pain and weakness recently and was d/c with bactrim presumably for a UTI. Is feeling back to normal now.   7/6/15: BMP normal and US normal.  No problems with CIC.  Has been taking flomax.  Is just doing CIC when he feels "a little tight"; about twice a day.  Thinks he is emptying better on the flomax.  Last CIC was yesterday afternoon, gets about 4 oz when he caths (120 ml).  Getting a full jug overnight with voiding on his own.    5/18/15: 86 yo man here with urinary retention with a schrader for the last few weeks after failing multiple voiding.  Has a history of prostate cancer almost 20 years ago with brachytherapy and XRT with current PSA 0.36.  No urolithiasis.  No hematuria.  Started wearing pads and having [place] retention about a month ago.  Has recently been on cipro.  Had a full heart workup 8/14 with no adverse findings.  Uses O2 at night for COPD.  Pt has poor short term recall and likely has some form of dementia.   Cysto shows : High bladder neck, left side 2 cm diverticulum, trabeculated otherwise unremarkable    5/15/15: Gayatri: "This 87-year-old male returns to the clinic today for his voiding trial. Approximately 240 mL of water was placed in his bladder, and the catheter was removed. Unfortunately, he only voided 100 mL. The bladder scan showed a PVR of 439 mL.  As we had discussed before, if his voiding trial was unsuccessful, he would need to be evaluated to see if he was a candidate for a TURP or a laser " "prostatectomy. He would like to proceed with this evaluation."    Allergies:  Gabapentin    Medications:  has a current medication list which includes the following prescription(s): albuterol, alendronate, aspirin, blood sugar diagnostic, calcium carbonate, ferrous sulfate, fluticasone-vilanterol, lancets, lantus solostar, levothyroxine, loratadine, metformin, nateglinide, pantoprazole, pravastatin, tamsulosin, tens unit and electrodes, and tiotropium, and the following Facility-Administered Medications: cyanocobalamin.    Review of Systems:  General: No fever, chills, fatigability, or weight loss.  Skin: No rashes, itching, or changes in color or texture of skin.  Chest: Denies SANCHEZ, cyanosis, wheezing, cough, and sputum production.  Abdomen: Appetite fine. No weight loss. Denies diarrhea, abdominal pain, hematemesis, or blood in stool.  Musculoskeletal: No joint stiffness or swelling. Denies back pain.  : As above.  All other review of systems negative.    PMH:   has a past medical history of Abnormal EKG (2014); Allergy; Arthritis; Bilateral carotid artery disease (2017); Cholelithiasis; COPD (chronic obstructive pulmonary disease); Diabetes with neurologic complications; Diverticulitis; Diverticulosis; Ex-smoker; Glaucoma; Heart murmur; History of pneumothorax; HTN (hypertension); Hyperlipidemia; Hyperlipidemia (2014); Hypothyroid; Iron deficiency anemia; Osteoporosis, unspecified ( zeenat ); Polyneuropathy; Prostate CA; PVD (peripheral vascular disease) (2014); Type 2 diabetes with peripheral circulatory disorder, controlled; Type 2 diabetes, controlled, with neuropathy (Diagnosed ); and Urinary incontinence.    PSH:   has a past surgical history that includes Cataract extraction; PCIOL OU (Bilateral, 2011 OD/2010 OS);  section; and Prostate surgery ().    FamHx: family history includes Cancer in his father; Diabetes in his mother; Stroke in his " father.    SocHx:  reports that he quit smoking about 24 years ago. He has never used smokeless tobacco. He reports that he drinks alcohol. He reports that he does not use drugs.      Physical Exam:  Vitals:    11/09/17 1336   BP: (!) 148/70   Pulse: 100     General: A&Ox3, no apparent distress, no deformities  Neck: No masses, normal thyroid  Lungs: normal inspiration, no use of accessory muscles  Heart: normal pulse, no arrhythmias  Abdomen: Soft, NT, ND  Skin: The skin is warm and dry. No jaundice.  Ext: No c/c/e.  : deferred    Labs/Studies:     Urinalysis performed in clinic, summary: UA normal  PVR    6/1/15: 399 ml    7/6/15: 350 ml  PSA    4/16: 0.36    Impression/Plan:   1. Not convinced that TURP would improve his voiding situation.  Complete obstruction is not present.  We discussed the options and elected CIC; doing well on that.    2. Pt doing CIC without difficulty no recent UTI; low risk of that today.   3. Caffeine cessation.  Reduce PM fluids.  4. US to check kidneys

## 2017-11-14 ENCOUNTER — TELEPHONE (OUTPATIENT)
Dept: UROLOGY | Facility: CLINIC | Age: 82
End: 2017-11-14

## 2017-11-14 ENCOUNTER — TELEPHONE (OUTPATIENT)
Dept: DIABETES | Facility: CLINIC | Age: 82
End: 2017-11-14

## 2017-11-15 ENCOUNTER — TELEPHONE (OUTPATIENT)
Dept: RADIOLOGY | Facility: HOSPITAL | Age: 82
End: 2017-11-15

## 2017-11-16 ENCOUNTER — HOSPITAL ENCOUNTER (OUTPATIENT)
Dept: RADIOLOGY | Facility: HOSPITAL | Age: 82
Discharge: HOME | End: 2017-11-16
Attending: UROLOGY
Payer: MEDICARE

## 2017-11-16 DIAGNOSIS — R33.9 URINARY RETENTION: ICD-10-CM

## 2017-11-16 PROCEDURE — 76770 US EXAM ABDO BACK WALL COMP: CPT | Mod: 26,,, | Performed by: RADIOLOGY

## 2017-11-16 PROCEDURE — 76770 US EXAM ABDO BACK WALL COMP: CPT | Mod: TC,PO

## 2017-11-18 DIAGNOSIS — J43.1 PANLOBULAR EMPHYSEMA: ICD-10-CM

## 2017-11-19 RX ORDER — METFORMIN HYDROCHLORIDE 1000 MG/1
TABLET ORAL
Qty: 180 TABLET | Refills: 1 | Status: SHIPPED | OUTPATIENT
Start: 2017-11-19 | End: 2018-08-20 | Stop reason: SDUPTHER

## 2017-11-20 RX ORDER — TIOTROPIUM BROMIDE 18 UG/1
CAPSULE ORAL; RESPIRATORY (INHALATION)
Qty: 90 CAPSULE | Refills: 2 | Status: SHIPPED | OUTPATIENT
Start: 2017-11-20 | End: 2017-12-07 | Stop reason: SDUPTHER

## 2017-11-22 ENCOUNTER — OFFICE VISIT (OUTPATIENT)
Dept: DIABETES | Facility: CLINIC | Age: 82
End: 2017-11-22
Payer: MEDICARE

## 2017-11-22 VITALS
HEIGHT: 67 IN | WEIGHT: 137.13 LBS | DIASTOLIC BLOOD PRESSURE: 64 MMHG | BODY MASS INDEX: 21.52 KG/M2 | SYSTOLIC BLOOD PRESSURE: 124 MMHG

## 2017-11-22 DIAGNOSIS — Z79.4 TYPE 2 DIABETES MELLITUS WITH DIABETIC POLYNEUROPATHY, WITH LONG-TERM CURRENT USE OF INSULIN: Primary | ICD-10-CM

## 2017-11-22 DIAGNOSIS — E11.42 TYPE 2 DIABETES MELLITUS WITH DIABETIC POLYNEUROPATHY, WITH LONG-TERM CURRENT USE OF INSULIN: Primary | ICD-10-CM

## 2017-11-22 LAB — GLUCOSE SERPL-MCNC: 192 MG/DL (ref 70–110)

## 2017-11-22 PROCEDURE — 82948 REAGENT STRIP/BLOOD GLUCOSE: CPT | Mod: S$GLB,,, | Performed by: PHYSICIAN ASSISTANT

## 2017-11-22 PROCEDURE — 99214 OFFICE O/P EST MOD 30 MIN: CPT | Mod: S$GLB,,, | Performed by: PHYSICIAN ASSISTANT

## 2017-11-22 PROCEDURE — 99499 UNLISTED E&M SERVICE: CPT | Mod: S$GLB,,, | Performed by: PHYSICIAN ASSISTANT

## 2017-11-22 PROCEDURE — 99999 PR PBB SHADOW E&M-EST. PATIENT-LVL III: CPT | Mod: PBBFAC,,, | Performed by: PHYSICIAN ASSISTANT

## 2017-11-22 NOTE — PROGRESS NOTES
Subjective:      Patient ID: Good Murillo Jr. is a 89 y.o. male.    PCP: Brandt Shelton MD      Good Murillo is a pleasant 89 y.o. male presenting to follow up on diabetes mellitus. He has had diabetes for 20 or more years. His last visit in Diabetes Management was 8/31/2017 Since that time he has had no improvement in his glycemia. His blood sugar range fasting has been 160-180 and 2 hour post meal has been not taking, and he has been monitoring 1 times per day as directed. His current concerns are glycemic control.    He denies any hospital admissions, emergency room visits, hypoglycemia, syncope, diaphoresis, chest pain, or dyspnea.    He has lost 1 pounds since last visit. His BMI is 21.48    His blood sugar in the clinic today was:   Lab Results   Component Value Date    POCGLU 192 (A) 11/22/2017       We discussed the American diabetes Association recommendations:  hemoglobin A1c below 7.0%; all diabetics should be on statins unless contraindicated; one aspirin daily unless contraindicated; fasting blood sugar between 80 and 130 mg/dL; postprandial blood sugar below 180 mg/dl; prevention of hypoglycemia, may adjust goals to higher levels if persistent; ACE or ARB therapy if not contraindicated; and maintain in an ideal body weight with BMI below 25.    Good is compliant most of the time with DM medications.     Good is compliant most of the time with lifestyle modifications to include activity and meal planning.       STANDARDS OF CARE:  Eye doctor: Dr. Barrera, last exam 1/12/2017  Dental exam: Recommend regular exams; denies gums bleeding.  Podiatry doctor:     ACTIVITY LEVEL: He exercises rarely.  MEAL PLANNING: Number of meals per day - 3. Number of snacks per day - 2.  Per dietary recall, patient is not limiting carbohydrates, saturated fats and sodium.   BLOOD GLUCOSE TESTING: Self-monitoring with   SOCIAL HISTORY: . Lives with family. retired no tobacco use    The following results were reviewed  with patient.    Lab Results   Component Value Date    WBC 9.23 08/04/2017    HGB 9.8 (L) 08/04/2017    HCT 30.4 (L) 08/04/2017     (H) 08/04/2017    CHOL 159 05/08/2017    TRIG 107 05/08/2017    HDL 40 05/08/2017    ALT 12 11/08/2017    AST 14 11/08/2017     08/04/2017    K 4.8 08/04/2017     08/04/2017    CREATININE 1.1 08/04/2017    ESTGFRAFRICA >60 08/04/2017    EGFRNONAA 59 (A) 08/04/2017    BUN 24 (H) 08/04/2017    CO2 28 08/04/2017    TSH 3.783 05/08/2017    PSA 0.36 04/20/2016    INR 1.0 08/27/2014     08/04/2017       Lab Results   Component Value Date    HGBA1C 8.6 (H) 11/08/2017    HGBA1C 8.2 (H) 05/08/2017    HGBA1C 8.0 (H) 03/15/2017     Lab Results   Component Value Date    FREET4 0.89 04/20/2016     Lab Results   Component Value Date    TSH 3.783 05/08/2017     Lab Results   Component Value Date    IRON 38 (L) 08/04/2017    TIBC 423 08/04/2017    FERRITIN 28 08/04/2017     Lab Results   Component Value Date    OJGMQXQN90 322 07/14/2017     Lab Results   Component Value Date    CALCIUM 10.1 08/04/2017       Review of patient's allergies indicates:   Allergen Reactions    Gabapentin      Didn't like starting at 300mg       Past Medical History:   Diagnosis Date    Abnormal EKG 8/22/2014    Allergy     Arthritis     back    Bilateral carotid artery disease 1/17/2017    Cholelithiasis     US retroperitoneal 12/2016    COPD (chronic obstructive pulmonary disease)     Diabetes with neurologic complications     Diverticulitis     Diverticulosis     hosp/divert bleed    Ex-smoker     Glaucoma     suspect    Heart murmur     History of pneumothorax     bilat    HTN (hypertension)     Hyperlipidemia     Hyperlipidemia 8/22/2014    Hypothyroid     Iron deficiency anemia     nathan    Osteoporosis, unspecified 12/16 zeenat 12/18    Polyneuropathy     Prostate CA     PVD (peripheral vascular disease) 8/22/2014    Type 2 diabetes with peripheral circulatory disorder,  controlled     Type 2 diabetes, controlled, with neuropathy Diagnosed 1994    Urinary incontinence        Review of Systems   Constitutional: Negative.  Negative for activity change, appetite change, chills, diaphoresis, fatigue, fever and unexpected weight change.   HENT: Negative.  Negative for dental problem, facial swelling, hearing loss, nosebleeds, trouble swallowing and voice change.    Eyes: Negative.  Negative for photophobia, pain, discharge, redness, itching and visual disturbance.   Respiratory: Negative.  Negative for cough, choking, chest tightness, shortness of breath and wheezing.    Cardiovascular: Negative.  Negative for chest pain, palpitations and leg swelling.   Gastrointestinal: Negative.  Negative for abdominal distention, abdominal pain, blood in stool, constipation, diarrhea, nausea and vomiting.   Endocrine: Positive for polyuria (has to catherize self 5-6 times daily). Negative for cold intolerance, heat intolerance, polydipsia and polyphagia.   Genitourinary: Negative.  Negative for decreased urine volume, difficulty urinating, dysuria, frequency, scrotal swelling, testicular pain and urgency.   Musculoskeletal: Negative.  Negative for arthralgias, back pain, gait problem, joint swelling, myalgias, neck pain and neck stiffness.   Skin: Negative.  Negative for color change, pallor, rash and wound.   Allergic/Immunologic: Negative.  Negative for environmental allergies, food allergies and immunocompromised state.   Neurological: Negative.  Negative for dizziness, tremors, seizures, syncope, facial asymmetry, speech difficulty, weakness, light-headedness, numbness and headaches.   Hematological: Negative.  Negative for adenopathy. Does not bruise/bleed easily.   Psychiatric/Behavioral: Negative.  Negative for agitation, behavioral problems, confusion, decreased concentration, dysphoric mood, hallucinations, self-injury, sleep disturbance and suicidal ideas. The patient is not  "nervous/anxious and is not hyperactive.       Objective:     Vitals - 1 value per visit 10/17/2017 11/9/2017 11/22/2017   SYSTOLIC 134 148 124   DIASTOLIC 62 70 64   PULSE 97 100 -   TEMPERATURE 96.4 - -   RESPIRATIONS - - -   SPO2 96 - -   Weight (lb) 135.58 138.45 137.13   Weight (kg) 61.5 62.8 62.2   HEIGHT 5' 8" 5' 8" 5' 7"   BODY MASS INDEX 20.62 21.05 21.48   VISIT REPORT - - -   Pain Score  0 0 -   Some recent data might be hidden       Physical Exam   Constitutional: He is oriented to person, place, and time. He appears well-developed and well-nourished. He is cooperative.  Non-toxic appearance. He does not have a sickly appearance. He does not appear ill. No distress. He is not intubated.   HENT:   Head: Normocephalic and atraumatic. Not macrocephalic and not microcephalic. Head is without raccoon's eyes, without Brantley's sign, without abrasion, without contusion, without laceration, without right periorbital erythema and without left periorbital erythema. Hair is normal.   Right Ear: External ear normal. No lacerations. No drainage, swelling or tenderness. No foreign bodies. No mastoid tenderness. Tympanic membrane is not injected, not scarred, not perforated, not erythematous, not retracted and not bulging. Tympanic membrane mobility is normal. No middle ear effusion. No hemotympanum. No decreased hearing is noted.   Left Ear: External ear normal. No lacerations. No drainage, swelling or tenderness. No foreign bodies. No mastoid tenderness. Tympanic membrane is not injected, not scarred, not perforated, not erythematous, not retracted and not bulging. Tympanic membrane mobility is normal.  No middle ear effusion. No hemotympanum. No decreased hearing is noted.   Nose: Nose normal.   Mouth/Throat: Oropharynx is clear and moist.   Eyes: EOM and lids are normal. Pupils are equal, round, and reactive to light. Right eye exhibits no chemosis, no discharge, no exudate and no hordeolum. No foreign body present " in the right eye. Left eye exhibits no chemosis, no discharge, no exudate and no hordeolum. No foreign body present in the left eye. Right conjunctiva is not injected. Right conjunctiva has no hemorrhage. Left conjunctiva is not injected. Left conjunctiva has no hemorrhage. No scleral icterus. Right eye exhibits normal extraocular motion and no nystagmus. Left eye exhibits normal extraocular motion and no nystagmus. Right pupil is round and reactive. Left pupil is round and reactive. Pupils are equal.   Neck: Normal range of motion, full passive range of motion without pain and phonation normal. Neck supple. Normal carotid pulses, no hepatojugular reflux and no JVD present. No tracheal tenderness, no spinous process tenderness and no muscular tenderness present. Carotid bruit is not present. No neck rigidity. No tracheal deviation, no edema, no erythema and normal range of motion present. No thyroid mass and no thyromegaly present.   Cardiovascular: Normal rate, regular rhythm, normal heart sounds and intact distal pulses.   No extrasystoles are present. PMI is not displaced.  Exam reveals no gallop, no friction rub and no decreased pulses.    No murmur heard.  Pulses:       Carotid pulses are 2+ on the right side, and 2+ on the left side.       Radial pulses are 2+ on the right side, and 2+ on the left side.        Dorsalis pedis pulses are 2+ on the right side, and 2+ on the left side.        Posterior tibial pulses are 2+ on the right side, and 2+ on the left side.   Pulmonary/Chest: Effort normal and breath sounds normal. No accessory muscle usage or stridor. No apnea, no tachypnea and no bradypnea. He is not intubated. No respiratory distress. He has no decreased breath sounds. He has no wheezes. He has no rhonchi. He has no rales. He exhibits no tenderness.   Abdominal: Soft. Bowel sounds are normal. He exhibits no shifting dullness, no distension, no pulsatile liver, no fluid wave, no abdominal bruit, no  ascites, no pulsatile midline mass and no mass. There is no hepatosplenomegaly, splenomegaly or hepatomegaly. There is no tenderness. There is no rigidity, no rebound, no guarding, no CVA tenderness and no tenderness at McBurney's point. No hernia. Hernia confirmed negative in the ventral area.   Musculoskeletal: Normal range of motion. He exhibits no edema or tenderness.        Right foot: There is normal range of motion and no deformity.        Left foot: There is normal range of motion and no deformity.   Feet:   Right Foot:   Protective Sensation: 6 sites tested. 6 sites sensed.   Skin Integrity: Negative for ulcer, blister, skin breakdown, erythema, warmth, callus or dry skin.   Left Foot:   Protective Sensation: 6 sites tested. 6 sites sensed.   Skin Integrity: Negative for ulcer, blister, skin breakdown, erythema, warmth, callus or dry skin.   Lymphadenopathy:        Head (right side): No submental, no submandibular, no tonsillar, no preauricular, no posterior auricular and no occipital adenopathy present.        Head (left side): No submental, no submandibular, no tonsillar, no preauricular, no posterior auricular and no occipital adenopathy present.     He has no cervical adenopathy.        Right cervical: No superficial cervical, no deep cervical and no posterior cervical adenopathy present.       Left cervical: No superficial cervical, no deep cervical and no posterior cervical adenopathy present.   Neurological: He is alert and oriented to person, place, and time. He has normal reflexes. He displays no atrophy and no tremor. No cranial nerve deficit or sensory deficit. He exhibits normal muscle tone. He displays no seizure activity. Coordination and gait normal.   Reflex Scores:       Bicep reflexes are 2+ on the right side and 2+ on the left side.       Brachioradialis reflexes are 2+ on the right side and 2+ on the left side.       Patellar reflexes are 2+ on the right side and 2+ on the left  side.  Skin: Skin is warm and dry. No rash noted. No erythema. No pallor.   Psychiatric: He has a normal mood and affect. His mood appears not anxious. His affect is not angry, not blunt, not labile and not inappropriate. His speech is not rapid and/or pressured, not delayed, not tangential and not slurred. He is not agitated, not aggressive, not hyperactive, not slowed, not withdrawn, not actively hallucinating and not combative. Thought content is not paranoid and not delusional. Cognition and memory are not impaired. He does not express impulsivity or inappropriate judgment. He does not exhibit a depressed mood. He expresses no homicidal and no suicidal ideation. He expresses no suicidal plans and no homicidal plans. He is communicative. He exhibits normal recent memory and normal remote memory. He is attentive.     Assessment:     1. Type 2 diabetes mellitus with diabetic polyneuropathy, with long-term current use of insulin       Plan:   Good Murillo is seen today for   1. Type 2 diabetes mellitus with diabetic polyneuropathy, with long-term current use of insulin      We have discussed the etiology and treatment options associated with the diagnosis as well as alternatives. He has elected the following treatments.     Type 2 diabetes mellitus with diabetic polyneuropathy, with long-term current use of insulin  -     POCT glucose        1.) Patient was instructed to monitor blood glucose twice daily, fasting, and 2 hour post meal; if on Multiple Daily Injections (MDI) he will need to have pre-meal blood glucose as well. Reminded to bring BG meter or record to each visit for review.  2.) Reviewed pathophysiology of diabetes, complications related to the disease, importance of annual dilated eye exam and self daily foot examination.  3.) Continue medications as prescribed Lantus; Metformin and Starlix. [facility] or Phone review in 1 week with BG records for adjustment of medication.  4.) Advised patient to  continue to follow up with Dietician/CDE as directed.  5.) Discussed activity, benefits, methods, and precautions. Recommended patient start/continue some form of exercise and increase as tolerated to 60 minutes per day to facilitate weight loss and aid in control of BGs. Also reminded patient of WHO recommendation of 10,000 steps daily as a goal.   6.) A1C, TSH, Lipid Panel, CMP/renal panel with eGFR and Micro/Creatinine.  7.) Return to clinic in 3 months for follow up. Advised patient to call clinic with any questions or concerns.    A total of 30 minutes was spent in face to face time, of which 50 % was spent in counseling patient on disease process, complications, treatment, and side effects of medications.    The patient was explained the above plan and given opportunity to ask questions.  He understands, chooses and consents to this plan and accepts all the risks, which include but are not limited to the risks mentioned above.   He understands the alternative of having no testing, interventions or treatments at this time. He left content and without further questions.

## 2017-11-24 ENCOUNTER — OFFICE VISIT (OUTPATIENT)
Dept: URGENT CARE | Facility: CLINIC | Age: 82
End: 2017-11-24
Payer: MEDICARE

## 2017-11-24 ENCOUNTER — HOSPITAL ENCOUNTER (OUTPATIENT)
Dept: RADIOLOGY | Facility: HOSPITAL | Age: 82
Discharge: HOME | End: 2017-11-24
Attending: PHYSICIAN ASSISTANT
Payer: MEDICARE

## 2017-11-24 VITALS
TEMPERATURE: 97 F | HEART RATE: 90 BPM | WEIGHT: 138.88 LBS | OXYGEN SATURATION: 95 % | SYSTOLIC BLOOD PRESSURE: 122 MMHG | BODY MASS INDEX: 21.8 KG/M2 | HEIGHT: 67 IN | DIASTOLIC BLOOD PRESSURE: 70 MMHG

## 2017-11-24 DIAGNOSIS — H10.9 CONJUNCTIVITIS OF LEFT EYE, UNSPECIFIED CONJUNCTIVITIS TYPE: ICD-10-CM

## 2017-11-24 DIAGNOSIS — R05.9 COUGH: Primary | ICD-10-CM

## 2017-11-24 DIAGNOSIS — R05.9 COUGH: ICD-10-CM

## 2017-11-24 PROCEDURE — 99999 PR PBB SHADOW E&M-EST. PATIENT-LVL IV: CPT | Mod: PBBFAC,,, | Performed by: PHYSICIAN ASSISTANT

## 2017-11-24 PROCEDURE — 99214 OFFICE O/P EST MOD 30 MIN: CPT | Mod: S$GLB,,, | Performed by: PHYSICIAN ASSISTANT

## 2017-11-24 PROCEDURE — 99499 UNLISTED E&M SERVICE: CPT | Mod: S$GLB,,, | Performed by: PHYSICIAN ASSISTANT

## 2017-11-24 PROCEDURE — 71020 XR CHEST PA AND LATERAL: CPT | Mod: TC,PO

## 2017-11-24 PROCEDURE — 71020 XR CHEST PA AND LATERAL: CPT | Mod: 26,,, | Performed by: RADIOLOGY

## 2017-11-24 RX ORDER — OFLOXACIN 3 MG/ML
1 SOLUTION/ DROPS OPHTHALMIC 4 TIMES DAILY
Qty: 1 BOTTLE | Refills: 0 | Status: SHIPPED | OUTPATIENT
Start: 2017-11-24 | End: 2017-12-01

## 2017-11-24 NOTE — PROGRESS NOTES
Subjective:      Patient ID: Good Murillo Jr. is a 89 y.o. male.    Chief Complaint: Chest Congestion and Cough    Mr. Murillo is a 89yo male that presents to Urgent Care for ongoing post-nasal drip and cough for several months.  Patient also states at home today his pulse ox was 88%.  Pulse ox increased to around 96% after at home oxygen.  Patient states he has oxygen and nasal cannula that he uses at night.  Patient denies any shortness of breath (unless over exerting) or wheezing and claims his cough is very mild.  He currently sees Pulmonology for COPD.       Patient also complains of scratchy feeling to his L eye that has been ongoing for several weeks and seems to be getting worse.       Cough   This is a new problem. The current episode started more than 1 month ago (constant drip). The cough is productive of sputum. Associated symptoms include postnasal drip. Pertinent negatives include no chills, ear pain, fever, headaches, rash, rhinorrhea, sore throat, shortness of breath (only with increased exertion) or wheezing. Treatments tried: Saline drops for eye. His past medical history is significant for COPD. There is no history of asthma or pneumonia.     Review of Systems   Constitutional: Negative for chills, diaphoresis and fever.   HENT: Positive for postnasal drip. Negative for congestion, ear pain, rhinorrhea, sinus pain, sinus pressure and sore throat.    Eyes: Negative for pain, discharge and visual disturbance.        Left eye feels scratchy (2-3wks, progressing)   Respiratory: Positive for cough (productive). Negative for chest tightness, shortness of breath (only with increased exertion) and wheezing.    Gastrointestinal: Negative for abdominal pain, constipation, diarrhea, nausea and vomiting.   Skin: Negative for rash.   Neurological: Negative for dizziness, light-headedness and headaches.       Objective:   /70 (BP Location: Right arm, Patient Position: Sitting, BP Method: Small (Manual))   " Pulse 90   Temp 97.3 °F (36.3 °C) (Tympanic)   Ht 5' 7" (1.702 m)   Wt 63 kg (138 lb 14.2 oz)   SpO2 95%   BMI 21.75 kg/m²   Physical Exam   Constitutional: He appears well-developed and well-nourished. He does not appear ill. No distress.   HENT:   Head: Normocephalic and atraumatic.   Right Ear: Tympanic membrane and ear canal normal. No tenderness. Tympanic membrane is not erythematous. No middle ear effusion.   Left Ear: Tympanic membrane and ear canal normal. No tenderness. Tympanic membrane is not erythematous.  No middle ear effusion.   Nose: Nose normal. Right sinus exhibits no maxillary sinus tenderness and no frontal sinus tenderness. Left sinus exhibits no maxillary sinus tenderness and no frontal sinus tenderness.   Mouth/Throat: Uvula is midline and oropharynx is clear and moist.   Eyes: EOM are normal. Pupils are equal, round, and reactive to light. Left eye exhibits no exudate. Left conjunctiva is injected.   Mild erythema of L lower lid  No abrasion or foreign body noted on fluorescein exam   Cardiovascular: Normal rate, regular rhythm and normal heart sounds.    No murmur heard.  Pulmonary/Chest: Effort normal and breath sounds normal. No respiratory distress. He has no decreased breath sounds. He has no wheezes. He has no rhonchi. He has no rales.   Skin: Skin is warm and dry. No rash noted. He is not diaphoretic.   Psychiatric: He has a normal mood and affect. His speech is normal and behavior is normal. Thought content normal.     Assessment:      1. Cough    2. Conjunctivitis of left eye, unspecified conjunctivitis type       Plan:   Cough  -     X-Ray Chest PA And Lateral; Future; Expected date: 11/24/2017    Conjunctivitis of left eye, unspecified conjunctivitis type  -     ofloxacin (OCUFLOX) 0.3 % ophthalmic solution; Place 1 drop into the left eye 4 (four) times daily.  Dispense: 1 Bottle; Refill: 0    Continue anti-histamine and begin flonase.   Will call with any abnormal CXR " results.   Advise use of at home Oxygen as needed.  Monitor pulse ox and if it continues to be decreased follow up with PCP or pulmonology.     Begin eye drops and follow up with ophthalmology if no improvement.  Appt set up in office.     Gave handout on bacterial conjunctivitis and cough.  Printed and reviewed AVS.      Patient expresses understanding and agrees with treatment plan.

## 2017-11-24 NOTE — PATIENT INSTRUCTIONS
Cough, Chronic, Uncertain Cause (Adult)    Everyone has had a cough as part of the common cold, flu, or bronchitis. This kind of cough occurs along with an achy feeling, low-grade fever, nasal and sinus congestion, and a scratchy or sore throat. This usually gets better in 2 to 3 weeks. A cough that lasts longer than 3 weeks may be due to other causes.  If your cough does not improve over the next 2 weeks, further testing may be needed. Follow up with your healthcare provider as advised. Cough suppressants may be recommended. Based on your exam today, the exact cause of your cough is not certain. Below are some common causes for persistent cough.  Smokers cough  Smokers cough doesnt go away. If you continue to smoke, it only gets worse. The cough is from irritation in the air passages. Talk to your healthcare provider about quitting. Medicines or nicotine-replacement products, like gum or the patch, may make quitting easier.  Postnasal drip  A cough that is worse at night may be due to postnasal drip. Excess mucus in the nose drains from the back of your nose to your throat. This triggers the cough reflex. Postnasal drip may be due to a sinus infection or allergy. Common allergens include dust, tobacco smoke (both inhaled and secondhand smoke), environmental pollutants, pollen, mold, pets, cleaning agents, room deodorizers, and chemical fumes. Over-the-counter antihistamines or decongestants may be helpful for allergies. A sinus infection may requires antibiotic treatment. See your healthcare provider if symptoms continue.  Medicines  Certain prescribed medicines can cause a chronic cough in some people:  · ACE inhibitors for high blood pressure. These include benazepril, captopril, enalapril, fosinopril, lisinopril, quinapril, ramipril, and others.  · Beta-blockers for high blood pressure and other conditions. These include propranolol, atenolol, metoprolol, nadolol, and others.  Let your healthcare provider  know if you are taking any of these.  Asthma  Cough may be the only sign of mild asthma. You may have tests to find out if asthma is causing your cough. You may also take asthma medicine on a trial basis.  Acid reflux (heartburn, GERD)  The esophagus is the tube that carries food from the mouth to the stomach. A valve at its lower end prevents stomach acids from flowing upward. If this valve does not work properly, acid from the stomach enters the esophagus. This may cause a burning pain in the upper abdomen or lower chest, belching, or cough. Symptoms are often worse when lying flat. Avoid eating or drinking before bedtime. Try using extra pillows to raise your upper body, or place 4-inch blocks under the head of your bed. You may try an over-the-counter antacid or an acid-blocking medicine such as famotidine, cimetidine, ranitidine, esomeprazole, lansoprazole, or omeprazole. Stronger medicines for this condition can be prescribed by your healthcare provider.  Follow-up care  Follow up with your healthcare provider, or as advised, if your cough does not improve. Further testing may be needed.  Note: If an X-ray was taken, a specialist will review it. You will be notified of any new findings that may affect your care.  When to seek medical advice  Call your healthcare provider right away if any of these occur:  · Mild wheezing or difficulty breathing  · Fever of 100.4ºF (38ºC) or higher, or as directed by your healthcare provider  · Unexpected weight loss  · Coughing up large amounts of colored sputum  · Night sweats (sheets and pajamas get soaking wet)  Call 911, or get immediate medical care  Contact emergency services right away if any of these occur:  · Coughing up blood  · Moderate to severe trouble breathing or wheezing  Date Last Reviewed: 9/13/2015  © 8730-2049 [employer]. [address] [place] [address], [place] [address]. All rights reserved. This information is not intended as a substitute for  professional medical care. Always follow your healthcare professional's instructions.    Continue anti-histamine and begin flonase.   Conjunctivitis, Bacterial    You have an infection in the membranes covering the white part of the eye. This part of the eye is called the conjunctiva. The infection is called conjunctivitis. The most common symptoms of conjunctivitis include a thick, pus-like discharge from the eye, swollen eyelids, redness, eyelids sticking together upon awakening, and a gritty or scratchy feeling in the eye. Your infection was caused by bacteria. It may be treated with medicine. With treatment, the infection takes about 7 to 10 days to resolve.  Home care  · Use prescribed antibiotic eye drops or ointment as directed to treat the infection.  · Apply a warm compress (towel soaked in warm water) to the affected eye 3 to 4 times a day. Do this just before applying medicine to the eye.  · Use a warm, wet cloth to wipe away crusting of the eyelids in the morning. This is caused by mucus drainage during the night. You may also use saline irrigating solution or artificial tears to rinse away mucus in the eye. Do not put a patch over the eye.  · Wash your hands before and after touching the infected eye. This is to prevent spreading the infection to the other eye, and to other people. Do not share your towels or washcloths with others.  · You may use acetaminophen or ibuprofen to control pain, unless another medicine was prescribed. (Note: If you have chronic liver or kidney disease or have ever had a stomach ulcer or gastrointestinal bleeding, talk with your doctor before using these medicines.)  · Do not wear contact lenses until your eyes have healed and all symptoms are gone.  Follow-up care  Follow up with your healthcare provider, or as advised.  When to seek medical advice  Call your healthcare provider right away if any of these occur:  · Worsening vision  · Increasing pain in the eye  · Increasing  swelling or redness of the eyelid  · Redness spreading around the eye  Date Last Reviewed: 6/14/2015  © 5849-5569 [employer]. [address] [place] [address], [place] [address]. All rights reserved. This information is not intended as a substitute for professional medical care. Always follow your healthcare professional's instructions.

## 2017-11-29 ENCOUNTER — PATIENT MESSAGE (OUTPATIENT)
Dept: PULMONOLOGY | Facility: CLINIC | Age: 82
End: 2017-11-29

## 2017-11-30 ENCOUNTER — OFFICE VISIT (OUTPATIENT)
Dept: OPHTHALMOLOGY | Facility: CLINIC | Age: 82
End: 2017-11-30
Payer: MEDICARE

## 2017-11-30 DIAGNOSIS — H40.013 OPEN ANGLE WITH BORDERLINE FINDINGS, LOW RISK, BILATERAL: Primary | ICD-10-CM

## 2017-11-30 DIAGNOSIS — H01.00A BLEPHARITIS OF UPPER AND LOWER EYELIDS OF BOTH EYES, UNSPECIFIED TYPE: ICD-10-CM

## 2017-11-30 DIAGNOSIS — H01.00B BLEPHARITIS OF UPPER AND LOWER EYELIDS OF BOTH EYES, UNSPECIFIED TYPE: ICD-10-CM

## 2017-11-30 DIAGNOSIS — Z96.1 PSEUDOPHAKIA OF BOTH EYES: ICD-10-CM

## 2017-11-30 PROCEDURE — 92133 CPTRZD OPH DX IMG PST SGM ON: CPT | Mod: S$GLB,,, | Performed by: OPHTHALMOLOGY

## 2017-11-30 PROCEDURE — 92083 EXTENDED VISUAL FIELD XM: CPT | Mod: S$GLB,,, | Performed by: OPHTHALMOLOGY

## 2017-11-30 PROCEDURE — 92012 INTRM OPH EXAM EST PATIENT: CPT | Mod: S$GLB,,, | Performed by: OPHTHALMOLOGY

## 2017-11-30 PROCEDURE — 99999 PR PBB SHADOW E&M-EST. PATIENT-LVL II: CPT | Mod: PBBFAC,,, | Performed by: OPHTHALMOLOGY

## 2017-11-30 NOTE — PROGRESS NOTES
SUBJECTIVE:   Good Murillo Jr. is a 89 y.o. male   Uncorrected distance visual acuity was 20/30 -1 in the right eye and 20/100 +2 in the left eye.   No chief complaint on file.       HPI:  HPI     Patient returns for an eye infection in OS , patient was seen at an   urgent care facility on 11/24/17  And diagnosed with a bacterial infection   and given OCuflox qid ou  patient  lives in a nursing home and states    patients piss in the air at the nursing home and states that is where he   got the infection,   patient c/o ou are red and itching , patient states   no discharge. Patient was given hvf today by mistake.      1. GLAUCOMA SUSPECT (18/20 pre CE ? Phacomorphic)  2. PCIOL OD 01/12/2011 SN60WF+24.0  PCIOL OS 12/08/2010 SN60WF +24.5  3. DM X 15 YEARS        Last edited by CARLEE Horton on 11/30/2017 11:35 AM. (History)        Assessment /Plan :  1. Open angle with borderline findings, low risk, bilateral No evidence of glaucoma at this time but based on risk factors recommend to continue monitoring.     2. Blepharitis of upper and lower eyelids of both eyes, unspecified type Findings and symptoms consistent with moderate blepharitis. The blepharitis instruction sheet was reviewed with the pt, recommending:Warm compresses along with baby shampoo      3. Pseudophakia of both eyes      RTC January for dilation with sdp

## 2017-12-07 ENCOUNTER — OFFICE VISIT (OUTPATIENT)
Dept: PULMONOLOGY | Facility: CLINIC | Age: 82
End: 2017-12-07
Payer: MEDICARE

## 2017-12-07 ENCOUNTER — PROCEDURE VISIT (OUTPATIENT)
Dept: PULMONOLOGY | Facility: CLINIC | Age: 82
End: 2017-12-07
Payer: MEDICARE

## 2017-12-07 VITALS
RESPIRATION RATE: 18 BRPM | OXYGEN SATURATION: 96 % | HEART RATE: 93 BPM | HEIGHT: 68 IN | WEIGHT: 137.38 LBS | DIASTOLIC BLOOD PRESSURE: 80 MMHG | SYSTOLIC BLOOD PRESSURE: 138 MMHG | BODY MASS INDEX: 20.82 KG/M2

## 2017-12-07 VITALS — WEIGHT: 137.38 LBS | BODY MASS INDEX: 20.82 KG/M2 | HEIGHT: 68 IN

## 2017-12-07 DIAGNOSIS — J44.9 CHRONIC OBSTRUCTIVE PULMONARY DISEASE, UNSPECIFIED COPD TYPE: ICD-10-CM

## 2017-12-07 DIAGNOSIS — J43.1 PANLOBULAR EMPHYSEMA: ICD-10-CM

## 2017-12-07 DIAGNOSIS — J44.9 CHRONIC OBSTRUCTIVE PULMONARY DISEASE, UNSPECIFIED COPD TYPE: Primary | ICD-10-CM

## 2017-12-07 DIAGNOSIS — J43.9 PULMONARY EMPHYSEMA, UNSPECIFIED EMPHYSEMA TYPE: ICD-10-CM

## 2017-12-07 DIAGNOSIS — J44.9 COPD (CHRONIC OBSTRUCTIVE PULMONARY DISEASE): Primary | ICD-10-CM

## 2017-12-07 DIAGNOSIS — G47.34 NOCTURNAL HYPOXEMIA: ICD-10-CM

## 2017-12-07 DIAGNOSIS — G47.34 NOCTURNAL HYPOXEMIA: Primary | ICD-10-CM

## 2017-12-07 PROCEDURE — 94620 PR PULMONARY STRESS TESTING,SIMPLE: CPT | Mod: S$GLB,,, | Performed by: INTERNAL MEDICINE

## 2017-12-07 PROCEDURE — 94762 N-INVAS EAR/PLS OXIMTRY CONT: CPT | Mod: 59,S$GLB,, | Performed by: INTERNAL MEDICINE

## 2017-12-07 PROCEDURE — 99999 PR PBB SHADOW E&M-EST. PATIENT-LVL IV: CPT | Mod: PBBFAC,,, | Performed by: INTERNAL MEDICINE

## 2017-12-07 PROCEDURE — 99499 UNLISTED E&M SERVICE: CPT | Mod: S$GLB,,, | Performed by: INTERNAL MEDICINE

## 2017-12-07 PROCEDURE — 99215 OFFICE O/P EST HI 40 MIN: CPT | Mod: 25,S$GLB,, | Performed by: INTERNAL MEDICINE

## 2017-12-07 RX ORDER — TIOTROPIUM BROMIDE 18 UG/1
18 CAPSULE ORAL; RESPIRATORY (INHALATION) DAILY
Qty: 90 CAPSULE | Refills: 4 | Status: SHIPPED | OUTPATIENT
Start: 2017-12-07 | End: 2018-05-16 | Stop reason: SDUPTHER

## 2017-12-07 RX ORDER — ALBUTEROL SULFATE 90 UG/1
2 AEROSOL, METERED RESPIRATORY (INHALATION) EVERY 6 HOURS
Qty: 3 INHALER | Refills: 4 | Status: SHIPPED | OUTPATIENT
Start: 2017-12-07 | End: 2018-05-16 | Stop reason: SDUPTHER

## 2017-12-07 NOTE — PROGRESS NOTES
Subjective:       Patient ID: Good Murillo Jr. is a 89 y.o. male.    Chief Complaint: He       COPD    COPD   Associated symptoms include congestion, coughing and fatigue. Pertinent negatives include no abdominal pain, chest pain, fever, nausea, rash or weakness.      Chest Pain  Good Murillo Jr. complains of chest pain. Onset was 2 years ago. Symptoms have been unchanged since that time. The patient's pain is intermittent and is associated with activities. The patient describes the pain as sharp and does not radiate. Patient rates pain as a 9/10 in intensity. Associated symptoms are: chest pressure/discomfort. Aggravating factors are: none. Alleviating factors are: none. Patient's cardiac risk factors are: advanced age (older than 55 for men, 65 for women). Patient's risk factors for DVT/PE: none. Previous cardiac testing: chest x-ray.        COPD  He presents for evaluation and treatment of COPD.  Some discomfort il left lower chest      The patient is not currently have symptoms / an exacerbation. The patient has COPD for approximately 10 years. Symptoms in previous episodes have included dyspnea, cough, wheezing and sinus congestion, and typically last 2 weeks. Previous episodes have been exacerbated by moderate activity. Current treatment includes albuterol inhaler and ipratropium inhaler, which generally provides some relief of symptoms.   He uses 1 pillows at night. Patient currently is on oxygen at 2 L/min per nasal cannula..uses prn.  The patient is having constitutional symptoms, denying fever, chills, anorexia, but has  weight loss - 154 to 128. The patient has been hospitalized for this condition before. He quit smoking approximately 25 years ago. The patient is experiencing exercise intolerance (difficulty walking 1 blocks on flat ground)    Wants to re qualify for oxygen .  Orders for overnight O2 sat placed     Did not get the overnight O2 sat and office note and approval for delivery from patient   to [employer] within the 30 day window      Past Medical History:   Diagnosis Date    Abnormal EKG 2014    Allergy     Arthritis     back    Bilateral carotid artery disease 2017    Cholelithiasis     US retroperitoneal 2016    COPD (chronic obstructive pulmonary disease)     Diabetes with neurologic complications     Diverticulitis     Diverticulosis     hosp/divert bleed    Ex-smoker     Glaucoma     suspect    Heart murmur     History of pneumothorax     bilat    HTN (hypertension)     Hyperlipidemia     Hyperlipidemia 2014    Hypothyroid     Iron deficiency anemia     nathan    Osteoporosis, unspecified  zeenat     Polyneuropathy     Prostate CA     PVD (peripheral vascular disease) 2014    Type 2 diabetes with peripheral circulatory disorder, controlled     Type 2 diabetes, controlled, with neuropathy Diagnosed     Urinary incontinence      Past Surgical History:   Procedure Laterality Date    CATARACT EXTRACTION       SECTION      PCIOL OU Bilateral 2011 OD/2010 OS    DR. DELGADILLO    PROSTATE SURGERY  1990s approx    brachytherapy     Social History     Social History    Marital status:      Spouse name: N/A    Number of children: 2    Years of education: N/A     Occupational History    retired      self employed     Social History Main Topics    Smoking status: Former Smoker     Quit date: 1993    Smokeless tobacco: Never Used    Alcohol use Yes      Comment: Champagne on     Drug use: No    Sexual activity: No     Other Topics Concern    Not on file     Social History Narrative    Patient is a  and lives with his friend and sister. He is retired he was self employed.     Review of Systems   Constitutional: Positive for fatigue. Negative for fever.   HENT: Positive for postnasal drip, rhinorrhea and congestion.    Eyes: Negative for redness and itching.   Respiratory:  Positive for cough, sputum production, shortness of breath, dyspnea on extertion, use of rescue inhaler and Paroxysmal Nocturnal Dyspnea.    Cardiovascular: Negative for chest pain, palpitations and leg swelling.   Genitourinary: Negative for difficulty urinating and hematuria.   Endocrine: Negative for cold intolerance and heat intolerance.    Skin: Negative for rash.   Gastrointestinal: Negative for nausea and abdominal pain.   Neurological: Negative for dizziness, syncope, weakness and light-headedness.   Hematological: Negative for adenopathy. Does not bruise/bleed easily.   Psychiatric/Behavioral: Negative for sleep disturbance. The patient is not nervous/anxious.        Objective:      Physical Exam   Constitutional: He is oriented to person, place, and time. He appears well-developed and well-nourished.   HENT:   Head: Normocephalic and atraumatic.   Eyes: Conjunctivae are normal. Pupils are equal, round, and reactive to light.   Neck: Neck supple. No JVD present. No tracheal deviation present. No thyromegaly present.   Cardiovascular: Normal rate and regular rhythm.    No murmur heard.  Pulmonary/Chest: He has decreased breath sounds. He has wheezes in the right lower field and the left lower field. He has no rhonchi. He has no rales.   Abdominal: Soft. Bowel sounds are normal.   Musculoskeletal: Normal range of motion. He exhibits no edema or tenderness.   Lymphadenopathy:     He has no cervical adenopathy.   Neurological: He is alert and oriented to person, place, and time.   Skin: Skin is warm and dry.   Nursing note and vitals reviewed.    Personal Diagnostic Review  Chest x-ray: hyperinflation    Spirometry: severe obstruction    6 Minute Walk  Interpretation:  Total distance walked in six minutes is moderately reduced indicating a reduction in overall  functional capacity. There was no significant oxygen desaturation. Clinical correlation suggested.    Agustin Dean MD  No flowsheet data found.       Assessment:       1. Nocturnal hypoxemia    2. Pulmonary emphysema, unspecified emphysema type    3. Panlobular emphysema        Outpatient Encounter Prescriptions as of 12/7/2017   Medication Sig Dispense Refill    albuterol 90 mcg/actuation inhaler Inhale 2 puffs into the lungs every 6 (six) hours. 3 Inhaler 4    alendronate (FOSAMAX) 70 MG tablet Take 1 tablet (70 mg total) by mouth every 7 days. 12 tablet 3    aspirin 81 mg Tab Take 1 tablet by mouth Daily. Over counter. To help prevent stroke/ heart attack      blood sugar diagnostic Strp 1 strip by Misc.(Non-Drug; Combo Route) route 3 (three) times daily. True Metrix test strips 102 strip 11    calcium carbonate (OS-TANGELA) 500 mg calcium (1,250 mg) tablet Take 1 tablet by mouth once daily.      ferrous sulfate 325 mg (65 mg iron) Tab tablet Take 1 tablet (325 mg total) by mouth once daily. 90 tablet 1    lancets 33 gauge Misc 1 lancet by Misc.(Non-Drug; Combo Route) route 3 (three) times daily. 100 each 11    LANTUS SOLOSTAR 100 unit/mL (3 mL) InPn pen INJECT 10 UNITS SUBCUTANEOUSLY ONCE DAILY 15 mL 0    levothyroxine (SYNTHROID) 25 MCG tablet Take 25 mcg by mouth before breakfast.       loratadine (CLARITIN) 10 mg tablet Take 1 tablet by mouth Once daily as needed.      metFORMIN (GLUCOPHAGE) 1000 MG tablet TAKE 1 TABLET BY MOUTH TWO TIMES A  tablet 1    nateglinide (STARLIX) 60 MG tablet Take 1 tablet (60 mg total) by mouth 3 (three) times daily before meals. 270 tablet 3    pantoprazole (PROTONIX) 40 MG tablet Take 1 tablet (40 mg total) by mouth once daily. 20 tablet 0    pravastatin (PRAVACHOL) 40 MG tablet Take 1 tablet (40 mg total) by mouth once daily. 90 tablet 3    TENS unit and electrodes Cmpk 1 Device by Misc.(Non-Drug; Combo Route) route daily as needed. 1 each 0    tiotropium (SPIRIVA WITH HANDIHALER) 18 mcg inhalation capsule Inhale 1 capsule (18 mcg total) into the lungs once daily. Controller 90 capsule 4     [DISCONTINUED] albuterol 90 mcg/actuation inhaler Inhale 2 puffs into the lungs every 6 (six) hours. 3 Inhaler 3    [DISCONTINUED] fluticasone-vilanterol (BREO ELLIPTA) 200-25 mcg/dose DsDv diskus inhaler Inhale 1 puff into the lungs once daily. Controller 180 each 3    [DISCONTINUED] SPIRIVA WITH HANDIHALER 18 mcg inhalation capsule INHALE ONE CAPSULE BY MOUTH EVERY DAY 90 capsule 2     Facility-Administered Encounter Medications as of 12/7/2017   Medication Dose Route Frequency Provider Last Rate Last Dose    cyanocobalamin injection 1,000 mcg  1,000 mcg Intramuscular Q30 Days Brandt Shelton MD   1,000 mcg at 09/06/17 1035     Orders Placed This Encounter   Procedures    Spirometry with/without bronchodilator     Standing Status:   Future     Standing Expiration Date:   12/7/2018    PULSE OXIMETRY OVERNIGHT     Order Specific Question:   Reason for Test?     Answer:   O2 Qualification     Comments:   On room air     Order Specific Question:   Symptoms:     Answer:   Daytime Fatigue     Plan:       Requested Prescriptions     Signed Prescriptions Disp Refills    albuterol 90 mcg/actuation inhaler 3 Inhaler 4     Sig: Inhale 2 puffs into the lungs every 6 (six) hours.    tiotropium (SPIRIVA WITH HANDIHALER) 18 mcg inhalation capsule 90 capsule 4     Sig: Inhale 1 capsule (18 mcg total) into the lungs once daily. Controller     Nocturnal hypoxemia  -     PULSE OXIMETRY OVERNIGHT    Pulmonary emphysema, unspecified emphysema type  -     Spirometry with/without bronchodilator; Future; Expected date: 06/09/2018    Panlobular emphysema  -     albuterol 90 mcg/actuation inhaler; Inhale 2 puffs into the lungs every 6 (six) hours.  Dispense: 3 Inhaler; Refill: 4  -     tiotropium (SPIRIVA WITH HANDIHALER) 18 mcg inhalation capsule; Inhale 1 capsule (18 mcg total) into the lungs once daily. Controller  Dispense: 90 capsule; Refill: 4               Return in about 6 months (around 6/7/2018) for natalie.    MEDICAL  DECISION MAKING: Moderate to high complexity.  Overall, the multiple problems listed are of moderate to high severity that may impact quality of life and activities of daily living. Side effects of medications, treatment plan as well as options and alternatives reviewed and discussed with patient. There was counseling of patient concerning these issues.    Total time spent in face to face counseling and coordination of care - 40  minutes over 50% of time was used in discussion of prognosis, risks, benefits of treatment, instructions and compliance with regimen . Discussion with other physicians or health care providers (DME, NP, pharmacy, respiratory therapy) occurred.

## 2017-12-07 NOTE — PROCEDURES
"[facility]  Six Minute Walk     SUMMARY     Ordering Provider: Md Jermaine      Performing nurse/tech/RT: GERALDINE BALL  Diagnosis: COPD  Height: 5' 7.5" (171.5 cm)  Weight: 62.3 kg (137 lb 5.6 oz)  BMI (Calculated): 21.2   Patient Race:             Phase Oxygen Assessment Supplemental O2 Heart   Rate Blood Pressure Sari Dyspnea Scale Rating   Resting 96 % Room Air 93 bpm 138/80 3   Exercise        Minute        1 94 % Room Air 102 bpm     2 95 % Room Air 104 bpm     3 93 % Room Air 108 bpm     4 93 % Room Air 108 bpm     5 93 % Room Air 111 bpm     6  94 % Room Air 102 bpm 150/67 3   Recovery        Minute        1 95 % Room Air 97 bpm     2 96 % Room Air 95 bpm     3 95 % Room Air 88 bpm     4 88 % Room Air 88 bpm 130/60 2     Six Minute Walk Summary  6MWT Status: completed without stopping  Patient Reported: No complaints     Interpretation:  Did the patient stop or pause?: No       Total Time Walked (Calculated): 360 seconds  Final Partial Lap Distance (feet): 150 feet  Total Distance Meters (Calculated): 228.6 meters  Predicted Distance Meters (Calculated): 427.81 meters  Percentage of Predicted (Calculated): 53.43  Peak VO2 (Calculated): 10.84  Mets: 3.1  Has The Patient Had a Previous Six Minute Walk Test?: Yes       Previous 6MWT Results  Has The Patient Had a Previous Six Minute Walk Test?: Yes  Date of Previous Test: 06/15/17  Total Time Walked: 360 seconds  Total Distance (meters): 259.08  Predicted Distance (meters): 448.42 meters  Percentage of Predicted: 57.78  Percent Change (Calculated): 0.12      Interpretation:  Total distance walked in six minutes is moderately reduced indicating a reduction in overall  functional capacity.   There was mild exercise induced hypoxemia with  oxygen desaturation to 93%.    Oxygen desaturation did not meet criteria for supplemental oxygen prescription.    Clinical correlation suggested.    Agustin Dean MD    Mild exercise-induced hypoxemia " described as an arterial oxygen saturation of 93-95% (or 3-4% less than at rest), moderate exercise-induced hypoxemia as 89-93%, and severe exercise induced hypoxemia as < 89% O2 saturation.  Medicare Criteria Comments:   When arterial oxygen saturation is at or below 88% during exercise (severe exercise induced hypoxemia) then the patient falls under Medicare Group 1 criteria for supplemental oxygen.  Details about Medicare Group Criteria coverage can be found at http://www.cms.hhs.gov/manuals/downloads/

## 2017-12-08 ENCOUNTER — PATIENT MESSAGE (OUTPATIENT)
Dept: PULMONOLOGY | Facility: CLINIC | Age: 82
End: 2017-12-08

## 2017-12-11 DIAGNOSIS — G47.34 NOCTURNAL HYPOXEMIA: Primary | ICD-10-CM

## 2017-12-11 NOTE — PROCEDURES
[facility]  [address] * [place] [address]  Telephone: [phone]  Test date: 17 Start: 17 01:21:04 Good Murillo  Doctor: Jermaine End: 17 06:39:20 [id number]  Oximetry: Summary Report  Comments: Overnight study breathing room air.  Recording time: 05:18:16 Highest pulse: 141 Highest SpO2: 95%  Excluded samplin:50:12 Lowest pulse: 72 Lowest SpO2: 84%  Total valid samplin:28:04 Mean pulse: 82 Mean SpO2: 90.0%  1 S.D.: 5.5 1 S.D.: 1.4  Time with SpO2<90: 1:28:28, 33.0%  Time with SpO2<80: 0:00:00, 0.0%  Time with SpO2<70: 0:00:00, 0.0%  Time with SpO2<60: 0:00:00, 0.0%  Time with SpO2<88: 0:07:48, 2.9%  Time with SpO2 =>90: 2:59:36, 67.0%  Time with SpO2=>80 & <90: 1:28:28, 33.0%  Time with SpO2=>70 & <80: 0:00:00, 0.0%  Time with SpO2=>60 & <70: 0:00:00, 0.0%  The longest continuous time with saturation <=88 was 00:03:56, which started at  17 05:04:44.  A desaturation event was defined as a decrease of saturation by 4 or more.  One event was excluded due to artifact.  There were 13 desaturation events over 3 minutes duration.  There were 34 desaturation events of less than 3 minutes duration during which:  The mean high was 92.3%. The mean low was 87.4%.  The number of these events that were:  > 0 & <10 seconds: 1 > 0 seconds: 34  =>10 & <20 seconds: 11 =>10 seconds: 33  =>20 & <30 seconds: 5 =>20 seconds: 22  =>30 & <40 seconds: 0 =>30 seconds: 17  =>40 & <50 seconds: 5 =>40 seconds: 17  =>50 & <60 seconds: 2 =>50 seconds: 12  =>60 seconds: 10 =>60 seconds: 10  The mean length of desaturation events that were >=10 sec & <=3 mins was: 49.9 sec.  Desaturation event index (events >=10 sec per sampled hour): 7.4  Desaturation event index (events >= 0 sec per sampled hour): 7.6  © 5669-5555 [employer] Oximetry version Standard [id number].  Oximeter: Respironics 920M Plus memory, 4 second resolution.  [facility]  [address] [place] [address] Ave. * [place]  [place] [address]  Telephone: [phone]  Test date: 12/08/17 Start: 12/08/17 01:21:04 Good Murillo  Doctor: Jermaine End: 12/08/17 06:39:20 [id number]  Oximetry: % Times Graphic Report  Comments: Overnight study breathing room air.  SATURATION  100  95  90  85  80  75  70  65  60  55  50  45  40  0 5 10 20 30 40 50  PERCENT OF TOTAL TIME  SATURATION  100  95  90  85  80  75  70  65  60  55  50  45  40  0 5 10 15 20 30 40 60 80 100  CUMULATIVE % TIME  © 5096-8967 [employer] Oximetry version Standard VO4446.  Oximeter: Respir[employer]s 920M Plus memory, 4 second resolution.  [facility] [address] * [place] [address]  Telephone: [phone]  Test date: 12/08/17 Start: 12/08/17 01:21:04 Good Murillo  Doctor: Jermaine End: 12/08/17 06:39:20 [id number]  Oximetry: % Times Text Report  Comments: Overnight study breathing room air.  SpO2:100 0.0 % time  SpO2: 99 0.0 % time SpO2: 89 21.1 % time SpO2: 79 % time  SpO2: 98 0.0 % time SpO2: 88 9.0 % time SpO2: 78 % time  SpO2: 97 0.0 % time SpO2: 87 2.1 % time SpO2: 77 % time  SpO2: 96 0.0 % time SpO2: 86 0.4 % time SpO2: 76 % time  SpO2: 95 0.0 % time SpO2: 85 0.2 % time SpO2: 75 % time  SpO2: 94 1.1 % time SpO2: 84 0.2 % time SpO2: 74 % time  SpO2: 93 3.5 % time SpO2: 83 % time SpO2: 73 % time  SpO2: 92 8.6 % time SpO2: 82 % time SpO2: 72 % time  SpO2: 91 21.7 % time SpO2: 81 % time SpO2: 71 % time  SpO2: 90 32.1 % time SpO2: 80 % time SpO2: 70 % time  Total 90's 67.0 % time Total 80's 33.0 % time Total 70's 0.0 % time  SpO2: 69 % time SpO2: 59 % time SpO2: 49 % time  SpO2: 68 % time SpO2: 58 % time SpO2: 48 % time  SpO2: 67 % time SpO2: 57 % time SpO2: 47 % time  SpO2: 66 % time SpO2: 56 % time SpO2: 46 % time  SpO2: 65 % time SpO2: 55 % time SpO2: 45 % time  SpO2: 64 % time SpO2: 54 % time SpO2: 44 % time  SpO2: 63 % time SpO2: 53 % time SpO2: 43 % time  SpO2: 62 % time SpO2: 52 % time SpO2: 42 % time  SpO2: 61 % time SpO2: 51 % time SpO2: 41  % time  SpO2: 60 % time SpO2: 50 % time SpO2: 40 % time  Total 60's 0.0 % time Total 50's 0.0 % time Total 40's 0.0 % time  SpO2 <40 0.0 % time  Pulse range > 199 0.0 % time  Pulse range 180 - 199 0.0 % time  Pulse range 160 - 179 0.0 % time  Pulse range 140 - 159 0.0 % time  Pulse range 120 - 139 0.5 % time  Pulse range 100 - 119 0.0 % time  Pulse range 90 - 99 5.9 % time  Pulse range 80 - 89 64.5 % time  Pulse range 70 - 79 29.0 % time  Pulse range 60 - 69 0.0 % time  Pulse range 50 - 59 0.0 % time  Pulse range < 50 0.0 % time  © 5215-3264 [employer] Oximetry version Standard [id number].  Oximeter: [employer] 920M Plus memory, 4 second resolution.  [facility] [address] * [place] [address]  Telephone: [phone]  Test date: 12/08/17 Start: 12/08/17 01:21:04 Good Murillo  Doctor: Jermaine End: 12/08/17 06:39:20 [id number]  Oximetry: Desaturation Report  Comments: Overnight study breathing room air.  The following lists the 34 desaturation events. The event durations had to be  within 3 minutes to qualify, and the event onset was defined as a decrease in SpO2  by 4 or more.  Saturation: Pulse Range:  Start date Start time End time Duration Onset Low Low High  1 12/08/17 01:22:00 01:23:16 00:01:16 94 89 84 - 86  2 12/08/17 01:26:32 01:26:48 00:00:16 94 88 84 - 85  3 12/08/17 01:30:52 01:31:04 00:00:12 94 90 87 - 88  4 12/08/17 01:33:52 01:36:40 00:02:48 95 86 85 - 101  5 12/08/17 01:37:44 01:38:00 00:00:16 90 86 85 - 87  6 12/08/17 01:39:36 01:39:56 00:00:20 94 89 82 - 87  7 12/08/17 01:46:00 01:46:16 00:00:16 93 89 80 - 82  8 12/08/17 01:53:12 01:54:08 00:00:56 93 87 82 - 88  9 12/08/17 01:56:40 01:58:40 00:02:00 93 88 76 - 85  10 12/08/17 01:58:56 01:59:12 00:00:16 92 88 77 - 79  11 12/08/17 02:00:00 02:00:28 00:00:28 92 88 76 - 77  12 12/08/17 02:01:32 02:01:52 00:00:20 93 89 74 - 79  13 12/08/17 02:02:08 02:02:24 00:00:16 93 88 76 - 78  14 12/08/17 02:04:52 02:06:28 00:01:36  93 86 77 - 86  15 12/08/17 02:07:16 02:07:56 00:00:40 92 88 78 - 85  16 12/08/17 02:08:44 02:09:04 00:00:20 93 88 77 - 80  17 12/08/17 02:10:12 02:10:24 00:00:12 92 88 77 - 79  18 12/08/17 02:23:28 02:24:12 00:00:44 92 88 79 - 83  19 12/08/17 02:58:48 02:59:28 00:00:40 93 87 80 - 85  20 12/08/17 02:59:44 03:00:40 00:00:56 91 87 80 - 86  21 12/08/17 03:20:24 03:21:28 00:01:04 89 84 87 - 96  22 12/08/17 04:23:52 04:25:28 00:01:36 91 87 79 - 85  23 12/08/17 05:08:20 05:08:32 00:00:12 88 84 86 - 86  24 12/08/17 06:15:16 06:15:56 00:00:40 91 87 81 - 84  25 12/08/17 06:16:16 06:16:36 00:00:20 91 86 82 - 87  26 12/08/17 06:18:36 06:18:48 00:00:12 92 88 83 - 89  27 12/08/17 06:19:24 06:21:12 00:01:48 93 87 79 - 88  28 12/08/17 06:22:44 06:22:52 00:00:08 92 86 83 - 90  29 12/08/17 06:24:16 06:26:44 00:02:28 93 88 80 - 133  30 12/08/17 06:30:08 06:31:40 00:01:32 93 88 81 - 85  31 12/08/17 06:32:04 06:32:20 00:00:16 92 88 79 - 81  32 12/08/17 06:33:24 06:33:36 00:00:12 92 87 79 - 80  33 12/08/17 06:34:16 06:35:52 00:01:36 94 88 82 - 96  34 12/08/17 06:36:16 06:37:00 00:00:44 92 88 92 - 99  © 7341-8754 [employer] Oximetry version Standard [id number].  Oximeter: Respironics 920M Plus memory, 4 second resolution.  [facility]  [address] * [place] [address]  Telephone: [phone]  Test date: 12/08/17 Start: 12/08/17 01:21:04 Good Murillo  Doctor: Jermaine End: 12/08/17 06:39:20 [id number]  Oximetry: 8 hours per page  Comments: Overnight study breathing room air.  HOUR 12/08/17 HOUR 12/08/17  1:30 5:30  2:00 6:00  2:30 6:30  3:00 7:00  3:30 7:30  4:00 8:00  4:30 8:30  5:00 9:00  60 100  PULSE  140 180 60 100  PULSE  70 80 140 180  SpO2  90 100 70 80  SpO2  90 100  © 2666-0190 [employer] Oximetry version Standard [id number].  Oximeter: Respironics 920M Plus memory, 4 second resolution.  [facility]  [address] * [place] [address]  Telephone: [phone]  Test  date: 12/08/17 Start: 12/08/17 01:21:04 Good Murillo  Doctor: Jermaine End: 12/08/17 06:39:20 [id number]  Oximetry: 2 hours lowest average SpO2  Comments: Overnight study breathing room air.  HOUR 12/08/17 HOUR 12/08/17  4:30 5:30  4:45 5:45  5:00 6:00  5:15 6:15  60 100  PULSE  140 180 60 100  PULSE  70 80 140 180  SpO2  90 100 70 80  SpO2  90 100  © 6486-3711 [employer] Oximetry version Standard [id number].  Oximeter: [employer] 920M Plus memory, 4 second resolution.    Overnight Oxygen Saturation Study:    INTERPRETATION:  Conditions of Test: Room air stable outpatient.  Interpretation of results of overnight oxygen saturation study. This was a technically  adequate study. Overnight oxygen saturation study is abnormal with O2 saturation less than 88%.   Time with SpO2<88: 0:07:48, 2.9% of the study period. Lowest oxygen saturation recorded was 84% .  Clinical correlation suggested.  Agustin Dean MD    Medicare Criteria Comments:   Oximetry test results suggest the patient does  fall under Medicare Group 1 Criteria.   (Arterial oxygen saturation at or below 88% for at least 5 minutes taken during sleep)    Details about Medicare Group Criteria coverage can be found at http://www.cms.hhs.gov/manuals/downloads/

## 2017-12-12 ENCOUNTER — TELEPHONE (OUTPATIENT)
Dept: PULMONOLOGY | Facility: CLINIC | Age: 82
End: 2017-12-12

## 2017-12-12 NOTE — TELEPHONE ENCOUNTER
----- Message from Elsy Felton sent at 12/12/2017  9:25 AM CST -----  Contact: Carlene/bijan daughter   Caller was returning nurse call.  [phone]

## 2017-12-25 RX ORDER — OMEPRAZOLE 40 MG/1
CAPSULE, DELAYED RELEASE ORAL
Qty: 30 CAPSULE | Refills: 8 | OUTPATIENT
Start: 2017-12-25

## 2018-01-16 ENCOUNTER — PES CALL (OUTPATIENT)
Dept: ADMINISTRATIVE | Facility: CLINIC | Age: 83
End: 2018-01-16

## 2018-01-19 DIAGNOSIS — K92.1 BLOOD IN STOOL: ICD-10-CM

## 2018-01-19 RX ORDER — PANTOPRAZOLE SODIUM 40 MG/1
TABLET, DELAYED RELEASE ORAL
Qty: 20 TABLET | Refills: 0 | OUTPATIENT
Start: 2018-01-19

## 2018-01-20 ENCOUNTER — OFFICE VISIT (OUTPATIENT)
Dept: URGENT CARE | Facility: CLINIC | Age: 83
End: 2018-01-20
Payer: MEDICARE

## 2018-01-20 VITALS
TEMPERATURE: 99 F | HEART RATE: 60 BPM | DIASTOLIC BLOOD PRESSURE: 70 MMHG | OXYGEN SATURATION: 98 % | SYSTOLIC BLOOD PRESSURE: 110 MMHG | WEIGHT: 141.19 LBS | HEIGHT: 68 IN | BODY MASS INDEX: 21.4 KG/M2

## 2018-01-20 DIAGNOSIS — J31.0 RHINITIS, UNSPECIFIED CHRONICITY, UNSPECIFIED TYPE: ICD-10-CM

## 2018-01-20 DIAGNOSIS — R82.90 ABNORMAL URINE ODOR: ICD-10-CM

## 2018-01-20 DIAGNOSIS — N39.0 URINARY TRACT INFECTION WITH HEMATURIA, SITE UNSPECIFIED: ICD-10-CM

## 2018-01-20 DIAGNOSIS — R31.9 URINARY TRACT INFECTION WITH HEMATURIA, SITE UNSPECIFIED: ICD-10-CM

## 2018-01-20 DIAGNOSIS — R68.89 NOT FEELING GREAT: Primary | ICD-10-CM

## 2018-01-20 LAB
BILIRUB SERPL-MCNC: ABNORMAL MG/DL
BLOOD URINE, POC: ABNORMAL
COLOR, POC UA: YELLOW
CTP QC/QA: YES
FLUAV AG NPH QL: NEGATIVE
FLUBV AG NPH QL: NEGATIVE
GLUCOSE UR QL STRIP: 1000
KETONES UR QL STRIP: ABNORMAL
LEUKOCYTE ESTERASE URINE, POC: + 2
NITRITE, POC UA: POSITIVE
PH, POC UA: 5
PROTEIN, POC: 100
SPECIFIC GRAVITY, POC UA: 1.02
UROBILINOGEN, POC UA: NORMAL

## 2018-01-20 PROCEDURE — 87077 CULTURE AEROBIC IDENTIFY: CPT

## 2018-01-20 PROCEDURE — 99214 OFFICE O/P EST MOD 30 MIN: CPT | Mod: 25,S$GLB,, | Performed by: NURSE PRACTITIONER

## 2018-01-20 PROCEDURE — 87186 SC STD MICRODIL/AGAR DIL: CPT

## 2018-01-20 PROCEDURE — 87086 URINE CULTURE/COLONY COUNT: CPT

## 2018-01-20 PROCEDURE — 99999 PR PBB SHADOW E&M-EST. PATIENT-LVL V: CPT | Mod: PBBFAC,,, | Performed by: NURSE PRACTITIONER

## 2018-01-20 PROCEDURE — 87804 INFLUENZA ASSAY W/OPTIC: CPT | Mod: QW,S$GLB,, | Performed by: NURSE PRACTITIONER

## 2018-01-20 PROCEDURE — 87088 URINE BACTERIA CULTURE: CPT

## 2018-01-20 PROCEDURE — 81001 URINALYSIS AUTO W/SCOPE: CPT | Mod: S$GLB,,, | Performed by: NURSE PRACTITIONER

## 2018-01-20 RX ORDER — CIPROFLOXACIN 250 MG/1
250 TABLET, FILM COATED ORAL 2 TIMES DAILY
Qty: 14 TABLET | Refills: 0 | Status: SHIPPED | OUTPATIENT
Start: 2018-01-20 | End: 2018-01-27

## 2018-01-20 RX ORDER — FLUTICASONE PROPIONATE 50 MCG
2 SPRAY, SUSPENSION (ML) NASAL DAILY
Qty: 1 BOTTLE | Refills: 0 | Status: SHIPPED | OUTPATIENT
Start: 2018-01-20 | End: 2018-02-03

## 2018-01-20 NOTE — PROGRESS NOTES
Subjective:       Patient ID: Good Murillo Jr. is a 89 y.o. male.    Chief Complaint: Nasal Congestion and Sinusitis    Patient present with not feeling and rhinorrhea.  No new coughing or shortness of breath.  Has tried saline rinses with mild improvement.  Reports having odorous urine.       Review of Systems   Constitutional: Negative for chills and fatigue.   HENT: Positive for rhinorrhea. Negative for congestion.    Respiratory: Positive for cough. Negative for shortness of breath.    Musculoskeletal: Positive for gait problem. Negative for arthralgias and joint swelling.   Psychiatric/Behavioral: Negative for agitation and confusion.       Objective:      Physical Exam   Constitutional: He is oriented to person, place, and time. Vital signs are normal. He appears well-developed and well-nourished.   HENT:   Head: Normocephalic and atraumatic.   Neck: Normal range of motion.   Cardiovascular: Normal rate and regular rhythm.    Pulmonary/Chest: Effort normal and breath sounds normal.   Musculoskeletal: Normal range of motion.   Neurological: He is alert and oriented to person, place, and time.   Skin: Skin is warm.   Psychiatric: He has a normal mood and affect. His behavior is normal.       Assessment:       1. Not feeling great    2. Rhinitis, unspecified chronicity, unspecified type    3. Abnormal urine odor    4. Urinary tract infection with hematuria, site unspecified        Plan:       Not feeling great  -     POCT Influenza A/B    Rhinitis, unspecified chronicity, unspecified type  -     POCT Influenza A/B  -     fluticasone (FLONASE) 50 mcg/actuation nasal spray; 2 sprays (100 mcg total) by Each Nare route once daily.  Dispense: 1 Bottle; Refill: 0    Abnormal urine odor  -     POCT urinalysis, dipstick or tablet reag  -     Urine culture; Future; Expected date: 01/20/2018    Urinary tract infection with hematuria, site unspecified  -     ciprofloxacin HCl (CIPRO) 250 MG tablet; Take 1 tablet (250 mg  total) by mouth 2 (two) times daily.  Dispense: 14 tablet; Refill: 0  -     Urine culture; Future; Expected date: 01/20/2018

## 2018-01-22 DIAGNOSIS — K92.1 BLOOD IN STOOL: ICD-10-CM

## 2018-01-23 ENCOUNTER — PATIENT OUTREACH (OUTPATIENT)
Dept: ADMINISTRATIVE | Facility: HOSPITAL | Age: 83
End: 2018-01-23

## 2018-01-24 ENCOUNTER — OFFICE VISIT (OUTPATIENT)
Dept: INTERNAL MEDICINE | Facility: CLINIC | Age: 83
End: 2018-01-24
Payer: MEDICARE

## 2018-01-24 ENCOUNTER — LAB VISIT (OUTPATIENT)
Dept: LAB | Facility: HOSPITAL | Age: 83
End: 2018-01-24
Attending: FAMILY MEDICINE
Payer: MEDICARE

## 2018-01-24 VITALS
SYSTOLIC BLOOD PRESSURE: 138 MMHG | TEMPERATURE: 97 F | WEIGHT: 138 LBS | DIASTOLIC BLOOD PRESSURE: 74 MMHG | HEIGHT: 68 IN | BODY MASS INDEX: 20.92 KG/M2 | HEART RATE: 96 BPM

## 2018-01-24 DIAGNOSIS — E03.4 HYPOTHYROIDISM DUE TO ACQUIRED ATROPHY OF THYROID: Chronic | ICD-10-CM

## 2018-01-24 DIAGNOSIS — I77.9 BILATERAL CAROTID ARTERY DISEASE: ICD-10-CM

## 2018-01-24 DIAGNOSIS — J43.9 PULMONARY EMPHYSEMA, UNSPECIFIED EMPHYSEMA TYPE: ICD-10-CM

## 2018-01-24 DIAGNOSIS — E53.8 VITAMIN B12 DEFICIENCY: ICD-10-CM

## 2018-01-24 DIAGNOSIS — K92.1 BLOOD IN STOOL: ICD-10-CM

## 2018-01-24 DIAGNOSIS — I70.203 ATHEROSCLEROSIS OF NATIVE ARTERY OF BOTH LOWER EXTREMITIES, WITH UNSPECIFIED PRESENCE OF CLINICAL MANIFESTATION: ICD-10-CM

## 2018-01-24 DIAGNOSIS — E11.42 TYPE 2 DIABETES MELLITUS WITH DIABETIC POLYNEUROPATHY, WITH LONG-TERM CURRENT USE OF INSULIN: ICD-10-CM

## 2018-01-24 DIAGNOSIS — E11.59 HYPERTENSION ASSOCIATED WITH DIABETES: Chronic | ICD-10-CM

## 2018-01-24 DIAGNOSIS — E11.42 TYPE 2 DIABETES MELLITUS WITH DIABETIC POLYNEUROPATHY, WITH LONG-TERM CURRENT USE OF INSULIN: Primary | ICD-10-CM

## 2018-01-24 DIAGNOSIS — Z79.4 TYPE 2 DIABETES MELLITUS WITH DIABETIC POLYNEUROPATHY, WITH LONG-TERM CURRENT USE OF INSULIN: Primary | ICD-10-CM

## 2018-01-24 DIAGNOSIS — I15.2 HYPERTENSION ASSOCIATED WITH DIABETES: Chronic | ICD-10-CM

## 2018-01-24 DIAGNOSIS — Z79.4 TYPE 2 DIABETES MELLITUS WITH DIABETIC POLYNEUROPATHY, WITH LONG-TERM CURRENT USE OF INSULIN: ICD-10-CM

## 2018-01-24 PROBLEM — J84.10 GRANULOMATOUS LUNG DISEASE: Status: RESOLVED | Noted: 2017-01-11 | Resolved: 2018-01-24

## 2018-01-24 PROBLEM — I27.9 PULMONARY HEART DISEASE: Status: RESOLVED | Noted: 2017-01-11 | Resolved: 2018-01-24

## 2018-01-24 LAB
BACTERIA UR CULT: NORMAL
CREAT UR-MCNC: 73 MG/DL
MICROALBUMIN UR DL<=1MG/L-MCNC: 62 UG/ML
MICROALBUMIN/CREATININE RATIO: 84.9 UG/MG

## 2018-01-24 PROCEDURE — 99214 OFFICE O/P EST MOD 30 MIN: CPT | Mod: 25,S$GLB,, | Performed by: FAMILY MEDICINE

## 2018-01-24 PROCEDURE — 99999 PR PBB SHADOW E&M-EST. PATIENT-LVL III: CPT | Mod: PBBFAC,,, | Performed by: FAMILY MEDICINE

## 2018-01-24 PROCEDURE — 96372 THER/PROPH/DIAG INJ SC/IM: CPT | Mod: S$GLB,,, | Performed by: FAMILY MEDICINE

## 2018-01-24 PROCEDURE — 99499 UNLISTED E&M SERVICE: CPT | Mod: S$GLB,,, | Performed by: FAMILY MEDICINE

## 2018-01-24 PROCEDURE — 82043 UR ALBUMIN QUANTITATIVE: CPT

## 2018-01-24 RX ORDER — CYANOCOBALAMIN 1000 UG/ML
100 INJECTION, SOLUTION INTRAMUSCULAR; SUBCUTANEOUS ONCE
Status: DISCONTINUED | OUTPATIENT
Start: 2018-01-24 | End: 2018-01-24

## 2018-01-24 RX ORDER — GABAPENTIN 100 MG/1
100 CAPSULE ORAL NIGHTLY
Qty: 90 CAPSULE | Refills: 1 | Status: SHIPPED | OUTPATIENT
Start: 2018-01-24 | End: 2018-06-28 | Stop reason: SDUPTHER

## 2018-01-24 RX ORDER — CYANOCOBALAMIN 1000 UG/ML
1000 INJECTION, SOLUTION INTRAMUSCULAR; SUBCUTANEOUS ONCE
Status: COMPLETED | OUTPATIENT
Start: 2018-01-24 | End: 2018-01-24

## 2018-01-24 RX ORDER — PANTOPRAZOLE SODIUM 40 MG/1
TABLET, DELAYED RELEASE ORAL
Qty: 20 TABLET | Refills: 0 | OUTPATIENT
Start: 2018-01-24

## 2018-01-24 RX ORDER — PANTOPRAZOLE SODIUM 40 MG/1
40 TABLET, DELAYED RELEASE ORAL DAILY
Qty: 30 TABLET | Refills: 3 | Status: SHIPPED | OUTPATIENT
Start: 2018-01-24 | End: 2018-06-25 | Stop reason: SDUPTHER

## 2018-01-24 RX ORDER — PANTOPRAZOLE SODIUM 40 MG/1
40 TABLET, DELAYED RELEASE ORAL DAILY
Qty: 20 TABLET | Refills: 0 | Status: SHIPPED | OUTPATIENT
Start: 2018-01-24 | End: 2018-01-24 | Stop reason: SDUPTHER

## 2018-01-24 RX ADMIN — CYANOCOBALAMIN 1000 MCG: 1000 INJECTION, SOLUTION INTRAMUSCULAR; SUBCUTANEOUS at 02:01

## 2018-01-24 NOTE — PROGRESS NOTES
Subjective:       Patient ID: Good Murillo Jr. is a 89 y.o. male.    Chief Complaint: Annual Exam    Pt is a 89 year who is here for his follow-up. Pt has several chronic condtions to incldue COPD, HTN and DM this is not well controlled. Had a extensive conversation with pt regarding his sugar and balance between to tight control versus too high. Reviewed with pt immunizations as well      Review of Systems   Constitutional: Negative.    Respiratory: Negative.    Cardiovascular: Negative.    Genitourinary: Negative.    Neurological: Negative.    Hematological: Negative.        Objective:      Physical Exam   Constitutional: He is oriented to person, place, and time. He appears well-developed and well-nourished.   Neck: Normal range of motion. Neck supple.   Cardiovascular: Normal rate and regular rhythm.  Exam reveals no friction rub.    No murmur heard.  Pulmonary/Chest: Effort normal and breath sounds normal. He has no wheezes. He has no rales.   Abdominal: Soft. Bowel sounds are normal.   Musculoskeletal: Normal range of motion.   Neurological: He is alert and oriented to person, place, and time. He displays normal reflexes. He exhibits normal muscle tone.   Skin: Skin is warm and dry.       Assessment:       1. Type 2 diabetes mellitus with diabetic polyneuropathy, with long-term current use of insulin    2. Pulmonary emphysema, unspecified emphysema type    3. Hypertension associated with diabetes    4. Hypothyroidism due to acquired atrophy of thyroid    5. Vitamin B12 deficiency    6. Atherosclerosis of native artery of both lower extremities, with unspecified presence of clinical manifestation    7. Bilateral carotid artery disease    8. Blood in stool        Plan:       Type 2 diabetes mellitus with diabetic polyneuropathy, with long-term current use of insulin  Comments:  Not realistic to push sugars to hard. If still in the 8's will go up on Lantus  Orders:  -     Hemoglobin A1c; Future; Expected date:  01/24/2018  -     Microalbumin/creatinine urine ratio; Future; Expected date: 01/24/2018    Pulmonary emphysema, unspecified emphysema type  Comments:  Stable    Hypertension associated with diabetes  Comments:  BP is controlled  Orders:  -     Lipid panel; Future; Expected date: 01/24/2018    Hypothyroidism due to acquired atrophy of thyroid  Comments:  Will recheck his thyroid levels  Orders:  -     Comprehensive metabolic panel; Future; Expected date: 01/24/2018  -     T4, free; Future; Expected date: 01/24/2018  -     TSH; Future; Expected date: 01/24/2018    Vitamin B12 deficiency  Comments:  1000 mcg of b12 and will check level today  Orders:  -     Discontinue: cyanocobalamin injection 100 mcg; Inject 0.1 mLs (100 mcg total) into the muscle once.  -     Vitamin B12; Future; Expected date: 01/24/2018  -     cyanocobalamin injection 1,000 mcg; Inject 1 mL (1,000 mcg total) into the muscle once.    Atherosclerosis of native artery of both lower extremities, with unspecified presence of clinical manifestation  Comments:  Stable    Bilateral carotid artery disease  Comments:  Stable at this point    Blood in stool  Comments:  No reoccurances. Stable on protonix to control GERD  Orders:  -     Discontinue: pantoprazole (PROTONIX) 40 MG tablet; Take 1 tablet (40 mg total) by mouth once daily.  Dispense: 20 tablet; Refill: 0  -     pantoprazole (PROTONIX) 40 MG tablet; Take 1 tablet (40 mg total) by mouth once daily.  Dispense: 30 tablet; Refill: 3    Other orders  -     gabapentin (NEURONTIN) 100 MG capsule; Take 1 capsule (100 mg total) by mouth every evening.  Dispense: 90 capsule; Refill: 1

## 2018-01-26 ENCOUNTER — PATIENT MESSAGE (OUTPATIENT)
Dept: INTERNAL MEDICINE | Facility: CLINIC | Age: 83
End: 2018-01-26

## 2018-01-28 ENCOUNTER — TELEPHONE (OUTPATIENT)
Dept: INTERNAL MEDICINE | Facility: CLINIC | Age: 83
End: 2018-01-28

## 2018-01-29 NOTE — TELEPHONE ENCOUNTER
----- Message from Bety Espinosa sent at 1/29/2018  8:49 AM CST -----  Contact: self [phone]  States that he is returning call please call back at [phone]//thank you acc

## 2018-01-30 ENCOUNTER — OFFICE VISIT (OUTPATIENT)
Dept: INTERNAL MEDICINE | Facility: CLINIC | Age: 83
End: 2018-01-30
Payer: MEDICARE

## 2018-01-30 ENCOUNTER — LAB VISIT (OUTPATIENT)
Dept: LAB | Facility: HOSPITAL | Age: 83
End: 2018-01-30
Attending: FAMILY MEDICINE
Payer: MEDICARE

## 2018-01-30 ENCOUNTER — PATIENT OUTREACH (OUTPATIENT)
Dept: ADMINISTRATIVE | Facility: HOSPITAL | Age: 83
End: 2018-01-30

## 2018-01-30 VITALS
BODY MASS INDEX: 21.97 KG/M2 | SYSTOLIC BLOOD PRESSURE: 158 MMHG | HEIGHT: 67 IN | HEART RATE: 86 BPM | DIASTOLIC BLOOD PRESSURE: 68 MMHG | WEIGHT: 140 LBS | TEMPERATURE: 98 F

## 2018-01-30 DIAGNOSIS — N30.00 ACUTE CYSTITIS WITHOUT HEMATURIA: ICD-10-CM

## 2018-01-30 DIAGNOSIS — Z79.4 TYPE 2 DIABETES MELLITUS WITH DIABETIC POLYNEUROPATHY, WITH LONG-TERM CURRENT USE OF INSULIN: Primary | ICD-10-CM

## 2018-01-30 DIAGNOSIS — E11.42 TYPE 2 DIABETES MELLITUS WITH DIABETIC POLYNEUROPATHY, WITH LONG-TERM CURRENT USE OF INSULIN: Primary | ICD-10-CM

## 2018-01-30 LAB
BACTERIA #/AREA URNS HPF: ABNORMAL /HPF
BILIRUB UR QL STRIP: NEGATIVE
CLARITY UR: CLEAR
COLOR UR: YELLOW
GLUCOSE UR QL STRIP: ABNORMAL
HGB UR QL STRIP: NEGATIVE
KETONES UR QL STRIP: NEGATIVE
LEUKOCYTE ESTERASE UR QL STRIP: NEGATIVE
MICROSCOPIC COMMENT: ABNORMAL
NITRITE UR QL STRIP: NEGATIVE
PH UR STRIP: 7 [PH] (ref 5–8)
PROT UR QL STRIP: ABNORMAL
RBC #/AREA URNS HPF: 10 /HPF (ref 0–4)
SP GR UR STRIP: 1.01 (ref 1–1.03)
URN SPEC COLLECT METH UR: ABNORMAL
WBC #/AREA URNS HPF: 6 /HPF (ref 0–5)
YEAST URNS QL MICRO: ABNORMAL

## 2018-01-30 PROCEDURE — 3008F BODY MASS INDEX DOCD: CPT | Mod: S$GLB,,, | Performed by: FAMILY MEDICINE

## 2018-01-30 PROCEDURE — 87086 URINE CULTURE/COLONY COUNT: CPT

## 2018-01-30 PROCEDURE — 99999 PR PBB SHADOW E&M-EST. PATIENT-LVL III: CPT | Mod: PBBFAC,,, | Performed by: FAMILY MEDICINE

## 2018-01-30 PROCEDURE — 99214 OFFICE O/P EST MOD 30 MIN: CPT | Mod: S$GLB,,, | Performed by: FAMILY MEDICINE

## 2018-01-30 PROCEDURE — 1159F MED LIST DOCD IN RCRD: CPT | Mod: S$GLB,,, | Performed by: FAMILY MEDICINE

## 2018-01-30 PROCEDURE — 81000 URINALYSIS NONAUTO W/SCOPE: CPT | Mod: PO

## 2018-01-30 PROCEDURE — 1126F AMNT PAIN NOTED NONE PRSNT: CPT | Mod: S$GLB,,, | Performed by: FAMILY MEDICINE

## 2018-01-30 PROCEDURE — 99499 UNLISTED E&M SERVICE: CPT | Mod: S$GLB,,, | Performed by: FAMILY MEDICINE

## 2018-01-30 RX ORDER — INSULIN GLARGINE 100 [IU]/ML
INJECTION, SOLUTION SUBCUTANEOUS
Qty: 15 ML | Refills: 3 | Status: SHIPPED | OUTPATIENT
Start: 2018-01-30 | End: 2018-08-03 | Stop reason: SDUPTHER

## 2018-01-30 RX ORDER — INSULIN LISPRO 100 [IU]/ML
INJECTION, SOLUTION INTRAVENOUS; SUBCUTANEOUS
Qty: 10 ML | Refills: 2 | Status: SHIPPED | OUTPATIENT
Start: 2018-01-30 | End: 2021-03-11

## 2018-01-30 NOTE — PROGRESS NOTES
Subjective:       Patient ID: Good Murillo Jr. is a 89 y.o. male.    Chief Complaint: Results; Sore Throat; Nasal Congestion; and Rash    Diabetes Type 2:      Pt is a 89 year old who has had problems with A1C for the last several years. Pt is taking 10 units of Lantus and metformin.       Sore Throat    This is a new problem. The current episode started today. The problem has been unchanged. There has been no fever. The patient is experiencing no pain. Pertinent negatives include no hoarse voice or neck pain.     Review of Systems   Constitutional: Negative.    HENT: Positive for sinus pain and sinus pressure. Negative for hoarse voice.    Cardiovascular: Negative.    Genitourinary: Negative.    Musculoskeletal: Negative.  Negative for neck pain.   Neurological: Negative for seizures, speech difficulty, light-headedness and numbness.   Hematological: Negative.    Psychiatric/Behavioral: Negative.        Objective:      Physical Exam   Constitutional: He is oriented to person, place, and time. He appears well-developed and well-nourished.   Cardiovascular: Normal rate and regular rhythm.    Pulmonary/Chest: Effort normal and breath sounds normal. He has no wheezes. He has no rales.   Abdominal: Soft. Bowel sounds are normal.   Neurological: He is alert and oriented to person, place, and time.   Skin: Skin is warm and dry.   Psychiatric: He has a normal mood and affect. His behavior is normal.       Assessment:       1. Type 2 diabetes mellitus with diabetic polyneuropathy, with long-term current use of insulin    2. Acute cystitis without hematuria        Plan:       Type 2 diabetes mellitus with diabetic polyneuropathy, with long-term current use of insulin  Comments:  Will increase the lantus to 15 units and go on sliding scale    Acute cystitis without hematuria  Comments:  Will repeat the urine  Orders:  -     Urine culture; Future; Expected date: 01/30/2018  -     Urinalysis; Future; Expected date:  01/30/2018    Other orders  -     insulin lispro (HUMALOG) 100 unit/mL injection; Sliding Scale:    150-200: 6 units  201-250: 8 units  251- 300 10 unit  301-350:  12 units  351 or greater 14 units  Dispense: 10 mL; Refill: 2  -     insulin glargine (LANTUS SOLOSTAR) 100 unit/mL (3 mL) InPn pen; INJECT 15 UNITS SUBCUTANEOUSLY ONCE DAILY  Dispense: 15 mL; Refill: 3

## 2018-01-30 NOTE — PATIENT INSTRUCTIONS
Sliding scale Humalog    150-200: 6 units  201-250: 8 units  251- 300 10 unit  301-350:  12 units  351 or greater 14 units call if it get here    Increase lantus to 15 units

## 2018-01-31 LAB — BACTERIA UR CULT: NORMAL

## 2018-01-31 RX ORDER — NYSTATIN 100000 U/G
OINTMENT TOPICAL 2 TIMES DAILY
Qty: 30 G | Refills: 0 | Status: SHIPPED | OUTPATIENT
Start: 2018-01-31 | End: 2018-03-16 | Stop reason: SDUPTHER

## 2018-02-08 ENCOUNTER — OFFICE VISIT (OUTPATIENT)
Dept: OPHTHALMOLOGY | Facility: CLINIC | Age: 83
End: 2018-02-08
Payer: MEDICARE

## 2018-02-08 DIAGNOSIS — H01.00A BLEPHARITIS OF UPPER AND LOWER EYELIDS OF BOTH EYES, UNSPECIFIED TYPE: ICD-10-CM

## 2018-02-08 DIAGNOSIS — Z96.1 PSEUDOPHAKIA OF BOTH EYES: ICD-10-CM

## 2018-02-08 DIAGNOSIS — H40.013 OPEN ANGLE WITH BORDERLINE FINDINGS, LOW RISK, BILATERAL: Primary | ICD-10-CM

## 2018-02-08 DIAGNOSIS — H01.00B BLEPHARITIS OF UPPER AND LOWER EYELIDS OF BOTH EYES, UNSPECIFIED TYPE: ICD-10-CM

## 2018-02-08 DIAGNOSIS — E11.9 DIABETES MELLITUS TYPE 2 WITHOUT RETINOPATHY: ICD-10-CM

## 2018-02-08 DIAGNOSIS — H52.7 REFRACTIVE ERROR: ICD-10-CM

## 2018-02-08 PROCEDURE — 99999 PR PBB SHADOW E&M-EST. PATIENT-LVL II: CPT | Mod: PBBFAC,,, | Performed by: OPHTHALMOLOGY

## 2018-02-08 PROCEDURE — 99499 UNLISTED E&M SERVICE: CPT | Mod: S$GLB,,, | Performed by: OPHTHALMOLOGY

## 2018-02-08 PROCEDURE — 92250 FUNDUS PHOTOGRAPHY W/I&R: CPT | Mod: S$GLB,,, | Performed by: OPHTHALMOLOGY

## 2018-02-08 PROCEDURE — 99212 OFFICE O/P EST SF 10 MIN: CPT | Mod: PO | Performed by: OPHTHALMOLOGY

## 2018-02-08 PROCEDURE — 92014 COMPRE OPH EXAM EST PT 1/>: CPT | Mod: S$GLB,,, | Performed by: OPHTHALMOLOGY

## 2018-02-08 NOTE — PROGRESS NOTES
SUBJECTIVE:   Good Murillo Jr. is a 89 y.o. male   Uncorrected distance visual acuity was 20/40 -2 in the right eye and 20/100 -1 in the left eye.   Chief Complaint   Patient presents with    Glaucoma Suspect     dilation and sdp's.     Follow-up     blepharitis f/u. pt wants to have this checked before dilation. os still uncomfotable        HPI:  HPI     Glaucoma Suspect    Additional comments: dilation and sdp's.            Follow-up    Additional comments: blepharitis f/u. pt wants to have this checked   before dilation. os still uncomfotable           Comments   Pt states his vision has been blurry ever since doing HVF last visit.     1. GLAUCOMA SUSPECT (18/20 pre CE ? Phacomorphic)  2. PCIOL OD 01/12/2011 SN60WF+24.0  PCIOL OS 12/08/2010 SN60WF +24.5  3. DM X 15 YEARS       Last edited by Francisca Ibarra MA on 2/8/2018  2:24 PM. (History)        Assessment /Plan :  1. Open angle with borderline findings, low risk, bilateral No evidence of glaucoma at this time but based on risk factors recommend to continue monitoring.     2. Pseudophakia of both eyes  -- Condition stable, no therapeutic change required. Monitoring routinely.     3. Blepharitis of upper and lower eyelids of both eyes, unspecified type improving   4. Refractive error final spec RX   5. Diabetes mellitus type 2 without retinopathy No diabetic retinopathy at this time. Reviewed diabetic eye precautions including avoiding tobacco use,  Good glucose control, and importance of regular follow up.          Return to clinic in 6 months  or as needed.  With IOP Check and GOCT

## 2018-02-09 DIAGNOSIS — E78.5 HYPERLIPIDEMIA ASSOCIATED WITH TYPE 2 DIABETES MELLITUS: ICD-10-CM

## 2018-02-09 DIAGNOSIS — E11.69 HYPERLIPIDEMIA ASSOCIATED WITH TYPE 2 DIABETES MELLITUS: ICD-10-CM

## 2018-02-09 RX ORDER — LEVOTHYROXINE SODIUM 25 UG/1
TABLET ORAL
Qty: 30 TABLET | Refills: 10 | Status: SHIPPED | OUTPATIENT
Start: 2018-02-09 | End: 2019-01-23 | Stop reason: SDUPTHER

## 2018-02-09 RX ORDER — PRAVASTATIN SODIUM 40 MG/1
TABLET ORAL
Qty: 90 TABLET | Refills: 3 | Status: SHIPPED | OUTPATIENT
Start: 2018-02-09 | End: 2019-01-23 | Stop reason: SDUPTHER

## 2018-02-15 DIAGNOSIS — E11.40 TYPE 2 DIABETES, CONTROLLED, WITH NEUROPATHY: ICD-10-CM

## 2018-02-15 RX ORDER — NATEGLINIDE 60 MG/1
TABLET ORAL
Qty: 270 TABLET | Refills: 2 | Status: SHIPPED | OUTPATIENT
Start: 2018-02-15 | End: 2018-12-18 | Stop reason: SDUPTHER

## 2018-02-19 ENCOUNTER — HOSPITAL ENCOUNTER (OUTPATIENT)
Dept: RADIOLOGY | Facility: HOSPITAL | Age: 83
Discharge: HOME | End: 2018-02-19
Attending: PHYSICIAN ASSISTANT
Payer: MEDICARE

## 2018-02-19 ENCOUNTER — OFFICE VISIT (OUTPATIENT)
Dept: URGENT CARE | Facility: CLINIC | Age: 83
End: 2018-02-19
Payer: MEDICARE

## 2018-02-19 VITALS
HEART RATE: 104 BPM | DIASTOLIC BLOOD PRESSURE: 66 MMHG | BODY MASS INDEX: 22.76 KG/M2 | HEIGHT: 67 IN | SYSTOLIC BLOOD PRESSURE: 130 MMHG | OXYGEN SATURATION: 93 % | RESPIRATION RATE: 20 BRPM | TEMPERATURE: 100 F | WEIGHT: 145 LBS

## 2018-02-19 DIAGNOSIS — J44.9 CHRONIC OBSTRUCTIVE PULMONARY DISEASE, UNSPECIFIED COPD TYPE: ICD-10-CM

## 2018-02-19 DIAGNOSIS — R79.81 LOW OXYGEN SATURATION: ICD-10-CM

## 2018-02-19 DIAGNOSIS — J18.9 PNEUMONIA OF LEFT LOWER LOBE DUE TO INFECTIOUS ORGANISM: Primary | ICD-10-CM

## 2018-02-19 DIAGNOSIS — R05.9 COUGH: ICD-10-CM

## 2018-02-19 DIAGNOSIS — R50.9 FEVER, UNSPECIFIED FEVER CAUSE: ICD-10-CM

## 2018-02-19 LAB
CTP QC/QA: YES
FLUAV AG NPH QL: NEGATIVE
FLUBV AG NPH QL: NEGATIVE

## 2018-02-19 PROCEDURE — 99214 OFFICE O/P EST MOD 30 MIN: CPT | Mod: S$GLB,,, | Performed by: PHYSICIAN ASSISTANT

## 2018-02-19 PROCEDURE — 99999 PR PBB SHADOW E&M-EST. PATIENT-LVL III: CPT | Mod: PBBFAC,,, | Performed by: PHYSICIAN ASSISTANT

## 2018-02-19 PROCEDURE — 3008F BODY MASS INDEX DOCD: CPT | Mod: S$GLB,,, | Performed by: PHYSICIAN ASSISTANT

## 2018-02-19 PROCEDURE — 71046 X-RAY EXAM CHEST 2 VIEWS: CPT | Mod: TC,FY,PO

## 2018-02-19 PROCEDURE — 99499 UNLISTED E&M SERVICE: CPT | Mod: S$GLB,,, | Performed by: PHYSICIAN ASSISTANT

## 2018-02-19 PROCEDURE — 71046 X-RAY EXAM CHEST 2 VIEWS: CPT | Mod: 26,,, | Performed by: RADIOLOGY

## 2018-02-19 PROCEDURE — 1126F AMNT PAIN NOTED NONE PRSNT: CPT | Mod: S$GLB,,, | Performed by: PHYSICIAN ASSISTANT

## 2018-02-19 PROCEDURE — 87804 INFLUENZA ASSAY W/OPTIC: CPT | Mod: 59,QW,S$GLB, | Performed by: PHYSICIAN ASSISTANT

## 2018-02-19 PROCEDURE — 1159F MED LIST DOCD IN RCRD: CPT | Mod: S$GLB,,, | Performed by: PHYSICIAN ASSISTANT

## 2018-02-19 RX ORDER — DOXYCYCLINE 100 MG/1
100 CAPSULE ORAL EVERY 12 HOURS
Qty: 20 CAPSULE | Refills: 0 | Status: SHIPPED | OUTPATIENT
Start: 2018-02-19 | End: 2018-03-01

## 2018-02-19 NOTE — PATIENT INSTRUCTIONS
Pneumonia (Adult)  Pneumonia is an infection deep within the lungs. It is in the small air sacs (alveoli). Pneumonia may be caused by a virus or bacteria. Pneumonia caused by bacteria is usually treated with an antibiotic. Severe cases may need to be treated in the hospital. Milder cases can be treated at home. Symptoms usually start to get better during the first 2 days of treatment.    Home care  Follow these guidelines when caring for yourself at home:  · Rest at home for the first 2 to 3 days, or until you feel stronger. Dont let yourself get overly tired when you go back to your activities.  · Stay away from cigarette smoke - yours or other peoples.  · You may use acetaminophen or ibuprofen to control fever or pain, unless another medicine was prescribed. If you have chronic liver or kidney disease, talk with your healthcare provider before using these medicines. Also talk with your provider if youve had a stomach ulcer or gastrointestinal bleeding. Dont give aspirin to anyone younger than 18 years of age who is ill with a fever. It may cause severe liver damage.  · Your appetite may be poor, so a light diet is fine.  · Drink 6 to 8 glasses of fluids every day to make sure you are getting enough fluids. Beverages can include water, sport drinks, sodas without caffeine, juices, tea, or soup. Fluids will help loosen secretions in the lung. This will make it easier for you to cough up the phlegm (sputum). If you also have heart or kidney disease, check with your healthcare provider before you drink extra fluids.  · Take antibiotic medicine prescribed until it is all gone, even if you are feeling better after a few days.  Follow-up care  Follow up with your healthcare provider in the next 2 to 3 days, or as advised. This is to be sure the medicine is helping you get better.  If you are 65 or older, you should get a pneumococcal vaccine and a yearly flu (influenza) shot. You should also get these vaccines if  you have chronic lung disease like asthma, emphysema, or COPD. Recently, a second type of pneumonia vaccine has become available for everyone over 65 years old. This is in addition to the previous vaccine. Ask your provider about this.  When to seek medical advice  Call your healthcare provider right away if any of these occur:  · You dont get better within the first 48 hours of treatment  · Shortness of breath gets worse  · Rapid breathing (more than 25 breaths per minute)  · Coughing up blood  · Chest pain gets worse with breathing  · Fever of 100.4°F (38°C) or higher that doesnt get better with fever medicine  · Weakness, dizziness, or fainting that gets worse  · Thirst or dry mouth that gets worse  · Sinus pain, headache, or a stiff neck  · Chest pain not caused by coughing  Date Last Reviewed: 1/1/2017  © 0907-2528 [employer]. [address] [place] [address]. All rights reserved. This information is not intended as a substitute for professional medical care. Always follow your healthcare professional's instructions.

## 2018-02-19 NOTE — PROGRESS NOTES
"Subjective:      Patient ID: Good Murillo Jr. is a 89 y.o. male.    Chief Complaint: Nasal Congestion (cough, fever)    Usually O sats of 94%, at home was in high 80s  Patient on O at home as needed, especially at night      Cough   This is a new problem. The current episode started in the past 7 days (3days). The problem has been gradually worsening. The cough is productive of sputum. Associated symptoms include a fever (started today), postnasal drip, rhinorrhea, shortness of breath and wheezing. Pertinent negatives include no chills, ear pain, headaches, rash or sore throat. Treatments tried: Robitussin. His past medical history is significant for COPD. There is no history of asthma, bronchitis or pneumonia.     Review of Systems   Constitutional: Positive for fever (started today). Negative for chills and diaphoresis.   HENT: Positive for congestion, postnasal drip and rhinorrhea. Negative for ear pain and sore throat.    Respiratory: Positive for cough (productive), shortness of breath and wheezing.    Gastrointestinal: Negative for abdominal pain, diarrhea, nausea and vomiting.   Skin: Negative for rash.   Neurological: Negative for dizziness, light-headedness and headaches.       Objective:   /66   Pulse 104   Temp 100 °F (37.8 °C) (Tympanic)   Resp 20   Ht 5' 7" (1.702 m)   Wt 65.8 kg (145 lb)   SpO2 (!) 93%   BMI 22.71 kg/m²   Physical Exam   Constitutional: He appears well-developed and well-nourished. He does not appear ill. No distress.   HENT:   Head: Normocephalic and atraumatic.   Right Ear: Tympanic membrane and ear canal normal. Tympanic membrane is not erythematous. No middle ear effusion.   Left Ear: Tympanic membrane and ear canal normal. Tympanic membrane is not erythematous.  No middle ear effusion.   Nose: Nose normal. No mucosal edema. Right sinus exhibits no maxillary sinus tenderness and no frontal sinus tenderness. Left sinus exhibits no maxillary sinus tenderness and no " frontal sinus tenderness.   Mouth/Throat: Uvula is midline and oropharynx is clear and moist.   Cardiovascular: Normal rate, regular rhythm and normal heart sounds.    No murmur heard.  Pulmonary/Chest: Effort normal. No respiratory distress. He has decreased breath sounds. He has no wheezes. He has no rhonchi. He has no rales.   Lymphadenopathy:     He has no cervical adenopathy.   Skin: Skin is warm and dry. No rash noted. He is not diaphoretic.   Psychiatric: He has a normal mood and affect. His speech is normal and behavior is normal. Thought content normal.     Assessment:      1. Pneumonia of left lower lobe due to infectious organism    2. Chronic obstructive pulmonary disease, unspecified COPD type    3. Fever, unspecified fever cause    4. Cough    5. Low oxygen saturation       Plan:   Pneumonia of left lower lobe due to infectious organism  -     doxycycline (VIBRAMYCIN) 100 MG Cap; Take 1 capsule (100 mg total) by mouth every 12 (twelve) hours.  Dispense: 20 capsule; Refill: 0    Chronic obstructive pulmonary disease, unspecified COPD type    Fever, unspecified fever cause  -     POCT Influenza A/B    Cough  -     X-Ray Chest PA And Lateral; Future; Expected date: 02/19/2018    Low oxygen saturation    Discussed with patient that d/t age, temp, pulse ox and history of COPD, feel as though further work up in ED is warranted to rule out any serious respiratory infection/complications  Patient to go to [facility] ED via private vehicle, refuses transport  Called ahead to [facility] and gave report    Addend: Patient returned to clinic after waiting in ED for 2hrs without being seen, discussed that I still feel ED visit is best d/t patient's extensive history, patient states understanding and would like to be treated in Urgent Care.  Reviewed x-ray results.  Once again advise that with pneumonia, it is best that he hospitalized based on age, diastolic BP and renal function, failure to do so increases risk of complications  (including but not limited to death).  Patient refuses ER/hospital, states he would like to be treated outpatient.  Will set up close follow up w PCP.  Advise if any change occurs he is to go to ED.   Patient expresses understanding.

## 2018-02-19 NOTE — ADDENDUM NOTE
Addended by: AMINAH HERNANDEZ on: 2/19/2018 05:32 PM     Modules accepted: Orders, Level of Service

## 2018-02-21 ENCOUNTER — OFFICE VISIT (OUTPATIENT)
Dept: INTERNAL MEDICINE | Facility: CLINIC | Age: 83
End: 2018-02-21
Payer: MEDICARE

## 2018-02-21 VITALS
DIASTOLIC BLOOD PRESSURE: 78 MMHG | HEIGHT: 67 IN | RESPIRATION RATE: 20 BRPM | BODY MASS INDEX: 22.36 KG/M2 | TEMPERATURE: 100 F | SYSTOLIC BLOOD PRESSURE: 118 MMHG | WEIGHT: 142.44 LBS | HEART RATE: 98 BPM | OXYGEN SATURATION: 93 %

## 2018-02-21 DIAGNOSIS — J69.0 ASPIRATION PNEUMONIA OF LEFT LOWER LOBE, UNSPECIFIED ASPIRATION PNEUMONIA TYPE: Primary | ICD-10-CM

## 2018-02-21 PROBLEM — K57.90 DIVERTICULOSIS: Status: ACTIVE | Noted: 2018-02-21

## 2018-02-21 PROBLEM — Z78.9 SELF-CATHETERIZES URINARY BLADDER: Chronic | Status: ACTIVE | Noted: 2017-09-06

## 2018-02-21 PROBLEM — I77.9 BILATERAL CAROTID ARTERY DISEASE: Chronic | Status: ACTIVE | Noted: 2017-01-17

## 2018-02-21 PROBLEM — K57.90 DIVERTICULOSIS: Chronic | Status: ACTIVE | Noted: 2018-02-21

## 2018-02-21 PROBLEM — R32 URINARY INCONTINENCE: Status: ACTIVE | Noted: 2018-02-21

## 2018-02-21 PROBLEM — R32 URINARY INCONTINENCE: Chronic | Status: ACTIVE | Noted: 2018-02-21

## 2018-02-21 PROBLEM — I70.0 CALCIFICATION OF AORTA: Chronic | Status: ACTIVE | Noted: 2017-01-11

## 2018-02-21 PROCEDURE — 1126F AMNT PAIN NOTED NONE PRSNT: CPT | Mod: S$GLB,,, | Performed by: PHYSICIAN ASSISTANT

## 2018-02-21 PROCEDURE — 3008F BODY MASS INDEX DOCD: CPT | Mod: S$GLB,,, | Performed by: PHYSICIAN ASSISTANT

## 2018-02-21 PROCEDURE — 99499 UNLISTED E&M SERVICE: CPT | Mod: S$GLB,,, | Performed by: PHYSICIAN ASSISTANT

## 2018-02-21 PROCEDURE — 99999 PR PBB SHADOW E&M-EST. PATIENT-LVL V: CPT | Mod: PBBFAC,,, | Performed by: PHYSICIAN ASSISTANT

## 2018-02-21 PROCEDURE — 1159F MED LIST DOCD IN RCRD: CPT | Mod: S$GLB,,, | Performed by: PHYSICIAN ASSISTANT

## 2018-02-21 PROCEDURE — 99213 OFFICE O/P EST LOW 20 MIN: CPT | Mod: S$GLB,,, | Performed by: PHYSICIAN ASSISTANT

## 2018-02-21 NOTE — PROGRESS NOTES
Subjective:       Patient ID: Good Murillo Jr. is a 89 y.o.W/ male.    Chief Complaint: Follow-up (after Dx pneumonia) and Cough    HPI         He comes in today as directed by somebody in urgent care that he saw 2 days ago on Monday.  They diagnosed him with left lower lobe pneumonia and put him on doxycycline and Robitussin-DM.  He feels like the antibiotic has really taken effect and improved his breathing dramatically in the last 24 hours.  He is breathing comfortably and his strength is starting to come back.  He is a resident of [facility] license of [facility].  He denies any problem with: Earache, hearing loss, sore throat, nasal congestion or PND, trouble swallowing or choking, loss of appetite, excess fatigue, chest pain, pleurisy, dyspnea of any modality, palpitations, tachyarrhythmia, sweats, diaphoresis, night sweats, anorexia, vomiting or nausea etc.    Review of Systems    I did review his influenza test was negative and also his chest x-ray was positive for area of fluid in the left lingular and lower lobe.  ENT: All within normal limits.  His voice is normal and there is an absence of any nasal tone or hoarseness to his voice.  CHEST: Breath sounds are diminished 40% but there is no wheeze, rhonchi, rale, or rub present.  Egophony is negative.  Fremitus is normal.  Percussion is normal.    Objective:      Physical Exam    Assessment:       1. Aspiration pneumonia of left lower lobe, unspecified aspiration pneumonia type        Plan:     1.  Finished taking his doxycycline and continue using the Robitussin-DM as needed.  Recheck a chest x-ray on 7 March to make sure it is cleared up.  He can come back anytime in the next 5-14 days if he starts having increased problems with his breathing.

## 2018-03-07 ENCOUNTER — HOSPITAL ENCOUNTER (OUTPATIENT)
Dept: RADIOLOGY | Facility: HOSPITAL | Age: 83
Discharge: HOME | End: 2018-03-07
Attending: PHYSICIAN ASSISTANT
Payer: MEDICARE

## 2018-03-07 DIAGNOSIS — J69.0 ASPIRATION PNEUMONIA OF LEFT LOWER LOBE, UNSPECIFIED ASPIRATION PNEUMONIA TYPE: ICD-10-CM

## 2018-03-07 PROCEDURE — 71046 X-RAY EXAM CHEST 2 VIEWS: CPT | Mod: 26,,, | Performed by: RADIOLOGY

## 2018-03-07 PROCEDURE — 71046 X-RAY EXAM CHEST 2 VIEWS: CPT | Mod: TC,FY,PO

## 2018-03-08 ENCOUNTER — TELEPHONE (OUTPATIENT)
Dept: INTERNAL MEDICINE | Facility: CLINIC | Age: 83
End: 2018-03-08

## 2018-03-16 ENCOUNTER — OFFICE VISIT (OUTPATIENT)
Dept: INTERNAL MEDICINE | Facility: CLINIC | Age: 83
End: 2018-03-16
Payer: MEDICARE

## 2018-03-16 VITALS
HEIGHT: 67 IN | DIASTOLIC BLOOD PRESSURE: 82 MMHG | OXYGEN SATURATION: 96 % | WEIGHT: 145.5 LBS | SYSTOLIC BLOOD PRESSURE: 162 MMHG | HEART RATE: 96 BPM | TEMPERATURE: 97 F | BODY MASS INDEX: 22.84 KG/M2

## 2018-03-16 DIAGNOSIS — R09.82 PND (POST-NASAL DRIP): Primary | ICD-10-CM

## 2018-03-16 DIAGNOSIS — R30.0 DYSURIA: ICD-10-CM

## 2018-03-16 DIAGNOSIS — N39.0 URINARY TRACT INFECTION WITH HEMATURIA, SITE UNSPECIFIED: ICD-10-CM

## 2018-03-16 DIAGNOSIS — R31.9 URINARY TRACT INFECTION WITH HEMATURIA, SITE UNSPECIFIED: ICD-10-CM

## 2018-03-16 LAB
BILIRUB SERPL-MCNC: NEGATIVE MG/DL
BLOOD URINE, POC: ABNORMAL
COLOR, POC UA: ABNORMAL
GLUCOSE UR QL STRIP: NORMAL
KETONES UR QL STRIP: NEGATIVE
LEUKOCYTE ESTERASE URINE, POC: ABNORMAL
NITRITE, POC UA: NEGATIVE
PH, POC UA: 8
PROTEIN, POC: ABNORMAL
SPECIFIC GRAVITY, POC UA: 1
UROBILINOGEN, POC UA: NORMAL

## 2018-03-16 PROCEDURE — 87077 CULTURE AEROBIC IDENTIFY: CPT

## 2018-03-16 PROCEDURE — 87088 URINE BACTERIA CULTURE: CPT

## 2018-03-16 PROCEDURE — 87186 SC STD MICRODIL/AGAR DIL: CPT

## 2018-03-16 PROCEDURE — 87086 URINE CULTURE/COLONY COUNT: CPT

## 2018-03-16 PROCEDURE — 81001 URINALYSIS AUTO W/SCOPE: CPT | Mod: S$GLB,,, | Performed by: NURSE PRACTITIONER

## 2018-03-16 PROCEDURE — 99999 PR PBB SHADOW E&M-EST. PATIENT-LVL V: CPT | Mod: PBBFAC,,, | Performed by: NURSE PRACTITIONER

## 2018-03-16 PROCEDURE — 99214 OFFICE O/P EST MOD 30 MIN: CPT | Mod: 25,S$GLB,, | Performed by: NURSE PRACTITIONER

## 2018-03-16 RX ORDER — CIPROFLOXACIN 250 MG/1
250 TABLET, FILM COATED ORAL 2 TIMES DAILY
Qty: 10 TABLET | Refills: 0 | Status: SHIPPED | OUTPATIENT
Start: 2018-03-16 | End: 2018-03-21

## 2018-03-16 RX ORDER — NYSTATIN 100000 U/G
OINTMENT TOPICAL
Qty: 30 G | Refills: 0 | Status: SHIPPED | OUTPATIENT
Start: 2018-03-16 | End: 2018-04-02 | Stop reason: SDUPTHER

## 2018-03-16 NOTE — PATIENT INSTRUCTIONS
Urinary Tract Infections in Men    Urinary tract infections (UTIs) are most often caused by bacteria that invade the urinary tract. The bacteria may come from outside the body. Or they may travel from the skin outside of rectum into the urethra. Pain in or around the urinary tract is a common symptom for most UTIs. But the only way to know for sure if you have a UTI is to have a urinalysis and urine culture.   Types of UTIs  · Cystitis: This is a bladder infection and is often linked to a blockage from an enlarged prostate. You may have an urgent or frequent need to urinate, and bloody urine. Treatment includes antibiotics and medicine to relax or shrink the prostate. Sometimes, surgery is needed.  · Urethritis: This is an infection of the urethra. You may have a discharge from the urethra or burning when you urinate. You may also have pain in the urethra or penis. It is treated with antibiotics.  · Prostatitis: This is an inflammation or infection of the prostate. You may have an urgent or frequent need to urinate, fever, or burning when you urinate. Or you may have a tender prostate, or a vague feeling of pressure. Prostatitis is treated with a range of medicines, depending on the cause.  · Pyelonephritis: This is a kidney infection. If not treated, it can be serious and damage your kidneys. In severe cases you may be hospitalized. You may have a fever and upper back pain.  Treating a UTI  · Medicine: Most UTIs are treated with antibiotics. These kill the bacteria. The length of time you need to take them depends on the type of infection. Take antibiotics exactly as directed until all of the medicine is gone. If you don't, the infection may not go away and may become harder to treat. For certain types of UTIs, you may be given other medicine to help treat your symptoms.  · Lifestyle changes: The lifestyle changes below will help get rid of your current infection. They may also help prevent future UTIs.  ¨ Drink  plenty of fluids such as water, juice, or other caffeine-free drinks. This helps flush bacteria out of your system.  ¨ Empty your bladder when you feel the urge to urinate and before going to sleep. Urine that stays in your bladder promotes infection.  ¨ Use condoms during sex. These help prevent UTIs caused by sexually transmitted bacteria.  ¨ Keep follow-up appointments with your healthcare provider. He or she can may do tests to make sure the infection has cleared. If needed, more treatment can be started.  · Other treatment: Most UTIs respond to medicine. But sometimes a procedure or surgery is needed. This can treat an enlarged prostate, or remove a kidney stone or other blockage. Surgery may also treat problems caused by scarring or long-term infections.  Date Last Reviewed: 1/1/2017  © 1431-2825 [employer]. [address] [place] [address], [place] [address]. All rights reserved. This information is not intended as a substitute for professional medical care. Always follow your healthcare professional's instructions.

## 2018-03-16 NOTE — PROGRESS NOTES
Subjective:       Patient ID: Good Murillo Jr. is a 89 y.o. male.    Chief Complaint: post nasal drip and Dysuria (burning)    Patient presents with rhinorrhea, post nasal drip and dysuria that started a few days ago. Reports completing antibiotics for pneumonia.  Reports he self catheterizes himself at home.  Has seen urology.  Been a while. Reports wearing briefs as well.        Review of Systems   Constitutional: Negative for chills and fatigue.   Respiratory: Negative for cough and shortness of breath.    Genitourinary: Positive for dysuria and frequency.   Musculoskeletal: Negative for arthralgias and gait problem.   Psychiatric/Behavioral: Negative for agitation and confusion.       Objective:      Physical Exam   Constitutional: He is oriented to person, place, and time. Vital signs are normal. He appears well-developed and well-nourished.   HENT:   Head: Normocephalic and atraumatic.   Nose: Rhinorrhea present.   Neck: Normal range of motion.   Cardiovascular: Normal rate and regular rhythm.    Pulmonary/Chest: Effort normal and breath sounds normal.   Musculoskeletal: Normal range of motion.        Lumbar back: He exhibits tenderness.   Neurological: He is alert and oriented to person, place, and time.   Skin: Skin is warm.   Psychiatric: He has a normal mood and affect. His behavior is normal.       Assessment:       1. PND (post-nasal drip)    2. Dysuria    3. Urinary tract infection with hematuria, site unspecified        Plan:         PND (post-nasal drip)    Dysuria  -     POCT urinalysis, dipstick or tablet reag  -     ciprofloxacin HCl (CIPRO) 250 MG tablet; Take 1 tablet (250 mg total) by mouth 2 (two) times daily.  Dispense: 10 tablet; Refill: 0  -     Urine culture    Urinary tract infection with hematuria, site unspecified  -     ciprofloxacin HCl (CIPRO) 250 MG tablet; Take 1 tablet (250 mg total) by mouth 2 (two) times daily.  Dispense: 10 tablet; Refill: 0  -     Urine  culture        Instructed to use Flonase for PND.  Has at home.  Will start Cipro.  Encourage to follow up with urology.

## 2018-03-20 LAB — BACTERIA UR CULT: NORMAL

## 2018-04-02 ENCOUNTER — OFFICE VISIT (OUTPATIENT)
Dept: URGENT CARE | Facility: CLINIC | Age: 83
End: 2018-04-02
Payer: MEDICARE

## 2018-04-02 VITALS
BODY MASS INDEX: 22.42 KG/M2 | OXYGEN SATURATION: 96 % | HEIGHT: 68 IN | WEIGHT: 147.94 LBS | HEART RATE: 106 BPM | DIASTOLIC BLOOD PRESSURE: 72 MMHG | RESPIRATION RATE: 19 BRPM | SYSTOLIC BLOOD PRESSURE: 128 MMHG | TEMPERATURE: 99 F

## 2018-04-02 DIAGNOSIS — H01.006 BLEPHARITIS OF EYELID OF LEFT EYE, UNSPECIFIED EYELID, UNSPECIFIED TYPE: Primary | ICD-10-CM

## 2018-04-02 DIAGNOSIS — L30.9 DERMATITIS: ICD-10-CM

## 2018-04-02 PROCEDURE — 99999 PR PBB SHADOW E&M-EST. PATIENT-LVL III: CPT | Mod: PBBFAC,,, | Performed by: PHYSICIAN ASSISTANT

## 2018-04-02 PROCEDURE — 99213 OFFICE O/P EST LOW 20 MIN: CPT | Mod: S$GLB,,, | Performed by: PHYSICIAN ASSISTANT

## 2018-04-02 RX ORDER — NYSTATIN 100000 U/G
OINTMENT TOPICAL
Qty: 30 G | Refills: 0 | Status: SHIPPED | OUTPATIENT
Start: 2018-04-02 | End: 2021-03-11

## 2018-04-02 RX ORDER — ERYTHROMYCIN 5 MG/G
OINTMENT OPHTHALMIC
Qty: 3.5 G | Refills: 0 | Status: SHIPPED | OUTPATIENT
Start: 2018-04-02 | End: 2018-07-21

## 2018-04-02 NOTE — PATIENT INSTRUCTIONS
Blepharitis    Blepharitis is an inflammation of the eyelid. It results in swelling of the eyelids, and it is usually caused by a bacterial infection or a skin condition. Blepharitis is a common eye condition. There are two types. Anterior blepharitis occurs where the eyelashes are attached (outside front edge of the eye). Posterior blepharitis affects the inner edge of the eyelid that touches the eyeball.  In addition to swollen eyelids, symptoms of blepharitis can include thick, yellow, dandruff-like scales that stick to the eyelid. There may be oily patches on the eyelid. The eyelashes may be crusted (with dandruff-like scales) when you wake up from sleeping. The irritated area may itch. The eyelids may be red. The eyes can be red and burn or sting. The eyes may tear a lot, or be dry. You can become sensitive to light or have blurred vision. Symptoms of blepharitis can cause irritability.  Blepharitis is a chronic condition and difficult to cure. Even with successful treatment, recurrences are common. Good hygiene and home treatments (in the Home care section below) can improve your condition.  Causes  Causes of blepharitis may include:  · Problems with the oil glands in the eyelid (meibomian glands)  · Dandruff of the scalp and eyebrows (seborrheic dermatitis)  · Acne rosacea (a skin condition that causes redness of the face, and other symptoms)  · Eyelash mites (tiny organisms in the eyelash follicles)  · Allergic reactions to cosmetics or medicines  Home care  Medicine: The healthcare provider may prescribe an antibiotic eye drops or ointment, artificial tears, and/or steroid eye drops. Follow all instructions for using these medicines. Use all medicines as directed. If you have pain, take medicine as advised by the healthcare provider.  · Wash your hands carefully with soap and warm water before and after caring for your eyes.  · Apply a warm compress or a warm, moist washcloth to the eyelids for 1 minute,  2 to 3 times a day, to loosen the crust. Then, wipe away scales or crust from the eyelids.  · After applying the warm compress, gently scrub the base of the eyelashes for almost 15 seconds per eyelid. To do this, close your eyes and use a moist eyelid cleansing wipe, clean washcloth, or cotton swab. Ask your healthcare provider about products (such as nonirritating baby shampoo) to use to help clean the eyelids.  · You may be instructed to gently massage your eyelids to help unblock the eyelid glands. Follow all instructions given by the healthcare provider.  · Unless told otherwise, on a regular basis, with eyes closed, clean your eyelids as directed by the healthcare provider. Blepharitis can be an ongoing problem.  · Do not wear eye makeup until the inflammation goes away, or as directed by your healthcare provider.  · Unless told otherwise, stop using contact lenses until you complete treatment for the condition.  · Wash your hands regularly to help prevent dirt and bacteria from coming in contact with your eyelid.  Follow-up care  Follow up with your healthcare provider, or as advised. Your healthcare provider may refer you to an eye specialist (an optometrist or ophthalmologist) for further evaluation and treatment.  When to seek medical advice  Call your healthcare provider right away if any of these occur:  · Increase in redness of the white part of the eye  · Increase in swelling, redness, irritation, or pain of the eyelids  · Eye pain  · Change in vision (trouble seeing or blurring)  · Drainage (pus, blood) from the eyelid  · Fever of 100.4ºF (38ºC) or higher, or as directed by your healthcare provider  Date Last Reviewed: 10/9/2015  © 0698-5180 [employer]. [address] [place] [address], [place] [address]. All rights reserved. This information is not intended as a substitute for professional medical care. Always follow your healthcare professional's instructions.

## 2018-04-02 NOTE — PROGRESS NOTES
"Subjective:       Patient ID: Good Murillo Jr. is a 89 y.o. male.    Chief Complaint: Conjunctivitis    Conjunctivitis   This is a new problem. The current episode started yesterday. The problem occurs constantly. The problem has been unchanged. Pertinent negatives include no abdominal pain, anorexia, chest pain, chills, congestion, coughing, fatigue, fever, headaches, myalgias, nausea, rash, sore throat, visual change (film will cover his eye "from the infection") or vomiting. Nothing aggravates the symptoms. Treatments tried: saline drops. The treatment provided mild relief.     Review of Systems   Constitutional: Negative for chills, fatigue and fever.   HENT: Negative for congestion, ear discharge, ear pain, postnasal drip, rhinorrhea, sinus pressure, sneezing and sore throat.    Eyes: Positive for discharge (slight), redness and itching (some irritation). Negative for pain.   Respiratory: Negative for cough, shortness of breath and wheezing.    Cardiovascular: Negative for chest pain and leg swelling.   Gastrointestinal: Negative for abdominal pain, anorexia, nausea and vomiting.   Musculoskeletal: Negative for myalgias.   Skin: Negative for rash.   Neurological: Negative for headaches.       Objective:      /72 (BP Location: Right arm, Patient Position: Sitting, BP Method: Large (Manual))   Pulse 106   Temp 98.6 °F (37 °C) (Tympanic)   Resp 19   Ht 5' 8" (1.727 m)   Wt 67.1 kg (147 lb 14.9 oz)   SpO2 96%   BMI 22.49 kg/m²   Physical Exam   Constitutional: He is oriented to person, place, and time. He appears well-developed and well-nourished. No distress.   HENT:   Head: Normocephalic and atraumatic.   Right Ear: External ear normal.   Left Ear: External ear normal.   Nose: Nose normal.   Eyes: EOM are normal. Right eye exhibits no discharge. Left eye exhibits no discharge. Left conjunctiva is injected (mild).   Mild redness to upper and lower lid, mild edema   Neck: Normal range of motion. Neck " supple.   Musculoskeletal:            Neurological: He is alert and oriented to person, place, and time. He has normal reflexes. He displays normal reflexes. Coordination normal.   Skin: Skin is warm and dry. Rash noted. He is not diaphoretic.   Vitals reviewed.      Assessment:       1. Blepharitis of eyelid of left eye, unspecified eyelid, unspecified type    2. Dermatitis        Plan:       Blepharitis of eyelid of left eye, unspecified eyelid, unspecified type  -     erythromycin (ROMYCIN) ophthalmic ointment; Apply 0.5 inch in affected eye/eyelid four times a day for 7 days  Dispense: 3.5 g; Refill: 0    Dermatitis  -     nystatin (MYCOSTATIN) ointment; APPLY TO AFFECTED AREA TWO TIMES A DAY  Dispense: 30 g; Refill: 0          Heather Trant PA-C [facility]

## 2018-04-11 ENCOUNTER — OFFICE VISIT (OUTPATIENT)
Dept: INTERNAL MEDICINE | Facility: CLINIC | Age: 83
End: 2018-04-11
Payer: MEDICARE

## 2018-04-11 VITALS
SYSTOLIC BLOOD PRESSURE: 140 MMHG | BODY MASS INDEX: 22.29 KG/M2 | DIASTOLIC BLOOD PRESSURE: 68 MMHG | WEIGHT: 147.06 LBS | HEIGHT: 68 IN | TEMPERATURE: 98 F | HEART RATE: 98 BPM

## 2018-04-11 DIAGNOSIS — E11.40 TYPE 2 DIABETES, CONTROLLED, WITH NEUROPATHY: Primary | Chronic | ICD-10-CM

## 2018-04-11 DIAGNOSIS — E53.8 VITAMIN B12 DEFICIENCY: ICD-10-CM

## 2018-04-11 DIAGNOSIS — Z78.9 SELF-CATHETERIZES URINARY BLADDER: Chronic | ICD-10-CM

## 2018-04-11 DIAGNOSIS — M79.89 LOCALIZED SWELLING OF LOWER EXTREMITY: ICD-10-CM

## 2018-04-11 PROCEDURE — 99999 PR PBB SHADOW E&M-EST. PATIENT-LVL III: CPT | Mod: PBBFAC,,, | Performed by: FAMILY MEDICINE

## 2018-04-11 PROCEDURE — 99499 UNLISTED E&M SERVICE: CPT | Mod: S$GLB,,, | Performed by: FAMILY MEDICINE

## 2018-04-11 PROCEDURE — 96372 THER/PROPH/DIAG INJ SC/IM: CPT | Mod: S$GLB,,, | Performed by: FAMILY MEDICINE

## 2018-04-11 PROCEDURE — 99214 OFFICE O/P EST MOD 30 MIN: CPT | Mod: 25,S$GLB,, | Performed by: FAMILY MEDICINE

## 2018-04-11 RX ORDER — FUROSEMIDE 20 MG/1
TABLET ORAL
Qty: 20 TABLET | Refills: 0 | Status: SHIPPED | OUTPATIENT
Start: 2018-04-11 | End: 2020-02-05 | Stop reason: SDUPTHER

## 2018-04-11 RX ORDER — CYANOCOBALAMIN 1000 UG/ML
1000 INJECTION, SOLUTION INTRAMUSCULAR; SUBCUTANEOUS ONCE
Status: COMPLETED | OUTPATIENT
Start: 2018-04-11 | End: 2018-04-11

## 2018-04-11 RX ADMIN — CYANOCOBALAMIN 1000 MCG: 1000 INJECTION, SOLUTION INTRAMUSCULAR; SUBCUTANEOUS at 09:04

## 2018-04-11 NOTE — PROGRESS NOTES
Subjective:       Patient ID: Good Murillo Jr. is a 89 y.o. male.    Chief Complaint: Follow-up    F/U:      Pt is a 89 year old who DM who last HgA1C was 9.2 Pt reports that sugars running from 100's close to 200's. Had an extensive conversation about realistic management of DM at 89 y.o. Pt has reported some low sugars since going up on the Lantus but admits to appetite not the best    Pt has small edema in the lower extremity that comes and goes when he does more walking. Review of ECHO shows good EF. Discussed with pt managment      Review of Systems   Constitutional: Negative.    Respiratory: Negative.    Cardiovascular: Positive for leg swelling.   Gastrointestinal: Negative.    Genitourinary: Negative.    Musculoskeletal: Negative.    Neurological: Negative.    Hematological: Negative.    Psychiatric/Behavioral: Negative.        Objective:      Physical Exam   Constitutional: He is oriented to person, place, and time. He appears well-developed and well-nourished.   Cardiovascular: Normal rate and regular rhythm.    1+ edema bilaterally   Pulmonary/Chest: Effort normal and breath sounds normal. No respiratory distress. He has no wheezes.   Abdominal: Soft. Bowel sounds are normal.   Neurological: He is alert and oriented to person, place, and time.   Skin: Skin is warm and dry.       Assessment:       1. Type 2 diabetes, controlled, with neuropathy    2. Self-catheterizes urinary bladder    3. Vitamin B12 deficiency    4. Localized swelling of lower extremity        Plan:       Type 2 diabetes, controlled, with neuropathy  Comments:  Will continue to monitor pt sugar and HgA1c. No change to medications    Self-catheterizes urinary bladder  Comments:  F/U with Dr. Montes De Oca    Vitamin B12 deficiency  Comments:  Will go B12 today  Orders:  -     cyanocobalamin injection 1,000 mcg; Inject 1 mL (1,000 mcg total) into the muscle once.    Localized swelling of lower extremity  Comments:  use of pressure stockings,  keep legs elevated and if gets worse for 2-3 days only 1/2 tab of 20 mg lasix    Other orders  -     furosemide (LASIX) 20 MG tablet; 1/2 tab a day for 2-3 days only if increase swelling  Dispense: 20 tablet; Refill: 0

## 2018-04-24 ENCOUNTER — OFFICE VISIT (OUTPATIENT)
Dept: URGENT CARE | Facility: CLINIC | Age: 83
End: 2018-04-24
Payer: MEDICARE

## 2018-04-24 ENCOUNTER — HOSPITAL ENCOUNTER (OUTPATIENT)
Dept: RADIOLOGY | Facility: HOSPITAL | Age: 83
Discharge: HOME | End: 2018-04-24
Attending: PHYSICIAN ASSISTANT
Payer: MEDICARE

## 2018-04-24 VITALS
RESPIRATION RATE: 10 BRPM | DIASTOLIC BLOOD PRESSURE: 62 MMHG | BODY MASS INDEX: 22.1 KG/M2 | HEART RATE: 118 BPM | OXYGEN SATURATION: 92 % | SYSTOLIC BLOOD PRESSURE: 132 MMHG | WEIGHT: 145.81 LBS | TEMPERATURE: 98 F | HEIGHT: 68 IN

## 2018-04-24 DIAGNOSIS — R09.02 HYPOXIA: Primary | ICD-10-CM

## 2018-04-24 DIAGNOSIS — R09.02 HYPOXIA: ICD-10-CM

## 2018-04-24 PROCEDURE — 99499 UNLISTED E&M SERVICE: CPT | Mod: S$GLB,,, | Performed by: PHYSICIAN ASSISTANT

## 2018-04-24 PROCEDURE — 99999 PR PBB SHADOW E&M-EST. PATIENT-LVL III: CPT | Mod: PBBFAC,,, | Performed by: PHYSICIAN ASSISTANT

## 2018-04-24 PROCEDURE — 71046 X-RAY EXAM CHEST 2 VIEWS: CPT | Mod: 26,,, | Performed by: RADIOLOGY

## 2018-04-24 PROCEDURE — 71046 X-RAY EXAM CHEST 2 VIEWS: CPT | Mod: TC,FY,PO

## 2018-04-24 PROCEDURE — 99214 OFFICE O/P EST MOD 30 MIN: CPT | Mod: S$GLB,,, | Performed by: PHYSICIAN ASSISTANT

## 2018-04-24 RX ORDER — GUAIFENESIN 1200 MG/1
1 TABLET, EXTENDED RELEASE ORAL 2 TIMES DAILY
COMMUNITY
Start: 2018-04-24 | End: 2018-05-04

## 2018-04-24 RX ORDER — AZITHROMYCIN 250 MG/1
250 TABLET, FILM COATED ORAL DAILY
Qty: 6 TABLET | Refills: 0 | Status: SHIPPED | OUTPATIENT
Start: 2018-04-24 | End: 2018-07-25

## 2018-04-24 NOTE — PROGRESS NOTES
"Subjective:       Patient ID: Good Murillo Jr. is a 89 y.o. male.    Chief Complaint: Chest Congestion and Shortness of Breath (x one week)    Shortness of Breath   This is a recurrent problem. The current episode started 1 to 4 weeks ago (2-3 weeks of the phlegm in the throat which is causing short of breath). The problem occurs intermittently. The problem has been unchanged. Associated symptoms include leg swelling (hose and elevation has been helping not needed the lasix), PND and sputum production (clear). Pertinent negatives include no abdominal pain, chest pain, coryza, ear pain, fever, headaches, orthopnea, rash, rhinorrhea, sore throat, vomiting or wheezing. Treatments tried: has oxygen at home and uses occasionally, has been using it more at 2L. His past medical history is significant for pneumonia (recent PNA).     Review of Systems   Constitutional: Negative for chills, fatigue and fever.   HENT: Positive for postnasal drip. Negative for congestion, ear discharge, ear pain, rhinorrhea, sinus pressure, sneezing and sore throat.    Eyes: Negative for pain and discharge.   Respiratory: Positive for sputum production (clear) and shortness of breath. Negative for cough (denies says he coughs "just to get up the phlegm") and wheezing.    Cardiovascular: Positive for leg swelling (hose and elevation has been helping not needed the lasix) and PND. Negative for chest pain and orthopnea.   Gastrointestinal: Negative for abdominal pain, nausea and vomiting.   Musculoskeletal: Negative for myalgias.   Skin: Negative for rash.   Neurological: Negative for headaches.       Objective:      /62 (BP Location: Right arm, Patient Position: Sitting)   Pulse (!) 118   Temp 97.5 °F (36.4 °C) (Tympanic)   Resp 10   Ht 5' 8" (1.727 m)   Wt 66.2 kg (145 lb 13.4 oz)   SpO2 (!) 92%   BMI 22.17 kg/m²   Physical Exam   Constitutional: He is oriented to person, place, and time. He appears well-developed and " well-nourished. No distress.   HENT:   Head: Normocephalic and atraumatic.   Right Ear: Tympanic membrane, external ear and ear canal normal.   Left Ear: Tympanic membrane, external ear and ear canal normal.   Nose: Nose normal. Right sinus exhibits no maxillary sinus tenderness and no frontal sinus tenderness. Left sinus exhibits no maxillary sinus tenderness and no frontal sinus tenderness.   Mouth/Throat: Oropharynx is clear and moist. No oropharyngeal exudate or posterior oropharyngeal erythema. No tonsillar exudate.   Eyes: Conjunctivae and EOM are normal. Pupils are equal, round, and reactive to light.   Neck: Normal range of motion. Neck supple.   Cardiovascular: Normal rate, regular rhythm, normal heart sounds and intact distal pulses.  Exam reveals no gallop and no friction rub.    No murmur heard.  Pulmonary/Chest: Effort normal and breath sounds normal. No stridor. No respiratory distress. He has no wheezes. He has no rales. He exhibits no tenderness.   Lymphadenopathy:     He has no cervical adenopathy.   Neurological: He is alert and oriented to person, place, and time.   Skin: Skin is warm and dry. No rash noted. He is not diaphoretic.   Nursing note and vitals reviewed.      Assessment:       1. Hypoxia        Plan:       Hypoxia  -     X-Ray Chest PA And Lateral; Future; Expected date: 04/24/2018  -     azithromycin (ZITHROMAX Z-MELISSA) 250 MG tablet; Take 1 tablet (250 mg total) by mouth once daily. Take pack as directed. Take 2 tablets on day 1 then 1 tablet daily until complete  Dispense: 6 tablet; Refill: 0  -     guaiFENesin 1,200 mg Ta12; Take 1 tablet by mouth 2 (two) times daily.    Likely acute bronchitis, x ray done given recent issue with pneumonia. No acute infiltrate. His O2 was lowish at 92% but he states this was similar to what he was getting at home and his baseline appears 96%. Start zpack and mucinex as this has been going on for 3 weeks. If fever, worsen SOB, or worsening cough  advised ER for evaluation of symptoms.  Left voicemail to update about CXR and asked patient to return call about test result.      Irina Matthews PA-C  [facility]

## 2018-05-07 ENCOUNTER — PATIENT MESSAGE (OUTPATIENT)
Dept: UROLOGY | Facility: CLINIC | Age: 83
End: 2018-05-07

## 2018-05-16 ENCOUNTER — OFFICE VISIT (OUTPATIENT)
Dept: PULMONOLOGY | Facility: CLINIC | Age: 83
End: 2018-05-16
Payer: MEDICARE

## 2018-05-16 VITALS
HEIGHT: 68 IN | RESPIRATION RATE: 16 BRPM | SYSTOLIC BLOOD PRESSURE: 110 MMHG | DIASTOLIC BLOOD PRESSURE: 74 MMHG | OXYGEN SATURATION: 90 % | WEIGHT: 151 LBS | HEART RATE: 100 BPM | BODY MASS INDEX: 22.88 KG/M2

## 2018-05-16 DIAGNOSIS — J43.1 PANLOBULAR EMPHYSEMA: ICD-10-CM

## 2018-05-16 DIAGNOSIS — J44.1 COPD EXACERBATION: ICD-10-CM

## 2018-05-16 DIAGNOSIS — J44.9 CHRONIC OBSTRUCTIVE PULMONARY DISEASE, UNSPECIFIED COPD TYPE: Primary | ICD-10-CM

## 2018-05-16 PROCEDURE — 99214 OFFICE O/P EST MOD 30 MIN: CPT | Mod: S$GLB,,, | Performed by: INTERNAL MEDICINE

## 2018-05-16 PROCEDURE — 99499 UNLISTED E&M SERVICE: CPT | Mod: S$GLB,,, | Performed by: INTERNAL MEDICINE

## 2018-05-16 PROCEDURE — 99999 PR PBB SHADOW E&M-EST. PATIENT-LVL V: CPT | Mod: PBBFAC,,, | Performed by: INTERNAL MEDICINE

## 2018-05-16 RX ORDER — GUAIFENESIN 600 MG/1
1200 TABLET, EXTENDED RELEASE ORAL 2 TIMES DAILY
Qty: 60 TABLET | COMMUNITY
Start: 2018-05-16 | End: 2018-05-26

## 2018-05-16 RX ORDER — TIOTROPIUM BROMIDE 18 UG/1
18 CAPSULE ORAL; RESPIRATORY (INHALATION) DAILY
Qty: 90 CAPSULE | Refills: 4 | Status: SHIPPED | OUTPATIENT
Start: 2018-05-16 | End: 2018-10-30 | Stop reason: SDUPTHER

## 2018-05-16 RX ORDER — ALBUTEROL SULFATE 90 UG/1
2 AEROSOL, METERED RESPIRATORY (INHALATION) EVERY 6 HOURS
Qty: 54 G | Refills: 4 | Status: SHIPPED | OUTPATIENT
Start: 2018-05-16 | End: 2019-02-11 | Stop reason: CLARIF

## 2018-05-16 RX ORDER — METHYLPREDNISOLONE 4 MG/1
TABLET ORAL
Qty: 21 TABLET | Refills: 1 | Status: SHIPPED | OUTPATIENT
Start: 2018-05-16 | End: 2018-06-06

## 2018-05-16 NOTE — PROGRESS NOTES
Subjective:       Patient ID: Good Murillo Jr. is a 89 y.o. male.    Chief Complaint: He       COPD and Shortness of Breath    HPI     COPD  He presents for evaluation and treatment of COPD. ?oxygen makes phlegm worse?  Some discomfort il left lower chest       The patient is not currently have symptoms / an exacerbation. The patient has COPD for approximately 10 years. Symptoms in previous episodes have included dyspnea, cough, wheezing and sinus congestion, and typically last 2 weeks. Previous episodes have been exacerbated by moderate activity. Current treatment includes albuterol inhaler and ipratropium inhaler, which generally provides some relief of symptoms.   He uses 1 pillows at night. Patient currently is on oxygen at 2 L/min per nasal cannula..uses prn.  The patient is having constitutional symptoms, denying fever, chills, anorexia, but has  weight loss - 154 to 128. The patient has been hospitalized for this condition before. He quit smoking approximately 25 years ago. The patient is experiencing exercise intolerance (difficulty walking 1 blocks on flat ground)    Past Medical History:   Diagnosis Date    Abnormal EKG 2014    Allergy     Bilateral carotid artery disease 2017    Cholelithiasis     US retroperitoneal 2016    COPD (chronic obstructive pulmonary disease)     Diverticulosis     hosp/divert bleed    Ex-smoker     Glaucoma     suspect    Heart murmur     History of pneumothorax     bilat    Hypothyroid     Iron deficiency anemia     nathan    Polyneuropathy     PVD (peripheral vascular disease) 2014    Urinary incontinence      Past Surgical History:   Procedure Laterality Date    CATARACT EXTRACTION       SECTION      PCIOL OU Bilateral 2011 OD/2010 OS    DR. DELGADILLO    PROSTATE SURGERY   approx    brachytherapy    SPINAL CORD STIMULATOR IMPLANT      TENS Unit     Social History     Social History    Marital status:       Spouse name: N/A    Number of children: 2    Years of education: N/A     Occupational History    retired      self employed     Social History Main Topics    Smoking status: Former Smoker     Quit date: 8/28/1993    Smokeless tobacco: Never Used    Alcohol use Yes      Comment: Champagne on Sunday    Drug use: No    Sexual activity: No     Other Topics Concern    Not on file     Social History Narrative    Patient is a [employer]. He is retired he was self employed.     Review of Systems   Constitutional: Positive for fatigue. Negative for fever.   HENT: Positive for postnasal drip, rhinorrhea and congestion.    Eyes: Negative for redness and itching.   Respiratory: Positive for cough, sputum production, shortness of breath, dyspnea on extertion, use of rescue inhaler and Paroxysmal Nocturnal Dyspnea.    Cardiovascular: Negative for chest pain, palpitations and leg swelling.   Genitourinary: Negative for difficulty urinating and hematuria.   Endocrine: Negative for cold intolerance and heat intolerance.    Skin: Negative for rash.   Gastrointestinal: Negative for nausea and abdominal pain.   Neurological: Negative for dizziness, syncope, weakness and light-headedness.   Hematological: Negative for adenopathy. Does not bruise/bleed easily.   Psychiatric/Behavioral: Negative for sleep disturbance. The patient is not nervous/anxious.        Objective:      Physical Exam   Constitutional: He is oriented to person, place, and time. He appears well-developed and well-nourished.   HENT:   Head: Normocephalic and atraumatic.   Mouth/Throat: Oropharyngeal exudate present.   Eyes: Conjunctivae are normal. Pupils are equal, round, and reactive to light.   Neck: Neck supple. No JVD present. No tracheal deviation present. No thyromegaly present.   Cardiovascular: Normal rate, regular rhythm and normal heart sounds.    No murmur heard.  Pulmonary/Chest: Effort normal. He has decreased breath sounds. He has  wheezes in the right lower field and the left lower field. He has no rhonchi. He has no rales.   Abdominal: Soft. Bowel sounds are normal.   Musculoskeletal: Normal range of motion. He exhibits no edema or tenderness.   Lymphadenopathy:     He has no cervical adenopathy.   Neurological: He is alert and oriented to person, place, and time.   Skin: Skin is warm and dry.   Nursing note and vitals reviewed.    Personal Diagnostic Review  Chest x-ray: hyperinflation   Office Spirometry Results:     No flowsheet data found.  Pulmonary Studies Review 5/16/2018   SpO2 90   Ordering Provider -   Performing nurse/tech/RT -   Diagnosis -   Height 68.000   Weight 2416.24   BMI (Calculated) 23   Predicted Distance 233.37   Patient Race -   6MWT Status -   Patient Reported -   Was O2 used? -   6MW Distance walked (feet) -   Distance walked (meters) -   Did patient stop? -   Type of assistive device(s) used? -   Is extra documentation required for this patient? -   Oxygen Saturation -   Supplemental Oxygen -   Heart Rate -   Blood Pressure -   Sari Dyspnea Rating  -   Oxygen Saturation -   Supplemental Oxygen -   Heart Rate -   Blood Pressure -   Sari Dyspnea Rating  -   Recovery Time (seconds) -   Oxygen Saturation -   Supplemental Oxygen -   Heart Rate -   Blood Pressure -   Sari Dyspnea Rating  -   Is procedure ready for interpretation? -   Did the patient stop or pause? -   Total Time Walked (Calculated) -   Total Laps Walked -   Final Partial Lap Distance (feet) -   Total Distance Feet (Calculated) -   Total Distance Meters (Calculated) -   Predicted Distance Meters (Calculated) 425.98   Percentage of Predicted (Calculated) -   Peak VO2 (Calculated) -   Mets -   Has The Patient Had a Previous Six Minute Walk Test? -   Oxygen Qualification? -         Assessment:       1. Chronic obstructive pulmonary disease, unspecified COPD type    2. Panlobular emphysema    3. COPD exacerbation        Outpatient Encounter Prescriptions as  of 5/16/2018   Medication Sig Dispense Refill    albuterol 90 mcg/actuation inhaler Inhale 2 puffs into the lungs every 6 (six) hours. 54 g 4    alendronate (FOSAMAX) 70 MG tablet Take 1 tablet (70 mg total) by mouth every 7 days. 12 tablet 3    amoxicillin-clavulanate 875-125mg (AUGMENTIN) 875-125 mg per tablet 1 tablet by mouth twice daily 20 tablet 0    aspirin 81 mg Tab Take 1 tablet by mouth Daily. Over counter. To help prevent stroke/ heart attack      azithromycin (ZITHROMAX Z-MELISSA) 250 MG tablet Take 1 tablet (250 mg total) by mouth once daily. Take pack as directed. Take 2 tablets on day 1 then 1 tablet daily until complete 6 tablet 0    blood sugar diagnostic Strp 1 strip by Misc.(Non-Drug; Combo Route) route 3 (three) times daily. True Metrix test strips 102 strip 11    calcium carbonate (OS-TANGELA) 500 mg calcium (1,250 mg) tablet Take 1 tablet by mouth once daily.      erythromycin (ROMYCIN) ophthalmic ointment Apply 0.5 inch in affected eye/eyelid four times a day for 7 days 3.5 g 0    ferrous sulfate 325 mg (65 mg iron) Tab tablet Take 1 tablet (325 mg total) by mouth once daily. 90 tablet 1    furosemide (LASIX) 20 MG tablet 1/2 tab a day for 2-3 days only if increase swelling 20 tablet 0    gabapentin (NEURONTIN) 100 MG capsule Take 1 capsule (100 mg total) by mouth every evening. 90 capsule 1    insulin glargine (LANTUS SOLOSTAR) 100 unit/mL (3 mL) InPn pen INJECT 15 UNITS SUBCUTANEOUSLY ONCE DAILY 15 mL 3    insulin lispro (HUMALOG) 100 unit/mL injection Sliding Scale:    150-200: 6 units  201-250: 8 units  251- 300 10 unit  301-350:  12 units  351 or greater 14 units 10 mL 2    lancets 33 gauge Misc 1 lancet by Misc.(Non-Drug; Combo Route) route 3 (three) times daily. 100 each 11    levothyroxine (SYNTHROID) 25 MCG tablet TAKE ONE TABLET BY MOUTH EVERY DAY 30 tablet 10    loratadine (CLARITIN) 10 mg tablet Take 1 tablet by mouth every morning.       metFORMIN (GLUCOPHAGE) 1000 MG  "tablet TAKE 1 TABLET BY MOUTH TWO TIMES A  tablet 1    nateglinide (STARLIX) 60 MG tablet TAKE ONE TABLET BY MOUTH THREE TIMES A DAY BEFORE MEALS 270 tablet 2    nystatin (MYCOSTATIN) ointment APPLY TO AFFECTED AREA TWO TIMES A DAY 30 g 0    OXYGEN-AIR DELIVERY SYSTEMS [facility] Inhale 2 L into the lungs as needed. Oxygen 2L via nasal canula as needed.      pantoprazole (PROTONIX) 40 MG tablet Take 1 tablet (40 mg total) by mouth once daily. 30 tablet 3    pravastatin (PRAVACHOL) 40 MG tablet TAKE 1 TABLET BY MOUTH ONCE DAILY 90 tablet 3    tamsulosin (FLOMAX) 0.4 mg Cp24 1 capsule PO QD 30 capsule 6    TENS unit and electrodes Cmpk 1 Device by Misc.(Non-Drug; Combo Route) route daily as needed. 1 each 0    tiotropium (SPIRIVA WITH HANDIHALER) 18 mcg inhalation capsule Inhale 1 capsule (18 mcg total) into the lungs once daily. Controller 90 capsule 4    [DISCONTINUED] albuterol 90 mcg/actuation inhaler Inhale 2 puffs into the lungs every 6 (six) hours. 3 Inhaler 4    [DISCONTINUED] tiotropium (SPIRIVA WITH HANDIHALER) 18 mcg inhalation capsule Inhale 1 capsule (18 mcg total) into the lungs once daily. Controller 90 capsule 4    guaiFENesin (MUCINEX) 600 mg 12 hr tablet Take 2 tablets (1,200 mg total) by mouth 2 (two) times daily. 60 tablet prn    methylPREDNISolone (MEDROL DOSEPACK) 4 mg tablet use as directed 21 tablet 1     No facility-administered encounter medications on file as of 5/16/2018.      Orders Placed This Encounter   Procedures    HME - OTHER     Nasal cannula and supplies for oxygen concentrator     Order Specific Question:   Type of Equipment:     Answer:   needs filter on oxygen concnetrator     Order Specific Question:   Height:     Answer:   5' 8" (1.727 m)     Order Specific Question:   Weight:     Answer:   68.5 kg (151 lb 0.2 oz)    X-Ray Chest PA And Lateral     Standing Status:   Future     Standing Expiration Date:   5/16/2019     Order Specific Question:   May the " Radiologist modify the order per protocol to meet the clinical needs of the patient?     Answer:   Yes    Six Minute Walk Test to qualify for Home Oxygen     Standing Status:   Future     Standing Expiration Date:   5/16/2019     Plan:       Requested Prescriptions     Signed Prescriptions Disp Refills    tiotropium (SPIRIVA WITH HANDIHALER) 18 mcg inhalation capsule 90 capsule 4     Sig: Inhale 1 capsule (18 mcg total) into the lungs once daily. Controller    albuterol 90 mcg/actuation inhaler 54 g 4     Sig: Inhale 2 puffs into the lungs every 6 (six) hours.    guaiFENesin (MUCINEX) 600 mg 12 hr tablet 60 tablet prn     Sig: Take 2 tablets (1,200 mg total) by mouth 2 (two) times daily.    methylPREDNISolone (MEDROL DOSEPACK) 4 mg tablet 21 tablet 1     Sig: use as directed     Chronic obstructive pulmonary disease, unspecified COPD type  -     HME - OTHER  -     X-Ray Chest PA And Lateral; Future; Expected date: 05/16/2018  -     Six Minute Walk Test to qualify for Home Oxygen; Future    Panlobular emphysema  -     tiotropium (SPIRIVA WITH HANDIHALER) 18 mcg inhalation capsule; Inhale 1 capsule (18 mcg total) into the lungs once daily. Controller  Dispense: 90 capsule; Refill: 4  -     albuterol 90 mcg/actuation inhaler; Inhale 2 puffs into the lungs every 6 (six) hours.  Dispense: 54 g; Refill: 4    COPD exacerbation  -     guaiFENesin (MUCINEX) 600 mg 12 hr tablet; Take 2 tablets (1,200 mg total) by mouth 2 (two) times daily.  Dispense: 60 tablet; Refill: prn  -     methylPREDNISolone (MEDROL DOSEPACK) 4 mg tablet; use as directed  Dispense: 21 tablet; Refill: 1           Follow-up in about 1 year (around 5/16/2019) for Review CXR, 6 min walk.  [id number]  MEDICAL DECISION MAKING: Moderate to high complexity.  Overall, the multiple problems listed are of moderate to high severity that may impact quality of life and activities of daily living. Side effects of medications, treatment plan as well as options and  alternatives reviewed and discussed with patient. There was counseling of patient concerning these issues.    Total time spent in face to face counseling and coordination of care - 45  minutes over 50% of time was used in discussion of prognosis, risks, benefits of treatment, instructions and compliance with regimen . Discussion with other physicians or health care providers (DME, NP, pharmacy, respiratory therapy) occurred.

## 2018-05-16 NOTE — PATIENT INSTRUCTIONS
Methylprednisolone tablets  What is this medicine?  METHYLPREDNISOLONE (meth ill pred NISS oh lone) is a corticosteroid. It is commonly used to treat inflammation of the skin, joints, lungs, and other organs. Common conditions treated include asthma, allergies, and arthritis. It is also used for other conditions, such as blood disorders and diseases of the adrenal glands.  How should I use this medicine?  Take this medicine by mouth with a drink of water. Follow the directions on the prescription label. Take it with food or milk to avoid stomach upset. If you are taking this medicine once a day, take it in the morning. Do not take more medicine than you are told to take. Do not suddenly stop taking your medicine because you may develop a severe reaction. Your doctor will tell you how much medicine to take. If your doctor wants you to stop the medicine, the dose may be slowly lowered over time to avoid any side effects.  Talk to your pediatrician regarding the use of this medicine in children. Special care may be needed.  What side effects may I notice from receiving this medicine?  Side effects that you should report to your doctor or health care professional as soon as possible:  · allergic reactions like skin rash, itching or hives, swelling of the face, lips, or tongue  · eye pain, decreased or blurred vision, or bulging eyes  · fever, sore throat, sneezing, cough, or other signs of infection, wounds that will not heal  · increased thirst  · mental depression, mood swings, mistaken feelings of self importance or of being mistreated  · pain in hips, back, ribs, arms, shoulders, or legs  · swelling of the ankles, feet, hands  · trouble passing urine or change in the amount of urine  Side effects that usually do not require medical attention (report to your doctor or health care professional if they continue or are bothersome):  · confusion, excitement, restlessness  · headache  · nausea, vomiting  · skin  problems, acne, thin and shiny skin  · weight gain  What may interact with this medicine?  Do not take this medicine with any of the following medications:  · mifepristone  This medicine may also interact with the following medications:  · tacrolimus  · vaccines  · warfarin  What if I miss a dose?  If you miss a dose, take it as soon as you can. If it is almost time for your next dose, talk to your doctor or health care professional. You may need to miss a dose or take an extra dose. Do not take double or extra doses without advice.  Where should I keep my medicine?  Keep out of the reach of children.  Store at room temperature between 20 and 25 degrees C (68 and 77 degrees F). Throw away any unused medicine after the expiration date.  What should I tell my health care provider before I take this medicine?  They need to know if you have any of these conditions:  · Cushing's syndrome  · diabetes  · glaucoma  · heart problems or disease  · high blood pressure  · infection such as herpes, measles, tuberculosis, or chickenpox  · kidney disease  · liver disease  · mental problems  · myasthenia gravis  · osteoporosis  · seizures  · stomach ulcer or intestine disease including colitis and diverticulitis  · thyroid problem  · an unusual or allergic reaction to lactose, methylprednisolone, other medicines, foods, dyes, or preservatives  · pregnant or trying to get pregnant  · breast-feeding  What should I watch for while using this medicine?  Visit your doctor or health care professional for regular checks on your progress. If you are taking this medicine for a long time, carry an identification card with your name and address, the type and dose of your medicine, and your doctor's name and address.  The medicine may increase your risk of getting an infection. Stay away from people who are sick. Tell your doctor or health care professional if you are around anyone with measles or chickenpox.  If you are going to have surgery,  tell your doctor or health care professional that you have taken this medicine within the last twelve months.  Ask your doctor or health care professional about your diet. You may need to lower the amount of salt you eat.  The medicine can increase your blood sugar. If you are a diabetic check with your doctor if you need help adjusting the dose of your diabetic medicine.  NOTE:This sheet is a summary. It may not cover all possible information. If you have questions about this medicine, talk to your doctor, pharmacist, or health care provider. Copyright© 2017 [employer]

## 2018-05-21 ENCOUNTER — PATIENT MESSAGE (OUTPATIENT)
Dept: PULMONOLOGY | Facility: CLINIC | Age: 83
End: 2018-05-21

## 2018-05-29 ENCOUNTER — PATIENT MESSAGE (OUTPATIENT)
Dept: PULMONOLOGY | Facility: CLINIC | Age: 83
End: 2018-05-29

## 2018-06-25 DIAGNOSIS — K92.1 BLOOD IN STOOL: ICD-10-CM

## 2018-06-25 RX ORDER — PANTOPRAZOLE SODIUM 40 MG/1
TABLET, DELAYED RELEASE ORAL
Qty: 90 TABLET | Refills: 3 | Status: SHIPPED | OUTPATIENT
Start: 2018-06-25 | End: 2018-06-28 | Stop reason: SDUPTHER

## 2018-06-28 ENCOUNTER — PATIENT MESSAGE (OUTPATIENT)
Dept: INTERNAL MEDICINE | Facility: CLINIC | Age: 83
End: 2018-06-28

## 2018-06-28 DIAGNOSIS — K92.1 BLOOD IN STOOL: ICD-10-CM

## 2018-06-28 RX ORDER — GABAPENTIN 100 MG/1
100 CAPSULE ORAL 2 TIMES DAILY
Qty: 180 CAPSULE | Refills: 2 | Status: SHIPPED | OUTPATIENT
Start: 2018-06-28 | End: 2019-03-18 | Stop reason: SDUPTHER

## 2018-06-28 RX ORDER — PANTOPRAZOLE SODIUM 40 MG/1
40 TABLET, DELAYED RELEASE ORAL DAILY
Qty: 90 TABLET | Refills: 2 | Status: SHIPPED | OUTPATIENT
Start: 2018-06-28 | End: 2019-03-18 | Stop reason: SDUPTHER

## 2018-06-29 ENCOUNTER — TELEPHONE (OUTPATIENT)
Dept: DERMATOLOGY | Facility: CLINIC | Age: 83
End: 2018-06-29

## 2018-06-29 NOTE — TELEPHONE ENCOUNTER
----- Message from Anne Joyner sent at 6/29/2018  9:09 AM CDT -----  Contact: daughter(Carleen)[phone]  Would like to consult with nurse regarding her father appt that was cancel, please call back at [phone]. Thanks/ar

## 2018-07-19 ENCOUNTER — PATIENT MESSAGE (OUTPATIENT)
Dept: INTERNAL MEDICINE | Facility: CLINIC | Age: 83
End: 2018-07-19

## 2018-07-21 ENCOUNTER — OFFICE VISIT (OUTPATIENT)
Dept: URGENT CARE | Facility: CLINIC | Age: 83
End: 2018-07-21
Payer: MEDICARE

## 2018-07-21 VITALS
WEIGHT: 149.13 LBS | SYSTOLIC BLOOD PRESSURE: 114 MMHG | RESPIRATION RATE: 17 BRPM | OXYGEN SATURATION: 90 % | DIASTOLIC BLOOD PRESSURE: 64 MMHG | BODY MASS INDEX: 22.6 KG/M2 | TEMPERATURE: 99 F | HEIGHT: 68 IN | HEART RATE: 104 BPM

## 2018-07-21 DIAGNOSIS — H01.009 BLEPHARITIS, UNSPECIFIED LATERALITY, UNSPECIFIED TYPE: ICD-10-CM

## 2018-07-21 DIAGNOSIS — B37.2 YEAST DERMATITIS: Primary | ICD-10-CM

## 2018-07-21 PROCEDURE — 99213 OFFICE O/P EST LOW 20 MIN: CPT | Mod: S$GLB,,, | Performed by: PHYSICIAN ASSISTANT

## 2018-07-21 PROCEDURE — 99999 PR PBB SHADOW E&M-EST. PATIENT-LVL V: CPT | Mod: PBBFAC,,, | Performed by: PHYSICIAN ASSISTANT

## 2018-07-21 RX ORDER — CLOTRIMAZOLE AND BETAMETHASONE DIPROPIONATE 10; .64 MG/G; MG/G
CREAM TOPICAL 2 TIMES DAILY
Qty: 45 G | Refills: 0 | Status: SHIPPED | OUTPATIENT
Start: 2018-07-21

## 2018-07-21 RX ORDER — NEOMYCIN SULFATE, POLYMYXIN B SULFATE AND DEXAMETHASONE 3.5; 10000; 1 MG/ML; [USP'U]/ML; MG/ML
1 SUSPENSION/ DROPS OPHTHALMIC EVERY 6 HOURS
Qty: 5 ML | Refills: 0 | Status: SHIPPED | OUTPATIENT
Start: 2018-07-21 | End: 2018-08-31

## 2018-07-22 PROBLEM — B37.2 YEAST DERMATITIS: Status: ACTIVE | Noted: 2018-07-22

## 2018-07-22 NOTE — PROGRESS NOTES
"Subjective:      Patient ID: Good Murillo Jr. is a 89 y.o. male.    Chief Complaint: Rash    Good Murillo is a 89 y.o. male here for a rash in the groin area. He has urinary incontenience and changes diapers 8-10 times daily. He has developed a red rash in the groin area.               Review of Systems   Constitutional: Negative.    HENT: Negative.    Eyes: Negative.    Respiratory: Negative.    Cardiovascular: Negative.    Gastrointestinal: Negative.    Endocrine: Negative.    Genitourinary: Negative.    Musculoskeletal: Negative.    Skin: Negative.    Allergic/Immunologic: Negative.    Neurological: Negative.    Hematological: Negative.    Psychiatric/Behavioral: Negative.         Review of patient's allergies indicates:  No Known Allergies    Past Medical History:   Diagnosis Date    Abnormal EKG 8/22/2014    Allergy     Bilateral carotid artery disease 1/17/2017    Cholelithiasis     US retroperitoneal 12/2016    COPD (chronic obstructive pulmonary disease)     Diverticulosis     hosp/divert bleed    Ex-smoker     Glaucoma     suspect    Heart murmur     History of pneumothorax     bilat    Hypothyroid     Iron deficiency anemia     nathan    Polyneuropathy     PVD (peripheral vascular disease) 8/22/2014    Urinary incontinence        Objective:   /64 (BP Location: Right arm, Patient Position: Sitting, BP Method: Small (Manual))   Pulse 104   Temp 98.7 °F (37.1 °C) (Tympanic)   Resp 17   Ht 5' 8" (1.727 m)   Wt 67.7 kg (149 lb 2.3 oz)   SpO2 (!) 90%   BMI 22.68 kg/m²     Physical Exam   Skin: Skin is warm and dry. Rash noted. There is erythema. No pallor.   Rash c/w mycotic genitalia rash.      Assessment:     Good Murillo is seen today for   1. Yeast dermatitis      Plan:     Good Murillo is seen today for   1. Yeast dermatitis      We have discussed the etiology and treatment options associated with the diagnosis as well as alternatives. He has elected the following treatments. "     Yeast dermatitis  -     clotrimazole-betamethasone 1-0.05% (LOTRISONE) cream; Apply topically 2 (two) times daily.  Dispense: 45 g; Refill: 0    Blepharitis   -     neomycin-polymyxin-dexamethasone (MAXITROL) 3.5mg/mL-10,000 unit/mL-0.1 % DrpS; Place 1 drop into both eyes every 6 (six) hours.  Dispense: 5 mL; Refill:    The patient was explained the above plan and given opportunity to ask questions. He understands, chooses and consents to this plan and accepts all the risks, which include but are not limited to the risks mentioned above. He understands the alternative of having no testing, interventions or treatments at this time. He left content and without further questions.     Disclaimer:  This note is prepared using voice recognition software and as such is likely to have errors and has not been proof read. Please contact me for questions.

## 2018-07-24 ENCOUNTER — PATIENT MESSAGE (OUTPATIENT)
Dept: INTERNAL MEDICINE | Facility: CLINIC | Age: 83
End: 2018-07-24

## 2018-07-24 DIAGNOSIS — E11.40 TYPE 2 DIABETES, CONTROLLED, WITH NEUROPATHY: Primary | Chronic | ICD-10-CM

## 2018-07-25 ENCOUNTER — OFFICE VISIT (OUTPATIENT)
Dept: DERMATOLOGY | Facility: CLINIC | Age: 83
End: 2018-07-25
Payer: MEDICARE

## 2018-07-25 ENCOUNTER — HOSPITAL ENCOUNTER (OUTPATIENT)
Dept: RADIOLOGY | Facility: HOSPITAL | Age: 83
Discharge: HOME | End: 2018-07-25
Attending: NURSE PRACTITIONER
Payer: MEDICARE

## 2018-07-25 ENCOUNTER — OFFICE VISIT (OUTPATIENT)
Dept: INTERNAL MEDICINE | Facility: CLINIC | Age: 83
End: 2018-07-25
Payer: MEDICARE

## 2018-07-25 ENCOUNTER — TELEPHONE (OUTPATIENT)
Dept: INTERNAL MEDICINE | Facility: CLINIC | Age: 83
End: 2018-07-25

## 2018-07-25 ENCOUNTER — CLINICAL SUPPORT (OUTPATIENT)
Dept: INTERNAL MEDICINE | Facility: CLINIC | Age: 83
End: 2018-07-25
Payer: MEDICARE

## 2018-07-25 VITALS
OXYGEN SATURATION: 94 % | TEMPERATURE: 98 F | BODY MASS INDEX: 22.35 KG/M2 | DIASTOLIC BLOOD PRESSURE: 74 MMHG | HEIGHT: 68 IN | SYSTOLIC BLOOD PRESSURE: 138 MMHG | WEIGHT: 147.5 LBS | HEART RATE: 91 BPM

## 2018-07-25 DIAGNOSIS — L98.9 ECZEMATOUS SKIN LESIONS: ICD-10-CM

## 2018-07-25 DIAGNOSIS — E53.8 VITAMIN B12 DEFICIENCY: ICD-10-CM

## 2018-07-25 DIAGNOSIS — E11.40 TYPE 2 DIABETES, CONTROLLED, WITH NEUROPATHY: Chronic | ICD-10-CM

## 2018-07-25 DIAGNOSIS — I15.2 HYPERTENSION ASSOCIATED WITH DIABETES: Chronic | ICD-10-CM

## 2018-07-25 DIAGNOSIS — M25.422 EFFUSION OF LEFT ELBOW: ICD-10-CM

## 2018-07-25 DIAGNOSIS — L30.4 INTERTRIGO: Primary | ICD-10-CM

## 2018-07-25 DIAGNOSIS — E11.59 HYPERTENSION ASSOCIATED WITH DIABETES: Chronic | ICD-10-CM

## 2018-07-25 DIAGNOSIS — M25.422 EFFUSION OF LEFT ELBOW: Primary | ICD-10-CM

## 2018-07-25 DIAGNOSIS — E53.8 VITAMIN B12 DEFICIENCY: Primary | ICD-10-CM

## 2018-07-25 PROCEDURE — 99213 OFFICE O/P EST LOW 20 MIN: CPT | Mod: S$GLB,,, | Performed by: DERMATOLOGY

## 2018-07-25 PROCEDURE — 99999 PR PBB SHADOW E&M-EST. PATIENT-LVL III: CPT | Mod: PBBFAC,,,

## 2018-07-25 PROCEDURE — 99999 PR PBB SHADOW E&M-EST. PATIENT-LVL V: CPT | Mod: PBBFAC,,, | Performed by: NURSE PRACTITIONER

## 2018-07-25 PROCEDURE — 99214 OFFICE O/P EST MOD 30 MIN: CPT | Mod: S$GLB,,, | Performed by: NURSE PRACTITIONER

## 2018-07-25 PROCEDURE — 73080 X-RAY EXAM OF ELBOW: CPT | Mod: TC,FY,PO,LT

## 2018-07-25 PROCEDURE — 99999 PR PBB SHADOW E&M-EST. PATIENT-LVL II: CPT | Mod: PBBFAC,,, | Performed by: DERMATOLOGY

## 2018-07-25 PROCEDURE — 73080 X-RAY EXAM OF ELBOW: CPT | Mod: 26,LT,, | Performed by: RADIOLOGY

## 2018-07-25 PROCEDURE — 96372 THER/PROPH/DIAG INJ SC/IM: CPT | Mod: S$GLB,,, | Performed by: FAMILY MEDICINE

## 2018-07-25 RX ORDER — TRIAMCINOLONE ACETONIDE 1 MG/G
CREAM TOPICAL
Qty: 80 G | Refills: 1 | Status: SHIPPED | OUTPATIENT
Start: 2018-07-25 | End: 2021-03-11

## 2018-07-25 RX ORDER — MICONAZOLE NITRATE 2 %
POWDER (GRAM) TOPICAL
Qty: 71 G | Refills: 11 | Status: SHIPPED | OUTPATIENT
Start: 2018-07-25 | End: 2021-03-11

## 2018-07-25 RX ORDER — CYANOCOBALAMIN 1000 UG/ML
100 INJECTION, SOLUTION INTRAMUSCULAR; SUBCUTANEOUS ONCE
Status: DISCONTINUED | OUTPATIENT
Start: 2018-07-25 | End: 2018-07-25

## 2018-07-25 RX ORDER — KETOCONAZOLE 20 MG/G
CREAM TOPICAL
Qty: 60 G | Refills: 3 | Status: SHIPPED | OUTPATIENT
Start: 2018-07-25

## 2018-07-25 RX ORDER — CYANOCOBALAMIN 1000 UG/ML
1000 INJECTION, SOLUTION INTRAMUSCULAR; SUBCUTANEOUS ONCE
Status: COMPLETED | OUTPATIENT
Start: 2018-07-25 | End: 2018-07-25

## 2018-07-25 RX ADMIN — CYANOCOBALAMIN 1000 MCG: 1000 INJECTION, SOLUTION INTRAMUSCULAR; SUBCUTANEOUS at 10:07

## 2018-07-25 NOTE — PROGRESS NOTES
Patient presents to clinic for 1,000 mcg cyanocobalamin injection IM in the Left Deltoid. Patient informed that he will remain in clinic for 15 minutes after. Patient tolerated well. Voiced no complaints

## 2018-07-25 NOTE — PROGRESS NOTES
Subjective:       Patient ID:  Good Murillo Jr. is a 89 y.o. male who presents for   Chief Complaint   Patient presents with    Skin Check     TBSE, no concerns     Hx of SK's and tinea pedis of the left lower leg, last seen on 9/20/16.  He is here today for occasional rashes of the bilateral lower legs which he believes is related to urinary incontinence. Using clotrimazole-betamethasone x several days.     The patient denies personal or family history of skin cancer.          Review of Systems   Constitutional: Negative for fever and chills.   Gastrointestinal: Negative for nausea and vomiting.   Skin: Positive for itching and rash. Negative for daily sunscreen use, activity-related sunscreen use and recent sunburn.   Hematologic/Lymphatic: Does not bruise/bleed easily.        Objective:    Physical Exam   Constitutional: He appears well-developed and well-nourished. No distress.   Neurological: He is alert and oriented to person, place, and time. He is not disoriented.   Psychiatric: He has a normal mood and affect.   Skin:   Areas Examined (abnormalities noted in diagram):   Head / Face Inspection Performed  Neck Inspection Performed  Chest / Axilla Inspection Performed  Abdomen Inspection Performed  Genitals / Buttocks / Groin Inspection Performed  Back Inspection Performed  RUE Inspected  LUE Inspection Performed  RLE Inspected  LLE Inspection Performed  Nails and Digits Inspection Performed              Diagram Legend      Pigmented verrucoid papule/plaque c/w seborrheic keratosis       Assessment / Plan:        Intertrigo  -     miconazole NITRATE 2 % (ZEASORB AF) 2 % top powder; Use two to three times daily to prevent rash in groin  Dispense: 85 g; Refill: 11  -     ketoconazole (NIZORAL) 2 % cream; AAA bid for rash in groin  Dispense: 60 g; Refill: 3  -     Discussed diagnosis and AVS given on candidal intertrigo.  Will start ketoconazole cream and miconazole powder TID.  Recommend frequent airing out  of the inframammary, abdominal, groin and folds.      Eczematous skin lesions  -     triamcinolone acetonide 0.1% (KENALOG) 0.1 % cream; AAA bid prn for rash on legs  Dispense: 80 g; Refill: 1  -     Of bilateral legs, will start above med.              Follow-up if symptoms worsen or fail to improve.

## 2018-07-25 NOTE — TELEPHONE ENCOUNTER
Returned pt call and informed pt that xray indicates no acute bony injury. Xray demonstrates arthritic changes and obvious swelling of the elbow. Continue with treatment as discussed at todays appt. Call or return if symptoms worsen or fail to improve within the next 1-2 weeks . Pt verbalizes understanding. /srb

## 2018-07-25 NOTE — PROGRESS NOTES
Subjective:       Patient ID: Good Murillo Jr. is a 89 y.o. male.    Chief Complaint: Joint Swelling (left side)    HPI    Painless Elbow (L) swelling x 2 weeks. No inciting event. Pt reports that this occurs every now again, same elbow. Last occurrence about a year ago. No fever, sweats, chills, redness, pain, drainage. No other joints affected.     Past Medical History:   Diagnosis Date    Abnormal EKG 2014    Allergy     Bilateral carotid artery disease 2017    Cholelithiasis     US retroperitoneal 2016    COPD (chronic obstructive pulmonary disease)     Diverticulosis     hosp/divert bleed    Ex-smoker     Glaucoma     suspect    Heart murmur     History of pneumothorax     bilat    Hypothyroid     Iron deficiency anemia     nathan    Polyneuropathy     PVD (peripheral vascular disease) 2014    Urinary incontinence      Past Surgical History:   Procedure Laterality Date    CATARACT EXTRACTION       SECTION      PCIOL OU Bilateral 2011 OD/2010 OS    DR. DELGADILLO    PROSTATE SURGERY   approx    brachytherapy    SPINAL CORD STIMULATOR IMPLANT      TENS Unit     Past Surgical History:   Procedure Laterality Date    CATARACT EXTRACTION       SECTION      PCIOL OU Bilateral 2011 OD/2010 OS    DR. DELGADILLO    PROSTATE SURGERY   approx    brachytherapy    SPINAL CORD STIMULATOR IMPLANT      TENS Unit     Social History     Social History    Marital status:      Spouse name: N/A    Number of children: 2    Years of education: N/A     Occupational History    retired      self employed     Social History Main Topics    Smoking status: Former Smoker     Quit date: 1993    Smokeless tobacco: Never Used    Alcohol use Yes      Comment: Champagne on     Drug use: No    Sexual activity: No     Other Topics Concern    Not on file     Social History Narrative    Patient is a [employer]. He is retired  he was self employed.     Review of patient's allergies indicates:  No Known Allergies  Current Outpatient Prescriptions   Medication Sig    albuterol 90 mcg/actuation inhaler Inhale 2 puffs into the lungs every 6 (six) hours.    alendronate (FOSAMAX) 70 MG tablet Take 1 tablet (70 mg total) by mouth every 7 days.    aspirin 81 mg Tab Take 1 tablet by mouth Daily. Over counter. To help prevent stroke/ heart attack    calcium carbonate (OS-TANGEAL) 500 mg calcium (1,250 mg) tablet Take 1 tablet by mouth once daily.    clotrimazole-betamethasone 1-0.05% (LOTRISONE) cream Apply topically 2 (two) times daily.    ferrous sulfate 325 mg (65 mg iron) Tab tablet Take 1 tablet (325 mg total) by mouth once daily.    furosemide (LASIX) 20 MG tablet 1/2 tab a day for 2-3 days only if increase swelling    gabapentin (NEURONTIN) 100 MG capsule Take 1 capsule (100 mg total) by mouth 2 (two) times daily.    insulin aspart U-100 (NOVOLOG FLEXPEN U-100 INSULIN) 100 unit/mL InPn pen sliding scale: If blood sugar is 150-200:6 units 201-250 8 units 251-300 10 units 301-350 1 units 351 or greater 14 units    insulin glargine (LANTUS SOLOSTAR U-100 INSULIN) 100 unit/mL (3 mL) InPn pen INJECT 15 UNITS SUBCUTANEOUSLY ONCE DAILY    insulin lispro (HUMALOG) 100 unit/mL injection Sliding Scale:    150-200: 6 units  201-250: 8 units  251- 300 10 unit  301-350:  12 units  351 or greater 14 units    ketoconazole (NIZORAL) 2 % cream Apply to affected area twice daily for rash in groin    lancets 33 gauge Misc 1 lancet by Misc.(Non-Drug; Combo Route) route 3 (three) times daily.    levothyroxine (SYNTHROID) 25 MCG tablet TAKE ONE TABLET BY MOUTH EVERY DAY    loratadine (CLARITIN) 10 mg tablet Take 1 tablet by mouth every morning.     metFORMIN (GLUCOPHAGE) 1000 MG tablet TAKE 1 TABLET BY MOUTH TWO TIMES A DAY    miconazole NITRATE 2 % (ZEASORB AF) 2 % top powder Use two to three times daily to prevent rash in groin    nateglinide  (STARLIX) 60 MG tablet TAKE ONE TABLET BY MOUTH THREE TIMES A DAY BEFORE MEALS    neomycin-polymyxin-dexamethasone (MAXITROL) 3.5mg/mL-10,000 unit/mL-0.1 % DrpS Place 1 drop into both eyes every 6 (six) hours.    nystatin (MYCOSTATIN) ointment APPLY TO AFFECTED AREA TWO TIMES A DAY    OXYGEN-AIR DELIVERY SYSTEMS MISC Inhale 2 L into the lungs as needed. Oxygen 2L via nasal canula as needed.    pantoprazole (PROTONIX) 40 MG tablet Take 1 tablet (40 mg total) by mouth once daily.    pravastatin (PRAVACHOL) 40 MG tablet TAKE 1 TABLET BY MOUTH ONCE DAILY    tamsulosin (FLOMAX) 0.4 mg Cp24 1 capsule PO QD    TENS unit and electrodes Cmpk 1 Device by Misc.(Non-Drug; Combo Route) route daily as needed.    tiotropium (SPIRIVA WITH HANDIHALER) 18 mcg inhalation capsule Inhale 1 capsule (18 mcg total) into the lungs once daily. Controller    triamcinolone acetonide 0.1% (KENALOG) 0.1 % cream Apply to affected area twice daily as needed for rash on legs    TRUE METRIX GLUCOSE TEST STRIP Strp Use 1 strip 3 times daily.     No current facility-administered medications for this visit.            Review of Systems   Constitutional: Negative for activity change, appetite change, chills, diaphoresis, fatigue, fever and unexpected weight change.   HENT: Negative for congestion, ear pain, postnasal drip, rhinorrhea, sinus pain, sinus pressure, sneezing, sore throat, tinnitus, trouble swallowing and voice change.    Eyes: Negative for photophobia, pain and visual disturbance.   Respiratory: Negative for cough, chest tightness, shortness of breath and wheezing.    Cardiovascular: Negative for chest pain, palpitations and leg swelling.   Gastrointestinal: Negative for abdominal distention, abdominal pain, constipation, diarrhea, nausea and vomiting.   Genitourinary: Negative for dysuria.   Musculoskeletal: Positive for joint swelling (left elbow ). Negative for arthralgias, back pain, neck pain and neck stiffness.    Allergic/Immunologic: Negative for immunocompromised state.   Neurological: Negative for dizziness, tremors, seizures, syncope, facial asymmetry, speech difficulty, weakness, light-headedness, numbness and headaches.   Hematological: Negative for adenopathy. Does not bruise/bleed easily.   Psychiatric/Behavioral: Negative for confusion and sleep disturbance.       Objective:      Physical Exam   Cardiovascular:   Pulses:       Radial pulses are 2+ on the left side.   Musculoskeletal:        Left forearm: He exhibits swelling. He exhibits no tenderness, no bony tenderness, no edema, no deformity and no laceration.   Moderate, fluctuant elbow effusion noted. No signs of infection. Nontender. No noted trauma. Full ROM, equal grasps/strength. No discoloration.    Ambulates with rolling walker    Skin: Capillary refill takes less than 2 seconds.       Assessment:     Vitals:    07/25/18 1033   BP: 138/74   Pulse: 91   Temp: 97.6 °F (36.4 °C)         1. Effusion of left elbow    2. Hypertension associated with diabetes    3. Type 2 diabetes, controlled, with neuropathy        Plan:   Effusion of left elbow  -     X-Ray Elbow Complete Left; Future; Expected date: 07/25/2018    Hypertension associated with diabetes  Continue current regime    Type 2 diabetes, controlled, with neuropathy  Continue current regime.    Xray elbow now  Ace bandage to left elbow  Rest, ice, elevation.   Pt not interested in joint aspiration at this time.  Return/call for  Likely aspiration of joint if swelling continues post 2 weeks or worsens.

## 2018-07-25 NOTE — TELEPHONE ENCOUNTER
MAMI to return call for xray results .. Inform patient xray indicates no acute bony injury. Xray demonstrates arthritic changes and obvious swelling of the elbow. Continue with treatment as discussed at todays appt. Call or return if symptoms worsen or fail to improve within the next 1-2 weeks.     b

## 2018-07-25 NOTE — PATIENT INSTRUCTIONS
Instructions for intertrigo:    · Use ketoconazole cream twice a day.    · Use miconazole powder or purchase over-the-counter Zeasorb-AF powder and use two to three times a day as needed to absorb sweat and prevent rash from worsening  · Frequently air out folds while at home (use a hand-held blow dryer set to cool)

## 2018-07-25 NOTE — TELEPHONE ENCOUNTER
----- Message from Barbie Mendez sent at 7/25/2018  1:44 PM CDT -----  Pt is returning phone call..[phone]

## 2018-07-25 NOTE — TELEPHONE ENCOUNTER
----- Message from Kennedi Wild NP sent at 7/25/2018  1:23 PM CDT -----  Inform patient xray indicates no acute bony injury. Xray demonstrates arthritic changes and obvious swelling of the elbow. Continue with treatment as discussed at todays appt. Call or return if symptoms worsen or fail to improve within the next 1-2 weeks.

## 2018-07-25 NOTE — PATIENT INSTRUCTIONS
Xray elbow now  Ace bandage to left elbow  Rest, ice, elevation.   Return for aspiration of joint if swelling continues post 2 weeks or worsens.

## 2018-07-25 NOTE — LETTER
July 25, 2018      Juanis Borja MD  [address] [facility] [place] [address]           [facility]  [address] [place] [address]  Phone: [phone]  Fax: [phone]          Patient: Good Murillo Jr.   MR Number: [id number]   YOB: 1927   Date of Visit: 7/25/2018       Dear Dr. Juanis Borja:    Thank you for referring Good Murillo to me for evaluation. Attached you will find relevant portions of my assessment and plan of care.    If you have questions, please do not hesitate to call me. I look forward to following Good Murillo along with you.    Sincerely,    Arabella Servin MD    Enclosure  CC:  No Recipients    If you would like to receive this communication electronically, please contact [email or web address] or [phone] to request more information on [facility] access.    For providers and/or their staff who would like to refer a patient to [facility], please contact us through our one-stop-shop provider referral line, [facility], at [phone].    If you feel you have received this communication in error or would no longer like to receive these types of communications, please e-mail [email or web address]

## 2018-08-01 ENCOUNTER — OFFICE VISIT (OUTPATIENT)
Dept: INTERNAL MEDICINE | Facility: CLINIC | Age: 83
End: 2018-08-01
Payer: MEDICARE

## 2018-08-01 VITALS
SYSTOLIC BLOOD PRESSURE: 162 MMHG | BODY MASS INDEX: 22.86 KG/M2 | HEART RATE: 96 BPM | TEMPERATURE: 98 F | DIASTOLIC BLOOD PRESSURE: 84 MMHG | WEIGHT: 150.81 LBS | HEIGHT: 68 IN

## 2018-08-01 DIAGNOSIS — E11.40 TYPE 2 DIABETES, CONTROLLED, WITH NEUROPATHY: Primary | Chronic | ICD-10-CM

## 2018-08-01 DIAGNOSIS — I15.2 HYPERTENSION ASSOCIATED WITH DIABETES: Chronic | ICD-10-CM

## 2018-08-01 DIAGNOSIS — E11.59 HYPERTENSION ASSOCIATED WITH DIABETES: Chronic | ICD-10-CM

## 2018-08-01 DIAGNOSIS — J43.9 PULMONARY EMPHYSEMA, UNSPECIFIED EMPHYSEMA TYPE: Chronic | ICD-10-CM

## 2018-08-01 PROCEDURE — 99214 OFFICE O/P EST MOD 30 MIN: CPT | Mod: S$GLB,,, | Performed by: FAMILY MEDICINE

## 2018-08-01 PROCEDURE — 99999 PR PBB SHADOW E&M-EST. PATIENT-LVL III: CPT | Mod: PBBFAC,,, | Performed by: FAMILY MEDICINE

## 2018-08-01 NOTE — PROGRESS NOTES
Subjective:       Patient ID: Good Murillo Jr. is a 89 y.o. male.    Chief Complaint: A1C    F/U:      Pt is a 89 year old who has A1C has been increasing. Pt is on lantus, metformin and starlix. Pt reports having to use 6 extra units of insulin on top of the 15 units of lantus.       Review of Systems   Constitutional: Negative.    Respiratory: Negative.    Gastrointestinal: Negative.    Genitourinary: Negative.    Musculoskeletal: Negative.    Neurological: Negative.    Hematological: Negative.    Psychiatric/Behavioral: Negative.        Objective:      Physical Exam   Constitutional: He is oriented to person, place, and time. He appears well-developed and well-nourished.   Cardiovascular: Normal rate and regular rhythm.    Pulmonary/Chest: Effort normal and breath sounds normal.   Abdominal: Soft. Bowel sounds are normal.   Neurological: He is alert and oriented to person, place, and time.   Skin: Skin is warm and dry.   Psychiatric: He has a normal mood and affect. His behavior is normal.       Assessment:       1. Type 2 diabetes, controlled, with neuropathy    2. Pulmonary emphysema, unspecified emphysema type    3. Hypertension associated with diabetes        Plan:       Type 2 diabetes, controlled, with neuropathy  Comments:  A1C was 9. increase lantus for 15 to 20. Call back sugars in 2 weeks with lowest and highest    Pulmonary emphysema, unspecified emphysema type  Comments:  Stable at this time    Hypertension associated with diabetes  Comments:  BP is high

## 2018-08-03 ENCOUNTER — TELEPHONE (OUTPATIENT)
Dept: INTERNAL MEDICINE | Facility: CLINIC | Age: 83
End: 2018-08-03

## 2018-08-03 ENCOUNTER — PATIENT MESSAGE (OUTPATIENT)
Dept: INTERNAL MEDICINE | Facility: CLINIC | Age: 83
End: 2018-08-03

## 2018-08-03 RX ORDER — INSULIN GLARGINE 100 [IU]/ML
INJECTION, SOLUTION SUBCUTANEOUS
Qty: 15 ML | Refills: 3 | Status: SHIPPED | OUTPATIENT
Start: 2018-08-03 | End: 2018-09-26 | Stop reason: SDUPTHER

## 2018-08-03 NOTE — TELEPHONE ENCOUNTER
----- Message from Nona Gacria sent at 8/3/2018  9:41 AM CDT -----  Contact: [facility]  Requesting a call back to discuss a dose increase on rx medication lantus. Please call back at [phone].    Pt uses:  [facility] - [address] [address]  Phone: [phone] Fax: [phone]    Thanks,  Nona Garcia

## 2018-08-20 RX ORDER — ALENDRONATE SODIUM 70 MG/1
TABLET ORAL
Qty: 12 TABLET | Refills: 3 | Status: SHIPPED | OUTPATIENT
Start: 2018-08-20 | End: 2019-07-16 | Stop reason: SDUPTHER

## 2018-08-20 RX ORDER — METFORMIN HYDROCHLORIDE 1000 MG/1
TABLET ORAL
Qty: 180 TABLET | Refills: 3 | Status: SHIPPED | OUTPATIENT
Start: 2018-08-20 | End: 2019-08-14 | Stop reason: SDUPTHER

## 2018-08-31 ENCOUNTER — OFFICE VISIT (OUTPATIENT)
Dept: INTERNAL MEDICINE | Facility: CLINIC | Age: 83
End: 2018-08-31
Payer: MEDICARE

## 2018-08-31 VITALS
BODY MASS INDEX: 22.95 KG/M2 | HEART RATE: 94 BPM | SYSTOLIC BLOOD PRESSURE: 112 MMHG | HEIGHT: 68 IN | TEMPERATURE: 99 F | OXYGEN SATURATION: 92 % | WEIGHT: 151.44 LBS | DIASTOLIC BLOOD PRESSURE: 70 MMHG

## 2018-08-31 DIAGNOSIS — I77.9 BILATERAL CAROTID ARTERY DISEASE: Chronic | ICD-10-CM

## 2018-08-31 DIAGNOSIS — Z85.46 HISTORY OF PROSTATE CANCER: ICD-10-CM

## 2018-08-31 DIAGNOSIS — Z96.1 PSEUDOPHAKIA OF BOTH EYES: ICD-10-CM

## 2018-08-31 DIAGNOSIS — H40.013 OPEN ANGLE WITH BORDERLINE FINDINGS, LOW RISK, BILATERAL: ICD-10-CM

## 2018-08-31 DIAGNOSIS — H52.7 REFRACTIVE ERROR: ICD-10-CM

## 2018-08-31 DIAGNOSIS — D64.9 NORMOCYTIC ANEMIA: ICD-10-CM

## 2018-08-31 DIAGNOSIS — I15.2 HYPERTENSION ASSOCIATED WITH DIABETES: Chronic | ICD-10-CM

## 2018-08-31 DIAGNOSIS — E11.59 HYPERTENSION ASSOCIATED WITH DIABETES: Chronic | ICD-10-CM

## 2018-08-31 DIAGNOSIS — E78.5 HYPERLIPIDEMIA ASSOCIATED WITH TYPE 2 DIABETES MELLITUS: Chronic | ICD-10-CM

## 2018-08-31 DIAGNOSIS — M81.0 AGE-RELATED OSTEOPOROSIS WITHOUT CURRENT PATHOLOGICAL FRACTURE: Chronic | ICD-10-CM

## 2018-08-31 DIAGNOSIS — R32 URINARY INCONTINENCE, UNSPECIFIED TYPE: Chronic | ICD-10-CM

## 2018-08-31 DIAGNOSIS — D50.9 IRON DEFICIENCY ANEMIA, UNSPECIFIED IRON DEFICIENCY ANEMIA TYPE: Chronic | ICD-10-CM

## 2018-08-31 DIAGNOSIS — Z78.9 SELF-CATHETERIZES URINARY BLADDER: Chronic | ICD-10-CM

## 2018-08-31 DIAGNOSIS — I73.9 PVD (PERIPHERAL VASCULAR DISEASE): Chronic | ICD-10-CM

## 2018-08-31 DIAGNOSIS — I27.20 PULMONARY HYPERTENSION: ICD-10-CM

## 2018-08-31 DIAGNOSIS — J43.9 PULMONARY EMPHYSEMA, UNSPECIFIED EMPHYSEMA TYPE: Chronic | ICD-10-CM

## 2018-08-31 DIAGNOSIS — K57.90 DIVERTICULOSIS OF INTESTINE WITHOUT BLEEDING, UNSPECIFIED INTESTINAL TRACT LOCATION: Chronic | ICD-10-CM

## 2018-08-31 DIAGNOSIS — I70.203 ATHEROSCLEROSIS OF NATIVE ARTERY OF BOTH LOWER EXTREMITIES, WITH UNSPECIFIED PRESENCE OF CLINICAL MANIFESTATION: ICD-10-CM

## 2018-08-31 DIAGNOSIS — I71.40 ABDOMINAL AORTIC ANEURYSM (AAA) WITHOUT RUPTURE: ICD-10-CM

## 2018-08-31 DIAGNOSIS — Z79.4 TYPE 2 DIABETES MELLITUS WITH DIABETIC POLYNEUROPATHY, WITH LONG-TERM CURRENT USE OF INSULIN: ICD-10-CM

## 2018-08-31 DIAGNOSIS — E11.42 TYPE 2 DIABETES MELLITUS WITH DIABETIC POLYNEUROPATHY, WITH LONG-TERM CURRENT USE OF INSULIN: ICD-10-CM

## 2018-08-31 DIAGNOSIS — R63.4 WEIGHT LOSS, NON-INTENTIONAL: ICD-10-CM

## 2018-08-31 DIAGNOSIS — M54.50 LUMBAR PAIN: ICD-10-CM

## 2018-08-31 DIAGNOSIS — E03.4 HYPOTHYROIDISM DUE TO ACQUIRED ATROPHY OF THYROID: Chronic | ICD-10-CM

## 2018-08-31 DIAGNOSIS — Z00.00 ENCOUNTER FOR PREVENTIVE HEALTH EXAMINATION: Primary | ICD-10-CM

## 2018-08-31 DIAGNOSIS — E53.8 VITAMIN B12 DEFICIENCY: ICD-10-CM

## 2018-08-31 DIAGNOSIS — I70.0 CALCIFICATION OF AORTA: Chronic | ICD-10-CM

## 2018-08-31 DIAGNOSIS — E11.69 HYPERLIPIDEMIA ASSOCIATED WITH TYPE 2 DIABETES MELLITUS: Chronic | ICD-10-CM

## 2018-08-31 PROBLEM — B37.2 YEAST DERMATITIS: Status: RESOLVED | Noted: 2018-07-22 | Resolved: 2018-08-31

## 2018-08-31 PROCEDURE — 99499 UNLISTED E&M SERVICE: CPT | Mod: HCNC,S$GLB,, | Performed by: PHYSICIAN ASSISTANT

## 2018-08-31 PROCEDURE — 99999 PR PBB SHADOW E&M-EST. PATIENT-LVL V: CPT | Mod: PBBFAC,,, | Performed by: PHYSICIAN ASSISTANT

## 2018-08-31 PROCEDURE — G0439 PPPS, SUBSEQ VISIT: HCPCS | Mod: ,,, | Performed by: PHYSICIAN ASSISTANT

## 2018-08-31 NOTE — Clinical Note
HRA completed today. Pt reports worsening shortness of breath. HRA indicates he is due for shingles vaccine. Advised him to discuss this with his PCP.

## 2018-08-31 NOTE — PROGRESS NOTES
"Good Murillo presented for a  Medicare AWV and comprehensive Health Risk Assessment today. The following components were reviewed and updated:    · Medical history  · Family History  · Social history  · Allergies and Current Medications  · Health Risk Assessment  · Health Maintenance  · Care Team     ** See Completed Assessments for Annual Wellness Visit within the encounter summary.**       The following assessments were completed:  · Living Situation  · CAGE  · Depression Screening  · Timed Get Up and Go  · Whisper Test  · Cognitive Function Screening  · Nutrition Screening  · ADL Screening  · PAQ Screening    Vitals:    08/31/18 1352   BP: 112/70   BP Location: Right arm   Patient Position: Sitting   Pulse: 94   Temp: 98.9 °F (37.2 °C)   TempSrc: Tympanic   SpO2: (!) 92%   Weight: 68.7 kg (151 lb 7.3 oz)   Height: 5' 8" (1.727 m)     Body mass index is 23.03 kg/m².  Physical Exam   Constitutional: He is oriented to person, place, and time. He appears well-developed and well-nourished. No distress.   Cardiovascular: Normal rate, regular rhythm and normal heart sounds. Exam reveals no gallop and no friction rub.   No murmur heard.  Pulmonary/Chest: Effort normal and breath sounds normal. No stridor. No respiratory distress. He has no wheezes.   Musculoskeletal: Normal range of motion.   Neurological: He is alert and oriented to person, place, and time.   Skin: Skin is warm. No rash noted. He is not diaphoretic.   Psychiatric: He has a normal mood and affect. His behavior is normal. Judgment and thought content normal.   Nursing note and vitals reviewed.        Diagnoses and health risks identified today and associated recommendations/orders:    1. Encounter for preventive health examination  -completed today.   -patient due for shingles vaccine. Advised to discuss with his PCP. Will schedule follow up.     2. Weight loss, non-intentional - due to esophageal stricture  -Stable and controlled. Weight has been on the " rise, has gained 6 lbs in the past 3 months. BMI is within normal limits. Does reports some trouble swallowing though. Continue current treatment plan as previously prescribed with your PCP.     3. Vitamin B12 deficiency  -Stable and controlled on injections and OTC medications. Continue current treatment plan as previously prescribed with your PCP.     4. Urinary incontinence, unspecified type  -recently seen an external urologist. Pt does not remember the name or clinic of the urologist. Advised pt to bring paper work to his follow up appointment.     5. Type 2 diabetes mellitus with diabetic polyneuropathy, with long-term current use of insulin  Lab Results   Component Value Date    HGBA1C 9.6 (H) 07/25/2018   -not controlled. Will schedule follow up with PCP.     6. Self-catheterizes urinary bladder  -self catheterizes TID. Denies any s/s of infection.  -recently seen an external urologist. Pt does not remember the name or clinic of the urologist. Advised pt to bring paper work to his follow up appointment.     7. Refractive error  -Stable and controlled. Continue current treatment plan as previously prescribed with your ophthalmologist, Dr. Morris     8. PVD (peripheral vascular disease)  -ARABELLA 2/1/2017: The right ankle brachial index was 0.86 which suggests mild right lower extremity arterial disease.   The left ankle brachial index was 0.92 which suggests minimal left lower extremity arterial disease.   The right TBI is 0.54.   The left TBI is 0.40.   --Stable and controlled. Continue current treatment plan as previously prescribed with your cardiologist    9. Pulmonary hypertension  2D Echo 5/31/2016: CONCLUSIONS     1 - Normal left ventricular systolic function (EF 55-60%).     2 - Normal left ventricular diastolic function.     3 - Normal right ventricular systolic function .     4 - Pulmonary hypertension. The estimated PA systolic pressure is 45 mmHg.   -Continue current treatment plan as previously  prescribed with your cardiologist.     10. Pseudophakia of both eyes  -Stable and controlled. Continue current treatment plan as previously prescribed with your ophthalmologist, Dr. Morris     11. Age-related osteoporosis without current pathological fracture  -Stable on fosamax. Continue current treatment plan as previously prescribed with your PCP.   -DEXA 12/15/2016. Repeat in December 2018.     12. Open angle with borderline findings, low risk, bilateral  -Stable and controlled. Continue current treatment plan as previously prescribed with your ophthalmologist, Dr. Morris.     13. Normocytic anemia  Lab Results   Component Value Date    WBC 9.23 08/04/2017    HGB 9.8 (L) 08/04/2017    HCT 30.4 (L) 08/04/2017    MCV 90 08/04/2017     (H) 08/04/2017     -Stable and controlled. Continue current treatment plan as previously prescribed with your PCP.     14. Lumbar pain  -Stable and controlled on gabapentin and tens unit. Continue current treatment plan as previously prescribed with your PCP.     15. Iron deficiency anemia, unspecified iron deficiency anemia type  Lab Results   Component Value Date    IRON 38 (L) 08/04/2017    TIBC 423 08/04/2017    FERRITIN 28 08/04/2017   --Stable and controlled. Continue current treatment plan as previously prescribed with your PCP.     16. Hypothyroidism due to acquired atrophy of thyroid  Lab Results   Component Value Date    TSH 1.673 01/24/2018       17. Hypertension associated with diabetes  -Stable and controlled. Continue current treatment plan as previously prescribed with your PCP.     18. Hyperlipidemia associated with type 2 diabetes mellitus  Lab Results   Component Value Date    CHOL 159 01/24/2018    CHOL 159 05/08/2017    CHOL 199 03/15/2017     Lab Results   Component Value Date    HDL 46 01/24/2018    HDL 40 05/08/2017    HDL 41 03/15/2017     Lab Results   Component Value Date    LDLCALC 84.2 01/24/2018    LDLCALC 97.6 05/08/2017    LDLCALC 136.2  03/15/2017     Lab Results   Component Value Date    TRIG 144 01/24/2018    TRIG 107 05/08/2017    TRIG 109 03/15/2017     Lab Results   Component Value Date    CHOLHDL 28.9 01/24/2018    CHOLHDL 25.2 05/08/2017    CHOLHDL 20.6 03/15/2017       19. History of prostate cancer  -recently seen an external urologist. Pt does not remember the name or clinic of the urologist. Advised pt to bring paper work to his follow up appointment.     20. Diverticulosis of intestine without bleeding, unspecified intestinal tract location  -Stable and controlled. No recent flares of diverticulitis. Continue current treatment plan as previously prescribed with your PCP.   -noted on CT chest 12/14/2016    21. Pulmonary emphysema, unspecified emphysema type  -has noted some worsening of his shortness of breath. Will get him scheduled for a follow up with his PCP to discuss.   -has seen pulmonology, Dr. Dean 5/16/2018.     22. Calcification of aorta  -CXR 3/7/2018, CT chest 12/4/2016  -Stable and controlled. Currently on daily low dose aspirin. Continue current treatment plan as previously prescribed with your PCP.     23. Bilateral carotid artery disease  -Carotid ultrasound 2/1/2017.   -Stable and controlled. Continue current treatment plan as previously prescribed with your PCP.     24. Atherosclerosis of native artery of both lower extremities, with unspecified presence of clinical manifestation  -ARABELLA 2/1/2017  -Stable and controlled. Continue current treatment plan as previously prescribed with your cardiologist.    25. Abdominal aortic aneurysm (AAA) without rupture  -Ultrasound abdominal aorta 10/26/2017:  Fusiform infrarenal abdominal aortic aneurysm measuring 3.4 x 3.6 cm  -Continue current treatment plan as previously prescribed with your cardiologist.    Provided Good with a 5-10 year written screening schedule and personal prevention plan. Recommendations were developed using the USPSTF age appropriate recommendations.  Education, counseling, and referrals were provided as needed. After Visit Summary printed and given to patient which includes a list of additional screenings\tests needed.    Follow-up if symptoms worsen or fail to improve.    Christiane Ag PA-C

## 2018-08-31 NOTE — PATIENT INSTRUCTIONS
Counseling and Referral of Other Preventative  (Italic type indicates deductible and co-insurance are waived)    Patient Name: Good Murillo  Today's Date: 8/31/2018    Health Maintenance       Date Due Completion Date    TETANUS VACCINE 07/19/1945 ---    Zoster Vaccine 07/19/1987 ---    Influenza Vaccine 08/01/2018 10/5/2017    Override on 10/11/2016: Done (nonoch)    Foot Exam 11/22/2018 11/22/2017    Override on 12/16/2016: Done    Override on 10/19/2016: Done    Override on 9/14/2016: Done    Override on 5/13/2016: Done    Override on 2/9/2016: Done    Override on 11/5/2015: Done    Override on 2/12/2015: Done    DEXA SCAN 12/15/2018 12/15/2016    Lipid Panel 01/24/2019 1/24/2018    Hemoglobin A1c 01/25/2019 7/25/2018    Eye Exam 02/08/2019 2/8/2018    Override on 2/8/2018: Done    Override on 1/12/2017: Done    Override on 6/7/2012: Done    Urine Microalbumin 05/10/2019 5/10/2018          No orders of the defined types were placed in this encounter.    The following information is provided to all patients.  This information is to help you find resources for any of the problems found today that may be affecting your health:                Living healthy guide: www.[facility].[place].gov      Understanding Diabetes: www.diabetes.org      Eating healthy: www.cdc.gov/healthyweight      CDC home safety checklist: www.cdc.gov/steadi/patient.html      Agency on Aging: www.goea.[place].gov      Alcoholics anonymous (AA): www.aa.org      Physical Activity: www.ana.nih.gov/ck7ipoq      Tobacco use: www.quitwithusla.org

## 2018-09-06 ENCOUNTER — LAB VISIT (OUTPATIENT)
Dept: LAB | Facility: HOSPITAL | Age: 83
End: 2018-09-06
Attending: FAMILY MEDICINE
Payer: MEDICARE

## 2018-09-06 ENCOUNTER — OFFICE VISIT (OUTPATIENT)
Dept: INTERNAL MEDICINE | Facility: CLINIC | Age: 83
End: 2018-09-06
Payer: MEDICARE

## 2018-09-06 VITALS
TEMPERATURE: 98 F | HEIGHT: 68 IN | DIASTOLIC BLOOD PRESSURE: 60 MMHG | HEART RATE: 91 BPM | BODY MASS INDEX: 23.32 KG/M2 | WEIGHT: 153.88 LBS | SYSTOLIC BLOOD PRESSURE: 112 MMHG

## 2018-09-06 DIAGNOSIS — E11.42 TYPE 2 DIABETES MELLITUS WITH DIABETIC POLYNEUROPATHY, WITH LONG-TERM CURRENT USE OF INSULIN: ICD-10-CM

## 2018-09-06 DIAGNOSIS — E53.8 VITAMIN B12 DEFICIENCY: ICD-10-CM

## 2018-09-06 DIAGNOSIS — J43.9 PULMONARY EMPHYSEMA, UNSPECIFIED EMPHYSEMA TYPE: Chronic | ICD-10-CM

## 2018-09-06 DIAGNOSIS — E11.42 TYPE 2 DIABETES MELLITUS WITH DIABETIC POLYNEUROPATHY, WITH LONG-TERM CURRENT USE OF INSULIN: Primary | ICD-10-CM

## 2018-09-06 DIAGNOSIS — Z79.4 TYPE 2 DIABETES MELLITUS WITH DIABETIC POLYNEUROPATHY, WITH LONG-TERM CURRENT USE OF INSULIN: ICD-10-CM

## 2018-09-06 DIAGNOSIS — Z79.4 TYPE 2 DIABETES MELLITUS WITH DIABETIC POLYNEUROPATHY, WITH LONG-TERM CURRENT USE OF INSULIN: Primary | ICD-10-CM

## 2018-09-06 LAB
ESTIMATED AVG GLUCOSE: 206 MG/DL
HBA1C MFR BLD HPLC: 8.8 %
VIT B12 SERPL-MCNC: 346 PG/ML

## 2018-09-06 PROCEDURE — 99213 OFFICE O/P EST LOW 20 MIN: CPT | Mod: PBBFAC,25,PO | Performed by: FAMILY MEDICINE

## 2018-09-06 PROCEDURE — 99214 OFFICE O/P EST MOD 30 MIN: CPT | Mod: S$PBB,,, | Performed by: FAMILY MEDICINE

## 2018-09-06 PROCEDURE — 99999 PR PBB SHADOW E&M-EST. PATIENT-LVL III: CPT | Mod: PBBFAC,,, | Performed by: FAMILY MEDICINE

## 2018-09-06 PROCEDURE — 1101F PT FALLS ASSESS-DOCD LE1/YR: CPT | Mod: CPTII,,, | Performed by: FAMILY MEDICINE

## 2018-09-06 PROCEDURE — 82607 VITAMIN B-12: CPT

## 2018-09-06 PROCEDURE — 96372 THER/PROPH/DIAG INJ SC/IM: CPT | Mod: PBBFAC,PO

## 2018-09-06 PROCEDURE — 36415 COLL VENOUS BLD VENIPUNCTURE: CPT | Mod: PO

## 2018-09-06 PROCEDURE — 83036 HEMOGLOBIN GLYCOSYLATED A1C: CPT

## 2018-09-06 RX ORDER — CYANOCOBALAMIN 1000 UG/ML
1000 INJECTION, SOLUTION INTRAMUSCULAR; SUBCUTANEOUS ONCE
Status: COMPLETED | OUTPATIENT
Start: 2018-09-06 | End: 2018-09-06

## 2018-09-06 RX ADMIN — CYANOCOBALAMIN 1000 MCG: 1000 INJECTION, SOLUTION INTRAMUSCULAR at 11:09

## 2018-09-06 NOTE — PROGRESS NOTES
Subjective:       Patient ID: Good Murillo Jr. is a 89 y.o. male.    Chief Complaint: Follow-up and B12 Injection    F/U:      Pt is a 89 year old last A1C was 9.1. Pt is still getting sugars in the 250s. Pt is stable on other chronic medical conditions. Not doing any other general medical screens      Review of Systems   Constitutional: Negative.    Respiratory: Negative.    Genitourinary: Negative.    Musculoskeletal: Negative.    Neurological: Negative.        Objective:      Physical Exam   Constitutional: He is oriented to person, place, and time. He appears well-developed and well-nourished.   Cardiovascular: Normal rate and regular rhythm. Exam reveals no friction rub.   No murmur heard.  Pulmonary/Chest: Effort normal and breath sounds normal. No stridor.   Abdominal: Soft. Bowel sounds are normal.   Neurological: He is alert and oriented to person, place, and time.   Skin: Skin is warm and dry.       Assessment:       1. Type 2 diabetes mellitus with diabetic polyneuropathy, with long-term current use of insulin    2. Vitamin B12 deficiency    3. Pulmonary emphysema, unspecified emphysema type        Plan:       Type 2 diabetes mellitus with diabetic polyneuropathy, with long-term current use of insulin  Comments:  Increase to 25 units on the lantus  Orders:  -     Hemoglobin A1c; Future; Expected date: 11/01/2018    Vitamin B12 deficiency  Comments:  Will get a b12 level and do a shot. today  Orders:  -     Vitamin B12; Future; Expected date: 09/06/2018  -     cyanocobalamin injection 1,000 mcg; Inject 1 mL (1,000 mcg total) into the muscle once.    Pulmonary emphysema, unspecified emphysema type  Comments:  Pt is controlled

## 2018-09-21 ENCOUNTER — PATIENT MESSAGE (OUTPATIENT)
Dept: INTERNAL MEDICINE | Facility: CLINIC | Age: 83
End: 2018-09-21

## 2018-09-26 ENCOUNTER — PATIENT MESSAGE (OUTPATIENT)
Dept: INTERNAL MEDICINE | Facility: CLINIC | Age: 83
End: 2018-09-26

## 2018-09-26 RX ORDER — INSULIN GLARGINE 100 [IU]/ML
INJECTION, SOLUTION SUBCUTANEOUS
Qty: 15 ML | Refills: 3 | Status: SHIPPED | OUTPATIENT
Start: 2018-09-26 | End: 2018-09-27 | Stop reason: SDUPTHER

## 2018-09-27 RX ORDER — INSULIN GLARGINE 100 [IU]/ML
INJECTION, SOLUTION SUBCUTANEOUS
Qty: 15 ML | Refills: 3 | Status: SHIPPED | OUTPATIENT
Start: 2018-09-27 | End: 2019-05-16 | Stop reason: SDUPTHER

## 2018-10-01 ENCOUNTER — PATIENT MESSAGE (OUTPATIENT)
Dept: INTERNAL MEDICINE | Facility: CLINIC | Age: 83
End: 2018-10-01

## 2018-10-01 RX ORDER — INSULIN ASPART 100 [IU]/ML
INJECTION, SOLUTION INTRAVENOUS; SUBCUTANEOUS
Qty: 15 ML | Refills: 10 | Status: SHIPPED | OUTPATIENT
Start: 2018-10-01 | End: 2019-09-14 | Stop reason: SDUPTHER

## 2018-10-02 RX ORDER — PEN NEEDLE, DIABETIC 30 GX3/16"
NEEDLE, DISPOSABLE MISCELLANEOUS
Status: CANCELLED | OUTPATIENT
Start: 2018-10-02

## 2018-10-04 ENCOUNTER — TELEPHONE (OUTPATIENT)
Dept: INTERNAL MEDICINE | Facility: CLINIC | Age: 83
End: 2018-10-04

## 2018-10-04 NOTE — TELEPHONE ENCOUNTER
Returned call to Noreen with [facility]. Prescription for Novolog verified with her and informed that it was based on a sliding scale with a max of 3 times a day.

## 2018-10-04 NOTE — TELEPHONE ENCOUNTER
----- Message from Nona Garcia sent at 10/4/2018  1:53 PM CDT -----  Contact: [facility]  Requesting a call back regarding clarification on rx medication Novalog.Please call back at [phone].      Pt uses  [facility] [address]  [address] [facility] [address] [facility] [address] [address]  Phone: [phone] Fax: [phone]    Thanks,  Nona Garcia

## 2018-10-28 ENCOUNTER — PATIENT MESSAGE (OUTPATIENT)
Dept: PULMONOLOGY | Facility: CLINIC | Age: 83
End: 2018-10-28

## 2018-10-29 ENCOUNTER — TELEPHONE (OUTPATIENT)
Dept: PULMONOLOGY | Facility: CLINIC | Age: 83
End: 2018-10-29

## 2018-10-29 DIAGNOSIS — R06.02 SOB (SHORTNESS OF BREATH): Primary | ICD-10-CM

## 2018-10-30 ENCOUNTER — OFFICE VISIT (OUTPATIENT)
Dept: PULMONOLOGY | Facility: CLINIC | Age: 83
End: 2018-10-30
Payer: MEDICARE

## 2018-10-30 VITALS
BODY MASS INDEX: 23.82 KG/M2 | WEIGHT: 157.19 LBS | OXYGEN SATURATION: 91 % | SYSTOLIC BLOOD PRESSURE: 120 MMHG | HEART RATE: 102 BPM | HEIGHT: 68 IN | RESPIRATION RATE: 18 BRPM | DIASTOLIC BLOOD PRESSURE: 62 MMHG

## 2018-10-30 DIAGNOSIS — R09.02 EXERCISE HYPOXEMIA: Primary | ICD-10-CM

## 2018-10-30 DIAGNOSIS — J43.1 PANLOBULAR EMPHYSEMA: ICD-10-CM

## 2018-10-30 DIAGNOSIS — J44.1 COPD EXACERBATION: ICD-10-CM

## 2018-10-30 DIAGNOSIS — J44.9 CHRONIC OBSTRUCTIVE PULMONARY DISEASE, UNSPECIFIED COPD TYPE: ICD-10-CM

## 2018-10-30 PROCEDURE — 99214 OFFICE O/P EST MOD 30 MIN: CPT | Mod: 25,HCNC,S$GLB, | Performed by: INTERNAL MEDICINE

## 2018-10-30 PROCEDURE — 99999 PR PBB SHADOW E&M-EST. PATIENT-LVL V: CPT | Mod: PBBFAC,HCNC,, | Performed by: INTERNAL MEDICINE

## 2018-10-30 PROCEDURE — 99499 UNLISTED E&M SERVICE: CPT | Mod: HCNC,S$GLB,, | Performed by: INTERNAL MEDICINE

## 2018-10-30 PROCEDURE — 1101F PT FALLS ASSESS-DOCD LE1/YR: CPT | Mod: CPTII,HCNC,S$GLB, | Performed by: INTERNAL MEDICINE

## 2018-10-30 PROCEDURE — 94640 AIRWAY INHALATION TREATMENT: CPT | Mod: HCNC,S$GLB,, | Performed by: INTERNAL MEDICINE

## 2018-10-30 RX ORDER — AZITHROMYCIN 250 MG/1
250 TABLET, FILM COATED ORAL DAILY
Qty: 6 TABLET | Refills: 1 | Status: SHIPPED | OUTPATIENT
Start: 2018-10-30 | End: 2019-02-01

## 2018-10-30 RX ORDER — ALBUTEROL SULFATE 0.83 MG/ML
2.5 SOLUTION RESPIRATORY (INHALATION) EVERY 4 HOURS PRN
Qty: 360 ML | Refills: 11 | Status: SHIPPED | OUTPATIENT
Start: 2018-10-30 | End: 2019-10-30

## 2018-10-30 RX ORDER — ALBUTEROL SULFATE 0.83 MG/ML
2.5 SOLUTION RESPIRATORY (INHALATION)
Status: COMPLETED | OUTPATIENT
Start: 2018-10-30 | End: 2018-10-30

## 2018-10-30 RX ORDER — TIOTROPIUM BROMIDE 18 UG/1
18 CAPSULE ORAL; RESPIRATORY (INHALATION) DAILY
Qty: 90 CAPSULE | Refills: 4 | Status: SHIPPED | OUTPATIENT
Start: 2018-10-30 | End: 2019-05-29 | Stop reason: ALTCHOICE

## 2018-10-30 RX ADMIN — ALBUTEROL SULFATE 2.5 MG: 0.83 SOLUTION RESPIRATORY (INHALATION) at 10:10

## 2018-10-30 NOTE — PATIENT INSTRUCTIONS
Azithromycin tablets  What is this medicine?  AZITHROMYCIN (az ith maria m MYE sin) is a macrolide antibiotic. It is used to treat or prevent certain kinds of bacterial infections. It will not work for colds, flu, or other viral infections.  How should I use this medicine?  Take this medicine by mouth with a full glass of water. Follow the directions on the prescription label. The tablets can be taken with food or on an empty stomach. If the medicine upsets your stomach, take it with food. Take your medicine at regular intervals. Do not take your medicine more often than directed. Take all of your medicine as directed even if you think your are better. Do not skip doses or stop your medicine early. Talk to your pediatrician regarding the use of this medicine in children. Special care may be needed.  What side effects may I notice from receiving this medicine?  Side effects that you should report to your doctor or health care professional as soon as possible:  · allergic reactions like skin rash, itching or hives, swelling of the face, lips, or tongue  · confusion, nightmares or hallucinations  · dark urine  · difficulty breathing  · hearing loss  · irregular heartbeat or chest pain  · pain or difficulty passing urine  · redness, blistering, peeling or loosening of the skin, including inside the mouth  · white patches or sores in the mouth  · yellowing of the eyes or skin  Side effects that usually do not require medical attention (report to your doctor or health care professional if they continue or are bothersome):  · diarrhea  · dizziness, drowsiness  · headache  · stomach upset or vomiting  · tooth discoloration  · vaginal irritation  What may interact with this medicine?  Do not take this medicine with any of the following medications:  · lincomycin  This medicine may also interact with the following medications:  · amiodarone  · antacids  · birth control  pills  · cyclosporine  · digoxin  · magnesium  · nelfinavir  · phenytoin  · warfarin  What if I miss a dose?  If you miss a dose, take it as soon as you can. If it is almost time for your next dose, take only that dose. Do not take double or extra doses.  Where should I keep my medicine?  Keep out of the reach of children.  Store at room temperature between 15 and 30 degrees C (59 and 86 degrees F). Throw away any unused medicine after the expiration date.  What should I tell my health care provider before I take this medicine?  They need to know if you have any of these conditions:  · kidney disease  · liver disease  · irregular heartbeat or heart disease  · an unusual or allergic reaction to azithromycin, erythromycin, other macrolide antibiotics, foods, dyes, or preservatives  · pregnant or trying to get pregnant  · breast-feeding  What should I watch for while using this medicine?  Tell your doctor or health care professional if your symptoms do not improve.  Do not treat diarrhea with over the counter products. Contact your doctor if you have diarrhea that lasts more than 2 days or if it is severe and watery.  This medicine can make you more sensitive to the sun. Keep out of the sun. If you cannot avoid being in the sun, wear protective clothing and use sunscreen. Do not use sun lamps or tanning beds/booths.  NOTE:This sheet is a summary. It may not cover all possible information. If you have questions about this medicine, talk to your doctor, pharmacist, or health care provider. Copyright© 2017 [employer]        Step-by-Step  Using a Nebulizer with a Mouthpiece    Date Last Reviewed: 5/29/2015  © 6947-1558 [employer]. [address] [place] [address], [place] [address]. All rights reserved. This information is not intended as a substitute for professional medical care. Always follow your healthcare professional's instructions.        Step-by-Step  Using a Nebulizer     11 steps in using a nebulizer  with a face mask     Date Last Reviewed: 3/1/2017  © 8964-0573 [employer]. [address] [place] [address]. All rights reserved. This information is not intended as a substitute for professional medical care. Always follow your healthcare professional's instructions.        Using a Nebulizer (Adult)    A nebulizer turns medicine into a mist. You breathe the mist in through a mask or a mouthpiece. To use your nebulizer, follow the steps below.  With a mask  · Put the correct dose of medicine in the cup. Attach the top as directed.  · Connect one end of the tubing to the cup and the other end to the nebulizer.  · Attach the mask to the cup.  · Place the mask over your nose and mouth. Make sure it fits securely and comfortably.  · Turn on the nebulizer.  · Take slow, deep breaths, keeping the nebulizer upright, until all the medicine is gone. This takes 10-15 minutes.  Be sure to follow directions you are given for cleaning the nebulizer and the mask.   With a mouthpiece    · Put the correct dose of medicine in the cup. Attach the top as directed.  · Connect one end of the tubing to the cup and the other end to the nebulizer.  · Attach the mouthpiece to the cup.  · Put the mouthpiece between your teeth and close your lips around it. Keep your tongue below the mouthpiece.  · Turn on the nebulizer.  · Take slow, deep breaths through the mouthpiece, keeping the nebulizer upright, until all the medicine is gone. This takes 10-15 minutes.  Be sure to follow directions you are given for cleaning the nebulizer and the mouthpiece.   Date Last Reviewed: 10/1/2016  © 4298-1245 [employer]. [address] [place] [address]. All rights reserved. This information is not intended as a substitute for professional medical care. Always follow your healthcare professional's instructions.

## 2018-10-30 NOTE — PROGRESS NOTES
Subjective:       Patient ID: Good Murillo Jr. is a 89 y.o. male.    Chief Complaint: COPD    HPI COPD  He presents for evaluation and treatment of COPD. The patient is currently having symptoms / an exacerbation. Current symptoms include acute dyspnea, chronic dyspnea and cough productive of white sputum in small amounts. Symptoms have been present since several weeks ago and have been gradually worsening. He denies chills and fever. Associated symptoms include fatigue, poor exercise tolerance, shortness of breath and weakness.  This episode appears to have been triggered by no identifiable factor. Treatments tried for the current exacerbation: albuterol nebulizer. The patient has been having similar episodes for approximately 10 years. He uses 1 pillows at night. Patient currently is on oxygen at 2 L/min per nasal cannula..- at night The patient is having no constitutional symptoms, denying fever, chills, anorexia, or weight loss. The patient has been hospitalized for this condition before. He quit smoking approximately many years ago. The patient is experiencing exercise intolerance (difficulty walking 1 blocks on flat ground).    Worsening shortness of breath with exercise    Past Medical History:   Diagnosis Date    Abnormal EKG 8/22/2014    Allergy     Arthritis     Back pain     Bilateral carotid artery disease 1/17/2017    Cholelithiasis     US retroperitoneal 12/2016    COPD (chronic obstructive pulmonary disease)     Diabetes with neurologic complications     Diverticulosis     hosp/divert bleed    Ex-smoker     Glaucoma     suspect    Heart murmur     History of pneumothorax     bilat    Hyperlipidemia     Hypothyroid     Iron deficiency anemia     nathan    Osteoporosis     Polyneuropathy     Prostate cancer     PVD (peripheral vascular disease) 8/22/2014    Urinary incontinence      Past Surgical History:   Procedure Laterality Date    CATARACT EXTRACTION      PCIOL OU Bilateral  2011 OD/2010 OS    DR. DELAGDILLO    PROSTATE SURGERY   approx    brachytherapy    SPINAL CORD STIMULATOR IMPLANT      TENS Unit     Social History     Socioeconomic History    Marital status:      Spouse name: Not on file    Number of children: 2    Years of education: Not on file    Highest education level: Not on file   Social Needs    Financial resource strain: Not on file    Food insecurity - worry: Not on file    Food insecurity - inability: Not on file    Transportation needs - medical: Not on file    Transportation needs - non-medical: Not on file   Occupational History    Occupation: retired     Comment: self employed   Tobacco Use    Smoking status: Former Smoker     Last attempt to quit: 1993     Years since quittin.1    Smokeless tobacco: Never Used   Substance and Sexual Activity    Alcohol use: Yes     Comment: Champagne on     Drug use: No    Sexual activity: No   Other Topics Concern    Not on file   Social History Narrative    Patient is a [employer]. He is retired he was self employed.     Review of Systems   Constitutional: Positive for fatigue. Negative for fever.   HENT: Positive for postnasal drip, rhinorrhea and congestion.    Eyes: Negative for redness and itching.   Respiratory: Positive for cough, sputum production, shortness of breath, dyspnea on extertion, use of rescue inhaler and Paroxysmal Nocturnal Dyspnea.    Cardiovascular: Negative for chest pain, palpitations and leg swelling.   Genitourinary: Negative for difficulty urinating and hematuria.   Endocrine: Negative for cold intolerance and heat intolerance.    Skin: Negative for rash.   Gastrointestinal: Negative for nausea and abdominal pain.   Neurological: Negative for dizziness, syncope, weakness and light-headedness.   Hematological: Negative for adenopathy. Does not bruise/bleed easily.   Psychiatric/Behavioral: Negative for sleep disturbance. The patient is not  nervous/anxious.        Objective:      Physical Exam   Constitutional: He is oriented to person, place, and time. He appears well-developed and well-nourished.   HENT:   Head: Normocephalic and atraumatic.   Eyes: Conjunctivae are normal. Pupils are equal, round, and reactive to light.   Neck: Neck supple. No JVD present. No tracheal deviation present. No thyromegaly present.   Cardiovascular: Normal rate and regular rhythm.   No murmur heard.  Pulmonary/Chest: He has decreased breath sounds. He has wheezes in the right lower field and the left lower field. He has no rhonchi. He has no rales.   Abdominal: Soft. Bowel sounds are normal.   Musculoskeletal: Normal range of motion. He exhibits no edema or tenderness.   Lymphadenopathy:     He has no cervical adenopathy.   Neurological: He is alert and oriented to person, place, and time.   Skin: Skin is warm and dry.   Nursing note and vitals reviewed.    Personal Diagnostic Review  Chest X-Ray: I personally reviewed the films and findings are:, air trapping/emphysema  Pulmonary function tests:  No recent  Pulmonary Studies Review 10/30/2018 9/6/2018 8/31/2018 8/1/2018 7/25/2018 7/21/2018 5/16/2018   SpO2 91 - 92 - 94 90 90   Ordering Provider - - - - - - -   Performing nurse/tech/RT - - - - - - -   Diagnosis - - - - - - -   Height 68.000 68.000 68.000 68.000 68.000 68.000 68.000   Weight 2515.01 2462.1 2423.3 2412.71 2359.8 2386.26 2416.24   BMI (Calculated) 24 23.4 23.1 23 22.5 22.7 23   Predicted Distance 220.82 224.19 225.87 226.43 229.24 228.11 233.37   Patient Race - - - - - - -   6MWT Status - - - - - - -   Patient Reported - - - - - - -   Was O2 used? - - - - - - -   6MW Distance walked (feet) - - - - - - -   Distance walked (meters) - - - - - - -   Did patient stop? - - - - - - -   Type of assistive device(s) used? - - - - - - -   Is extra documentation required for this patient? - - - - - - -   Oxygen Saturation - - - - - - -   Supplemental Oxygen - - - - - -  -   Heart Rate - - - - - - -   Blood Pressure - - - - - - -   Sari Dyspnea Rating  - - - - - - -   Oxygen Saturation - - - - - - -   Supplemental Oxygen - - - - - - -   Heart Rate - - - - - - -   Blood Pressure - - - - - - -   Sari Dyspnea Rating  - - - - - - -   Recovery Time (seconds) - - - - - - -   Oxygen Saturation - - - - - - -   Supplemental Oxygen - - - - - - -   Heart Rate - - - - - - -   Blood Pressure - - - - - - -   Sari Dyspnea Rating  - - - - - - -   Is procedure ready for interpretation? - - - - - - -   Did the patient stop or pause? - - - - - - -   Total Time Walked (Calculated) - - - - - - -   Total Laps Walked - - - - - - -   Final Partial Lap Distance (feet) - - - - - - -   Total Distance Feet (Calculated) - - - - - - -   Total Distance Meters (Calculated) - - - - - - -   Predicted Distance Meters (Calculated) 416.03 418.67 420.61 421.14 423.78 422.46 425.98   Percentage of Predicted (Calculated) - - - - - - -   Peak VO2 (Calculated) - - - - - - -   Mets - - - - - - -   Has The Patient Had a Previous Six Minute Walk Test? - - - - - - -   Oxygen Qualification? - - - - - - -     No flowsheet data found.            Patient was given a jet nebulization treatment with albuterol. The patient was instructed on the proper use of nebulizer machine and given nebulizer set up device. Side effects of medication discussed.  Patient voiced understanding.   CPT code 12949    Assessment:       1. Exercise hypoxemia    2. Chronic obstructive pulmonary disease, unspecified COPD type    3. Panlobular emphysema    4. COPD exacerbation        Outpatient Encounter Medications as of 10/30/2018   Medication Sig Dispense Refill    albuterol 90 mcg/actuation inhaler Inhale 2 puffs into the lungs every 6 (six) hours. 54 g 4    alendronate (FOSAMAX) 70 MG tablet TAKE ONE TABLET BY MOUTH EVERY 7 DAYS ON SUNDAYS 12 tablet 3    aspirin 81 mg Tab Take 1 tablet by mouth Daily. Over counter. To help prevent stroke/ heart attack       blood sugar diagnostic (TRUE METRIX GLUCOSE TEST STRIP) Strp Check sugars x3 daily 300 strip 3    calcium carbonate (OS-TANGELA) 500 mg calcium (1,250 mg) tablet Take 1 tablet by mouth once daily.      clotrimazole-betamethasone 1-0.05% (LOTRISONE) cream Apply topically 2 (two) times daily. (Patient taking differently: Apply topically as needed. ) 45 g 0    ferrous sulfate 325 mg (65 mg iron) Tab tablet Take 1 tablet (325 mg total) by mouth once daily. 90 tablet 1    furosemide (LASIX) 20 MG tablet 1/2 tab a day for 2-3 days only if increase swelling 20 tablet 0    gabapentin (NEURONTIN) 100 MG capsule Take 1 capsule (100 mg total) by mouth 2 (two) times daily. 180 capsule 2    insulin glargine (LANTUS SOLOSTAR U-100 INSULIN) 100 unit/mL (3 mL) InPn pen INJECT 25 UNITS SUBCUTANEOUSLY ONCE DAILY 15 mL 3    insulin lispro (HUMALOG) 100 unit/mL injection Sliding Scale:    150-200: 6 units  201-250: 8 units  251- 300 10 unit  301-350:  12 units  351 or greater 14 units 10 mL 2    ketoconazole (NIZORAL) 2 % cream Apply to affected area twice daily for rash in groin 60 g 3    lancets 33 gauge Misc 1 lancet by Misc.(Non-Drug; Combo Route) route 3 (three) times daily. 100 each 11    levothyroxine (SYNTHROID) 25 MCG tablet TAKE ONE TABLET BY MOUTH EVERY DAY 30 tablet 10    loratadine (CLARITIN) 10 mg tablet Take 1 tablet by mouth every morning.       metFORMIN (GLUCOPHAGE) 1000 MG tablet TAKE ONE TABLET BY MOUTH TWICE DAILY ; 180 tablet 3    miconazole NITRATE 2 % (ZEASORB AF) 2 % top powder Use two to three times daily to prevent rash in groin 71 g 11    nateglinide (STARLIX) 60 MG tablet TAKE ONE TABLET BY MOUTH THREE TIMES A DAY BEFORE MEALS 270 tablet 2    NOVOLOG FLEXPEN U-100 INSULIN 100 unit/mL InPn pen INJECT BASED ON SLIDING SCALE 150-200 6U, 201-250 8U, 251-300 10U, 301-350 12U, + 14U 15 mL 10    nystatin (MYCOSTATIN) ointment APPLY TO AFFECTED AREA TWO TIMES A DAY 30 g 0    OXYGEN-AIR  "[place]IV[place] [facility] [facility] [place]hale 2 L into the lungs as needed. Oxygen 2L via nasal canula as needed.      pantoprazole (PROTONIX) 40 MG tablet Take 1 tablet (40 mg total) by mouth once daily. 90 tablet 2    pravastatin (PRAVACHOL) 40 MG tablet TAKE 1 TABLET BY MOUTH ONCE DAILY 90 tablet 3    tamsulosin (FLOMAX) 0.4 mg Cp24 1 capsule PO QD 30 capsule 6    TENS unit and electrodes Cmpk 1 Device by Misc.(Non-Drug; Combo Route) route daily as needed. 1 each 0    tiotropium (SPIRIVA WITH HANDIHALER) 18 mcg inhalation capsule Inhale 1 capsule (18 mcg total) into the lungs once daily. Controller 90 capsule 4    triamcinolone acetonide 0.1% (KENALOG) 0.1 % cream Apply to affected area twice daily as needed for rash on legs 80 g 1    [DISCONTINUED] tiotropium (SPIRIVA WITH HANDIHALER) 18 mcg inhalation capsule Inhale 1 capsule (18 mcg total) into the lungs once daily. Controller 90 capsule 4    albuterol (PROVENTIL) 2.5 mg /3 mL (0.083 %) nebulizer solution Take 3 mLs (2.5 mg total) by nebulization every 4 (four) hours as needed for Wheezing or Shortness of Breath. 360 mL 11    azithromycin (ZITHROMAX Z-MELISSA) 250 MG tablet Take 1 tablet (250 mg total) by mouth once daily. 500 mg on day 1 (two tablets) followed by 250 mg once daily on days 2-5 6 tablet 1     Facility-Administered Encounter Medications as of 10/30/2018   Medication Dose Route Frequency Provider Last Rate Last Dose    albuterol nebulizer solution 2.5 mg  2.5 mg Nebulization 1 time in Clinic/HOD Agustin Dean MD         Orders Placed This Encounter   Procedures    NEBULIZER FOR HOME USE     [facility] for CPAP/Oxygen/Nebulizer supplies.  Customer Service: [phone]  Call: [phone]  Fax: [phone]  Billing Inquiries: [phone] or [phone]       Order Specific Question:   Height:     Answer:   5' 8" (1.727 m)     Order Specific Question:   Weight:     Answer:   71.3 kg (157 lb 3 oz)     Order Specific Question:   Length of need (1-99 " "months):     Answer:   99    NEBULIZER KIT (SUPPLIES) FOR HOME USE     Order Specific Question:   Height:     Answer:   5' 8" (1.727 m)     Order Specific Question:   Weight:     Answer:   71.3 kg (157 lb 3 oz)     Order Specific Question:   Length of need (1-99 months):     Answer:   99     Order Specific Question:   Mask or Mouthpiece?     Answer:   Mouthpiece    Six Minute Walk Test to qualify for Home Oxygen     Standing Status:   Future     Standing Expiration Date:   10/30/2019     Plan:       Requested Prescriptions     Signed Prescriptions Disp Refills    albuterol (PROVENTIL) 2.5 mg /3 mL (0.083 %) nebulizer solution 360 mL 11     Sig: Take 3 mLs (2.5 mg total) by nebulization every 4 (four) hours as needed for Wheezing or Shortness of Breath.    tiotropium (SPIRIVA WITH HANDIHALER) 18 mcg inhalation capsule 90 capsule 4     Sig: Inhale 1 capsule (18 mcg total) into the lungs once daily. Controller    azithromycin (ZITHROMAX Z-MELISSA) 250 MG tablet 6 tablet 1     Sig: Take 1 tablet (250 mg total) by mouth once daily. 500 mg on day 1 (two tablets) followed by 250 mg once daily on days 2-5     Exercise hypoxemia    Chronic obstructive pulmonary disease, unspecified COPD type  -     Six Minute Walk Test to qualify for Home Oxygen; Future  -     albuterol nebulizer solution 2.5 mg  -     NEBULIZER FOR HOME USE  -     NEBULIZER KIT (SUPPLIES) FOR HOME USE  -     albuterol (PROVENTIL) 2.5 mg /3 mL (0.083 %) nebulizer solution; Take 3 mLs (2.5 mg total) by nebulization every 4 (four) hours as needed for Wheezing or Shortness of Breath.  Dispense: 360 mL; Refill: 11    Panlobular emphysema  -     tiotropium (SPIRIVA WITH HANDIHALER) 18 mcg inhalation capsule; Inhale 1 capsule (18 mcg total) into the lungs once daily. Controller  Dispense: 90 capsule; Refill: 4    COPD exacerbation  -     azithromycin (ZITHROMAX Z-MELISSA) 250 MG tablet; Take 1 tablet (250 mg total) by mouth once daily. 500 mg on day 1 (two tablets) " followed by 250 mg once daily on days 2-5  Dispense: 6 tablet; Refill: 1      Follow-up in about 6 months (around 4/30/2019) for 6 min walk, Neb Tx Demo today.    Patient was given a jet nebulization treatment with albuterol. The patient was instructed on the proper use of nebulizer machine and given nebulizer set up device. Side effects of medication discussed.  Patient voiced understanding.   CPT code 79441      MEDICAL DECISION MAKING: Moderate  complexity.  Overall, the multiple problems listed are of moderate severity that may impact quality of life and activities of daily living. Side effects of medications, treatment plan as well as options and alternatives reviewed and discussed with patient. There was counseling of patient concerning these issues.    Total time spent in face to face counseling and coordination of care - 25 minutes over 50% of time was used in discussion of prognosis, risks, benefits of treatment, instructions and compliance with regimen . Discussion with other physicians and/or health care providers ( home health or for use of durable medical equipment (oxygen, nebulizers, CPAP, BiPAP) occurred.

## 2018-10-31 ENCOUNTER — PATIENT MESSAGE (OUTPATIENT)
Dept: PULMONOLOGY | Facility: CLINIC | Age: 83
End: 2018-10-31

## 2018-10-31 ENCOUNTER — PATIENT MESSAGE (OUTPATIENT)
Dept: INTERNAL MEDICINE | Facility: CLINIC | Age: 83
End: 2018-10-31

## 2018-10-31 ENCOUNTER — CLINICAL SUPPORT (OUTPATIENT)
Dept: PULMONOLOGY | Facility: CLINIC | Age: 83
End: 2018-10-31
Payer: MEDICARE

## 2018-10-31 VITALS — WEIGHT: 156.31 LBS | BODY MASS INDEX: 23.69 KG/M2 | HEIGHT: 68 IN

## 2018-10-31 DIAGNOSIS — J44.9 CHRONIC OBSTRUCTIVE PULMONARY DISEASE, UNSPECIFIED COPD TYPE: ICD-10-CM

## 2018-10-31 PROCEDURE — 94618 PULMONARY STRESS TESTING: CPT | Mod: 26,S$PBB,HCNC, | Performed by: INTERNAL MEDICINE

## 2018-10-31 PROCEDURE — 94618 PULMONARY STRESS TESTING: CPT | Mod: PBBFAC,HCNC,PO

## 2018-10-31 NOTE — PROCEDURES
"[facility]  Six Minute Walk     SUMMARY     Ordering Provider: Dr Dean   Interpreting Provider: Dr Dean  Performing nurse/tech/RT: DEWAYNE Alvarez RRT  Diagnosis: COPD  Height: 5' 7.5" (171.5 cm)  Weight: 70.9 kg (156 lb 4.9 oz)  BMI (Calculated): 24.2   Patient Race:             Phase Oxygen Assessment Supplemental O2 Heart   Rate Blood Pressure Sari Dyspnea Scale Rating   Resting 88 % Room Air 98 bpm 101/61 4   Exercise        Minute        1 90 % 2 L/M 109 bpm     2 89 % 2 L/M 109 bpm     3 88 % 2 L/M 112 bpm     4 90 % 3 L/M 111 bpm     5 91 % 3 L/M 105 bpm     6  91 % 3 L/M 107 bpm 139/60 3   Recovery        Minute        1 94 % 3 L/M 102 bpm     2 96 % 3 L/M 101 bpm     3 96 % 3 L/M 103 bpm     4 96 % 3 L/M 101 bpm 129/67 1     Six Minute Walk Summary  6MWT Status: completed without stopping  Patient Reported: (stinging in both feet,right hip pain)     Interpretation:  Did the patient stop or pause?: No                                         Total Time Walked (Calculated): 360 seconds  Final Partial Lap Distance (feet): 75 feet  Total Distance Meters (Calculated): 205.74 meters  Predicted Distance Meters (Calculated): 407.65 meters  Percentage of Predicted (Calculated): 50.47  Peak VO2 (Calculated): 10.15  Mets: 2.9  Has The Patient Had a Previous Six Minute Walk Test?: Yes       Previous 6MWT Results  Has The Patient Had a Previous Six Minute Walk Test?: Yes  Date of Previous Test: 12/07/17  Total Time Walked: 360 seconds  Total Distance (meters): 228.6  Predicted Distance (meters): 427.81 meters  Percentage of Predicted: 53.43  Percent Change (Calculated): 0.1    Interpretation:  Total distance walked in six minutes is moderately reduced indicating a reduction in overall  functional capacity. There was  significant oxygen desaturation at rest at 88%. Patient was exercised with supplemental oxygen. Clinical correlation suggested.    Agustin Dean MD    Mild exercise-induced hypoxemia " described as an arterial oxygen saturation of 93-95% (or 3-4% less than at rest), moderate exercise-induced hypoxemia as 89-93%, and severe exercise induced hypoxemia as < 89% O2 saturation.  Medicare Criteria Comments:   When arterial oxygen saturation is at or below 88% during exercise (severe exercise induced hypoxemia) then the patient falls under Medicare Group 1 criteria for supplemental oxygen.  Details about Medicare Group Criteria coverage can be found at http://www.cms.hhs.gov/manuals/downloads/

## 2018-11-01 ENCOUNTER — OFFICE VISIT (OUTPATIENT)
Dept: INTERNAL MEDICINE | Facility: CLINIC | Age: 83
End: 2018-11-01
Payer: MEDICARE

## 2018-11-01 VITALS
WEIGHT: 156.31 LBS | TEMPERATURE: 98 F | HEIGHT: 67 IN | SYSTOLIC BLOOD PRESSURE: 130 MMHG | BODY MASS INDEX: 24.53 KG/M2 | HEART RATE: 105 BPM | DIASTOLIC BLOOD PRESSURE: 62 MMHG

## 2018-11-01 DIAGNOSIS — E53.8 VITAMIN B12 DEFICIENCY: ICD-10-CM

## 2018-11-01 DIAGNOSIS — Z79.4 TYPE 2 DIABETES MELLITUS WITH DIABETIC POLYNEUROPATHY, WITH LONG-TERM CURRENT USE OF INSULIN: Primary | ICD-10-CM

## 2018-11-01 DIAGNOSIS — E11.42 TYPE 2 DIABETES MELLITUS WITH DIABETIC POLYNEUROPATHY, WITH LONG-TERM CURRENT USE OF INSULIN: Primary | ICD-10-CM

## 2018-11-01 DIAGNOSIS — J96.11 CHRONIC RESPIRATORY FAILURE WITH HYPOXIA: Primary | ICD-10-CM

## 2018-11-01 DIAGNOSIS — E03.4 HYPOTHYROIDISM DUE TO ACQUIRED ATROPHY OF THYROID: Chronic | ICD-10-CM

## 2018-11-01 DIAGNOSIS — J44.9 CHRONIC OBSTRUCTIVE PULMONARY DISEASE, UNSPECIFIED COPD TYPE: ICD-10-CM

## 2018-11-01 DIAGNOSIS — J43.9 PULMONARY EMPHYSEMA, UNSPECIFIED EMPHYSEMA TYPE: Chronic | ICD-10-CM

## 2018-11-01 PROCEDURE — 99999 PR PBB SHADOW E&M-EST. PATIENT-LVL V: CPT | Mod: PBBFAC,HCNC,, | Performed by: FAMILY MEDICINE

## 2018-11-01 PROCEDURE — 99214 OFFICE O/P EST MOD 30 MIN: CPT | Mod: HCNC,25,S$GLB, | Performed by: FAMILY MEDICINE

## 2018-11-01 PROCEDURE — 99215 OFFICE O/P EST HI 40 MIN: CPT | Mod: PBBFAC,HCNC,PO | Performed by: FAMILY MEDICINE

## 2018-11-01 PROCEDURE — 96372 THER/PROPH/DIAG INJ SC/IM: CPT | Mod: HCNC,S$GLB,, | Performed by: FAMILY MEDICINE

## 2018-11-01 PROCEDURE — 99499 UNLISTED E&M SERVICE: CPT | Mod: HCNC,S$GLB,, | Performed by: FAMILY MEDICINE

## 2018-11-01 PROCEDURE — 1101F PT FALLS ASSESS-DOCD LE1/YR: CPT | Mod: CPTII,HCNC,S$GLB, | Performed by: FAMILY MEDICINE

## 2018-11-01 RX ORDER — CYANOCOBALAMIN 1000 UG/ML
1000 INJECTION, SOLUTION INTRAMUSCULAR; SUBCUTANEOUS ONCE
Status: COMPLETED | OUTPATIENT
Start: 2018-11-01 | End: 2018-11-01

## 2018-11-01 RX ADMIN — CYANOCOBALAMIN 1000 MCG: 1000 INJECTION, SOLUTION INTRAMUSCULAR; SUBCUTANEOUS at 10:11

## 2018-11-01 NOTE — LETTER
November 12, 2018      [facility]  [address] [place] [address]  Phone: [phone]  Fax: [phone]          Patient: Good Murillo Jr.   MR Number: [id number]   YOB: 1927   Date of Visit: 11/1/2018       Dear No ref. provider found:    Thank you for referring Good Murillo to me for evaluation. Attached you will find relevant portions of my assessment and plan of care.    If you have questions, please do not hesitate to call me. I look forward to following Good Murillo along with you.    Sincerely,    Agustin Dean MD    Enclosure  CC:  No Recipients    IIf you would like to receive this communication electronically, please contact [email or web address] or [phone] to request [employer] Link access.    [employer] is a tool which provides read-only access to select patient information with whom you have a relationship. Its easy to use and provides real time access to review your patients record including encounter summaries, notes, results, and demographic information.    If you feel you have received this communication in error or would no longer like to receive these types of communications, please e-mail [email or web address]

## 2018-11-01 NOTE — LETTER
November 1, 2018    Good Murillo Jr.  [address]  [place] [address]             [facility]  [address] [place] [address]  Phone: [phone]  Fax: [phone] Dear Mr. Murillo:    Rx for DURABLE MEDICAL EQUIPMENT ( oxygenand supplies) printed , signed , faxed to [facility] and mailed to patient. For information call   [facility] for CPAP/Oxygen/Nebulizer supplies.  Customer Service: [phone]  Call: [phone]  Fax: [phone]  Billing Inquiries: [phone] or [phone]        If you have any questions or concerns, please don't hesitate to call.    Sincerely,        Agustin Dean MD

## 2018-11-01 NOTE — PROGRESS NOTES
Subjective:       Patient ID: Good Murillo Jr. is a 89 y.o. male.    Chief Complaint: Follow-up    Follow-up:      Pt is a 89 year who sugars are fluctuating from lowest in the 70's to occasionally 230's. Discussed with pt last visit increasing his lantus to 25 units but he did not. Pt last A1C was 8.3. Pt has neuropathy in his feet and have tried to work-up the gabapentin. Pt is stable with thyroid and his COPD.       Review of Systems   Constitutional: Negative.    Respiratory: Negative.    Cardiovascular: Negative.  Negative for chest pain.   Gastrointestinal: Negative.    Genitourinary: Negative.  Negative for frequency and genital sores.   Neurological: Positive for numbness.   Hematological: Negative.    Psychiatric/Behavioral: Negative.        Objective:      Physical Exam   Constitutional: He is oriented to person, place, and time. He appears well-developed and well-nourished.   Cardiovascular: Normal rate and regular rhythm. Exam reveals no friction rub.   No murmur heard.  Pulmonary/Chest: Effort normal and breath sounds normal. No stridor. He has no wheezes.   Neurological: He is alert and oriented to person, place, and time. He displays normal reflexes. He exhibits normal muscle tone.   Skin: Skin is warm and dry.       Assessment:       1. Type 2 diabetes mellitus with diabetic polyneuropathy, with long-term current use of insulin    2. Hypothyroidism due to acquired atrophy of thyroid    3. Pulmonary emphysema, unspecified emphysema type    4. Vitamin B12 deficiency        Plan:       Type 2 diabetes mellitus with diabetic polyneuropathy, with long-term current use of insulin  Comments:  Will do hgA1C in Dec  Orders:  -     Hemoglobin A1c; Future; Expected date: 12/17/2018  -     Ambulatory Referral to Physical/Occupational Therapy    Hypothyroidism due to acquired atrophy of thyroid  Comments:  Stable    Pulmonary emphysema, unspecified emphysema type  Comments:  stable at this time    Vitamin B12  deficiency  Comments:  Will do b12 shot  Orders:  -     cyanocobalamin injection 1,000 mcg

## 2018-11-01 NOTE — PROGRESS NOTES
prescription for oxygen sent    Subjective:       Patient ID: Good Murillo Jr. is a 89 y.o. male.    Chief Complaint: No chief complaint on file.    HPI COPD  He presents for evaluation and treatment of COPD. The patient is not currently having symptoms / an exacerbation. Current symptoms include acute dyspnea, chronic dyspnea and cough productive of white sputum in small amounts. Symptoms have been present since several weeks ago and have been gradually worsening. He denies chills and fever. Associated symptoms include fatigue, poor exercise tolerance, shortness of breath and weakness.  This episode appears to have been triggered by no identifiable factor. Treatments tried for the current exacerbation: albuterol nebulizer. The patient has been having similar episodes for approximately 10 years. He uses 1 pillows at night. Patient currently is on oxygen at 2 L/min per nasal cannula..- at night The patient is having no constitutional symptoms, denying fever, chills, anorexia, or weight loss. The patient has been hospitalized for this condition before. He quit smoking approximately many years ago. The patient is experiencing exercise intolerance (difficulty walking 1 blocks on flat ground).    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 DELGADILLO    PROSTATE SURGERY   approx    brachytherapy    SPINAL CORD STIMULATOR IMPLANT      TENS Unit     Social History     Socioeconomic History    Marital status:      Spouse name: Not on file    Number of children: 2    Years of education: Not on file    Highest education level: Not on file   Social Needs    Financial resource strain: Not on file    Food insecurity - worry: Not on file    Food insecurity - inability: Not on file    Transportation needs - medical: Not on file    Transportation needs - non-medical: Not on file   Occupational History    Occupation: retired     Comment: self employed   Tobacco Use    Smoking status: Former Smoker     Last attempt to quit: 1993     Years since quittin.1    Smokeless tobacco: Never Used   Substance and Sexual Activity    Alcohol use: Yes     Comment: Champagne on     Drug use: No    Sexual activity: No   Other Topics Concern    Not on file   Social History Narrative    Patient is a [employer]. He is retired he was self employed.     Review of Systems   Constitutional: Positive for fatigue. Negative for fever.   HENT: Positive for postnasal drip, rhinorrhea and congestion.    Eyes: Negative for redness and itching.   Respiratory: Positive for cough, sputum production, shortness of breath, dyspnea on extertion, use of rescue inhaler and Paroxysmal Nocturnal Dyspnea.    Cardiovascular: Negative for chest pain, palpitations and leg swelling.   Genitourinary: Negative for difficulty urinating and hematuria.   Endocrine: Negative for cold intolerance and heat intolerance.    Skin: Negative for rash.   Gastrointestinal: Negative for nausea and abdominal pain.   Neurological: Negative for dizziness, syncope, weakness and light-headedness.   Hematological: Negative for adenopathy. Does not bruise/bleed easily.    Psychiatric/Behavioral: Negative for sleep disturbance. The patient is not nervous/anxious.        Objective:      Physical Exam   Constitutional: He is oriented to person, place, and time. He appears well-developed and well-nourished.   HENT:   Head: Normocephalic and atraumatic.   Eyes: Conjunctivae are normal. Pupils are equal, round, and reactive to light.   Neck: Neck supple. No JVD present. No tracheal deviation present. No thyromegaly present.   Cardiovascular: Normal rate and regular rhythm.   No murmur heard.  Pulmonary/Chest: He has decreased breath sounds. He has wheezes in the right lower field and the left lower field. He has no rhonchi. He has no rales.   Abdominal: Soft. Bowel sounds are normal.   Musculoskeletal: Normal range of motion. He exhibits no edema or tenderness.   Lymphadenopathy:     He has no cervical adenopathy.   Neurological: He is alert and oriented to person, place, and time.   Skin: Skin is warm and dry.   Nursing note and vitals reviewed.    Personal Diagnostic Review  Chest X-Ray: I personally reviewed the films and findings are:, air trapping/emphysema  Pulmonary function tests:  No recent  Pulmonary Studies Review 11/1/2018 10/31/2018 10/30/2018 9/6/2018 8/31/2018 8/1/2018 7/25/2018   SpO2 - - 91 - 92 - 94   Ordering Provider - Dr Dean - - - - -   Interpreting Provider - Dr Dean - - - - -   Performing nurse/tech/RT - D Alvarez RRT - - - - -   Diagnosis - COPD - - - - -   Height 67.000 67.5 68.000 68.000 68.000 68.000 68.000   Weight 2500.9 2500.9 2515.01 2462.1 2423.3 2412.71 2359.8   BMI (Calculated) 24.5 24.2 24 23.4 23.1 23 22.5   Predicted Distance 218.02 219.7 220.82 224.19 225.87 226.43 229.24   Patient Race -  - - - - -   6MWT Status - completed without stopping - - - - -   Patient Reported - (No Data) - - - - -   Was O2 used? - Yes - - - - -   Delivery Method - Cannula - - - - -   6MW Distance walked (feet) - - - - - - -   Distance walked (meters) - - - - - - -    Did patient stop? - No - - - - -   Type of assistive device(s) used? - a walker - - - - -   Is extra documentation required for this patient? - Yes - - - - -   Oxygen Saturation - 88 - - - - -   Supplemental Oxygen - Room Air - - - - -   Heart Rate - 98 - - - - -   Blood Pressure - 101/61 - - - - -   Sari Dyspnea Rating  - somewhat heavy - - - - -   Oxygen Saturation - 91 - - - - -   Supplemental Oxygen - 3 L/M - - - - -   Heart Rate - 107 - - - - -   Blood Pressure - 139/60 - - - - -   Sari Dyspnea Rating  - moderate - - - - -   Recovery Time (seconds) - 240 - - - - -   Oxygen Saturation - 96 - - - - -   Supplemental Oxygen - 3 L/M - - - - -   Heart Rate - 101 - - - - -   Blood Pressure - 129/67 - - - - -   Sari Dyspnea Rating  - very light - - - - -   Is procedure ready for interpretation? - Yes - - - - -   Did the patient stop or pause? - No - - - - -   Total Time Walked (Calculated) - 360 - - - - -   Total Laps Walked - 3 - - - - -   Final Partial Lap Distance (feet) - 75 - - - - -   Total Distance Feet (Calculated) - 675 - - - - -   Total Distance Meters (Calculated) - 205.74 - - - - -   Predicted Distance Meters (Calculated) 397.81 407.65 416.03 418.67 420.61 421.14 423.78   Percentage of Predicted (Calculated) - 50.47 - - - - -   Peak VO2 (Calculated) - 10.15 - - - - -   Mets - 2.9 - - - - -   Has The Patient Had a Previous Six Minute Walk Test? - Yes - - - - -   Oxygen Qualification? - Yes - - - - -   Oxygen Saturation - 88 - - - - -   Supplemental Oxygen - Room Air - - - - -   Heart Rate - 98 - - - - -   Blood Pressure - 101/61 - - - - -   Sari Dyspnea Rating  - somewhat heavy - - - - -   Oxygen Saturation - 91 - - - - -   Supplemental Oxygen - 3 L/M - - - - -   Heart Rate - 107 - - - - -   Blood Pressure - 139/60 - - - - -   Sari Dyspnea Rating  - moderate - - - - -   Recovery Time (seconds) - 240 - - - - -   Oxygen Saturation - 96 - - - - -   Supplemental Oxygen - 3 L/M - - - - -   Heart Rate - 101 - -  - - -   Blood Pressure - 129/67 - - - - -   Sari Dyspnea Rating  - very light - - - - -     No flowsheet data found.      Assessment:       1. Chronic respiratory failure with hypoxia    2. Chronic obstructive pulmonary disease, unspecified COPD type        Outpatient Encounter Medications as of 11/1/2018   Medication Sig Dispense Refill    albuterol (PROVENTIL) 2.5 mg /3 mL (0.083 %) nebulizer solution Take 3 mLs (2.5 mg total) by nebulization every 4 (four) hours as needed for Wheezing or Shortness of Breath. 360 mL 11    albuterol 90 mcg/actuation inhaler Inhale 2 puffs into the lungs every 6 (six) hours. 54 g 4    alendronate (FOSAMAX) 70 MG tablet TAKE ONE TABLET BY MOUTH EVERY 7 DAYS ON SUNDAYS 12 tablet 3    aspirin 81 mg Tab Take 1 tablet by mouth Daily. Over counter. To help prevent stroke/ heart attack      azithromycin (ZITHROMAX Z-MELISSA) 250 MG tablet Take two tablets (500 mg )on day 1 (two tablets) followed by one tablet(250 mg once daily on days 2-5 6 tablet 1    blood sugar diagnostic (TRUE METRIX GLUCOSE TEST STRIP) Strp Check sugars x3 daily 300 strip 3    calcium carbonate (OS-TANGELA) 500 mg calcium (1,250 mg) tablet Take 1 tablet by mouth once daily.      clotrimazole-betamethasone 1-0.05% (LOTRISONE) cream Apply topically 2 (two) times daily. (Patient taking differently: Apply topically as needed. ) 45 g 0    ferrous sulfate 325 mg (65 mg iron) Tab tablet Take 1 tablet (325 mg total) by mouth once daily. 90 tablet 1    furosemide (LASIX) 20 MG tablet 1/2 tab a day for 2-3 days only if increase swelling 20 tablet 0    gabapentin (NEURONTIN) 100 MG capsule Take 1 capsule (100 mg total) by mouth 2 (two) times daily. 180 capsule 2    insulin glargine (LANTUS SOLOSTAR U-100 INSULIN) 100 unit/mL (3 mL) InPn pen INJECT 25 UNITS SUBCUTANEOUSLY ONCE DAILY 15 mL 3    insulin lispro (HUMALOG) 100 unit/mL injection Sliding Scale:    150-200: 6 units  201-250: 8 units  251- 300 10 unit  301-350:  12  units  351 or greater 14 units 10 mL 2    ketoconazole (NIZORAL) 2 % cream Apply to affected area twice daily for rash in groin 60 g 3    lancets 33 gauge Misc 1 lancet by Misc.(Non-Drug; Combo Route) route 3 (three) times daily. 100 each 11    levothyroxine (SYNTHROID) 25 MCG tablet TAKE ONE TABLET BY MOUTH EVERY DAY 30 tablet 10    loratadine (CLARITIN) 10 mg tablet Take 1 tablet by mouth every morning.       metFORMIN (GLUCOPHAGE) 1000 MG tablet TAKE ONE TABLET BY MOUTH TWICE DAILY ; 180 tablet 3    miconazole NITRATE 2 % (ZEASORB AF) 2 % top powder Use two to three times daily to prevent rash in groin 71 g 11    nateglinide (STARLIX) 60 MG tablet TAKE ONE TABLET BY MOUTH THREE TIMES A DAY BEFORE MEALS 270 tablet 2    NOVOLOG FLEXPEN U-100 INSULIN 100 unit/mL InPn pen INJECT BASED ON SLIDING SCALE 150-200 6U, 201-250 8U, 251-300 10U, 301-350 12U, + 14U 15 mL 10    nystatin (MYCOSTATIN) ointment APPLY TO AFFECTED AREA TWO TIMES A DAY 30 g 0    OXYGEN-AIR DELIVERY SYSTEMS [facility] Inhale 2 L into the lungs as needed. Oxygen 2L via nasal canula as needed.      pantoprazole (PROTONIX) 40 MG tablet Take 1 tablet (40 mg total) by mouth once daily. 90 tablet 2    pravastatin (PRAVACHOL) 40 MG tablet TAKE 1 TABLET BY MOUTH ONCE DAILY 90 tablet 3    tamsulosin (FLOMAX) 0.4 mg Cp24 1 capsule PO QD 30 capsule 6    TENS unit and electrodes Cmpk 1 Device by Misc.(Non-Drug; Combo Route) route daily as needed. 1 each 0    tiotropium (SPIRIVA WITH HANDIHALER) 18 mcg inhalation capsule Inhale 1 capsule (18 mcg total) into the lungs once daily. Controller 90 capsule 4    triamcinolone acetonide 0.1% (KENALOG) 0.1 % cream Apply to affected area twice daily as needed for rash on legs 80 g 1     Facility-Administered Encounter Medications as of 11/1/2018   Medication Dose Route Frequency Provider Last Rate Last Dose    [COMPLETED] cyanocobalamin injection 1,000 mcg  1,000 mcg Intramuscular Once Brandt Shelton MD  "  1,000 mcg at 11/01/18 1048     Orders Placed This Encounter   Procedures    OXYGEN FOR HOME USE     Needs a battery operated portable unit. Inogen or similar device  [facility] for CPAP/Oxygen/Nebulizer supplies.  Customer Service: [phone]  Call: [phone]  Fax: [phone]  Billing Inquiries: [phone] or [phone]     Order Specific Question:   Liter Flow     Answer:   2     Order Specific Question:   Duration     Answer:   Continuous     Order Specific Question:   Qualifying SpO2:     Answer:   88%     Order Specific Question:   Testing done at:     Answer:   Rest     Order Specific Question:   Route     Answer:   nasal cannula     Order Specific Question:   Portable mode:     Answer:   pulse dose acceptable     Order Specific Question:   Device     Answer:   home concentrator with portable unit     Order Specific Question:   Length of need (in months):     Answer:   99 mos     Order Specific Question:   Patient condition with qualifying saturation     Answer:   COPD     Order Specific Question:   Height:     Answer:   5'7"     Order Specific Question:   Weight:     Answer:   156 lbs     Order Specific Question:   Alternative treatment measures have been tried or considered and deemed clinically ineffective.     Answer:   Yes     Plan:       Requested Prescriptions      No prescriptions requested or ordered in this encounter     Chronic respiratory failure with hypoxia  -     OXYGEN FOR HOME USE    Chronic obstructive pulmonary disease, unspecified COPD type  -     OXYGEN FOR HOME USE      No Follow-up on file.    Patient was given a jet nebulization treatment with albuterol. The patient was instructed on the proper use of nebulizer machine and given nebulizer set up device. Side effects of medication discussed.  Patient voiced understanding.   CPT code 38396      MEDICAL DECISION MAKING: Moderate  complexity.  Overall, the multiple problems listed are of moderate severity that may impact " quality of life and activities of daily living. Side effects of medications, treatment plan as well as options and alternatives reviewed and discussed with patient. There was counseling of patient concerning these issues.    Total time spent in face to face counseling and coordination of care - 25 minutes over 50% of time was used in discussion of prognosis, risks, benefits of treatment, instructions and compliance with regimen . Discussion with other physicians and/or health care providers ( home health or for use of durable medical equipment (oxygen, nebulizers, CPAP, BiPAP) occurred.

## 2018-11-02 NOTE — PROGRESS NOTES
Patient, Good Murillo Jr. (MRN #[id number]), presented with a recent Estimated Glumerular Filtration Rate (EGFR) between 30 and 245 consistent with the definition of chronic kidney disease stage 4 (ICD10 - N18.3).    eGFR if non    Date Value Ref Range Status   01/24/2018 40.4 (A) >60 mL/min/1.73 m^2 Final     Comment:     Calculation used to obtain the estimated glomerular filtration  rate (eGFR) is the CKD-EPI equation.          The patient's chronic kidney disease stage 4 was monitored, evaluated, addressed and/or treated. This addendum to the medical record is made on 11/02/2018.

## 2018-11-07 ENCOUNTER — PATIENT MESSAGE (OUTPATIENT)
Dept: PULMONOLOGY | Facility: CLINIC | Age: 83
End: 2018-11-07

## 2018-11-09 ENCOUNTER — PATIENT MESSAGE (OUTPATIENT)
Dept: PULMONOLOGY | Facility: CLINIC | Age: 83
End: 2018-11-09

## 2018-11-13 ENCOUNTER — PATIENT MESSAGE (OUTPATIENT)
Dept: PULMONOLOGY | Facility: CLINIC | Age: 83
End: 2018-11-13

## 2018-11-29 ENCOUNTER — CLINICAL SUPPORT (OUTPATIENT)
Dept: REHABILITATION | Facility: HOSPITAL | Age: 83
End: 2018-11-29
Attending: FAMILY MEDICINE
Payer: MEDICARE

## 2018-11-29 DIAGNOSIS — R53.1 GENERALIZED WEAKNESS: Primary | ICD-10-CM

## 2018-11-29 PROCEDURE — G8978 MOBILITY CURRENT STATUS: HCPCS | Mod: CK,PO | Performed by: PHYSICAL THERAPIST

## 2018-11-29 PROCEDURE — G8979 MOBILITY GOAL STATUS: HCPCS | Mod: CK,PO | Performed by: PHYSICAL THERAPIST

## 2018-11-29 PROCEDURE — 97162 PT EVAL MOD COMPLEX 30 MIN: CPT | Mod: PO | Performed by: PHYSICAL THERAPIST

## 2018-11-29 PROCEDURE — 97535 SELF CARE MNGMENT TRAINING: CPT | Mod: PO | Performed by: PHYSICAL THERAPIST

## 2018-11-29 NOTE — PLAN OF CARE
PHYSICAL THERAPY INITIAL OUTPATIENT EVALUATION    Referring Provider:  Dr. Brandt Shelton    Diagnosis:         ICD-10-CM ICD-9-CM    1. Generalized weakness R53.1 780.79      Orders:  Evaluate and Treat    Date of Initial Evaluation:  2018    Orders :  2018    Coding Cycle Visit # 1    SUBJECTIVE:  Patient is familiar to this clinic for physical therapy treatment in the past.  Patient reports he has moved to the [place] now and is mostly walking with his rollator walker.  He reports that he sometimes walks without it, but limps when he does.  He reports that he uses a trampoline with towel bars on the walls for balance.  He reports that his balance and neuropathy are his main concern.    Past Medical History:   Diagnosis Date    Abnormal EKG 2014    Allergy     Arthritis     Back pain     Bilateral carotid artery disease 2017    Cholelithiasis     US retroperitoneal 2016    COPD (chronic obstructive pulmonary disease)     Diabetes with neurologic complications     Diverticulosis     hosp/divert bleed    Ex-smoker     Glaucoma     suspect    Heart murmur     History of pneumothorax     bilat    Hyperlipidemia     Hypothyroid     Iron deficiency anemia     nathan    Osteoporosis     Polyneuropathy     Prostate cancer     PVD (peripheral vascular disease) 2014    Urinary incontinence      Patient Active Problem List   Diagnosis    COPD (chronic obstructive pulmonary disease)    Osteoporosis    History of prostate cancer    Iron deficiency anemia    Type 2 diabetes mellitus with diabetic polyneuropathy, with long-term current use of insulin    Atherosclerosis of both lower extremities    Hyperlipidemia associated with type 2 diabetes mellitus    Lumbar pain    Hypertension associated with diabetes    Hypothyroid    Refractive error    Weight loss, non-intentional - due to esophageal stricture    Calcification of aorta    Open angle with borderline  findings, low risk, bilateral    Pseudophakia of both eyes    Bilateral carotid artery disease    Vitamin B12 deficiency    Normocytic anemia    Self-catheterizes urinary bladder    PVD (peripheral vascular disease)    Urinary incontinence    Diverticulosis    Abdominal aortic aneurysm (AAA) without rupture    Pulmonary hypertension     Current Outpatient Medications:     albuterol (PROVENTIL) 2.5 mg /3 mL (0.083 %) nebulizer solution, Take 3 mLs (2.5 mg total) by nebulization every 4 (four) hours as needed for Wheezing or Shortness of Breath., Disp: 360 mL, Rfl: 11    albuterol 90 mcg/actuation inhaler, Inhale 2 puffs into the lungs every 6 (six) hours., Disp: 54 g, Rfl: 4    alendronate (FOSAMAX) 70 MG tablet, TAKE ONE TABLET BY MOUTH EVERY 7 DAYS ON SUNDAYS, Disp: 12 tablet, Rfl: 3    aspirin 81 mg Tab, Take 1 tablet by mouth Daily. Over counter. To help prevent stroke/ heart attack, Disp: , Rfl:     azithromycin (ZITHROMAX Z-MELISSA) 250 MG tablet, Take two tablets (500 mg )on day 1 (two tablets) followed by one tablet(250 mg once daily on days 2-5, Disp: 6 tablet, Rfl: 1    blood sugar diagnostic (TRUE METRIX GLUCOSE TEST STRIP) Strp, Check sugars x3 daily, Disp: 300 strip, Rfl: 3    calcium carbonate (OS-TANGELA) 500 mg calcium (1,250 mg) tablet, Take 1 tablet by mouth once daily., Disp: , Rfl:     clotrimazole-betamethasone 1-0.05% (LOTRISONE) cream, Apply topically 2 (two) times daily. (Patient taking differently: Apply topically as needed. ), Disp: 45 g, Rfl: 0    ferrous sulfate 325 mg (65 mg iron) Tab tablet, Take 1 tablet (325 mg total) by mouth once daily., Disp: 90 tablet, Rfl: 1    furosemide (LASIX) 20 MG tablet, 1/2 tab a day for 2-3 days only if increase swelling, Disp: 20 tablet, Rfl: 0    gabapentin (NEURONTIN) 100 MG capsule, Take 1 capsule (100 mg total) by mouth 2 (two) times daily., Disp: 180 capsule, Rfl: 2    insulin glargine (LANTUS SOLOSTAR U-100 INSULIN) 100 unit/mL (3 mL)  InPn pen, INJECT 25 UNITS SUBCUTANEOUSLY ONCE DAILY, Disp: 15 mL, Rfl: 3    insulin lispro (HUMALOG) 100 unit/mL injection, Sliding Scale:  150-200: 6 units 201-250: 8 units 251- 300 10 unit 301-350:  12 units 351 or greater 14 units, Disp: 10 mL, Rfl: 2    ketoconazole (NIZORAL) 2 % cream, Apply to affected area twice daily for rash in groin, Disp: 60 g, Rfl: 3    lancets 33 gauge Misc, 1 lancet by Misc.(Non-Drug; Combo Route) route 3 (three) times daily., Disp: 100 each, Rfl: 11    levothyroxine (SYNTHROID) 25 MCG tablet, TAKE ONE TABLET BY MOUTH EVERY DAY, Disp: 30 tablet, Rfl: 10    loratadine (CLARITIN) 10 mg tablet, Take 1 tablet by mouth every morning. , Disp: , Rfl:     metFORMIN (GLUCOPHAGE) 1000 MG tablet, TAKE ONE TABLET BY MOUTH TWICE DAILY ;, Disp: 180 tablet, Rfl: 3    miconazole NITRATE 2 % (ZEASORB AF) 2 % top powder, Use two to three times daily to prevent rash in groin, Disp: 71 g, Rfl: 11    nateglinide (STARLIX) 60 MG tablet, TAKE ONE TABLET BY MOUTH THREE TIMES A DAY BEFORE MEALS, Disp: 270 tablet, Rfl: 2    NOVOLOG FLEXPEN U-100 INSULIN 100 unit/mL InPn pen, INJECT BASED ON SLIDING SCALE 150-200 6U, 201-250 8U, 251-300 10U, 301-350 12U, + 14U, Disp: 15 mL, Rfl: 10    nystatin (MYCOSTATIN) ointment, APPLY TO AFFECTED AREA TWO TIMES A DAY, Disp: 30 g, Rfl: 0    OXYGEN-AIR DELIVERY SYSTEMS [facility] [place], Inhale 2 L into the lungs as needed. Oxygen 2L via nasal canula as needed., Disp: , Rfl:     pantoprazole (PROTONIX) 40 MG tablet, Take 1 tablet (40 mg total) by mouth once daily., Disp: 90 tablet, Rfl: 2    pravastatin (PRAVACHOL) 40 MG tablet, TAKE 1 TABLET BY MOUTH ONCE DAILY, Disp: 90 tablet, Rfl: 3    tamsulosin (FLOMAX) 0.4 mg Cp24, 1 capsule PO QD, Disp: 30 capsule, Rfl: 6    TENS unit and electrodes Cmpk, 1 Device by Misc.(Non-Drug; Combo Route) route daily as needed., Disp: 1 each, Rfl: 0    tiotropium (SPIRIVA WITH HANDIHALER) 18 mcg inhalation capsule, Inhale 1 capsule (18  mcg total) into the lungs once daily. Controller, Disp: 90 capsule, Rfl: 4    triamcinolone acetonide 0.1% (KENALOG) 0.1 % cream, Apply to affected area twice daily as needed for rash on legs, Disp: 80 g, Rfl: 1    OBJECTIVE:  Pain: None /10    Sensation:  intact to light touch     ROM:  WFL for bilateral lower extremities with exception of L knee extension lacking 5 degrees.    Strength:    Right ankle dorsiflexion  4/5   left  4/5,   right ankle plantarflexion  4/5   left  4/5,   right ankle inversion  NT   left  NT,   right ankle eversion  NT   left  NT,   right quadriceps  4+/5,   left  4+/5,   right hamstrings  4/5,   left  4/5,   right hip abduction  4-/5,   left  4-/5,   right hip adduction  4-/5,   left  4-/5,   right hip flexion  4/5,   left  4/5    Function:  Optimal Instrument     The following scores are patient-reported values, with 1 representing the ability to do the activity without any difficulty, 2 representing the ability to do the activity with little difficulty, 3 representing the ability to do with moderate difficulty, 4 representing the ability to do the activity with much difficulty, 5 representing the inability to do do the activity, and 9 representing that the activity is not applicable.    1.  Lying flat   9  2.  Rolling over  9  3.  Moving - lying to sitting 2  4.  Sitting   2  5.  Squatting   2  6.  Bending/stooping  9  7.  Balancing   2  8.  Kneeling   2  9.  Standing   9  10.  Walking - short distance 3  11.  Walking - long distance 4  12.  Walking - outdoors 4  13.  Climbing stairs  5  14.  Hopping   5  15.  Jumping   5  16.  Running   9  17.  Pushing   2  18.  Pulling   2  19.  Reaching   9  20.  Grasping   2  21.  Lifting   2  22.  Carrying   2      Total  46    Patient reports 46.88% impairment on the Optimal Instrument, or G-[id number].    Balance:  Freitas Balance Assessment        1.Sitting to Standing       3  2.Standing Unsupported      4   3.Sitting with Back  Unsupported     4   4.Standing to Sitting       3   5.Transfers        3   6.Standing Unsupported with Eyes Closed    2   7.Standing Unsupported with Feet Together    1   8.Reaching Forward with Outstretched Arm while Standing  4   9. Object from the Floor from a Standing Position  4   10.Turning to Look Behind Shoulder while Standing   3   11.Turn 360 Degrees       0   12.Placing Alternate Foot on Step while Standing Unsupported 0   13.Standing Unsupported One Foot in Front    0   14.Standing on One Leg      0             Total 31             Risk Medium Fall Risk       Other:  Shortened hamstring length noted on the L LE.      ASSESSMENT:  The patient is a 89 y.o. year old male who presents to physical therapy with complaints of impaired gait and generalized weakness secondary to polyneuropathy.  Patient's impairments include decreased left hamstring length, decreased left knee extension, decreased generalized lower extremity strength, and impaired balance.  These impairments are limiting patient's ability to walk prolonged distances.  Patient's prognosis is guarded secondary to neuropathy.  Patient will benefit from skilled physical therapy intervention to strengthen weakened muscles and lengthen shortened muscles with therapeutic exercise, to improve posture with neuromuscular re-education, to improve gait with gait training, and to educate the patient to better enable him to achieve her goals.    Co-morbidities which may impact the plan of care and potentially impede the patient's progress in therapy include:  Osteoporosis, PVD, COPD, History of back pain, Polyneuropathy    The patient's clinical presentation is evolving.  Based on patient's evolving clinical presentation, five co-morbidities, and examination of multiple body systems, patient presents with moderate complexity.    Short Term Goals:  (4 weeks)  1.  Patient will demonstrate increased hip strength to 4/5 or better for improved functional  mobility.  2.  Patient will demonstrate increased ankle strength to 4+/5 for improved functional mobility.  3.  Patient will be independent with his home exercise program.  4.  Patient will report improved functional impairment level to 42% impaired, or G-8979-CK, in ten treatment days.    Long Term Goals:  (8 weeks)  1.  Patient will demonstrate increased hip strength to 4+/5 or better for improved functional mobility.  2.  Patient will demonstrate improved balance indicated by score of 32 or better on the Freitas Balance Scale for improved functional mobility.  3.  Patient will report improved functional impairment level to 40% impaired, or [id number], in ten treatment days.    TREATMENT PROVIDED:    Initial evaluation completed.      Self Care:  (15 minutes)  Home exercise program was initiated to include seated ball squeeze 10 x 10 seconds, seated clams with green theraband 2 x 10 each, seated MIP 2 x 10 each, seated toe raises 2 x 10 each.    PLAN:  Patient will benefit from physical therapy (2) x/week for (8) weeks including gait training, neuromuscular re-education, therapeutic exercise, functional activities, modalities, and patient education.    Thank you for this referral.    These services are reasonable and necessary for the conditions set forth above while under my care.

## 2018-11-29 NOTE — PROGRESS NOTES
PHYSICAL THERAPY INITIAL OUTPATIENT EVALUATION    Referring Provider:  Dr. Brandt Shelton    Diagnosis:         ICD-10-CM ICD-9-CM    1. Generalized weakness R53.1 780.79      Orders:  Evaluate and Treat    Date of Initial Evaluation:  2018    Orders :  2018    Coding Cycle Visit # 1    SUBJECTIVE:  Patient is familiar to this clinic for physical therapy treatment in the past.  Patient reports he has moved to the [place] now and is mostly walking with his rollator walker.  He reports that he sometimes walks without it, but limps when he does.  He reports that he uses a trampoline with towel bars on the walls for balance.  He reports that his balance and neuropathy are his main concern.    Past Medical History:   Diagnosis Date    Abnormal EKG 2014    Allergy     Arthritis     Back pain     Bilateral carotid artery disease 2017    Cholelithiasis     US retroperitoneal 2016    COPD (chronic obstructive pulmonary disease)     Diabetes with neurologic complications     Diverticulosis     hosp/divert bleed    Ex-smoker     Glaucoma     suspect    Heart murmur     History of pneumothorax     bilat    Hyperlipidemia     Hypothyroid     Iron deficiency anemia     nathan    Osteoporosis     Polyneuropathy     Prostate cancer     PVD (peripheral vascular disease) 2014    Urinary incontinence      Patient Active Problem List   Diagnosis    COPD (chronic obstructive pulmonary disease)    Osteoporosis    History of prostate cancer    Iron deficiency anemia    Type 2 diabetes mellitus with diabetic polyneuropathy, with long-term current use of insulin    Atherosclerosis of both lower extremities    Hyperlipidemia associated with type 2 diabetes mellitus    Lumbar pain    Hypertension associated with diabetes    Hypothyroid    Refractive error    Weight loss, non-intentional - due to esophageal stricture    Calcification of aorta    Open angle with borderline  findings, low risk, bilateral    Pseudophakia of both eyes    Bilateral carotid artery disease    Vitamin B12 deficiency    Normocytic anemia    Self-catheterizes urinary bladder    PVD (peripheral vascular disease)    Urinary incontinence    Diverticulosis    Abdominal aortic aneurysm (AAA) without rupture    Pulmonary hypertension     Current Outpatient Medications:     albuterol (PROVENTIL) 2.5 mg /3 mL (0.083 %) nebulizer solution, Take 3 mLs (2.5 mg total) by nebulization every 4 (four) hours as needed for Wheezing or Shortness of Breath., Disp: 360 mL, Rfl: 11    albuterol 90 mcg/actuation inhaler, Inhale 2 puffs into the lungs every 6 (six) hours., Disp: 54 g, Rfl: 4    alendronate (FOSAMAX) 70 MG tablet, TAKE ONE TABLET BY MOUTH EVERY 7 DAYS ON SUNDAYS, Disp: 12 tablet, Rfl: 3    aspirin 81 mg Tab, Take 1 tablet by mouth Daily. Over counter. To help prevent stroke/ heart attack, Disp: , Rfl:     azithromycin (ZITHROMAX Z-MELISSA) 250 MG tablet, Take two tablets (500 mg )on day 1 (two tablets) followed by one tablet(250 mg once daily on days 2-5, Disp: 6 tablet, Rfl: 1    blood sugar diagnostic (TRUE METRIX GLUCOSE TEST STRIP) Strp, Check sugars x3 daily, Disp: 300 strip, Rfl: 3    calcium carbonate (OS-TANEGLA) 500 mg calcium (1,250 mg) tablet, Take 1 tablet by mouth once daily., Disp: , Rfl:     clotrimazole-betamethasone 1-0.05% (LOTRISONE) cream, Apply topically 2 (two) times daily. (Patient taking differently: Apply topically as needed. ), Disp: 45 g, Rfl: 0    ferrous sulfate 325 mg (65 mg iron) Tab tablet, Take 1 tablet (325 mg total) by mouth once daily., Disp: 90 tablet, Rfl: 1    furosemide (LASIX) 20 MG tablet, 1/2 tab a day for 2-3 days only if increase swelling, Disp: 20 tablet, Rfl: 0    gabapentin (NEURONTIN) 100 MG capsule, Take 1 capsule (100 mg total) by mouth 2 (two) times daily., Disp: 180 capsule, Rfl: 2    insulin glargine (LANTUS SOLOSTAR U-100 INSULIN) 100 unit/mL (3 mL)  InPn pen, INJECT 25 UNITS SUBCUTANEOUSLY ONCE DAILY, Disp: 15 mL, Rfl: 3    insulin lispro (HUMALOG) 100 unit/mL injection, Sliding Scale:  150-200: 6 units 201-250: 8 units 251- 300 10 unit 301-350:  12 units 351 or greater 14 units, Disp: 10 mL, Rfl: 2    ketoconazole (NIZORAL) 2 % cream, Apply to affected area twice daily for rash in groin, Disp: 60 g, Rfl: 3    lancets 33 gauge Misc, 1 lancet by Misc.(Non-Drug; Combo Route) route 3 (three) times daily., Disp: 100 each, Rfl: 11    levothyroxine (SYNTHROID) 25 MCG tablet, TAKE ONE TABLET BY MOUTH EVERY DAY, Disp: 30 tablet, Rfl: 10    loratadine (CLARITIN) 10 mg tablet, Take 1 tablet by mouth every morning. , Disp: , Rfl:     metFORMIN (GLUCOPHAGE) 1000 MG tablet, TAKE ONE TABLET BY MOUTH TWICE DAILY ;, Disp: 180 tablet, Rfl: 3    miconazole NITRATE 2 % (ZEASORB AF) 2 % top powder, Use two to three times daily to prevent rash in groin, Disp: 71 g, Rfl: 11    nateglinide (STARLIX) 60 MG tablet, TAKE ONE TABLET BY MOUTH THREE TIMES A DAY BEFORE MEALS, Disp: 270 tablet, Rfl: 2    NOVOLOG FLEXPEN U-100 INSULIN 100 unit/mL InPn pen, INJECT BASED ON SLIDING SCALE 150-200 6U, 201-250 8U, 251-300 10U, 301-350 12U, + 14U, Disp: 15 mL, Rfl: 10    nystatin (MYCOSTATIN) ointment, APPLY TO AFFECTED AREA TWO TIMES A DAY, Disp: 30 g, Rfl: 0    OXYGEN-AIR DELIVERY SYSTEMS [facility] [place], Inhale 2 L into the lungs as needed. Oxygen 2L via nasal canula as needed., Disp: , Rfl:     pantoprazole (PROTONIX) 40 MG tablet, Take 1 tablet (40 mg total) by mouth once daily., Disp: 90 tablet, Rfl: 2    pravastatin (PRAVACHOL) 40 MG tablet, TAKE 1 TABLET BY MOUTH ONCE DAILY, Disp: 90 tablet, Rfl: 3    tamsulosin (FLOMAX) 0.4 mg Cp24, 1 capsule PO QD, Disp: 30 capsule, Rfl: 6    TENS unit and electrodes Cmpk, 1 Device by Misc.(Non-Drug; Combo Route) route daily as needed., Disp: 1 each, Rfl: 0    tiotropium (SPIRIVA WITH HANDIHALER) 18 mcg inhalation capsule, Inhale 1 capsule (18  mcg total) into the lungs once daily. Controller, Disp: 90 capsule, Rfl: 4    triamcinolone acetonide 0.1% (KENALOG) 0.1 % cream, Apply to affected area twice daily as needed for rash on legs, Disp: 80 g, Rfl: 1    OBJECTIVE:  Pain: None /10    Sensation:  intact to light touch     ROM:  WFL for bilateral lower extremities with exception of L knee extension lacking 5 degrees.    Strength:    Right ankle dorsiflexion  4/5   left  4/5,   right ankle plantarflexion  4/5   left  4/5,   right ankle inversion  NT   left  NT,   right ankle eversion  NT   left  NT,   right quadriceps  4+/5,   left  4+/5,   right hamstrings  4/5,   left  4/5,   right hip abduction  4-/5,   left  4-/5,   right hip adduction  4-/5,   left  4-/5,   right hip flexion  4/5,   left  4/5    Function:  Optimal Instrument     The following scores are patient-reported values, with 1 representing the ability to do the activity without any difficulty, 2 representing the ability to do the activity with little difficulty, 3 representing the ability to do with moderate difficulty, 4 representing the ability to do the activity with much difficulty, 5 representing the inability to do do the activity, and 9 representing that the activity is not applicable.    1.  Lying flat   9  2.  Rolling over  9  3.  Moving - lying to sitting 2  4.  Sitting   2  5.  Squatting   2  6.  Bending/stooping  9  7.  Balancing   2  8.  Kneeling   2  9.  Standing   9  10.  Walking - short distance 3  11.  Walking - long distance 4  12.  Walking - outdoors 4  13.  Climbing stairs  5  14.  Hopping   5  15.  Jumping   5  16.  Running   9  17.  Pushing   2  18.  Pulling   2  19.  Reaching   9  20.  Grasping   2  21.  Lifting   2  22.  Carrying   2      Total  46    Patient reports 46.88% impairment on the Optimal Instrument, or G-[id number].    Balance:  Freitas Balance Assessment        1.Sitting to Standing       3  2.Standing Unsupported      4   3.Sitting with Back  Unsupported     4   4.Standing to Sitting       3   5.Transfers        3   6.Standing Unsupported with Eyes Closed    2   7.Standing Unsupported with Feet Together    1   8.Reaching Forward with Outstretched Arm while Standing  4   9. Object from the Floor from a Standing Position  4   10.Turning to Look Behind Shoulder while Standing   3   11.Turn 360 Degrees       0   12.Placing Alternate Foot on Step while Standing Unsupported 0   13.Standing Unsupported One Foot in Front    0   14.Standing on One Leg      0             Total 31             Risk Medium Fall Risk       Other:  Shortened hamstring length noted on the L LE.      ASSESSMENT:  The patient is a 89 y.o. year old male who presents to physical therapy with complaints of impaired gait and generalized weakness secondary to polyneuropathy.  Patient's impairments include decreased left hamstring length, decreased left knee extension, decreased generalized lower extremity strength, and impaired balance.  These impairments are limiting patient's ability to walk prolonged distances.  Patient's prognosis is guarded secondary to neuropathy.  Patient will benefit from skilled physical therapy intervention to strengthen weakened muscles and lengthen shortened muscles with therapeutic exercise, to improve posture with neuromuscular re-education, to improve gait with gait training, and to educate the patient to better enable him to achieve her goals.    Co-morbidities which may impact the plan of care and potentially impede the patient's progress in therapy include:  Osteoporosis, PVD, COPD, History of back pain, Polyneuropathy    The patient's clinical presentation is evolving.  Based on patient's evolving clinical presentation, five co-morbidities, and examination of multiple body systems, patient presents with moderate complexity.    Short Term Goals:  (4 weeks)  1.  Patient will demonstrate increased hip strength to 4/5 or better for improved functional  mobility.  2.  Patient will demonstrate increased ankle strength to 4+/5 for improved functional mobility.  3.  Patient will be independent with his home exercise program.  4.  Patient will report improved functional impairment level to 42% impaired, or [id number]-CK, in ten treatment days.    Long Term Goals:  (8 weeks)  1.  Patient will demonstrate increased hip strength to 4+/5 or better for improved functional mobility.  2.  Patient will demonstrate improved balance indicated by score of 32 or better on the Freitas Balance Scale for improved functional mobility.  3.  Patient will report improved functional impairment level to 40% impaired, or [id number]-CK, in ten treatment days.    TREATMENT PROVIDED:    Initial evaluation completed.      Self Care:  (15 minutes)  Home exercise program was initiated to include seated ball squeeze 10 x 10 seconds, seated clams with green theraband 2 x 10 each, seated MIP 2 x 10 each, seated toe raises 2 x 10 each.    PLAN:  Patient will benefit from physical therapy (2) x/week for (8) weeks including gait training, neuromuscular re-education, therapeutic exercise, functional activities, modalities, and patient education.    Thank you for this referral.    These services are reasonable and necessary for the conditions set forth above while under my care.

## 2018-12-04 ENCOUNTER — CLINICAL SUPPORT (OUTPATIENT)
Dept: REHABILITATION | Facility: HOSPITAL | Age: 83
End: 2018-12-04
Payer: MEDICARE

## 2018-12-04 DIAGNOSIS — R53.1 GENERALIZED WEAKNESS: Primary | ICD-10-CM

## 2018-12-04 PROCEDURE — 97110 THERAPEUTIC EXERCISES: CPT | Mod: PO | Performed by: PHYSICAL THERAPIST

## 2018-12-04 NOTE — PROGRESS NOTES
PHYSICAL THERAPY DAILY PROGRESS NOTE    Referring Provider:  Dr. Brandt Shelton    Diagnosis:         ICD-10-CM ICD-9-CM    1. Generalized weakness R53.1 780.79      Orders:  Evaluate and Treat    Date of Initial Evaluation:  2018    Orders :  2018    Coding Cycle Visit # 2    SUBJECTIVE:  Patient reports he has been working out at home.    Initial:  Patient is familiar to this clinic for physical therapy treatment in the past.  Patient reports he has moved to the [place] now and is mostly walking with his rollator walker.  He reports that he sometimes walks without it, but limps when he does.  He reports that he uses a trampoline with towel bars on the walls for balance.  He reports that his balance and neuropathy are his main concern.    Past Medical History:   Diagnosis Date    Abnormal EKG 2014    Allergy     Arthritis     Back pain     Bilateral carotid artery disease 2017    Cholelithiasis     US retroperitoneal 2016    COPD (chronic obstructive pulmonary disease)     Diabetes with neurologic complications     Diverticulosis     hosp/divert bleed    Ex-smoker     Glaucoma     suspect    Heart murmur     History of pneumothorax     bilat    Hyperlipidemia     Hypothyroid     Iron deficiency anemia     nathan    Osteoporosis     Polyneuropathy     Prostate cancer     PVD (peripheral vascular disease) 2014    Urinary incontinence      Patient Active Problem List   Diagnosis    COPD (chronic obstructive pulmonary disease)    Osteoporosis    History of prostate cancer    Iron deficiency anemia    Type 2 diabetes mellitus with diabetic polyneuropathy, with long-term current use of insulin    Atherosclerosis of both lower extremities    Hyperlipidemia associated with type 2 diabetes mellitus    Lumbar pain    Hypertension associated with diabetes    Hypothyroid    Refractive error    Weight loss, non-intentional - due to esophageal stricture     Calcification of aorta    Open angle with borderline findings, low risk, bilateral    Pseudophakia of both eyes    Bilateral carotid artery disease    Vitamin B12 deficiency    Normocytic anemia    Self-catheterizes urinary bladder    PVD (peripheral vascular disease)    Urinary incontinence    Diverticulosis    Abdominal aortic aneurysm (AAA) without rupture    Pulmonary hypertension     Current Outpatient Medications:     albuterol (PROVENTIL) 2.5 mg /3 mL (0.083 %) nebulizer solution, Take 3 mLs (2.5 mg total) by nebulization every 4 (four) hours as needed for Wheezing or Shortness of Breath., Disp: 360 mL, Rfl: 11    albuterol 90 mcg/actuation inhaler, Inhale 2 puffs into the lungs every 6 (six) hours., Disp: 54 g, Rfl: 4    alendronate (FOSAMAX) 70 MG tablet, TAKE ONE TABLET BY MOUTH EVERY 7 DAYS ON SUNDAYS, Disp: 12 tablet, Rfl: 3    aspirin 81 mg Tab, Take 1 tablet by mouth Daily. Over counter. To help prevent stroke/ heart attack, Disp: , Rfl:     azithromycin (ZITHROMAX Z-MELISSA) 250 MG tablet, Take two tablets (500 mg )on day 1 (two tablets) followed by one tablet(250 mg once daily on days 2-5, Disp: 6 tablet, Rfl: 1    blood sugar diagnostic (TRUE METRIX GLUCOSE TEST STRIP) Strp, Check sugars x3 daily, Disp: 300 strip, Rfl: 3    calcium carbonate (OS-TANGELA) 500 mg calcium (1,250 mg) tablet, Take 1 tablet by mouth once daily., Disp: , Rfl:     clotrimazole-betamethasone 1-0.05% (LOTRISONE) cream, Apply topically 2 (two) times daily. (Patient taking differently: Apply topically as needed. ), Disp: 45 g, Rfl: 0    ferrous sulfate 325 mg (65 mg iron) Tab tablet, Take 1 tablet (325 mg total) by mouth once daily., Disp: 90 tablet, Rfl: 1    furosemide (LASIX) 20 MG tablet, 1/2 tab a day for 2-3 days only if increase swelling, Disp: 20 tablet, Rfl: 0    gabapentin (NEURONTIN) 100 MG capsule, Take 1 capsule (100 mg total) by mouth 2 (two) times daily., Disp: 180 capsule, Rfl: 2    insulin  glargine (LANTUS SOLOSTAR U-100 INSULIN) 100 unit/mL (3 mL) InPn pen, INJECT 25 UNITS SUBCUTANEOUSLY ONCE DAILY, Disp: 15 mL, Rfl: 3    insulin lispro (HUMALOG) 100 unit/mL injection, Sliding Scale:  150-200: 6 units 201-250: 8 units 251- 300 10 unit 301-350:  12 units 351 or greater 14 units, Disp: 10 mL, Rfl: 2    ketoconazole (NIZORAL) 2 % cream, Apply to affected area twice daily for rash in groin, Disp: 60 g, Rfl: 3    lancets 33 gauge Misc, 1 lancet by Misc.(Non-Drug; Combo Route) route 3 (three) times daily., Disp: 100 each, Rfl: 11    levothyroxine (SYNTHROID) 25 MCG tablet, TAKE ONE TABLET BY MOUTH EVERY DAY, Disp: 30 tablet, Rfl: 10    loratadine (CLARITIN) 10 mg tablet, Take 1 tablet by mouth every morning. , Disp: , Rfl:     metFORMIN (GLUCOPHAGE) 1000 MG tablet, TAKE ONE TABLET BY MOUTH TWICE DAILY ;, Disp: 180 tablet, Rfl: 3    miconazole NITRATE 2 % (ZEASORB AF) 2 % top powder, Use two to three times daily to prevent rash in groin, Disp: 71 g, Rfl: 11    nateglinide (STARLIX) 60 MG tablet, TAKE ONE TABLET BY MOUTH THREE TIMES A DAY BEFORE MEALS, Disp: 270 tablet, Rfl: 2    NOVOLOG FLEXPEN U-100 INSULIN 100 unit/mL InPn pen, INJECT BASED ON SLIDING SCALE 150-200 6U, 201-250 8U, 251-300 10U, 301-350 12U, + 14U, Disp: 15 mL, Rfl: 10    nystatin (MYCOSTATIN) ointment, APPLY TO AFFECTED AREA TWO TIMES A DAY, Disp: 30 g, Rfl: 0    OXYGEN-AIR DELIVERY SYSTEMS [facility] [place], Inhale 2 L into the lungs as needed. Oxygen 2L via nasal canula as needed., Disp: , Rfl:     pantoprazole (PROTONIX) 40 MG tablet, Take 1 tablet (40 mg total) by mouth once daily., Disp: 90 tablet, Rfl: 2    pravastatin (PRAVACHOL) 40 MG tablet, TAKE 1 TABLET BY MOUTH ONCE DAILY, Disp: 90 tablet, Rfl: 3    tamsulosin (FLOMAX) 0.4 mg Cp24, 1 capsule PO QD, Disp: 30 capsule, Rfl: 6    TENS unit and electrodes Cmpk, 1 Device by Misc.(Non-Drug; Combo Route) route daily as needed., Disp: 1 each, Rfl: 0    tiotropium (SPIRIVA WITH  HANDIHALER) 18 mcg inhalation capsule, Inhale 1 capsule (18 mcg total) into the lungs once daily. Controller, Disp: 90 capsule, Rfl: 4    triamcinolone acetonide 0.1% (KENALOG) 0.1 % cream, Apply to affected area twice daily as needed for rash on legs, Disp: 80 g, Rfl: 1    OBJECTIVE:  Pain: None /10    Sensation:  intact to light touch     ROM:  WFL for bilateral lower extremities with exception of L knee extension lacking 5 degrees.    Strength:    Right ankle dorsiflexion  4/5   left  4/5,   right ankle plantarflexion  4/5   left  4/5,   right ankle inversion  NT   left  NT,   right ankle eversion  NT   left  NT,   right quadriceps  4+/5,   left  4+/5,   right hamstrings  4/5,   left  4/5,   right hip abduction  4-/5,   left  4-/5,   right hip adduction  4-/5,   left  4-/5,   right hip flexion  4/5,   left  4/5    Function:  Optimal Instrument     The following scores are patient-reported values, with 1 representing the ability to do the activity without any difficulty, 2 representing the ability to do the activity with little difficulty, 3 representing the ability to do with moderate difficulty, 4 representing the ability to do the activity with much difficulty, 5 representing the inability to do do the activity, and 9 representing that the activity is not applicable.    1.  Lying flat   9  2.  Rolling over  9  3.  Moving - lying to sitting 2  4.  Sitting   2  5.  Squatting   2  6.  Bending/stooping  9  7.  Balancing   2  8.  Kneeling   2  9.  Standing   9  10.  Walking - short distance 3  11.  Walking - long distance 4  12.  Walking - outdoors 4  13.  Climbing stairs  5  14.  Hopping   5  15.  Jumping   5  16.  Running   9  17.  Pushing   2  18.  Pulling   2  19.  Reaching   9  20.  Grasping   2  21.  Lifting   2  22.  Carrying   2      Total  46    Patient reports 46.88% impairment on the Optimal Instrument, or G-[id number].    Balance:  Freitas Balance Assessment        1.Sitting to Standing       3  2.Standing  Unsupported      4   3.Sitting with Back Unsupported     4   4.Standing to Sitting       3   5.Transfers        3   6.Standing Unsupported with Eyes Closed    2   7.Standing Unsupported with Feet Together    1   8.Reaching Forward with Outstretched Arm while Standing  4   9. Object from the Floor from a Standing Position  4   10.Turning to Look Behind Shoulder while Standing   3   11.Turn 360 Degrees       0   12.Placing Alternate Foot on Step while Standing Unsupported 0   13.Standing Unsupported One Foot in Front    0   14.Standing on One Leg      0             Total 31             Risk Medium Fall Risk       Other:  Shortened hamstring length noted on the L LE.      ASSESSMENT:  Patient fatigued with strengthening exercises.  Continued PT needed to maximize functional outcome.    Initial:  The patient is a 89 y.o. year old male who presents to physical therapy with complaints of impaired gait and generalized weakness secondary to polyneuropathy.  Patient's impairments include decreased left hamstring length, decreased left knee extension, decreased generalized lower extremity strength, and impaired balance.  These impairments are limiting patient's ability to walk prolonged distances.  Patient's prognosis is guarded secondary to neuropathy.  Patient will benefit from skilled physical therapy intervention to strengthen weakened muscles and lengthen shortened muscles with therapeutic exercise, to improve posture with neuromuscular re-education, to improve gait with gait training, and to educate the patient to better enable him to achieve her goals.    Co-morbidities which may impact the plan of care and potentially impede the patient's progress in therapy include:  Osteoporosis, PVD, COPD, History of back pain, Polyneuropathy    The patient's clinical presentation is evolving.  Based on patient's evolving clinical presentation, five co-morbidities, and examination of multiple body systems, patient presents  with moderate complexity.    Short Term Goals:  (4 weeks)  1.  Patient will demonstrate increased hip strength to 4/5 or better for improved functional mobility.  2.  Patient will demonstrate increased ankle strength to 4+/5 for improved functional mobility.  3.  Patient will be independent with his home exercise program.  4.  Patient will report improved functional impairment level to 42% impaired, or G-8979-CK, in ten treatment days.    Long Term Goals:  (8 weeks)  1.  Patient will demonstrate increased hip strength to 4+/5 or better for improved functional mobility.  2.  Patient will demonstrate improved balance indicated by score of 32 or better on the Freitas Balance Scale for improved functional mobility.  3.  Patient will report improved functional impairment level to 40% impaired, or [id number]-CK, in ten treatment days.    TREATMENT PROVIDED:    Therapeutic Exercise:  (35 minutes)  Home exercise program includes the following exercises:    Seated ball squeeze 10 x 10 seconds  Seated clams with green theraband 2 x 10 each  Seated MIP 2 x 10 each  Seated toe raises 2 x 10 each  LAQs 0# 2 x 10 each  HSC with green theraband 2 x 10 each  Standing hip abduction 2 x 10 each  Standing hip extension 2 x 10 each  Side steps along plynth x 3   Squats x 10   Hamstring stretch with strap 3 x 30 seconds each    PLAN:  Patient will benefit from physical therapy (2) x/week for (8) weeks including gait training, neuromuscular re-education, therapeutic exercise, functional activities, modalities, and patient education.    Thank you for this referral.    These services are reasonable and necessary for the conditions set forth above while under my care.

## 2018-12-11 ENCOUNTER — CLINICAL SUPPORT (OUTPATIENT)
Dept: REHABILITATION | Facility: HOSPITAL | Age: 83
End: 2018-12-11
Payer: MEDICARE

## 2018-12-11 DIAGNOSIS — R53.1 GENERALIZED WEAKNESS: Primary | ICD-10-CM

## 2018-12-11 PROCEDURE — 97110 THERAPEUTIC EXERCISES: CPT | Mod: HCNC,PO | Performed by: PHYSICAL THERAPIST

## 2018-12-11 NOTE — PROGRESS NOTES
PHYSICAL THERAPY DAILY PROGRESS NOTE    Referring Provider:  Dr. Brandt Shelton    Diagnosis:         ICD-10-CM ICD-9-CM    1. Generalized weakness R53.1 780.79      Orders:  Evaluate and Treat    Date of Initial Evaluation:  2018    Orders :  2018    Coding Cycle Visit # 2    SUBJECTIVE:  Patient reports he has been working out at home.    Initial:  Patient is familiar to this clinic for physical therapy treatment in the past.  Patient reports he has moved to the [place] now and is mostly walking with his rollator walker.  He reports that he sometimes walks without it, but limps when he does.  He reports that he uses a trampoline with towel bars on the walls for balance.  He reports that his balance and neuropathy are his main concern.    Past Medical History:   Diagnosis Date    Abnormal EKG 2014    Allergy     Arthritis     Back pain     Bilateral carotid artery disease 2017    Cholelithiasis     US retroperitoneal 2016    COPD (chronic obstructive pulmonary disease)     Diabetes with neurologic complications     Diverticulosis     hosp/divert bleed    Ex-smoker     Glaucoma     suspect    Heart murmur     History of pneumothorax     bilat    Hyperlipidemia     Hypothyroid     Iron deficiency anemia     nathan    Osteoporosis     Polyneuropathy     Prostate cancer     PVD (peripheral vascular disease) 2014    Urinary incontinence      Patient Active Problem List   Diagnosis    COPD (chronic obstructive pulmonary disease)    Osteoporosis    History of prostate cancer    Iron deficiency anemia    Type 2 diabetes mellitus with diabetic polyneuropathy, with long-term current use of insulin    Atherosclerosis of both lower extremities    Hyperlipidemia associated with type 2 diabetes mellitus    Lumbar pain    Hypertension associated with diabetes    Hypothyroid    Refractive error    Weight loss, non-intentional - due to esophageal stricture     Calcification of aorta    Open angle with borderline findings, low risk, bilateral    Pseudophakia of both eyes    Bilateral carotid artery disease    Vitamin B12 deficiency    Normocytic anemia    Self-catheterizes urinary bladder    PVD (peripheral vascular disease)    Urinary incontinence    Diverticulosis    Abdominal aortic aneurysm (AAA) without rupture    Pulmonary hypertension     Current Outpatient Medications:     albuterol (PROVENTIL) 2.5 mg /3 mL (0.083 %) nebulizer solution, Take 3 mLs (2.5 mg total) by nebulization every 4 (four) hours as needed for Wheezing or Shortness of Breath., Disp: 360 mL, Rfl: 11    albuterol 90 mcg/actuation inhaler, Inhale 2 puffs into the lungs every 6 (six) hours., Disp: 54 g, Rfl: 4    alendronate (FOSAMAX) 70 MG tablet, TAKE ONE TABLET BY MOUTH EVERY 7 DAYS ON SUNDAYS, Disp: 12 tablet, Rfl: 3    aspirin 81 mg Tab, Take 1 tablet by mouth Daily. Over counter. To help prevent stroke/ heart attack, Disp: , Rfl:     azithromycin (ZITHROMAX Z-MELISSA) 250 MG tablet, Take two tablets (500 mg )on day 1 (two tablets) followed by one tablet(250 mg once daily on days 2-5, Disp: 6 tablet, Rfl: 1    blood sugar diagnostic (TRUE METRIX GLUCOSE TEST STRIP) Strp, Check sugars x3 daily, Disp: 300 strip, Rfl: 3    calcium carbonate (OS-TANGELA) 500 mg calcium (1,250 mg) tablet, Take 1 tablet by mouth once daily., Disp: , Rfl:     clotrimazole-betamethasone 1-0.05% (LOTRISONE) cream, Apply topically 2 (two) times daily. (Patient taking differently: Apply topically as needed. ), Disp: 45 g, Rfl: 0    ferrous sulfate 325 mg (65 mg iron) Tab tablet, Take 1 tablet (325 mg total) by mouth once daily., Disp: 90 tablet, Rfl: 1    furosemide (LASIX) 20 MG tablet, 1/2 tab a day for 2-3 days only if increase swelling, Disp: 20 tablet, Rfl: 0    gabapentin (NEURONTIN) 100 MG capsule, Take 1 capsule (100 mg total) by mouth 2 (two) times daily., Disp: 180 capsule, Rfl: 2    insulin  glargine (LANTUS SOLOSTAR U-100 INSULIN) 100 unit/mL (3 mL) InPn pen, INJECT 25 UNITS SUBCUTANEOUSLY ONCE DAILY, Disp: 15 mL, Rfl: 3    insulin lispro (HUMALOG) 100 unit/mL injection, Sliding Scale:  150-200: 6 units 201-250: 8 units 251- 300 10 unit 301-350:  12 units 351 or greater 14 units, Disp: 10 mL, Rfl: 2    ketoconazole (NIZORAL) 2 % cream, Apply to affected area twice daily for rash in groin, Disp: 60 g, Rfl: 3    lancets 33 gauge Misc, 1 lancet by Misc.(Non-Drug; Combo Route) route 3 (three) times daily., Disp: 100 each, Rfl: 11    levothyroxine (SYNTHROID) 25 MCG tablet, TAKE ONE TABLET BY MOUTH EVERY DAY, Disp: 30 tablet, Rfl: 10    loratadine (CLARITIN) 10 mg tablet, Take 1 tablet by mouth every morning. , Disp: , Rfl:     metFORMIN (GLUCOPHAGE) 1000 MG tablet, TAKE ONE TABLET BY MOUTH TWICE DAILY ;, Disp: 180 tablet, Rfl: 3    miconazole NITRATE 2 % (ZEASORB AF) 2 % top powder, Use two to three times daily to prevent rash in groin, Disp: 71 g, Rfl: 11    nateglinide (STARLIX) 60 MG tablet, TAKE ONE TABLET BY MOUTH THREE TIMES A DAY BEFORE MEALS, Disp: 270 tablet, Rfl: 2    NOVOLOG FLEXPEN U-100 INSULIN 100 unit/mL InPn pen, INJECT BASED ON SLIDING SCALE 150-200 6U, 201-250 8U, 251-300 10U, 301-350 12U, + 14U, Disp: 15 mL, Rfl: 10    nystatin (MYCOSTATIN) ointment, APPLY TO AFFECTED AREA TWO TIMES A DAY, Disp: 30 g, Rfl: 0    OXYGEN-AIR DELIVERY SYSTEMS [facility] [place], Inhale 2 L into the lungs as needed. Oxygen 2L via nasal canula as needed., Disp: , Rfl:     pantoprazole (PROTONIX) 40 MG tablet, Take 1 tablet (40 mg total) by mouth once daily., Disp: 90 tablet, Rfl: 2    pravastatin (PRAVACHOL) 40 MG tablet, TAKE 1 TABLET BY MOUTH ONCE DAILY, Disp: 90 tablet, Rfl: 3    tamsulosin (FLOMAX) 0.4 mg Cp24, 1 capsule PO QD, Disp: 30 capsule, Rfl: 6    TENS unit and electrodes Cmpk, 1 Device by Misc.(Non-Drug; Combo Route) route daily as needed., Disp: 1 each, Rfl: 0    tiotropium (SPIRIVA WITH  HANDIHALER) 18 mcg inhalation capsule, Inhale 1 capsule (18 mcg total) into the lungs once daily. Controller, Disp: 90 capsule, Rfl: 4    triamcinolone acetonide 0.1% (KENALOG) 0.1 % cream, Apply to affected area twice daily as needed for rash on legs, Disp: 80 g, Rfl: 1    OBJECTIVE:  Pain: None /10    Sensation:  intact to light touch     ROM:  WFL for bilateral lower extremities with exception of L knee extension lacking 5 degrees.    Strength:    Right ankle dorsiflexion  4/5   left  4/5,   right ankle plantarflexion  4/5   left  4/5,   right ankle inversion  NT   left  NT,   right ankle eversion  NT   left  NT,   right quadriceps  4+/5,   left  4+/5,   right hamstrings  4/5,   left  4/5,   right hip abduction  4-/5,   left  4-/5,   right hip adduction  4-/5,   left  4-/5,   right hip flexion  4/5,   left  4/5    Function:  Optimal Instrument     The following scores are patient-reported values, with 1 representing the ability to do the activity without any difficulty, 2 representing the ability to do the activity with little difficulty, 3 representing the ability to do with moderate difficulty, 4 representing the ability to do the activity with much difficulty, 5 representing the inability to do do the activity, and 9 representing that the activity is not applicable.    1.  Lying flat   9  2.  Rolling over  9  3.  Moving - lying to sitting 2  4.  Sitting   2  5.  Squatting   2  6.  Bending/stooping  9  7.  Balancing   2  8.  Kneeling   2  9.  Standing   9  10.  Walking - short distance 3  11.  Walking - long distance 4  12.  Walking - outdoors 4  13.  Climbing stairs  5  14.  Hopping   5  15.  Jumping   5  16.  Running   9  17.  Pushing   2  18.  Pulling   2  19.  Reaching   9  20.  Grasping   2  21.  Lifting   2  22.  Carrying   2      Total  46    Patient reports 46.88% impairment on the Optimal Instrument, or G-[id number].    Balance:  Freitas Balance Assessment        1.Sitting to Standing       3  2.Standing  Unsupported      4   3.Sitting with Back Unsupported     4   4.Standing to Sitting       3   5.Transfers        3   6.Standing Unsupported with Eyes Closed    2   7.Standing Unsupported with Feet Together    1   8.Reaching Forward with Outstretched Arm while Standing  4   9. Object from the Floor from a Standing Position  4   10.Turning to Look Behind Shoulder while Standing   3   11.Turn 360 Degrees       0   12.Placing Alternate Foot on Step while Standing Unsupported 0   13.Standing Unsupported One Foot in Front    0   14.Standing on One Leg      0             Total 31             Risk Medium Fall Risk       Other:  Shortened hamstring length noted on the L LE.      ASSESSMENT:  Patient fatigued with strengthening exercise progressions.  Continued PT needed to maximize functional outcome.    Initial:  The patient is a 89 y.o. year old male who presents to physical therapy with complaints of impaired gait and generalized weakness secondary to polyneuropathy.  Patient's impairments include decreased left hamstring length, decreased left knee extension, decreased generalized lower extremity strength, and impaired balance.  These impairments are limiting patient's ability to walk prolonged distances.  Patient's prognosis is guarded secondary to neuropathy.  Patient will benefit from skilled physical therapy intervention to strengthen weakened muscles and lengthen shortened muscles with therapeutic exercise, to improve posture with neuromuscular re-education, to improve gait with gait training, and to educate the patient to better enable him to achieve her goals.    Co-morbidities which may impact the plan of care and potentially impede the patient's progress in therapy include:  Osteoporosis, PVD, COPD, History of back pain, Polyneuropathy    The patient's clinical presentation is evolving.  Based on patient's evolving clinical presentation, five co-morbidities, and examination of multiple body systems,  patient presents with moderate complexity.    Short Term Goals:  (4 weeks)  1.  Patient will demonstrate increased hip strength to 4/5 or better for improved functional mobility.  2.  Patient will demonstrate increased ankle strength to 4+/5 for improved functional mobility.  3.  Patient will be independent with his home exercise program.  4.  Patient will report improved functional impairment level to 42% impaired, or G-8979-CK, in ten treatment days.    Long Term Goals:  (8 weeks)  1.  Patient will demonstrate increased hip strength to 4+/5 or better for improved functional mobility.  2.  Patient will demonstrate improved balance indicated by score of 32 or better on the Freitas Balance Scale for improved functional mobility.  3.  Patient will report improved functional impairment level to 40% impaired, or [id number]-CK, in ten treatment days.    TREATMENT PROVIDED:    Therapeutic Exercise:  (35 minutes)  Home exercise program includes the following exercises:  Alternating ball/belt stabilization 10 x 10 seconds each  Seated ball squeeze 10 x 10 seconds  Seated clams with green theraband 2 x 10 each  Seated MIP 2 x 10 each  Seated toe raises 2 x 10 each deferred   LAQs 2# 2 x 10 each ball squeeze  HSC with green theraband 2 x 10 each  Standing toe raises 2 x 10  Standing heel raises 2 x 10  Standing hip abduction 2 x 10 each  Standing hip extension 2 x 10 each  Side steps along plynth x 3   Squats x 10   Passive hamstring stretch 3 x 30 seconds each  Calf stretch with strap 3 x 30 seconds each    PLAN:  Patient will benefit from physical therapy (2) x/week for (8) weeks including gait training, neuromuscular re-education, therapeutic exercise, functional activities, modalities, and patient education.    Thank you for this referral.    These services are reasonable and necessary for the conditions set forth above while under my care.

## 2018-12-18 DIAGNOSIS — E11.40 TYPE 2 DIABETES, CONTROLLED, WITH NEUROPATHY: ICD-10-CM

## 2018-12-18 RX ORDER — NATEGLINIDE 60 MG/1
TABLET ORAL
Qty: 90 TABLET | Refills: 10 | Status: SHIPPED | OUTPATIENT
Start: 2018-12-18 | End: 2019-11-13 | Stop reason: SDUPTHER

## 2018-12-21 ENCOUNTER — CLINICAL SUPPORT (OUTPATIENT)
Dept: REHABILITATION | Facility: HOSPITAL | Age: 83
End: 2018-12-21
Payer: MEDICARE

## 2018-12-21 DIAGNOSIS — R53.1 GENERALIZED WEAKNESS: Primary | ICD-10-CM

## 2018-12-21 PROCEDURE — 97110 THERAPEUTIC EXERCISES: CPT | Mod: HCNC,PO | Performed by: PHYSICAL THERAPIST

## 2018-12-21 NOTE — PROGRESS NOTES
PHYSICAL THERAPY DAILY PROGRESS NOTE    Referring Provider:  Dr. Brandt Shelton    Diagnosis:         ICD-10-CM ICD-9-CM    1. Generalized weakness R53.1 780.79      Orders:  Evaluate and Treat    Date of Initial Evaluation:  2018    Orders :  2018    Coding Cycle Visit # 3    SUBJECTIVE:  Patient reports he slept funny on his back.    Initial:  Patient is familiar to this clinic for physical therapy treatment in the past.  Patient reports he has moved to the [place] now and is mostly walking with his rollator walker.  He reports that he sometimes walks without it, but limps when he does.  He reports that he uses a trampoline with towel bars on the walls for balance.  He reports that his balance and neuropathy are his main concern.    Past Medical History:   Diagnosis Date    Abnormal EKG 2014    Allergy     Arthritis     Back pain     Bilateral carotid artery disease 2017    Cholelithiasis     US retroperitoneal 2016    COPD (chronic obstructive pulmonary disease)     Diabetes with neurologic complications     Diverticulosis     hosp/divert bleed    Ex-smoker     Glaucoma     suspect    Heart murmur     History of pneumothorax     bilat    Hyperlipidemia     Hypothyroid     Iron deficiency anemia     nathan    Osteoporosis     Polyneuropathy     Prostate cancer     PVD (peripheral vascular disease) 2014    Urinary incontinence      Patient Active Problem List   Diagnosis    COPD (chronic obstructive pulmonary disease)    Osteoporosis    History of prostate cancer    Iron deficiency anemia    Type 2 diabetes mellitus with diabetic polyneuropathy, with long-term current use of insulin    Atherosclerosis of both lower extremities    Hyperlipidemia associated with type 2 diabetes mellitus    Lumbar pain    Hypertension associated with diabetes    Hypothyroid    Refractive error    Weight loss, non-intentional - due to esophageal stricture     Calcification of aorta    Open angle with borderline findings, low risk, bilateral    Pseudophakia of both eyes    Bilateral carotid artery disease    Vitamin B12 deficiency    Normocytic anemia    Self-catheterizes urinary bladder    PVD (peripheral vascular disease)    Urinary incontinence    Diverticulosis    Abdominal aortic aneurysm (AAA) without rupture    Pulmonary hypertension     Current Outpatient Medications:     albuterol (PROVENTIL) 2.5 mg /3 mL (0.083 %) nebulizer solution, Take 3 mLs (2.5 mg total) by nebulization every 4 (four) hours as needed for Wheezing or Shortness of Breath., Disp: 360 mL, Rfl: 11    albuterol 90 mcg/actuation inhaler, Inhale 2 puffs into the lungs every 6 (six) hours., Disp: 54 g, Rfl: 4    alendronate (FOSAMAX) 70 MG tablet, TAKE ONE TABLET BY MOUTH EVERY 7 DAYS ON SUNDAYS, Disp: 12 tablet, Rfl: 3    aspirin 81 mg Tab, Take 1 tablet by mouth Daily. Over counter. To help prevent stroke/ heart attack, Disp: , Rfl:     azithromycin (ZITHROMAX Z-MELISSA) 250 MG tablet, Take two tablets (500 mg )on day 1 (two tablets) followed by one tablet(250 mg once daily on days 2-5, Disp: 6 tablet, Rfl: 1    blood sugar diagnostic (TRUE METRIX GLUCOSE TEST STRIP) Strp, Check sugars x3 daily, Disp: 300 strip, Rfl: 3    calcium carbonate (OS-TANGELA) 500 mg calcium (1,250 mg) tablet, Take 1 tablet by mouth once daily., Disp: , Rfl:     clotrimazole-betamethasone 1-0.05% (LOTRISONE) cream, Apply topically 2 (two) times daily. (Patient taking differently: Apply topically as needed. ), Disp: 45 g, Rfl: 0    ferrous sulfate 325 mg (65 mg iron) Tab tablet, Take 1 tablet (325 mg total) by mouth once daily., Disp: 90 tablet, Rfl: 1    furosemide (LASIX) 20 MG tablet, 1/2 tab a day for 2-3 days only if increase swelling, Disp: 20 tablet, Rfl: 0    gabapentin (NEURONTIN) 100 MG capsule, Take 1 capsule (100 mg total) by mouth 2 (two) times daily., Disp: 180 capsule, Rfl: 2    insulin  glargine (LANTUS SOLOSTAR U-100 INSULIN) 100 unit/mL (3 mL) InPn pen, INJECT 25 UNITS SUBCUTANEOUSLY ONCE DAILY, Disp: 15 mL, Rfl: 3    insulin lispro (HUMALOG) 100 unit/mL injection, Sliding Scale:  150-200: 6 units 201-250: 8 units 251- 300 10 unit 301-350:  12 units 351 or greater 14 units, Disp: 10 mL, Rfl: 2    ketoconazole (NIZORAL) 2 % cream, Apply to affected area twice daily for rash in groin, Disp: 60 g, Rfl: 3    lancets 33 gauge Misc, 1 lancet by Misc.(Non-Drug; Combo Route) route 3 (three) times daily., Disp: 100 each, Rfl: 11    levothyroxine (SYNTHROID) 25 MCG tablet, TAKE ONE TABLET BY MOUTH EVERY DAY, Disp: 30 tablet, Rfl: 10    loratadine (CLARITIN) 10 mg tablet, Take 1 tablet by mouth every morning. , Disp: , Rfl:     metFORMIN (GLUCOPHAGE) 1000 MG tablet, TAKE ONE TABLET BY MOUTH TWICE DAILY ;, Disp: 180 tablet, Rfl: 3    miconazole NITRATE 2 % (ZEASORB AF) 2 % top powder, Use two to three times daily to prevent rash in groin, Disp: 71 g, Rfl: 11    nateglinide (STARLIX) 60 MG tablet, TAKE ONE TABLET BY MOUTH THREE TIMES DAILY BEFORE MEALS ;;, Disp: 90 tablet, Rfl: 10    NOVOLOG FLEXPEN U-100 INSULIN 100 unit/mL InPn pen, INJECT BASED ON SLIDING SCALE 150-200 6U, 201-250 8U, 251-300 10U, 301-350 12U, + 14U, Disp: 15 mL, Rfl: 10    nystatin (MYCOSTATIN) ointment, APPLY TO AFFECTED AREA TWO TIMES A DAY, Disp: 30 g, Rfl: 0    OXYGEN-AIR DELIVERY SYSTEMS [facility], Inhale 2 L into the lungs as needed. Oxygen 2L via nasal canula as needed., Disp: , Rfl:     pantoprazole (PROTONIX) 40 MG tablet, Take 1 tablet (40 mg total) by mouth once daily., Disp: 90 tablet, Rfl: 2    pravastatin (PRAVACHOL) 40 MG tablet, TAKE 1 TABLET BY MOUTH ONCE DAILY, Disp: 90 tablet, Rfl: 3    tamsulosin (FLOMAX) 0.4 mg Cp24, 1 capsule PO QD, Disp: 30 capsule, Rfl: 6    TENS unit and electrodes Cmpk, 1 Device by Misc.(Non-Drug; Combo Route) route daily as needed., Disp: 1 each, Rfl: 0    tiotropium (SPIRIVA  WITH HANDIHALER) 18 mcg inhalation capsule, Inhale 1 capsule (18 mcg total) into the lungs once daily. Controller, Disp: 90 capsule, Rfl: 4    triamcinolone acetonide 0.1% (KENALOG) 0.1 % cream, Apply to affected area twice daily as needed for rash on legs, Disp: 80 g, Rfl: 1    OBJECTIVE:  Pain: None /10    Sensation:  intact to light touch     ROM:  WFL for bilateral lower extremities with exception of L knee extension lacking 5 degrees.    Strength:    Right ankle dorsiflexion  4/5   left  4/5,   right ankle plantarflexion  4/5   left  4/5,   right ankle inversion  NT   left  NT,   right ankle eversion  NT   left  NT,   right quadriceps  4+/5,   left  4+/5,   right hamstrings  4/5,   left  4/5,   right hip abduction  4-/5,   left  4-/5,   right hip adduction  4-/5,   left  4-/5,   right hip flexion  4/5,   left  4/5    Function:  Optimal Instrument     The following scores are patient-reported values, with 1 representing the ability to do the activity without any difficulty, 2 representing the ability to do the activity with little difficulty, 3 representing the ability to do with moderate difficulty, 4 representing the ability to do the activity with much difficulty, 5 representing the inability to do do the activity, and 9 representing that the activity is not applicable.    1.  Lying flat   9  2.  Rolling over  9  3.  Moving - lying to sitting 2  4.  Sitting   2  5.  Squatting   2  6.  Bending/stooping  9  7.  Balancing   2  8.  Kneeling   2  9.  Standing   9  10.  Walking - short distance 3  11.  Walking - long distance 4  12.  Walking - outdoors 4  13.  Climbing stairs  5  14.  Hopping   5  15.  Jumping   5  16.  Running   9  17.  Pushing   2  18.  Pulling   2  19.  Reaching   9  20.  Grasping   2  21.  Lifting   2  22.  Carrying   2      Total  46    Patient reports 46.88% impairment on the Optimal Instrument, or G-8978-CK.    Balance:  Freitas Balance Assessment        1.Sitting to  Standing       3  2.Standing Unsupported      4   3.Sitting with Back Unsupported     4   4.Standing to Sitting       3   5.Transfers        3   6.Standing Unsupported with Eyes Closed    2   7.Standing Unsupported with Feet Together    1   8.Reaching Forward with Outstretched Arm while Standing  4   9. Object from the Floor from a Standing Position  4   10.Turning to Look Behind Shoulder while Standing   3   11.Turn 360 Degrees       0   12.Placing Alternate Foot on Step while Standing Unsupported 0   13.Standing Unsupported One Foot in Front    0   14.Standing on One Leg      0             Total 31             Risk Medium Fall Risk       Other:  Shortened hamstring length noted on the L LE.      ASSESSMENT:  Patient fatigued with strengthening exercise progressions.  Blue theraband provided to patient for home exercise program.  Continued PT needed to maximize functional outcome.    Initial:  The patient is a 89 y.o. year old male who presents to physical therapy with complaints of impaired gait and generalized weakness secondary to polyneuropathy.  Patient's impairments include decreased left hamstring length, decreased left knee extension, decreased generalized lower extremity strength, and impaired balance.  These impairments are limiting patient's ability to walk prolonged distances.  Patient's prognosis is guarded secondary to neuropathy.  Patient will benefit from skilled physical therapy intervention to strengthen weakened muscles and lengthen shortened muscles with therapeutic exercise, to improve posture with neuromuscular re-education, to improve gait with gait training, and to educate the patient to better enable him to achieve her goals.    Co-morbidities which may impact the plan of care and potentially impede the patient's progress in therapy include:  Osteoporosis, PVD, COPD, History of back pain, Polyneuropathy    The patient's clinical presentation is evolving.  Based on patient's evolving  clinical presentation, five co-morbidities, and examination of multiple body systems, patient presents with moderate complexity.    Short Term Goals:  (4 weeks)  1.  Patient will demonstrate increased hip strength to 4/5 or better for improved functional mobility.  2.  Patient will demonstrate increased ankle strength to 4+/5 for improved functional mobility.  3.  Patient will be independent with his home exercise program.  4.  Patient will report improved functional impairment level to 42% impaired, or G-8979-CK, in ten treatment days.    Long Term Goals:  (8 weeks)  1.  Patient will demonstrate increased hip strength to 4+/5 or better for improved functional mobility.  2.  Patient will demonstrate improved balance indicated by score of 32 or better on the Freitas Balance Scale for improved functional mobility.  3.  Patient will report improved functional impairment level to 40% impaired, or [id number]-CK, in ten treatment days.    TREATMENT PROVIDED:    Therapeutic Exercise:  (40 minutes)  Home exercise program includes the following exercises:  Alternating ball/belt stabilization 10 x 10 seconds each  Seated ball squeeze 10 x 10 seconds  Seated clams with blue theraband 2 x 10 each  Seated MIP 2 x 10 each  Seated toe raises 2 x 10 each deferred   LAQs 2# 2 x 10 each ball squeeze  HSC with blue theraband 2 x 10 each  Standing toe raises 2 x 10  Standing heel raises 2 x 10  Standing hip abduction 2 x 10 each  Standing hip extension 2 x 10 each  Side steps along plynth x 3   Squats x 10   Passive hamstring stretch 3 x 30 seconds each  Calf stretch with strap 3 x 30 seconds each    PLAN:  Patient will benefit from physical therapy (2) x/week for (8) weeks including gait training, neuromuscular re-education, therapeutic exercise, functional activities, modalities, and patient education.    Thank you for this referral.    These services are reasonable and necessary for the conditions set forth above while under my care.

## 2018-12-26 ENCOUNTER — CLINICAL SUPPORT (OUTPATIENT)
Dept: REHABILITATION | Facility: HOSPITAL | Age: 83
End: 2018-12-26
Payer: MEDICARE

## 2018-12-26 DIAGNOSIS — R53.1 GENERALIZED WEAKNESS: Primary | ICD-10-CM

## 2018-12-26 PROCEDURE — 97110 THERAPEUTIC EXERCISES: CPT | Mod: HCNC,PO | Performed by: PHYSICAL THERAPIST

## 2018-12-26 NOTE — PROGRESS NOTES
PHYSICAL THERAPY DAILY PROGRESS NOTE    Referring Provider:  Dr. Brandt Shelton    Diagnosis:         ICD-10-CM ICD-9-CM    1. Generalized weakness R53.1 780.79      Orders:  Evaluate and Treat    Date of Initial Evaluation:  2018    Orders :  2018    Coding Cycle Visit # 4    SUBJECTIVE:  Patient reports he is worn out today from [place].    Initial:  Patient is familiar to this clinic for physical therapy treatment in the past.  Patient reports he has moved to the [place] now and is mostly walking with his rollator walker.  He reports that he sometimes walks without it, but limps when he does.  He reports that he uses a trampoline with towel bars on the walls for balance.  He reports that his balance and neuropathy are his main concern.    Past Medical History:   Diagnosis Date    Abnormal EKG 2014    Allergy     Arthritis     Back pain     Bilateral carotid artery disease 2017    Cholelithiasis     US retroperitoneal 2016    COPD (chronic obstructive pulmonary disease)     Diabetes with neurologic complications     Diverticulosis     hosp/divert bleed    Ex-smoker     Glaucoma     suspect    Heart murmur     History of pneumothorax     bilat    Hyperlipidemia     Hypothyroid     Iron deficiency anemia     nathan    Osteoporosis     Polyneuropathy     Prostate cancer     PVD (peripheral vascular disease) 2014    Urinary incontinence      Patient Active Problem List   Diagnosis    COPD (chronic obstructive pulmonary disease)    Osteoporosis    History of prostate cancer    Iron deficiency anemia    Type 2 diabetes mellitus with diabetic polyneuropathy, with long-term current use of insulin    Atherosclerosis of both lower extremities    Hyperlipidemia associated with type 2 diabetes mellitus    Lumbar pain    Hypertension associated with diabetes    Hypothyroid    Refractive error    Weight loss, non-intentional - due to esophageal stricture     Calcification of aorta    Open angle with borderline findings, low risk, bilateral    Pseudophakia of both eyes    Bilateral carotid artery disease    Vitamin B12 deficiency    Normocytic anemia    Self-catheterizes urinary bladder    PVD (peripheral vascular disease)    Urinary incontinence    Diverticulosis    Abdominal aortic aneurysm (AAA) without rupture    Pulmonary hypertension     Current Outpatient Medications:     albuterol (PROVENTIL) 2.5 mg /3 mL (0.083 %) nebulizer solution, Take 3 mLs (2.5 mg total) by nebulization every 4 (four) hours as needed for Wheezing or Shortness of Breath., Disp: 360 mL, Rfl: 11    albuterol 90 mcg/actuation inhaler, Inhale 2 puffs into the lungs every 6 (six) hours., Disp: 54 g, Rfl: 4    alendronate (FOSAMAX) 70 MG tablet, TAKE ONE TABLET BY MOUTH EVERY 7 DAYS ON SUNDAYS, Disp: 12 tablet, Rfl: 3    aspirin 81 mg Tab, Take 1 tablet by mouth Daily. Over counter. To help prevent stroke/ heart attack, Disp: , Rfl:     azithromycin (ZITHROMAX Z-MELISSA) 250 MG tablet, Take two tablets (500 mg )on day 1 (two tablets) followed by one tablet(250 mg once daily on days 2-5, Disp: 6 tablet, Rfl: 1    blood sugar diagnostic (TRUE METRIX GLUCOSE TEST STRIP) Strp, Check sugars x3 daily, Disp: 300 strip, Rfl: 3    calcium carbonate (OS-TANGELA) 500 mg calcium (1,250 mg) tablet, Take 1 tablet by mouth once daily., Disp: , Rfl:     clotrimazole-betamethasone 1-0.05% (LOTRISONE) cream, Apply topically 2 (two) times daily. (Patient taking differently: Apply topically as needed. ), Disp: 45 g, Rfl: 0    ferrous sulfate 325 mg (65 mg iron) Tab tablet, Take 1 tablet (325 mg total) by mouth once daily., Disp: 90 tablet, Rfl: 1    furosemide (LASIX) 20 MG tablet, 1/2 tab a day for 2-3 days only if increase swelling, Disp: 20 tablet, Rfl: 0    gabapentin (NEURONTIN) 100 MG capsule, Take 1 capsule (100 mg total) by mouth 2 (two) times daily., Disp: 180 capsule, Rfl: 2    insulin  glargine (LANTUS SOLOSTAR U-100 INSULIN) 100 unit/mL (3 mL) InPn pen, INJECT 25 UNITS SUBCUTANEOUSLY ONCE DAILY, Disp: 15 mL, Rfl: 3    insulin lispro (HUMALOG) 100 unit/mL injection, Sliding Scale:  150-200: 6 units 201-250: 8 units 251- 300 10 unit 301-350:  12 units 351 or greater 14 units, Disp: 10 mL, Rfl: 2    ketoconazole (NIZORAL) 2 % cream, Apply to affected area twice daily for rash in groin, Disp: 60 g, Rfl: 3    lancets 33 gauge Misc, 1 lancet by Misc.(Non-Drug; Combo Route) route 3 (three) times daily., Disp: 100 each, Rfl: 11    levothyroxine (SYNTHROID) 25 MCG tablet, TAKE ONE TABLET BY MOUTH EVERY DAY, Disp: 30 tablet, Rfl: 10    loratadine (CLARITIN) 10 mg tablet, Take 1 tablet by mouth every morning. , Disp: , Rfl:     metFORMIN (GLUCOPHAGE) 1000 MG tablet, TAKE ONE TABLET BY MOUTH TWICE DAILY ;, Disp: 180 tablet, Rfl: 3    miconazole NITRATE 2 % (ZEASORB AF) 2 % top powder, Use two to three times daily to prevent rash in groin, Disp: 71 g, Rfl: 11    nateglinide (STARLIX) 60 MG tablet, TAKE ONE TABLET BY MOUTH THREE TIMES DAILY BEFORE MEALS ;;, Disp: 90 tablet, Rfl: 10    NOVOLOG FLEXPEN U-100 INSULIN 100 unit/mL InPn pen, INJECT BASED ON SLIDING SCALE 150-200 6U, 201-250 8U, 251-300 10U, 301-350 12U, + 14U, Disp: 15 mL, Rfl: 10    nystatin (MYCOSTATIN) ointment, APPLY TO AFFECTED AREA TWO TIMES A DAY, Disp: 30 g, Rfl: 0    OXYGEN-AIR DELIVERY SYSTEMS [facility], Inhale 2 L into the lungs as needed. Oxygen 2L via nasal canula as needed., Disp: , Rfl:     pantoprazole (PROTONIX) 40 MG tablet, Take 1 tablet (40 mg total) by mouth once daily., Disp: 90 tablet, Rfl: 2    pravastatin (PRAVACHOL) 40 MG tablet, TAKE 1 TABLET BY MOUTH ONCE DAILY, Disp: 90 tablet, Rfl: 3    tamsulosin (FLOMAX) 0.4 mg Cp24, 1 capsule PO QD, Disp: 30 capsule, Rfl: 6    TENS unit and electrodes Cmpk, 1 Device by Misc.(Non-Drug; Combo Route) route daily as needed., Disp: 1 each, Rfl: 0    tiotropium (SPIRIVA  WITH HANDIHALER) 18 mcg inhalation capsule, Inhale 1 capsule (18 mcg total) into the lungs once daily. Controller, Disp: 90 capsule, Rfl: 4    triamcinolone acetonide 0.1% (KENALOG) 0.1 % cream, Apply to affected area twice daily as needed for rash on legs, Disp: 80 g, Rfl: 1    OBJECTIVE:  Pain: None /10    Sensation:  intact to light touch     ROM:  WFL for bilateral lower extremities with exception of L knee extension lacking 5 degrees.    Strength:    Right ankle dorsiflexion  4/5   left  4/5,   right ankle plantarflexion  4/5   left  4/5,   right ankle inversion  NT   left  NT,   right ankle eversion  NT   left  NT,   right quadriceps  4+/5,   left  4+/5,   right hamstrings  4/5,   left  4/5,   right hip abduction  4-/5,   left  4-/5,   right hip adduction  4-/5,   left  4-/5,   right hip flexion  4/5,   left  4/5    Function:  Optimal Instrument     The following scores are patient-reported values, with 1 representing the ability to do the activity without any difficulty, 2 representing the ability to do the activity with little difficulty, 3 representing the ability to do with moderate difficulty, 4 representing the ability to do the activity with much difficulty, 5 representing the inability to do do the activity, and 9 representing that the activity is not applicable.    1.  Lying flat   9  2.  Rolling over  9  3.  Moving - lying to sitting 2  4.  Sitting   2  5.  Squatting   2  6.  Bending/stooping  9  7.  Balancing   2  8.  Kneeling   2  9.  Standing   9  10.  Walking - short distance 3  11.  Walking - long distance 4  12.  Walking - outdoors 4  13.  Climbing stairs  5  14.  Hopping   5  15.  Jumping   5  16.  Running   9  17.  Pushing   2  18.  Pulling   2  19.  Reaching   9  20.  Grasping   2  21.  Lifting   2  22.  Carrying   2      Total  46    Patient reports 46.88% impairment on the Optimal Instrument, or G-8978-CK.    Balance:  Freitas Balance Assessment        1.Sitting to  Standing       3  2.Standing Unsupported      4   3.Sitting with Back Unsupported     4   4.Standing to Sitting       3   5.Transfers        3   6.Standing Unsupported with Eyes Closed    2   7.Standing Unsupported with Feet Together    1   8.Reaching Forward with Outstretched Arm while Standing  4   9. Object from the Floor from a Standing Position  4   10.Turning to Look Behind Shoulder while Standing   3   11.Turn 360 Degrees       0   12.Placing Alternate Foot on Step while Standing Unsupported 0   13.Standing Unsupported One Foot in Front    0   14.Standing on One Leg      0             Total 31             Risk Medium Fall Risk       Other:  Shortened hamstring length noted on the L LE.      ASSESSMENT:  Patient fatigued with strengthening exercises.  Continued PT needed to maximize functional outcome.    Initial:  The patient is a 89 y.o. year old male who presents to physical therapy with complaints of impaired gait and generalized weakness secondary to polyneuropathy.  Patient's impairments include decreased left hamstring length, decreased left knee extension, decreased generalized lower extremity strength, and impaired balance.  These impairments are limiting patient's ability to walk prolonged distances.  Patient's prognosis is guarded secondary to neuropathy.  Patient will benefit from skilled physical therapy intervention to strengthen weakened muscles and lengthen shortened muscles with therapeutic exercise, to improve posture with neuromuscular re-education, to improve gait with gait training, and to educate the patient to better enable him to achieve her goals.    Co-morbidities which may impact the plan of care and potentially impede the patient's progress in therapy include:  Osteoporosis, PVD, COPD, History of back pain, Polyneuropathy    The patient's clinical presentation is evolving.  Based on patient's evolving clinical presentation, five co-morbidities, and examination of multiple  body systems, patient presents with moderate complexity.    Short Term Goals:  (4 weeks)  1.  Patient will demonstrate increased hip strength to 4/5 or better for improved functional mobility.  2.  Patient will demonstrate increased ankle strength to 4+/5 for improved functional mobility.  3.  Patient will be independent with his home exercise program.  4.  Patient will report improved functional impairment level to 42% impaired, or G-8979-CK, in ten treatment days.    Long Term Goals:  (8 weeks)  1.  Patient will demonstrate increased hip strength to 4+/5 or better for improved functional mobility.  2.  Patient will demonstrate improved balance indicated by score of 32 or better on the Freitas Balance Scale for improved functional mobility.  3.  Patient will report improved functional impairment level to 40% impaired, or [id number]-CK, in ten treatment days.    TREATMENT PROVIDED:    Moist heat was applied to the lower back x 10 minutes.    Therapeutic Exercise:  (35 minutes)  Home exercise program includes the following exercises:  Alternating ball/belt stabilization 10 x 10 seconds each  Seated ball squeeze 10 x 10 seconds  Seated clams with blue theraband 2 x 10 each  Seated MIP 2 x 10 each  Seated toe raises 2 x 10 each deferred   LAQs 2# 2 x 10 each ball squeeze  HSC with blue theraband 2 x 10 each  Standing toe raises 2 x 10  Standing heel raises 2 x 10  Standing hip abduction 2 x 10 each  Standing hip extension 2 x 10 each  Side steps along plynth x 3   Squats x 10   Passive hamstring stretch 3 x 30 seconds each  Calf stretch with strap 3 x 30 seconds each    PLAN:  Patient will benefit from physical therapy (2) x/week for (8) weeks including gait training, neuromuscular re-education, therapeutic exercise, functional activities, modalities, and patient education.    Thank you for this referral.    These services are reasonable and necessary for the conditions set forth above while under my care.

## 2019-01-16 ENCOUNTER — CLINICAL SUPPORT (OUTPATIENT)
Dept: REHABILITATION | Facility: HOSPITAL | Age: 84
End: 2019-01-16
Payer: MEDICARE

## 2019-01-16 DIAGNOSIS — R53.1 GENERALIZED WEAKNESS: Primary | ICD-10-CM

## 2019-01-16 PROCEDURE — 97110 THERAPEUTIC EXERCISES: CPT | Mod: HCNC | Performed by: PHYSICAL THERAPIST

## 2019-01-16 NOTE — PROGRESS NOTES
PHYSICAL THERAPY DAILY PROGRESS NOTE    Referring Provider:  Dr. Brandt Shelton    Diagnosis:         ICD-10-CM ICD-9-CM    1. Generalized weakness R53.1 780.79      Orders:  Evaluate and Treat    Date of Initial Evaluation:  2018    Orders :  2018    Coding Cycle Visit # 5    SUBJECTIVE:  Patient reports he has neuropathy, numbness in the lower legs and a bad back today.    Initial:  Patient is familiar to this clinic for physical therapy treatment in the past.  Patient reports he has moved to the [place] now and is mostly walking with his rollator walker.  He reports that he sometimes walks without it, but limps when he does.  He reports that he uses a trampoline with towel bars on the walls for balance.  He reports that his balance and neuropathy are his main concern.    Past Medical History:   Diagnosis Date    Abnormal EKG 2014    Allergy     Arthritis     Back pain     Bilateral carotid artery disease 2017    Cholelithiasis     US retroperitoneal 2016    COPD (chronic obstructive pulmonary disease)     Diabetes with neurologic complications     Diverticulosis     hosp/divert bleed    Ex-smoker     Glaucoma     suspect    Heart murmur     History of pneumothorax     bilat    Hyperlipidemia     Hypothyroid     Iron deficiency anemia     nathan    Osteoporosis     Polyneuropathy     Prostate cancer     PVD (peripheral vascular disease) 2014    Urinary incontinence      Patient Active Problem List   Diagnosis    COPD (chronic obstructive pulmonary disease)    Osteoporosis    History of prostate cancer    Iron deficiency anemia    Type 2 diabetes mellitus with diabetic polyneuropathy, with long-term current use of insulin    Atherosclerosis of both lower extremities    Hyperlipidemia associated with type 2 diabetes mellitus    Lumbar pain    Hypertension associated with diabetes    Hypothyroid    Refractive error    Weight loss, non-intentional -  due to esophageal stricture    Calcification of aorta    Open angle with borderline findings, low risk, bilateral    Pseudophakia of both eyes    Bilateral carotid artery disease    Vitamin B12 deficiency    Normocytic anemia    Self-catheterizes urinary bladder    PVD (peripheral vascular disease)    Urinary incontinence    Diverticulosis    Abdominal aortic aneurysm (AAA) without rupture    Pulmonary hypertension     Current Outpatient Medications:     albuterol (PROVENTIL) 2.5 mg /3 mL (0.083 %) nebulizer solution, Take 3 mLs (2.5 mg total) by nebulization every 4 (four) hours as needed for Wheezing or Shortness of Breath., Disp: 360 mL, Rfl: 11    albuterol 90 mcg/actuation inhaler, Inhale 2 puffs into the lungs every 6 (six) hours., Disp: 54 g, Rfl: 4    alendronate (FOSAMAX) 70 MG tablet, TAKE ONE TABLET BY MOUTH EVERY 7 DAYS ON SUNDAYS, Disp: 12 tablet, Rfl: 3    aspirin 81 mg Tab, Take 1 tablet by mouth Daily. Over counter. To help prevent stroke/ heart attack, Disp: , Rfl:     azithromycin (ZITHROMAX Z-MELISSA) 250 MG tablet, Take two tablets (500 mg )on day 1 (two tablets) followed by one tablet(250 mg once daily on days 2-5, Disp: 6 tablet, Rfl: 1    blood sugar diagnostic (TRUE METRIX GLUCOSE TEST STRIP) Strp, Check sugars x3 daily, Disp: 300 strip, Rfl: 3    calcium carbonate (OS-TANGELA) 500 mg calcium (1,250 mg) tablet, Take 1 tablet by mouth once daily., Disp: , Rfl:     clotrimazole-betamethasone 1-0.05% (LOTRISONE) cream, Apply topically 2 (two) times daily. (Patient taking differently: Apply topically as needed. ), Disp: 45 g, Rfl: 0    ferrous sulfate 325 mg (65 mg iron) Tab tablet, Take 1 tablet (325 mg total) by mouth once daily., Disp: 90 tablet, Rfl: 1    furosemide (LASIX) 20 MG tablet, 1/2 tab a day for 2-3 days only if increase swelling, Disp: 20 tablet, Rfl: 0    gabapentin (NEURONTIN) 100 MG capsule, Take 1 capsule (100 mg total) by mouth 2 (two) times daily., Disp: 180  capsule, Rfl: 2    insulin glargine (LANTUS SOLOSTAR U-100 INSULIN) 100 unit/mL (3 mL) InPn pen, INJECT 25 UNITS SUBCUTANEOUSLY ONCE DAILY, Disp: 15 mL, Rfl: 3    insulin lispro (HUMALOG) 100 unit/mL injection, Sliding Scale:  150-200: 6 units 201-250: 8 units 251- 300 10 unit 301-350:  12 units 351 or greater 14 units, Disp: 10 mL, Rfl: 2    ketoconazole (NIZORAL) 2 % cream, Apply to affected area twice daily for rash in groin, Disp: 60 g, Rfl: 3    lancets 33 gauge Misc, 1 lancet by Misc.(Non-Drug; Combo Route) route 3 (three) times daily., Disp: 100 each, Rfl: 11    levothyroxine (SYNTHROID) 25 MCG tablet, TAKE ONE TABLET BY MOUTH EVERY DAY, Disp: 30 tablet, Rfl: 10    loratadine (CLARITIN) 10 mg tablet, Take 1 tablet by mouth every morning. , Disp: , Rfl:     metFORMIN (GLUCOPHAGE) 1000 MG tablet, TAKE ONE TABLET BY MOUTH TWICE DAILY ;, Disp: 180 tablet, Rfl: 3    miconazole NITRATE 2 % (ZEASORB AF) 2 % top powder, Use two to three times daily to prevent rash in groin, Disp: 71 g, Rfl: 11    nateglinide (STARLIX) 60 MG tablet, TAKE ONE TABLET BY MOUTH THREE TIMES DAILY BEFORE MEALS ;;, Disp: 90 tablet, Rfl: 10    NOVOLOG FLEXPEN U-100 INSULIN 100 unit/mL InPn pen, INJECT BASED ON SLIDING SCALE 150-200 6U, 201-250 8U, 251-300 10U, 301-350 12U, + 14U, Disp: 15 mL, Rfl: 10    nystatin (MYCOSTATIN) ointment, APPLY TO AFFECTED AREA TWO TIMES A DAY, Disp: 30 g, Rfl: 0    OXYGEN-AIR DELIVERY SYSTEMS [facility], Inhale 2 L into the lungs as needed. Oxygen 2L via nasal canula as needed., Disp: , Rfl:     pantoprazole (PROTONIX) 40 MG tablet, Take 1 tablet (40 mg total) by mouth once daily., Disp: 90 tablet, Rfl: 2    pravastatin (PRAVACHOL) 40 MG tablet, TAKE 1 TABLET BY MOUTH ONCE DAILY, Disp: 90 tablet, Rfl: 3    tamsulosin (FLOMAX) 0.4 mg Cp24, 1 capsule PO QD, Disp: 30 capsule, Rfl: 6    TENS unit and electrodes Cmpk, 1 Device by Misc.(Non-Drug; Combo Route) route daily as needed., Disp: 1 each, Rfl:  0    tiotropium (SPIRIVA WITH HANDIHALER) 18 mcg inhalation capsule, Inhale 1 capsule (18 mcg total) into the lungs once daily. Controller, Disp: 90 capsule, Rfl: 4    triamcinolone acetonide 0.1% (KENALOG) 0.1 % cream, Apply to affected area twice daily as needed for rash on legs, Disp: 80 g, Rfl: 1    OBJECTIVE:  Pain: None /10    Sensation:  intact to light touch     ROM:  WFL for bilateral lower extremities with exception of L knee extension lacking 5 degrees.    Strength:    Right ankle dorsiflexion  4/5   left  4/5,   right ankle plantarflexion  4/5   left  4/5,   right ankle inversion  NT   left  NT,   right ankle eversion  NT   left  NT,   right quadriceps  4+/5,   left  4+/5,   right hamstrings  4/5,   left  4/5,   right hip abduction  4-/5,   left  4-/5,   right hip adduction  4-/5,   left  4-/5,   right hip flexion  4/5,   left  4/5    Function:  Optimal Instrument     The following scores are patient-reported values, with 1 representing the ability to do the activity without any difficulty, 2 representing the ability to do the activity with little difficulty, 3 representing the ability to do with moderate difficulty, 4 representing the ability to do the activity with much difficulty, 5 representing the inability to do do the activity, and 9 representing that the activity is not applicable.    1.  Lying flat   9  2.  Rolling over  9  3.  Moving - lying to sitting 2  4.  Sitting   2  5.  Squatting   2  6.  Bending/stooping  9  7.  Balancing   2  8.  Kneeling   2  9.  Standing   9  10.  Walking - short distance 3  11.  Walking - long distance 4  12.  Walking - outdoors 4  13.  Climbing stairs  5  14.  Hopping   5  15.  Jumping   5  16.  Running   9  17.  Pushing   2  18.  Pulling   2  19.  Reaching   9  20.  Grasping   2  21.  Lifting   2  22.  Carrying   2      Total  46    Patient reports 46.88% impairment on the Optimal Instrument, or [id number].    Balance:  Freitas Balance Assessment        1.Sitting to  Standing       3  2.Standing Unsupported      4   3.Sitting with Back Unsupported     4   4.Standing to Sitting       3   5.Transfers        3   6.Standing Unsupported with Eyes Closed    2   7.Standing Unsupported with Feet Together    1   8.Reaching Forward with Outstretched Arm while Standing  4   9. Object from the Floor from a Standing Position  4   10.Turning to Look Behind Shoulder while Standing   3   11.Turn 360 Degrees       0   12.Placing Alternate Foot on Step while Standing Unsupported 0   13.Standing Unsupported One Foot in Front    0   14.Standing on One Leg      0             Total 31             Risk Medium Fall Risk       Other:  Shortened hamstring length noted on the L LE.      ASSESSMENT:  Patient fatigued with strengthening exercises.  Continued PT needed to maximize functional outcome.    Initial:  The patient is a 89 y.o. year old male who presents to physical therapy with complaints of impaired gait and generalized weakness secondary to polyneuropathy.  Patient's impairments include decreased left hamstring length, decreased left knee extension, decreased generalized lower extremity strength, and impaired balance.  These impairments are limiting patient's ability to walk prolonged distances.  Patient's prognosis is guarded secondary to neuropathy.  Patient will benefit from skilled physical therapy intervention to strengthen weakened muscles and lengthen shortened muscles with therapeutic exercise, to improve posture with neuromuscular re-education, to improve gait with gait training, and to educate the patient to better enable him to achieve her goals.    Co-morbidities which may impact the plan of care and potentially impede the patient's progress in therapy include:  Osteoporosis, PVD, COPD, History of back pain, Polyneuropathy    The patient's clinical presentation is evolving.  Based on patient's evolving clinical presentation, five co-morbidities, and examination of multiple  body systems, patient presents with moderate complexity.    Short Term Goals:  (4 weeks)  1.  Patient will demonstrate increased hip strength to 4/5 or better for improved functional mobility.  2.  Patient will demonstrate increased ankle strength to 4+/5 for improved functional mobility.  3.  Patient will be independent with his home exercise program.  4.  Patient will report improved functional impairment level to 42% impaired, or [id number]-CK, in ten treatment days.    Long Term Goals:  (8 weeks)  1.  Patient will demonstrate increased hip strength to 4+/5 or better for improved functional mobility.  2.  Patient will demonstrate improved balance indicated by score of 32 or better on the Freitas Balance Scale for improved functional mobility.  3.  Patient will report improved functional impairment level to 40% impaired, or [id number]-CK, in ten treatment days.    TREATMENT PROVIDED:    Moist heat was applied to the lower back x 10 minutes.    Therapeutic Exercise:  (30 minutes)  Home exercise program includes the following exercises:  Alternating ball/belt stabilization 10 x 10 seconds each  Seated ball squeeze 10 x 10 seconds  Seated clams with blue theraband 2 x 10 each  Seated MIP with blue theraband 2 x 10 each  Seated toe raises 2 x 10 each deferred   LAQs 2# 2 x 10 each ball squeeze  HSC with blue theraband 2 x 10 each deferred  Standing toe raises 2 x 10  Standing heel raises 2 x 10  Standing hip abduction 2 x 10 each  Standing hip extension 2 x 10 each deferred  Side steps along plynth x 3 deferred  Squats x 10   Passive hamstring stretch 3 x 30 seconds each  Calf stretch with strap 3 x 30 seconds each    PLAN:  Patient will benefit from physical therapy (2) x/week for (8) weeks including gait training, neuromuscular re-education, therapeutic exercise, functional activities, modalities, and patient education.    Thank you for this referral.    These services are reasonable and necessary for the conditions set forth  above while under my care.

## 2019-01-18 ENCOUNTER — CLINICAL SUPPORT (OUTPATIENT)
Dept: REHABILITATION | Facility: HOSPITAL | Age: 84
End: 2019-01-18
Payer: MEDICARE

## 2019-01-18 DIAGNOSIS — R53.1 GENERALIZED WEAKNESS: Primary | ICD-10-CM

## 2019-01-18 PROCEDURE — 97110 THERAPEUTIC EXERCISES: CPT | Mod: HCNC | Performed by: PHYSICAL THERAPIST

## 2019-01-18 NOTE — PROGRESS NOTES
PHYSICAL THERAPY DAILY PROGRESS NOTE    Referring Provider:  Dr. Brandt Shelton    Diagnosis:         ICD-10-CM ICD-9-CM    1. Generalized weakness R53.1 780.79      Orders:  Evaluate and Treat    Date of Initial Evaluation:  2018    Orders :  2018    Coding Cycle Visit # 5    SUBJECTIVE:  Patient reports he has neuropathy, numbness in the lower legs and a bad back today.    Initial:  Patient is familiar to this clinic for physical therapy treatment in the past.  Patient reports he has moved to the [place] now and is mostly walking with his rollator walker.  He reports that he sometimes walks without it, but limps when he does.  He reports that he uses a trampoline with towel bars on the walls for balance.  He reports that his balance and neuropathy are his main concern.    Past Medical History:   Diagnosis Date    Abnormal EKG 2014    Allergy     Arthritis     Back pain     Bilateral carotid artery disease 2017    Cholelithiasis     US retroperitoneal 2016    COPD (chronic obstructive pulmonary disease)     Diabetes with neurologic complications     Diverticulosis     hosp/divert bleed    Ex-smoker     Glaucoma     suspect    Heart murmur     History of pneumothorax     bilat    Hyperlipidemia     Hypothyroid     Iron deficiency anemia     nathan    Osteoporosis     Polyneuropathy     Prostate cancer     PVD (peripheral vascular disease) 2014    Urinary incontinence      Patient Active Problem List   Diagnosis    COPD (chronic obstructive pulmonary disease)    Osteoporosis    History of prostate cancer    Iron deficiency anemia    Type 2 diabetes mellitus with diabetic polyneuropathy, with long-term current use of insulin    Atherosclerosis of both lower extremities    Hyperlipidemia associated with type 2 diabetes mellitus    Lumbar pain    Hypertension associated with diabetes    Hypothyroid    Refractive error    Weight loss, non-intentional -  due to esophageal stricture    Calcification of aorta    Open angle with borderline findings, low risk, bilateral    Pseudophakia of both eyes    Bilateral carotid artery disease    Vitamin B12 deficiency    Normocytic anemia    Self-catheterizes urinary bladder    PVD (peripheral vascular disease)    Urinary incontinence    Diverticulosis    Abdominal aortic aneurysm (AAA) without rupture    Pulmonary hypertension     Current Outpatient Medications:     albuterol (PROVENTIL) 2.5 mg /3 mL (0.083 %) nebulizer solution, Take 3 mLs (2.5 mg total) by nebulization every 4 (four) hours as needed for Wheezing or Shortness of Breath., Disp: 360 mL, Rfl: 11    albuterol 90 mcg/actuation inhaler, Inhale 2 puffs into the lungs every 6 (six) hours., Disp: 54 g, Rfl: 4    alendronate (FOSAMAX) 70 MG tablet, TAKE ONE TABLET BY MOUTH EVERY 7 DAYS ON SUNDAYS, Disp: 12 tablet, Rfl: 3    aspirin 81 mg Tab, Take 1 tablet by mouth Daily. Over counter. To help prevent stroke/ heart attack, Disp: , Rfl:     azithromycin (ZITHROMAX Z-MELISSA) 250 MG tablet, Take two tablets (500 mg )on day 1 (two tablets) followed by one tablet(250 mg once daily on days 2-5, Disp: 6 tablet, Rfl: 1    blood sugar diagnostic (TRUE METRIX GLUCOSE TEST STRIP) Strp, Check sugars x3 daily, Disp: 300 strip, Rfl: 3    calcium carbonate (OS-TANGELA) 500 mg calcium (1,250 mg) tablet, Take 1 tablet by mouth once daily., Disp: , Rfl:     clotrimazole-betamethasone 1-0.05% (LOTRISONE) cream, Apply topically 2 (two) times daily. (Patient taking differently: Apply topically as needed. ), Disp: 45 g, Rfl: 0    ferrous sulfate 325 mg (65 mg iron) Tab tablet, Take 1 tablet (325 mg total) by mouth once daily., Disp: 90 tablet, Rfl: 1    furosemide (LASIX) 20 MG tablet, 1/2 tab a day for 2-3 days only if increase swelling, Disp: 20 tablet, Rfl: 0    gabapentin (NEURONTIN) 100 MG capsule, Take 1 capsule (100 mg total) by mouth 2 (two) times daily., Disp: 180  capsule, Rfl: 2    insulin glargine (LANTUS SOLOSTAR U-100 INSULIN) 100 unit/mL (3 mL) InPn pen, INJECT 25 UNITS SUBCUTANEOUSLY ONCE DAILY, Disp: 15 mL, Rfl: 3    insulin lispro (HUMALOG) 100 unit/mL injection, Sliding Scale:  150-200: 6 units 201-250: 8 units 251- 300 10 unit 301-350:  12 units 351 or greater 14 units, Disp: 10 mL, Rfl: 2    ketoconazole (NIZORAL) 2 % cream, Apply to affected area twice daily for rash in groin, Disp: 60 g, Rfl: 3    lancets 33 gauge Misc, 1 lancet by Misc.(Non-Drug; Combo Route) route 3 (three) times daily., Disp: 100 each, Rfl: 11    levothyroxine (SYNTHROID) 25 MCG tablet, TAKE ONE TABLET BY MOUTH EVERY DAY, Disp: 30 tablet, Rfl: 10    loratadine (CLARITIN) 10 mg tablet, Take 1 tablet by mouth every morning. , Disp: , Rfl:     metFORMIN (GLUCOPHAGE) 1000 MG tablet, TAKE ONE TABLET BY MOUTH TWICE DAILY ;, Disp: 180 tablet, Rfl: 3    miconazole NITRATE 2 % (ZEASORB AF) 2 % top powder, Use two to three times daily to prevent rash in groin, Disp: 71 g, Rfl: 11    nateglinide (STARLIX) 60 MG tablet, TAKE ONE TABLET BY MOUTH THREE TIMES DAILY BEFORE MEALS ;;, Disp: 90 tablet, Rfl: 10    NOVOLOG FLEXPEN U-100 INSULIN 100 unit/mL InPn pen, INJECT BASED ON SLIDING SCALE 150-200 6U, 201-250 8U, 251-300 10U, 301-350 12U, + 14U, Disp: 15 mL, Rfl: 10    nystatin (MYCOSTATIN) ointment, APPLY TO AFFECTED AREA TWO TIMES A DAY, Disp: 30 g, Rfl: 0    OXYGEN-AIR DELIVERY SYSTEMS [facility] [place], Inhale 2 L into the lungs as needed. Oxygen 2L via nasal canula as needed., Disp: , Rfl:     pantoprazole (PROTONIX) 40 MG tablet, Take 1 tablet (40 mg total) by mouth once daily., Disp: 90 tablet, Rfl: 2    pravastatin (PRAVACHOL) 40 MG tablet, TAKE 1 TABLET BY MOUTH ONCE DAILY, Disp: 90 tablet, Rfl: 3    tamsulosin (FLOMAX) 0.4 mg Cp24, 1 capsule PO QD, Disp: 30 capsule, Rfl: 6    TENS unit and electrodes Cmpk, 1 Device by Misc.(Non-Drug; Combo Route) route daily as needed., Disp: 1 each, Rfl:  0    tiotropium (SPIRIVA WITH HANDIHALER) 18 mcg inhalation capsule, Inhale 1 capsule (18 mcg total) into the lungs once daily. Controller, Disp: 90 capsule, Rfl: 4    triamcinolone acetonide 0.1% (KENALOG) 0.1 % cream, Apply to affected area twice daily as needed for rash on legs, Disp: 80 g, Rfl: 1    OBJECTIVE:  Pain: None /10    Sensation:  intact to light touch     ROM:  WFL for bilateral lower extremities with exception of L knee extension lacking 5 degrees.    Strength:    Right ankle dorsiflexion  4/5   left  4/5,   right ankle plantarflexion  4/5   left  4/5,   right ankle inversion  NT   left  NT,   right ankle eversion  NT   left  NT,   right quadriceps  4+/5,   left  4+/5,   right hamstrings  4/5,   left  4/5,   right hip abduction  4-/5,   left  4-/5,   right hip adduction  4-/5,   left  4-/5,   right hip flexion  4/5,   left  4/5    Function:  Optimal Instrument     The following scores are patient-reported values, with 1 representing the ability to do the activity without any difficulty, 2 representing the ability to do the activity with little difficulty, 3 representing the ability to do with moderate difficulty, 4 representing the ability to do the activity with much difficulty, 5 representing the inability to do do the activity, and 9 representing that the activity is not applicable.    1.  Lying flat   9  2.  Rolling over  9  3.  Moving - lying to sitting 2  4.  Sitting   2  5.  Squatting   2  6.  Bending/stooping  9  7.  Balancing   2  8.  Kneeling   2  9.  Standing   9  10.  Walking - short distance 3  11.  Walking - long distance 4  12.  Walking - outdoors 4  13.  Climbing stairs  5  14.  Hopping   5  15.  Jumping   5  16.  Running   9  17.  Pushing   2  18.  Pulling   2  19.  Reaching   9  20.  Grasping   2  21.  Lifting   2  22.  Carrying   2      Total  46    Patient reports 46.88% impairment on the Optimal Instrument, or G-[id number].    Balance:  Freitas Balance Assessment        1.Sitting to  Standing       3  2.Standing Unsupported      4   3.Sitting with Back Unsupported     4   4.Standing to Sitting       3   5.Transfers        3   6.Standing Unsupported with Eyes Closed    2   7.Standing Unsupported with Feet Together    1   8.Reaching Forward with Outstretched Arm while Standing  4   9. Object from the Floor from a Standing Position  4   10.Turning to Look Behind Shoulder while Standing   3   11.Turn 360 Degrees       0   12.Placing Alternate Foot on Step while Standing Unsupported 0   13.Standing Unsupported One Foot in Front    0   14.Standing on One Leg      0             Total 31             Risk Medium Fall Risk       Other:  Shortened hamstring length noted on the L LE.      ASSESSMENT:  Patient fatigued with strengthening exercise progressions.  Continued PT needed to maximize functional outcome.    Initial:  The patient is a 89 y.o. year old male who presents to physical therapy with complaints of impaired gait and generalized weakness secondary to polyneuropathy.  Patient's impairments include decreased left hamstring length, decreased left knee extension, decreased generalized lower extremity strength, and impaired balance.  These impairments are limiting patient's ability to walk prolonged distances.  Patient's prognosis is guarded secondary to neuropathy.  Patient will benefit from skilled physical therapy intervention to strengthen weakened muscles and lengthen shortened muscles with therapeutic exercise, to improve posture with neuromuscular re-education, to improve gait with gait training, and to educate the patient to better enable him to achieve her goals.    Co-morbidities which may impact the plan of care and potentially impede the patient's progress in therapy include:  Osteoporosis, PVD, COPD, History of back pain, Polyneuropathy    The patient's clinical presentation is evolving.  Based on patient's evolving clinical presentation, five co-morbidities, and examination of  "multiple body systems, patient presents with moderate complexity.    Short Term Goals:  (4 weeks)  1.  Patient will demonstrate increased hip strength to 4/5 or better for improved functional mobility.  2.  Patient will demonstrate increased ankle strength to 4+/5 for improved functional mobility.  3.  Patient will be independent with his home exercise program.  4.  Patient will report improved functional impairment level to 42% impaired, or G-8979-CK, in ten treatment days.    Long Term Goals:  (8 weeks)  1.  Patient will demonstrate increased hip strength to 4+/5 or better for improved functional mobility.  2.  Patient will demonstrate improved balance indicated by score of 32 or better on the Freitas Balance Scale for improved functional mobility.  3.  Patient will report improved functional impairment level to 40% impaired, or [id number]-CK, in ten treatment days.    TREATMENT PROVIDED:    Moist heat was applied to the lower back x 15 minutes.    Therapeutic Exercise:  (35 minutes)  Home exercise program includes the following exercises:  Alternating ball/belt stabilization 10 x 10 seconds each  Seated ball squeeze 10 x 10 seconds  Seated clams with blue theraband 2 x 10 each  Seated MIP with blue theraband 2 x 10 each  Seated toe raises 2 x 10 each deferred   LAQs 2# 2 x 10 each ball squeeze  Seated HSC with blue theraband 2 x 10 each   Standing toe raises 2 x 10  Standing heel raises 2 x 10  Standing hip abduction 2 x 10 each  Standing hip extension 2 x 10 each deferred  Side steps along plynth x 3 deferred  Step-ups 4" 2 x 10 each  Squats x 10   Passive hamstring stretch 3 x 30 seconds each  Calf stretch with strap 3 x 30 seconds each    PLAN:  Patient will benefit from physical therapy (2) x/week for (8) weeks including gait training, neuromuscular re-education, therapeutic exercise, functional activities, modalities, and patient education.    Thank you for this referral.    These services are reasonable and " necessary for the conditions set forth above while under my care.

## 2019-01-23 DIAGNOSIS — E78.5 HYPERLIPIDEMIA ASSOCIATED WITH TYPE 2 DIABETES MELLITUS: ICD-10-CM

## 2019-01-23 DIAGNOSIS — E11.69 HYPERLIPIDEMIA ASSOCIATED WITH TYPE 2 DIABETES MELLITUS: ICD-10-CM

## 2019-01-28 RX ORDER — LEVOTHYROXINE SODIUM 25 UG/1
TABLET ORAL
Qty: 90 TABLET | Refills: 1 | Status: SHIPPED | OUTPATIENT
Start: 2019-01-28 | End: 2019-08-14 | Stop reason: SDUPTHER

## 2019-01-28 RX ORDER — PRAVASTATIN SODIUM 40 MG/1
TABLET ORAL
Qty: 90 TABLET | Refills: 1 | Status: SHIPPED | OUTPATIENT
Start: 2019-01-28 | End: 2019-08-14 | Stop reason: SDUPTHER

## 2019-02-01 ENCOUNTER — HOSPITAL ENCOUNTER (OUTPATIENT)
Dept: RADIOLOGY | Facility: HOSPITAL | Age: 84
Discharge: HOME | End: 2019-02-01
Attending: FAMILY MEDICINE
Payer: MEDICARE

## 2019-02-01 ENCOUNTER — OFFICE VISIT (OUTPATIENT)
Dept: INTERNAL MEDICINE | Facility: CLINIC | Age: 84
End: 2019-02-01
Payer: MEDICARE

## 2019-02-01 VITALS
HEART RATE: 105 BPM | DIASTOLIC BLOOD PRESSURE: 68 MMHG | HEIGHT: 67 IN | BODY MASS INDEX: 24.29 KG/M2 | OXYGEN SATURATION: 89 % | SYSTOLIC BLOOD PRESSURE: 132 MMHG | TEMPERATURE: 98 F | WEIGHT: 154.75 LBS

## 2019-02-01 DIAGNOSIS — I73.9 PVD (PERIPHERAL VASCULAR DISEASE): Chronic | ICD-10-CM

## 2019-02-01 DIAGNOSIS — I27.20 PULMONARY HYPERTENSION: ICD-10-CM

## 2019-02-01 DIAGNOSIS — J43.9 PULMONARY EMPHYSEMA, UNSPECIFIED EMPHYSEMA TYPE: Chronic | ICD-10-CM

## 2019-02-01 DIAGNOSIS — E11.42 TYPE 2 DIABETES MELLITUS WITH DIABETIC POLYNEUROPATHY, WITH LONG-TERM CURRENT USE OF INSULIN: Primary | ICD-10-CM

## 2019-02-01 DIAGNOSIS — R05.9 COUGH: ICD-10-CM

## 2019-02-01 DIAGNOSIS — Z79.4 TYPE 2 DIABETES MELLITUS WITH DIABETIC POLYNEUROPATHY, WITH LONG-TERM CURRENT USE OF INSULIN: Primary | ICD-10-CM

## 2019-02-01 PROCEDURE — 1101F PR PT FALLS ASSESS DOC 0-1 FALLS W/OUT INJ PAST YR: ICD-10-PCS | Mod: HCNC,CPTII,S$GLB, | Performed by: FAMILY MEDICINE

## 2019-02-01 PROCEDURE — 71046 X-RAY EXAM CHEST 2 VIEWS: CPT | Mod: TC,HCNC

## 2019-02-01 PROCEDURE — 99999 PR PBB SHADOW E&M-EST. PATIENT-LVL III: CPT | Mod: PBBFAC,HCNC,, | Performed by: FAMILY MEDICINE

## 2019-02-01 PROCEDURE — 99214 PR OFFICE/OUTPT VISIT, EST, LEVL IV, 30-39 MIN: ICD-10-PCS | Mod: HCNC,S$GLB,, | Performed by: FAMILY MEDICINE

## 2019-02-01 PROCEDURE — 71046 X-RAY EXAM CHEST 2 VIEWS: CPT | Mod: 26,HCNC,, | Performed by: RADIOLOGY

## 2019-02-01 PROCEDURE — 99999 PR PBB SHADOW E&M-EST. PATIENT-LVL III: ICD-10-PCS | Mod: PBBFAC,HCNC,, | Performed by: FAMILY MEDICINE

## 2019-02-01 PROCEDURE — 99214 OFFICE O/P EST MOD 30 MIN: CPT | Mod: HCNC,S$GLB,, | Performed by: FAMILY MEDICINE

## 2019-02-01 PROCEDURE — 1101F PT FALLS ASSESS-DOCD LE1/YR: CPT | Mod: HCNC,CPTII,S$GLB, | Performed by: FAMILY MEDICINE

## 2019-02-01 PROCEDURE — 71046 XR CHEST PA AND LATERAL: ICD-10-PCS | Mod: 26,HCNC,, | Performed by: RADIOLOGY

## 2019-02-01 RX ORDER — AMOXICILLIN AND CLAVULANATE POTASSIUM 500; 125 MG/1; MG/1
1 TABLET, FILM COATED ORAL 2 TIMES DAILY
Qty: 20 TABLET | Refills: 0 | Status: SHIPPED | OUTPATIENT
Start: 2019-02-01 | End: 2019-02-06

## 2019-02-01 RX ORDER — METHYLPREDNISOLONE 4 MG/1
TABLET ORAL
Qty: 21 TABLET | Refills: 0 | Status: SHIPPED | OUTPATIENT
Start: 2019-02-01 | End: 2019-02-06

## 2019-02-01 NOTE — PROGRESS NOTES
Subjective:       Patient ID: Good Murillo Jr. is a 89 y.o. male.    Chief Complaint: Follow-up    F/U:          Pt is a 89 year old uncontrolled DM. Pt says his sugars are bouncing all over. Pt was told to increase his lantus but he has not. He is not consistent in his diet. Pt is also coughing today and has been for about 2 weeks. He has COPD and been using his albuterol on regular basis. He also take spiriva regularly. Pt has had no fever but does report drainage and congestion.      Review of Systems   Constitutional: Negative.    Respiratory: Negative.    Gastrointestinal: Negative.    Genitourinary: Negative.    Musculoskeletal: Negative.    Neurological: Negative.        Objective:      Physical Exam   Constitutional: He appears well-developed and well-nourished.   Cardiovascular: Normal rate and regular rhythm. Exam reveals no friction rub.   No murmur heard.  Pulmonary/Chest: Effort normal and breath sounds normal. No stridor. He has no wheezes.   Musculoskeletal: Normal range of motion.       Assessment:       1. Type 2 diabetes mellitus with diabetic polyneuropathy, with long-term current use of insulin    2. Pulmonary emphysema, unspecified emphysema type    3. PVD (peripheral vascular disease)    4. Pulmonary hypertension    5. Cough        Plan:       Type 2 diabetes mellitus with diabetic polyneuropathy, with long-term current use of insulin  Comments:  Will recheck his HgA1c next week    Pulmonary emphysema, unspecified emphysema type  Comments:  Treat like cough is COPD exacerbation. Will start on steroids. Pt will watch his sugars    PVD (peripheral vascular disease)  Comments:  Stable    Pulmonary hypertension  Comments:  stable    Cough  Comments:  Will get a xray   Orders:  -     X-Ray Chest PA And Lateral; Future; Expected date: 02/01/2019    Other orders  -     amoxicillin-clavulanate 500-125mg (AUGMENTIN) 500-125 mg Tab; Take 1 tablet (500 mg total) by mouth 2 (two) times daily.  Dispense:  20 tablet; Refill: 0  -     methylPREDNISolone (MEDROL DOSEPACK) 4 mg tablet; use as directed  Dispense: 21 tablet; Refill: 0

## 2019-02-06 ENCOUNTER — TELEPHONE (OUTPATIENT)
Dept: INTERNAL MEDICINE | Facility: CLINIC | Age: 84
End: 2019-02-06

## 2019-02-06 ENCOUNTER — OFFICE VISIT (OUTPATIENT)
Dept: INTERNAL MEDICINE | Facility: CLINIC | Age: 84
End: 2019-02-06
Payer: MEDICARE

## 2019-02-06 ENCOUNTER — TELEPHONE (OUTPATIENT)
Dept: DIABETES | Facility: CLINIC | Age: 84
End: 2019-02-06

## 2019-02-06 VITALS
BODY MASS INDEX: 23.56 KG/M2 | SYSTOLIC BLOOD PRESSURE: 132 MMHG | WEIGHT: 150.13 LBS | HEART RATE: 104 BPM | DIASTOLIC BLOOD PRESSURE: 70 MMHG | TEMPERATURE: 98 F | HEIGHT: 67 IN

## 2019-02-06 DIAGNOSIS — J43.9 PULMONARY EMPHYSEMA, UNSPECIFIED EMPHYSEMA TYPE: Chronic | ICD-10-CM

## 2019-02-06 DIAGNOSIS — E11.42 TYPE 2 DIABETES MELLITUS WITH DIABETIC POLYNEUROPATHY, WITH LONG-TERM CURRENT USE OF INSULIN: Primary | ICD-10-CM

## 2019-02-06 DIAGNOSIS — Z79.4 TYPE 2 DIABETES MELLITUS WITH DIABETIC POLYNEUROPATHY, WITH LONG-TERM CURRENT USE OF INSULIN: Primary | ICD-10-CM

## 2019-02-06 DIAGNOSIS — J01.00 ACUTE NON-RECURRENT MAXILLARY SINUSITIS: ICD-10-CM

## 2019-02-06 PROCEDURE — 99214 PR OFFICE/OUTPT VISIT, EST, LEVL IV, 30-39 MIN: ICD-10-PCS | Mod: HCNC,S$GLB,, | Performed by: FAMILY MEDICINE

## 2019-02-06 PROCEDURE — 1101F PT FALLS ASSESS-DOCD LE1/YR: CPT | Mod: HCNC,CPTII,S$GLB, | Performed by: FAMILY MEDICINE

## 2019-02-06 PROCEDURE — 99999 PR PBB SHADOW E&M-EST. PATIENT-LVL III: ICD-10-PCS | Mod: PBBFAC,HCNC,, | Performed by: FAMILY MEDICINE

## 2019-02-06 PROCEDURE — 99214 OFFICE O/P EST MOD 30 MIN: CPT | Mod: HCNC,S$GLB,, | Performed by: FAMILY MEDICINE

## 2019-02-06 PROCEDURE — 1101F PR PT FALLS ASSESS DOC 0-1 FALLS W/OUT INJ PAST YR: ICD-10-PCS | Mod: HCNC,CPTII,S$GLB, | Performed by: FAMILY MEDICINE

## 2019-02-06 PROCEDURE — 99999 PR PBB SHADOW E&M-EST. PATIENT-LVL III: CPT | Mod: PBBFAC,HCNC,, | Performed by: FAMILY MEDICINE

## 2019-02-06 NOTE — TELEPHONE ENCOUNTER
----- Message from Elsy Felton sent at 2/6/2019  2:05 PM CST -----  Contact: pt   Pt was returning nurse call    [phone] (home)

## 2019-02-06 NOTE — PROGRESS NOTES
Subjective:       Patient ID: Good Murillo Jr. is a 89 y.o. male.    Chief Complaint: Follow-up    F/U:      Pt is back after just being put on antibiotics 4 days ago. Discussed with pt need to allow antibiotics work. Pt Hga1C ia 8. Adjusted Lantus several months ago but he reported it took sugars below 80 so he went back to original 20 unit dose      Review of Systems   Constitutional: Negative.    HENT: Positive for postnasal drip and rhinorrhea.    Respiratory: Negative.    Cardiovascular: Negative.    Gastrointestinal: Negative.    Genitourinary: Negative.    Musculoskeletal: Negative.    Neurological: Negative.    Hematological: Negative.    Psychiatric/Behavioral: Negative.        Objective:      Physical Exam   Constitutional: He appears well-developed and well-nourished.   HENT:   Right Ear: Tympanic membrane is erythematous.   Left Ear: Tympanic membrane is erythematous.   Nose: Mucosal edema and rhinorrhea present.   Mouth/Throat: Posterior oropharyngeal erythema present.   Cardiovascular: Normal rate and regular rhythm.       Assessment:       1. Type 2 diabetes mellitus with diabetic polyneuropathy, with long-term current use of insulin    2. Acute non-recurrent maxillary sinusitis    3. Pulmonary emphysema, unspecified emphysema type        Plan:       Type 2 diabetes mellitus with diabetic polyneuropathy, with long-term current use of insulin  Comments:  Will send pt to DM education. This is clearly an example of standard for A1c 7 may not apply. Increasing his medication regimen is either met with low sugars and poor compliance. I have nothing more to offer it is reasonable to see if DM education can stabilize.  Orders:  -     Ambulatory Referral to Diabetes Education    Acute non-recurrent maxillary sinusitis  Comments:  Continue with the antibiotics    Pulmonary emphysema, unspecified emphysema type  Comments:  Continue on inhalers and 02 as needed

## 2019-02-07 PROBLEM — J01.00 ACUTE NON-RECURRENT MAXILLARY SINUSITIS: Status: ACTIVE | Noted: 2019-02-07

## 2019-02-11 ENCOUNTER — PATIENT MESSAGE (OUTPATIENT)
Dept: PULMONOLOGY | Facility: CLINIC | Age: 84
End: 2019-02-11

## 2019-02-11 DIAGNOSIS — J43.1 PANLOBULAR EMPHYSEMA: Primary | ICD-10-CM

## 2019-02-11 RX ORDER — ALBUTEROL SULFATE 90 UG/1
2 AEROSOL, METERED RESPIRATORY (INHALATION) EVERY 6 HOURS PRN
Qty: 3 INHALER | Refills: 4 | Status: SHIPPED | OUTPATIENT
Start: 2019-02-11 | End: 2019-12-04 | Stop reason: SDUPTHER

## 2019-03-11 ENCOUNTER — PATIENT MESSAGE (OUTPATIENT)
Dept: PULMONOLOGY | Facility: CLINIC | Age: 84
End: 2019-03-11

## 2019-03-18 DIAGNOSIS — K92.1 BLOOD IN STOOL: ICD-10-CM

## 2019-03-18 RX ORDER — GABAPENTIN 100 MG/1
CAPSULE ORAL
Qty: 180 CAPSULE | Refills: 2 | Status: SHIPPED | OUTPATIENT
Start: 2019-03-18 | End: 2019-12-12 | Stop reason: SDUPTHER

## 2019-03-18 RX ORDER — PANTOPRAZOLE SODIUM 40 MG/1
TABLET, DELAYED RELEASE ORAL
Qty: 90 TABLET | Refills: 2 | Status: SHIPPED | OUTPATIENT
Start: 2019-03-18 | End: 2019-12-12 | Stop reason: SDUPTHER

## 2019-03-26 DIAGNOSIS — J43.1 PANLOBULAR EMPHYSEMA: Primary | ICD-10-CM

## 2019-04-24 ENCOUNTER — TELEPHONE (OUTPATIENT)
Dept: PULMONOLOGY | Facility: CLINIC | Age: 84
End: 2019-04-24

## 2019-04-24 NOTE — TELEPHONE ENCOUNTER
Pt contacted to reschedule appointment due to Jermaine MELENDEZ being unavailable.  Pt accepted [facility] appointment on 5/29/2019 at 1100 with Dr Dean.  Pt verbalized understanding.

## 2019-05-13 PROBLEM — J01.00 ACUTE NON-RECURRENT MAXILLARY SINUSITIS: Status: RESOLVED | Noted: 2019-02-07 | Resolved: 2019-05-13

## 2019-05-16 RX ORDER — FERROUS SULFATE 325(65) MG
TABLET ORAL
Qty: 90 TABLET | Refills: 1 | Status: SHIPPED | OUTPATIENT
Start: 2019-05-16 | End: 2019-11-13 | Stop reason: SDUPTHER

## 2019-05-16 RX ORDER — INSULIN GLARGINE 100 [IU]/ML
INJECTION, SOLUTION SUBCUTANEOUS
Qty: 15 ML | Refills: 10 | Status: SHIPPED | OUTPATIENT
Start: 2019-05-16 | End: 2020-05-12

## 2019-05-16 RX ORDER — CALCIUM CARBONATE 500 MG/1
TABLET, CHEWABLE ORAL
Qty: 90 TABLET | Refills: 1 | Status: SHIPPED | OUTPATIENT
Start: 2019-05-16 | End: 2019-11-13 | Stop reason: SDUPTHER

## 2019-05-16 RX ORDER — ASPIRIN 81 MG/1
TABLET ORAL
Qty: 90 TABLET | Refills: 1 | Status: SHIPPED | OUTPATIENT
Start: 2019-05-16 | End: 2019-11-13 | Stop reason: SDUPTHER

## 2019-05-16 RX ORDER — LORATADINE 10 MG/1
TABLET ORAL
Qty: 90 TABLET | Refills: 1 | Status: SHIPPED | OUTPATIENT
Start: 2019-05-16 | End: 2019-11-13 | Stop reason: SDUPTHER

## 2019-05-27 ENCOUNTER — TELEPHONE (OUTPATIENT)
Dept: PULMONOLOGY | Facility: CLINIC | Age: 84
End: 2019-05-27

## 2019-05-27 ENCOUNTER — PATIENT MESSAGE (OUTPATIENT)
Dept: PULMONOLOGY | Facility: CLINIC | Age: 84
End: 2019-05-27

## 2019-05-27 NOTE — TELEPHONE ENCOUNTER
[place] stated patient does not like using nebulizer. He states nebulizer does not work, advised daughter nebulizer last longer and aides in breathing, patient using oxygen as needed, F/u appointment schedule for 05/29/19.

## 2019-05-29 ENCOUNTER — TELEPHONE (OUTPATIENT)
Dept: PULMONOLOGY | Facility: CLINIC | Age: 84
End: 2019-05-29

## 2019-05-29 ENCOUNTER — PATIENT MESSAGE (OUTPATIENT)
Dept: PULMONOLOGY | Facility: CLINIC | Age: 84
End: 2019-05-29

## 2019-05-29 ENCOUNTER — OFFICE VISIT (OUTPATIENT)
Dept: PULMONOLOGY | Facility: CLINIC | Age: 84
End: 2019-05-29
Payer: MEDICARE

## 2019-05-29 VITALS
DIASTOLIC BLOOD PRESSURE: 60 MMHG | HEART RATE: 95 BPM | HEIGHT: 67 IN | BODY MASS INDEX: 24.85 KG/M2 | RESPIRATION RATE: 16 BRPM | SYSTOLIC BLOOD PRESSURE: 128 MMHG | WEIGHT: 158.31 LBS | OXYGEN SATURATION: 96 %

## 2019-05-29 DIAGNOSIS — J43.9 PULMONARY EMPHYSEMA, UNSPECIFIED EMPHYSEMA TYPE: Chronic | ICD-10-CM

## 2019-05-29 DIAGNOSIS — J43.1 PANLOBULAR EMPHYSEMA: ICD-10-CM

## 2019-05-29 DIAGNOSIS — J30.0 VASOMOTOR RHINITIS: ICD-10-CM

## 2019-05-29 DIAGNOSIS — R07.82 INTERCOSTAL PAIN: ICD-10-CM

## 2019-05-29 DIAGNOSIS — R09.89 RALES: ICD-10-CM

## 2019-05-29 DIAGNOSIS — J44.9 CHRONIC OBSTRUCTIVE PULMONARY DISEASE, UNSPECIFIED COPD TYPE: Primary | ICD-10-CM

## 2019-05-29 PROCEDURE — 99999 PR PBB SHADOW E&M-EST. PATIENT-LVL V: CPT | Mod: PBBFAC,HCNC,, | Performed by: INTERNAL MEDICINE

## 2019-05-29 PROCEDURE — 99215 PR OFFICE/OUTPT VISIT, EST, LEVL V, 40-54 MIN: ICD-10-PCS | Mod: HCNC,S$GLB,, | Performed by: INTERNAL MEDICINE

## 2019-05-29 PROCEDURE — 99215 OFFICE O/P EST HI 40 MIN: CPT | Mod: HCNC,S$GLB,, | Performed by: INTERNAL MEDICINE

## 2019-05-29 PROCEDURE — 1101F PT FALLS ASSESS-DOCD LE1/YR: CPT | Mod: HCNC,CPTII,S$GLB, | Performed by: INTERNAL MEDICINE

## 2019-05-29 PROCEDURE — 1101F PR PT FALLS ASSESS DOC 0-1 FALLS W/OUT INJ PAST YR: ICD-10-PCS | Mod: HCNC,CPTII,S$GLB, | Performed by: INTERNAL MEDICINE

## 2019-05-29 PROCEDURE — 99999 PR PBB SHADOW E&M-EST. PATIENT-LVL V: ICD-10-PCS | Mod: PBBFAC,HCNC,, | Performed by: INTERNAL MEDICINE

## 2019-05-29 RX ORDER — IPRATROPIUM BROMIDE 42 UG/1
2 SPRAY, METERED NASAL 4 TIMES DAILY
Qty: 15 ML | Refills: 11 | Status: SHIPPED | OUTPATIENT
Start: 2019-05-29 | End: 2019-12-04 | Stop reason: SDUPTHER

## 2019-05-29 NOTE — TELEPHONE ENCOUNTER
Returned call spoke with daughter, explained medication changes and usage, daughter stated understanding

## 2019-05-29 NOTE — PROGRESS NOTES
Subjective:       Patient ID: Good Murillo Jr. is a 89 y.o. male.    Chief Complaint: He       COPD and Pulmonary Hypertension    HPI     Problems with spiriva respimat  Sinus congestion  COPD  He presents for evaluation and treatment of COPD. The patient is not currently have symptoms / an exacerbation. The patient has COPD for approximately many years. Symptoms in previous episodes have included dyspnea, cough and wheezing, and typically last 2 weeks. Previous episodes have been exacerbated by any exercise. Current treatment includes albuterol nebulizer, ipratropium inhaler and salmeterol/fluticasone inhaler, which generally provides some relief of symptoms.   He uses 1 pillows at night. Patient currently is on oxygen at 3- 4 L/min per nasal cannula.. The patient is having no constitutional symptoms, denying fever, chills, anorexia, or weight loss. The patient has been hospitalized for this condition before. He quit smoking approximately many years ago. The patient is experiencing exercise intolerance (difficulty walking 3 blocks on flat ground).    Past Medical History:   Diagnosis Date    Abnormal EKG 8/22/2014    Allergy     Arthritis     Back pain     Bilateral carotid artery disease 1/17/2017    Cholelithiasis     US retroperitoneal 12/2016    COPD (chronic obstructive pulmonary disease)     Diabetes with neurologic complications     Diverticulosis     hosp/divert bleed    Ex-smoker     Glaucoma     suspect    Heart murmur     History of pneumothorax     bilat    Hyperlipidemia     Hypothyroid     Iron deficiency anemia     nathan    Osteoporosis     Polyneuropathy     Prostate cancer     PVD (peripheral vascular disease) 8/22/2014    Urinary incontinence      Past Surgical History:   Procedure Laterality Date    CATARACT EXTRACTION      PCIOL OU Bilateral 01/12/2011 OD/12/08/2010 OS    DR. DELGADILLO    PROSTATE SURGERY  1990s approx    brachytherapy    SPINAL CORD STIMULATOR IMPLANT       TENS Unit     Social History     Socioeconomic History    Marital status:      Spouse name: Not on file    Number of children: 2    Years of education: Not on file    Highest education level: Not on file   Occupational History    Occupation: retired     Comment: self employed   Social Needs    Financial resource strain: Not on file    Food insecurity:     Worry: Not on file     Inability: Not on file    Transportation needs:     Medical: Not on file     Non-medical: Not on file   Tobacco Use    Smoking status: Former Smoker     Packs/day: 1.00     Types: Cigarettes     Last attempt to quit: 1993     Years since quittin.7    Smokeless tobacco: Never Used   Substance and Sexual Activity    Alcohol use: Yes     Comment: Champagne on     Drug use: No    Sexual activity: Never   Lifestyle    Physical activity:     Days per week: Not on file     Minutes per session: Not on file    Stress: Not on file   Relationships    Social connections:     Talks on phone: Not on file     Gets together: Not on file     Attends Confucianism service: Not on file     Active member of club or organization: Not on file     Attends meetings of clubs or organizations: Not on file     Relationship status: Not on file   Other Topics Concern    Not on file   Social History Narrative    Patient is a [employer]. He is retired he was self employed.     Review of Systems   Constitutional: Positive for fatigue. Negative for fever.   HENT: Positive for postnasal drip, rhinorrhea and congestion.    Eyes: Negative for redness and itching.   Respiratory: Positive for cough, sputum production, shortness of breath, dyspnea on extertion, use of rescue inhaler and Paroxysmal Nocturnal Dyspnea.    Cardiovascular: Negative for chest pain, palpitations and leg swelling.   Genitourinary: Negative for difficulty urinating and hematuria.   Endocrine: Negative for cold intolerance and heat intolerance.    Skin:  "Negative for rash.   Gastrointestinal: Negative for nausea and abdominal pain.   Neurological: Negative for dizziness, syncope, weakness and light-headedness.   Hematological: Negative for adenopathy. Does not bruise/bleed easily.   Psychiatric/Behavioral: Negative for sleep disturbance. The patient is not nervous/anxious.        Objective:      /60   Pulse 95   Resp 16   Ht 5' 7" (1.702 m)   Wt 71.8 kg (158 lb 4.6 oz)   SpO2 96% Comment: 3 liters  BMI 24.79 kg/m²   Physical Exam   Constitutional: He is oriented to person, place, and time. He appears well-developed and well-nourished.   HENT:   Head: Normocephalic and atraumatic.   Mouth/Throat: Oropharyngeal exudate present.   Eyes: Pupils are equal, round, and reactive to light. Conjunctivae are normal.   Neck: Neck supple. No JVD present. No tracheal deviation present. No thyromegaly present.   Cardiovascular: Normal rate, regular rhythm and normal heart sounds.   No murmur heard.  Pulmonary/Chest: Effort normal. He has decreased breath sounds. He has wheezes in the right lower field and the left lower field. He has no rhonchi. He has no rales.   Abdominal: Soft. Bowel sounds are normal.   Musculoskeletal: Normal range of motion. He exhibits no edema or tenderness.   Lymphadenopathy:     He has no cervical adenopathy.   Neurological: He is alert and oriented to person, place, and time.   Skin: Skin is warm and dry.   Nursing note and vitals reviewed.    Personal Diagnostic Review  Chest x-ray: hyperinflation    X-Ray Chest PA And Lateral  Narrative: EXAMINATION:  XR CHEST PA AND LATERAL    CLINICAL HISTORY:  Cough    TECHNIQUE:  PA and lateral views of the chest were performed.    COMPARISON:  04/24/2018.    FINDINGS:  The heart and mediastinal contours are stable.  Focal eventration of the posterior left hemidiaphragm again noted.  Evidence of COPD with hyperexpansion of the lungs and coarsening of the basilar lung markings.  Biapical bullous changes " and chronic pleuroparenchymal scarring.  No acute infiltrate or effusion.  Multilevel degenerative change in the spine and stable compression deformity of a couple of midthoracic vertebrae.  Impression: Stable chest.    Electronically signed by: DEWAYNE Amador MD  Date:    02/01/2019  Time:    10:46      Office Spirometry Results:     No flowsheet data found.  Pulmonary Studies Review 5/29/2019   SpO2 96   Ordering Provider -   Interpreting Provider -   Performing nurse/tech/RT -   Diagnosis -   Height 67.000   Weight 2532.64   BMI (Calculated) 24.8   Predicted Distance 216.33   Patient Race -   6MWT Status -   Patient Reported -   Was O2 used? -   Delivery Method -   6MW Distance walked (feet) -   Distance walked (meters) -   Did patient stop? -   Type of assistive device(s) used? -   Is extra documentation required for this patient? -   Oxygen Saturation -   Supplemental Oxygen -   Heart Rate -   Blood Pressure -   Sari Dyspnea Rating  -   Oxygen Saturation -   Supplemental Oxygen -   Heart Rate -   Blood Pressure -   Sari Dyspnea Rating  -   Recovery Time (seconds) -   Oxygen Saturation -   Supplemental Oxygen -   Heart Rate -   Blood Pressure -   Sari Dyspnea Rating  -   Is procedure ready for interpretation? -   Did the patient stop or pause? -   Total Time Walked (Calculated) -   Total Laps Walked -   Final Partial Lap Distance (feet) -   Total Distance Feet (Calculated) -   Total Distance Meters (Calculated) -   Predicted Distance Meters (Calculated) 396.23   Percentage of Predicted (Calculated) -   Peak VO2 (Calculated) -   Mets -   Has The Patient Had a Previous Six Minute Walk Test? -   Oxygen Qualification? -   Oxygen Saturation -   Supplemental Oxygen -   Heart Rate -   Blood Pressure -   Sari Dyspnea Rating  -   Oxygen Saturation -   Supplemental Oxygen -   Heart Rate -   Blood Pressure -   Sari Dyspnea Rating  -   Recovery Time (seconds) -   Oxygen Saturation -   Supplemental Oxygen -   Heart Rate -    Blood Pressure -   Sari Dyspnea Rating  -         Assessment:       Chronic obstructive pulmonary disease, unspecified COPD type  -     fluticasone-umeclidin-vilanter (TRELEGY ELLIPTA) 100-62.5-25 mcg DsDv; Inhale 1 puff into the lungs once daily.  Dispense: 60 each; Refill: 11    Panlobular emphysema  -     CT Chest Without Contrast; Future; Expected date: 05/29/2019    Intercostal pain  -     CT Chest Without Contrast; Future; Expected date: 05/29/2019    Rales  -     CT Chest Without Contrast; Future; Expected date: 05/29/2019    Vasomotor rhinitis  -     ipratropium (ATROVENT) 42 mcg (0.06 %) nasal spray; use 2 sprays by Nasal route 4 (four) times daily. As needed for rhinitis  Dispense: 15 mL; Refill: 11    Pulmonary emphysema, unspecified emphysema type          Outpatient Encounter Medications as of 5/29/2019   Medication Sig Dispense Refill    albuterol (PROVENTIL) 2.5 mg /3 mL (0.083 %) nebulizer solution Take 3 mLs (2.5 mg total) by nebulization every 4 (four) hours as needed for Wheezing or Shortness of Breath. 360 mL 11    albuterol (VENTOLIN HFA) 90 mcg/actuation inhaler Inhale 2 puffs into the lungs every 6 (six) hours as needed for Wheezing. Rescue 3 Inhaler 4    alendronate (FOSAMAX) 70 MG tablet TAKE ONE TABLET BY MOUTH EVERY 7 DAYS ON SUNDAYS 12 tablet 3    aspirin (ECOTRIN) 81 MG EC tablet TAKE ONE TABLET BY MOUTH EVERY DAY 90 tablet 1    aspirin 81 mg Tab Take 1 tablet by mouth Daily. Over counter. To help prevent stroke/ heart attack      blood sugar diagnostic (TRUE METRIX GLUCOSE TEST STRIP) Strp Check sugars x3 daily 300 strip 3    CALCIUM ANTACID 200 mg calcium (500 mg) chewable tablet TAKE ONE TABLET BY MOUTH EVERY DAY 90 tablet 1    calcium carbonate (OS-TANGELA) 500 mg calcium (1,250 mg) tablet Take 1 tablet by mouth once daily.      clotrimazole-betamethasone 1-0.05% (LOTRISONE) cream Apply topically 2 (two) times daily. (Patient taking differently: Apply topically as needed. ) 45  g 0    ferrous sulfate (FEOSOL) 325 mg (65 mg iron) Tab tablet TAKE ONE TABLET BY MOUTH EVERY DAY 90 tablet 1    furosemide (LASIX) 20 MG tablet 1/2 tab a day for 2-3 days only if increase swelling 20 tablet 0    gabapentin (NEURONTIN) 100 MG capsule TAKE ONE CAPSULE BY MOUTH TWICE DAILY ; 180 capsule 2    insulin (LANTUS SOLOSTAR U-100 INSULIN) glargine 100 units/mL (3mL) SubQ pen INJECT 25 UNITS UNDER THE SKIN ONCE DAILY 15 mL 10    insulin lispro (HUMALOG) 100 unit/mL injection Sliding Scale:    150-200: 6 units  201-250: 8 units  251- 300 10 unit  301-350:  12 units  351 or greater 14 units 10 mL 2    ketoconazole (NIZORAL) 2 % cream Apply to affected area twice daily for rash in groin 60 g 3    lancets 33 gauge Misc 1 lancet by Misc.(Non-Drug; Combo Route) route 3 (three) times daily. 100 each 11    levothyroxine (SYNTHROID) 25 MCG tablet TAKE ONE TABLET BY MOUTH EVERY DAY 90 tablet 1    loratadine (CLARITIN) 10 mg tablet TAKE ONE TABLET BY MOUTH EVERY DAY 90 tablet 1    metFORMIN (GLUCOPHAGE) 1000 MG tablet TAKE ONE TABLET BY MOUTH TWICE DAILY ; 180 tablet 3    miconazole NITRATE 2 % (ZEASORB AF) 2 % top powder Use two to three times daily to prevent rash in groin 71 g 11    nateglinide (STARLIX) 60 MG tablet TAKE ONE TABLET BY MOUTH THREE TIMES DAILY BEFORE MEALS ;; 90 tablet 10    NOVOLOG FLEXPEN U-100 INSULIN 100 unit/mL InPn pen INJECT BASED ON SLIDING SCALE 150-200 6U, 201-250 8U, 251-300 10U, 301-350 12U, + 14U 15 mL 10    nystatin (MYCOSTATIN) ointment APPLY TO AFFECTED AREA TWO TIMES A DAY 30 g 0    OXYGEN-AIR DELIVERY SYSTEMS [facility] Inhale 2 L into the lungs as needed. Oxygen 2L via nasal canula as needed.      pantoprazole (PROTONIX) 40 MG tablet TAKE ONE TABLET BY MOUTH EVERY DAY 90 tablet 2    pravastatin (PRAVACHOL) 40 MG tablet TAKE ONE TABLET BY MOUTH EVERY DAY 90 tablet 1    tamsulosin (FLOMAX) 0.4 mg Cap Take 1 capsule by mouth daily 30 capsule 11    tamsulosin (FLOMAX)  0.4 mg Cp24 1 capsule PO QD 30 capsule 6    TENS unit and electrodes Cmpk 1 Device by Misc.(Non-Drug; Combo Route) route daily as needed. 1 each 0    triamcinolone acetonide 0.1% (KENALOG) 0.1 % cream Apply to affected area twice daily as needed for rash on legs 80 g 1    [DISCONTINUED] tiotropium (SPIRIVA WITH HANDIHALER) 18 mcg inhalation capsule Inhale 1 capsule (18 mcg total) into the lungs once daily. Controller 90 capsule 4    [DISCONTINUED] tiotropium bromide (SPIRIVA RESPIMAT) 2.5 mcg/actuation Mist Inhale 2 puffs into the lungs once daily. Controller 12 g 3    amoxicillin-clavulanate 875-125mg (AUGMENTIN) 875-125 mg per tablet Take 1 tablet by mouth twice daily 20 tablet 0    fluticasone-umeclidin-vilanter (TRELEGY ELLIPTA) 100-62.5-25 mcg DsDv Inhale 1 puff into the lungs once daily. 60 each 11    ipratropium (ATROVENT) 42 mcg (0.06 %) nasal spray use 2 sprays by Nasal route 4 (four) times daily. As needed for rhinitis 15 mL 11     No facility-administered encounter medications on file as of 5/29/2019.      Plan:       Requested Prescriptions     Signed Prescriptions Disp Refills    fluticasone-umeclidin-vilanter (TRELEGY ELLIPTA) 100-62.5-25 mcg DsDv 60 each 11     Sig: Inhale 1 puff into the lungs once daily.    ipratropium (ATROVENT) 42 mcg (0.06 %) nasal spray 15 mL 11     Sig: use 2 sprays by Nasal route 4 (four) times daily. As needed for rhinitis     Problem List Items Addressed This Visit     COPD (chronic obstructive pulmonary disease) - Primary (Chronic)    Relevant Medications    fluticasone-umeclidin-vilanter (TRELEGY ELLIPTA) 100-62.5-25 mcg DsDv    Other Relevant Orders    CT Chest Without Contrast      Other Visit Diagnoses     Intercostal pain        Relevant Orders    CT Chest Without Contrast    Rales        Relevant Orders    CT Chest Without Contrast    Vasomotor rhinitis        Relevant Medications    ipratropium (ATROVENT) 42 mcg (0.06 %) nasal spray             Follow up in  about 6 months (around 11/29/2019).    MEDICAL DECISION MAKING: Moderate to high complexity.  Overall, the multiple problems listed are of moderate to high severity that may impact quality of life and activities of daily living. Side effects of medications, treatment plan as well as options and alternatives reviewed and discussed with patient. There was counseling of patient concerning these issues.    Total time spent in face to face counseling and coordination of care - 40  minutes over 50% of time was used in discussion of prognosis, risks, benefits of treatment, instructions and compliance with regimen . Discussion with other physicians or health care providers (DME, NP, pharmacy, respiratory therapy) occurred.

## 2019-06-05 ENCOUNTER — HOSPITAL ENCOUNTER (OUTPATIENT)
Dept: RADIOLOGY | Facility: HOSPITAL | Age: 84
Discharge: HOME | End: 2019-06-05
Attending: INTERNAL MEDICINE
Payer: MEDICARE

## 2019-06-05 DIAGNOSIS — R09.89 RALES: ICD-10-CM

## 2019-06-05 DIAGNOSIS — R07.82 INTERCOSTAL PAIN: ICD-10-CM

## 2019-06-05 DIAGNOSIS — J43.1 PANLOBULAR EMPHYSEMA: ICD-10-CM

## 2019-06-05 PROCEDURE — 71250 CT THORAX DX C-: CPT | Mod: TC,HCNC

## 2019-06-05 PROCEDURE — 71250 CT THORAX DX C-: CPT | Mod: 26,HCNC,, | Performed by: RADIOLOGY

## 2019-06-05 PROCEDURE — 71250 CT CHEST WITHOUT CONTRAST: ICD-10-PCS | Mod: 26,HCNC,, | Performed by: RADIOLOGY

## 2019-06-27 ENCOUNTER — OFFICE VISIT (OUTPATIENT)
Dept: INTERNAL MEDICINE | Facility: CLINIC | Age: 84
End: 2019-06-27
Payer: MEDICARE

## 2019-06-27 VITALS
TEMPERATURE: 98 F | HEART RATE: 104 BPM | DIASTOLIC BLOOD PRESSURE: 56 MMHG | HEIGHT: 67 IN | SYSTOLIC BLOOD PRESSURE: 110 MMHG | RESPIRATION RATE: 16 BRPM | WEIGHT: 156.75 LBS | BODY MASS INDEX: 24.6 KG/M2

## 2019-06-27 DIAGNOSIS — I27.20 PULMONARY HYPERTENSION: ICD-10-CM

## 2019-06-27 DIAGNOSIS — E11.59 HYPERTENSION ASSOCIATED WITH DIABETES: Chronic | ICD-10-CM

## 2019-06-27 DIAGNOSIS — K57.90 DIVERTICULOSIS OF INTESTINE WITHOUT BLEEDING, UNSPECIFIED INTESTINAL TRACT LOCATION: Chronic | ICD-10-CM

## 2019-06-27 DIAGNOSIS — Z96.1 PSEUDOPHAKIA OF BOTH EYES: ICD-10-CM

## 2019-06-27 DIAGNOSIS — I70.203 ATHEROSCLEROSIS OF NATIVE ARTERY OF BOTH LOWER EXTREMITIES, WITH UNSPECIFIED PRESENCE OF CLINICAL MANIFESTATION: ICD-10-CM

## 2019-06-27 DIAGNOSIS — R63.4 WEIGHT LOSS, NON-INTENTIONAL: ICD-10-CM

## 2019-06-27 DIAGNOSIS — J43.1 PANLOBULAR EMPHYSEMA: Chronic | ICD-10-CM

## 2019-06-27 DIAGNOSIS — Z00.00 ENCOUNTER FOR PREVENTIVE HEALTH EXAMINATION: Primary | ICD-10-CM

## 2019-06-27 DIAGNOSIS — M81.0 AGE-RELATED OSTEOPOROSIS WITHOUT CURRENT PATHOLOGICAL FRACTURE: Chronic | ICD-10-CM

## 2019-06-27 DIAGNOSIS — R05.9 COUGH: ICD-10-CM

## 2019-06-27 DIAGNOSIS — M54.50 LUMBAR PAIN: ICD-10-CM

## 2019-06-27 DIAGNOSIS — D64.9 NORMOCYTIC ANEMIA: ICD-10-CM

## 2019-06-27 DIAGNOSIS — D50.9 IRON DEFICIENCY ANEMIA, UNSPECIFIED IRON DEFICIENCY ANEMIA TYPE: Chronic | ICD-10-CM

## 2019-06-27 DIAGNOSIS — I71.40 ABDOMINAL AORTIC ANEURYSM (AAA) WITHOUT RUPTURE: ICD-10-CM

## 2019-06-27 DIAGNOSIS — E03.4 HYPOTHYROIDISM DUE TO ACQUIRED ATROPHY OF THYROID: Chronic | ICD-10-CM

## 2019-06-27 DIAGNOSIS — I70.0 CALCIFICATION OF AORTA: Chronic | ICD-10-CM

## 2019-06-27 DIAGNOSIS — Z79.4 TYPE 2 DIABETES MELLITUS WITH DIABETIC POLYNEUROPATHY, WITH LONG-TERM CURRENT USE OF INSULIN: ICD-10-CM

## 2019-06-27 DIAGNOSIS — E11.69 HYPERLIPIDEMIA ASSOCIATED WITH TYPE 2 DIABETES MELLITUS: Chronic | ICD-10-CM

## 2019-06-27 DIAGNOSIS — E11.42 TYPE 2 DIABETES MELLITUS WITH DIABETIC POLYNEUROPATHY, WITH LONG-TERM CURRENT USE OF INSULIN: ICD-10-CM

## 2019-06-27 DIAGNOSIS — E78.5 HYPERLIPIDEMIA ASSOCIATED WITH TYPE 2 DIABETES MELLITUS: Chronic | ICD-10-CM

## 2019-06-27 DIAGNOSIS — Z85.46 HISTORY OF PROSTATE CANCER: ICD-10-CM

## 2019-06-27 DIAGNOSIS — R32 URINARY INCONTINENCE, UNSPECIFIED TYPE: Chronic | ICD-10-CM

## 2019-06-27 DIAGNOSIS — I15.2 HYPERTENSION ASSOCIATED WITH DIABETES: Chronic | ICD-10-CM

## 2019-06-27 DIAGNOSIS — I73.9 PVD (PERIPHERAL VASCULAR DISEASE): Chronic | ICD-10-CM

## 2019-06-27 DIAGNOSIS — Z78.9 SELF-CATHETERIZES URINARY BLADDER: Chronic | ICD-10-CM

## 2019-06-27 DIAGNOSIS — H52.7 REFRACTIVE ERROR: ICD-10-CM

## 2019-06-27 DIAGNOSIS — H40.013 OPEN ANGLE WITH BORDERLINE FINDINGS, LOW RISK, BILATERAL: ICD-10-CM

## 2019-06-27 DIAGNOSIS — I65.23 BILATERAL CAROTID ARTERY STENOSIS: Chronic | ICD-10-CM

## 2019-06-27 DIAGNOSIS — E53.8 VITAMIN B12 DEFICIENCY: ICD-10-CM

## 2019-06-27 PROCEDURE — 99499 UNLISTED E&M SERVICE: CPT | Mod: S$GLB,,, | Performed by: PHYSICIAN ASSISTANT

## 2019-06-27 PROCEDURE — G0439 PPPS, SUBSEQ VISIT: HCPCS | Mod: HCNC,S$GLB,, | Performed by: PHYSICIAN ASSISTANT

## 2019-06-27 PROCEDURE — 99999 PR PBB SHADOW E&M-EST. PATIENT-LVL V: ICD-10-PCS | Mod: PBBFAC,HCNC,, | Performed by: PHYSICIAN ASSISTANT

## 2019-06-27 PROCEDURE — 99499 RISK ADDL DX/OHS AUDIT: ICD-10-PCS | Mod: S$GLB,,, | Performed by: PHYSICIAN ASSISTANT

## 2019-06-27 PROCEDURE — 99999 PR PBB SHADOW E&M-EST. PATIENT-LVL V: CPT | Mod: PBBFAC,HCNC,, | Performed by: PHYSICIAN ASSISTANT

## 2019-06-27 PROCEDURE — G0439 PR MEDICARE ANNUAL WELLNESS SUBSEQUENT VISIT: ICD-10-PCS | Mod: HCNC,S$GLB,, | Performed by: PHYSICIAN ASSISTANT

## 2019-06-27 NOTE — Clinical Note
Seen today for health risk assessment. Patient is due for his DEXA scan and lipid recheck. I will schedule him a follow up with you.

## 2019-06-27 NOTE — PATIENT INSTRUCTIONS
Counseling and Referral of Other Preventative  (Italic type indicates deductible and co-insurance are waived)    Patient Name: Good Murillo  Today's Date: 6/27/2019    Health Maintenance       Date Due Completion Date    TETANUS VACCINE 07/19/1945 ---    Shingles Vaccine (1 of 2) 07/19/1977 ---    DEXA SCAN 12/15/2018 12/15/2016    Lipid Panel 01/24/2019 1/24/2018    Eye Exam 02/08/2019 2/8/2018    Override on 2/8/2018: Done    Override on 1/12/2017: Done    Override on 6/7/2012: Done    Hemoglobin A1c 03/06/2019 9/6/2018    Urine Microalbumin 05/10/2019 5/10/2018    Influenza Vaccine 08/01/2019 10/2/2018    Override on 10/11/2016: Done (nonoch)    Foot Exam 02/01/2020 2/1/2019 (Done)    Override on 2/1/2019: Done    Override on 12/16/2016: Done    Override on 10/19/2016: Done    Override on 9/14/2016: Done    Override on 5/13/2016: Done    Override on 2/9/2016: Done    Override on 11/5/2015: Done    Override on 2/12/2015: Done    Aspirin/Antiplatelet Therapy 05/29/2020 5/29/2019        No orders of the defined types were placed in this encounter.    The following information is provided to all patients.  This information is to help you find resources for any of the problems found today that may be affecting your health:                Living healthy guide: www.[facility].[place].gov      Understanding Diabetes: www.diabetes.org      Eating healthy: www.cdc.gov/healthyweight      CDC home safety checklist: www.cdc.gov/steadi/patient.html      Agency on Aging: www.goea.[place].gov      Alcoholics anonymous (AA): www.aa.org      Physical Activity: www.ana.nih.gov/ua6atot      Tobacco use: www.quitwithusla.org

## 2019-06-27 NOTE — PROGRESS NOTES
"Good Murillo presented for a  Medicare AWV and comprehensive Health Risk Assessment today. The following components were reviewed and updated:    · Medical history  · Family History  · Social history  · Allergies and Current Medications  · Health Risk Assessment  · Health Maintenance  · Care Team     ** See Completed Assessments for Annual Wellness Visit within the encounter summary.**       The following assessments were completed:  · Living Situation  · CAGE  · Depression Screening  · Timed Get Up and Go  · Whisper Test  · Cognitive Function Screening  · Nutrition Screening  · ADL Screening  · PAQ Screening    Vitals:    06/27/19 1103   BP: (!) 110/56   Pulse: 104   Resp: 16   Temp: 98 °F (36.7 °C)   TempSrc: Tympanic   Weight: 71.1 kg (156 lb 12 oz)   Height: 5' 7" (1.702 m)     Body mass index is 24.55 kg/m².  Physical Exam   Constitutional: He is oriented to person, place, and time. Vital signs are normal. He appears well-developed and well-nourished. He is active. No distress.   HENT:   Head: Normocephalic and atraumatic.   Cardiovascular: Normal rate, regular rhythm and normal heart sounds. Exam reveals no gallop and no friction rub.   No murmur heard.  Pulmonary/Chest: Effort normal and breath sounds normal. No respiratory distress. He has no wheezes. He has no rales.   Neurological: He is alert and oriented to person, place, and time.   Skin: Skin is warm. He is not diaphoretic.   Psychiatric: He has a normal mood and affect. His behavior is normal. Judgment and thought content normal.   Nursing note and vitals reviewed.        Diagnoses and health risks identified today and associated recommendations/orders:    1. Encounter for preventive health examination  -due for dexa scan, lipid recheck, and eye exam. Will get patient scheduled for eye doctor today.  -will get patient scheduled for a follow up with his PCP to discuss this    2. Panlobular emphysema  -Stable and controlled on oxygen, albuterol,and " lakeshia. Continue current treatment plan as previously prescribed with your pulmonologist    3. Age-related osteoporosis without current pathological fracture  -due for bone scan. Will get patient scheduled for a follow up with his PCP    4. Iron deficiency anemia, unspecified iron deficiency anemia type  Lab Results   Component Value Date    IRON 38 (L) 08/04/2017    TIBC 423 08/04/2017    FERRITIN 28 08/04/2017   -Stable and controlled on iron supplement. Continue current treatment plan as previously prescribed with your PCP.     5. Hyperlipidemia associated with type 2 diabetes mellitus  Lab Results   Component Value Date    CHOL 159 01/24/2018    CHOL 159 05/08/2017    CHOL 199 03/15/2017     Lab Results   Component Value Date    HDL 46 01/24/2018    HDL 40 05/08/2017    HDL 41 03/15/2017     Lab Results   Component Value Date    LDLCALC 84.2 01/24/2018    LDLCALC 97.6 05/08/2017    LDLCALC 136.2 03/15/2017     Lab Results   Component Value Date    TRIG 144 01/24/2018    TRIG 107 05/08/2017    TRIG 109 03/15/2017     Lab Results   Component Value Date    CHOLHDL 28.9 01/24/2018    CHOLHDL 25.2 05/08/2017    CHOLHDL 20.6 03/15/2017   -due for recheck  -has been stable and controlled on pravastatin. Continue current treatment plan as previously prescribed with your PCP.     6. Hypertension associated with diabetes  -Stable and controlled. Continue current treatment plan as previously prescribed with your PCP.   - a little low today but patient feels fine.     7. Hypothyroidism due to acquired atrophy of thyroid  Lab Results   Component Value Date    TSH 1.673 01/24/2018   -Stable and controlled on levothyroxine. Continue current treatment plan as previously prescribed with your PCP.     8. Calcification of aorta  -CT chest 12/14/2016  -Stable and controlled. On aspirin and pravastatin. Continue current treatment plan as previously prescribed with your PCP and cardiologist.     9. Bilateral carotid artery  stenosis  -U/S 2/1/2017  -Stable and controlled. On aspirin and pravastatin. Continue current treatment plan as previously prescribed with your PCP and cardiologist.     10. Self-catheterizes urinary bladder  -Stable and controlled. Continue current treatment plan as previously prescribed with your urologist.    11. PVD (peripheral vascular disease)  -ARABELLA 2/1/2017  -Stable and controlled. Continue current treatment plan as previously prescribed with your cardiologist and PCP.    12. Urinary incontinence, unspecified type  -Stable and controlled on flomax. Continue current treatment plan as previously prescribed with your urologist.    13. Diverticulosis of intestine without bleeding, unspecified intestinal tract location  -noted on CT chest 12/14/2016  -Stable and controlled. Continue current treatment plan as previously prescribed with your PCP.     14. History of prostate cancer  -20 years ago. Treated with brachytherapy and XRT. Closely monitored by Dr. David.  -external urologist, Dr. David. Seen 3 months ago.   -Stable and controlled. Continue current treatment plan as previously prescribed with your PCP.     15. Type 2 diabetes mellitus with diabetic polyneuropathy, with long-term current use of insulin  Lab Results   Component Value Date    HGBA1C 8.8 (H) 09/06/2018   -still not controlled but improving  - Continue current treatment plan as previously prescribed with your PCP.     16. Atherosclerosis of native artery of both lower extremities, with unspecified presence of clinical manifestation  -ARABELLA 5/31/2016  -Stable and controlled. Continue current treatment plan as previously prescribed with your PCP and caridologist.    17. Lumbar pain  -Stable and controlled on gabapentin. Continue current treatment plan as previously prescribed with your PCP.     18. Refractive error  -due for eye appointment for a recheck. Will get him scheduled today.     19. Weight loss, non-intentional - due to esophageal  stricture  -Stable and controlled. Continue current treatment plan as previously prescribed with your PCP.   -has been gaining weight.    20. Open angle with borderline findings, low risk, bilateral  -due for eye appointment for a recheck. Will get him scheduled today.     21. Pseudophakia of both eyes  -due for eye appointment for a recheck. Will get him scheduled today.     22. Vitamin B12 deficiency  -B12 injections. Due for another one. Will get him scheduled a follow up with Dr. Shelton.     23. Normocytic anemia  Lab Results   Component Value Date    WBC 9.23 08/04/2017    HGB 9.8 (L) 08/04/2017    HCT 30.4 (L) 08/04/2017    MCV 90 08/04/2017     (H) 08/04/2017     -Stable and controlled. Continue current treatment plan as previously prescribed with your PCP.     24. Abdominal aortic aneurysm (AAA) without rupture  -U/S abdomen 10/26/2017  -Stable and controlled. Continue current treatment plan as previously prescribed with your PCP and cardiologist.    25. Pulmonary hypertension  -2D echo 5/31/2016  -Stable and controlled. Continue current treatment plan as previously prescribed with your PCP.     26. Cough  -stable      Provided Good with a 5-10 year written screening schedule and personal prevention plan. Recommendations were developed using the USPSTF age appropriate recommendations. Education, counseling, and referrals were provided as needed. After Visit Summary printed and given to patient which includes a list of additional screenings\tests needed.    Follow up if symptoms worsen or fail to improve.    Christiane Ag PA-C

## 2019-07-03 ENCOUNTER — LAB VISIT (OUTPATIENT)
Dept: LAB | Facility: HOSPITAL | Age: 84
End: 2019-07-03
Attending: FAMILY MEDICINE
Payer: MEDICARE

## 2019-07-03 ENCOUNTER — OFFICE VISIT (OUTPATIENT)
Dept: INTERNAL MEDICINE | Facility: CLINIC | Age: 84
End: 2019-07-03
Payer: MEDICARE

## 2019-07-03 VITALS
BODY MASS INDEX: 24.85 KG/M2 | OXYGEN SATURATION: 91 % | WEIGHT: 158.31 LBS | SYSTOLIC BLOOD PRESSURE: 128 MMHG | TEMPERATURE: 99 F | HEIGHT: 67 IN | DIASTOLIC BLOOD PRESSURE: 72 MMHG | HEART RATE: 91 BPM

## 2019-07-03 DIAGNOSIS — E11.42 TYPE 2 DIABETES MELLITUS WITH DIABETIC POLYNEUROPATHY, WITH LONG-TERM CURRENT USE OF INSULIN: ICD-10-CM

## 2019-07-03 DIAGNOSIS — E53.8 B12 DEFICIENCY: ICD-10-CM

## 2019-07-03 DIAGNOSIS — E11.59 HYPERTENSION ASSOCIATED WITH DIABETES: Chronic | ICD-10-CM

## 2019-07-03 DIAGNOSIS — Z79.4 TYPE 2 DIABETES MELLITUS WITH DIABETIC POLYNEUROPATHY, WITH LONG-TERM CURRENT USE OF INSULIN: Primary | ICD-10-CM

## 2019-07-03 DIAGNOSIS — K02.9 DENTAL CARIES: ICD-10-CM

## 2019-07-03 DIAGNOSIS — Z79.4 TYPE 2 DIABETES MELLITUS WITH DIABETIC POLYNEUROPATHY, WITH LONG-TERM CURRENT USE OF INSULIN: ICD-10-CM

## 2019-07-03 DIAGNOSIS — E11.42 TYPE 2 DIABETES MELLITUS WITH DIABETIC POLYNEUROPATHY, WITH LONG-TERM CURRENT USE OF INSULIN: Primary | ICD-10-CM

## 2019-07-03 DIAGNOSIS — I15.2 HYPERTENSION ASSOCIATED WITH DIABETES: Chronic | ICD-10-CM

## 2019-07-03 DIAGNOSIS — J43.1 PANLOBULAR EMPHYSEMA: Chronic | ICD-10-CM

## 2019-07-03 LAB
ESTIMATED AVG GLUCOSE: 235 MG/DL (ref 68–131)
ESTIMATED AVG GLUCOSE: 235 MG/DL (ref 68–131)
HBA1C MFR BLD HPLC: 9.8 % (ref 4–5.6)
HBA1C MFR BLD HPLC: 9.8 % (ref 4–5.6)
VIT B12 SERPL-MCNC: 360 PG/ML (ref 210–950)

## 2019-07-03 PROCEDURE — 36415 COLL VENOUS BLD VENIPUNCTURE: CPT | Mod: HCNC

## 2019-07-03 PROCEDURE — 99999 PR PBB SHADOW E&M-EST. PATIENT-LVL III: CPT | Mod: PBBFAC,HCNC,, | Performed by: FAMILY MEDICINE

## 2019-07-03 PROCEDURE — 1101F PR PT FALLS ASSESS DOC 0-1 FALLS W/OUT INJ PAST YR: ICD-10-PCS | Mod: HCNC,CPTII,S$GLB, | Performed by: FAMILY MEDICINE

## 2019-07-03 PROCEDURE — 82607 VITAMIN B-12: CPT | Mod: HCNC

## 2019-07-03 PROCEDURE — 99214 OFFICE O/P EST MOD 30 MIN: CPT | Mod: HCNC,S$GLB,, | Performed by: FAMILY MEDICINE

## 2019-07-03 PROCEDURE — 1101F PT FALLS ASSESS-DOCD LE1/YR: CPT | Mod: HCNC,CPTII,S$GLB, | Performed by: FAMILY MEDICINE

## 2019-07-03 PROCEDURE — 83036 HEMOGLOBIN GLYCOSYLATED A1C: CPT | Mod: HCNC

## 2019-07-03 PROCEDURE — 99999 PR PBB SHADOW E&M-EST. PATIENT-LVL III: ICD-10-PCS | Mod: PBBFAC,HCNC,, | Performed by: FAMILY MEDICINE

## 2019-07-03 PROCEDURE — 99214 PR OFFICE/OUTPT VISIT, EST, LEVL IV, 30-39 MIN: ICD-10-PCS | Mod: HCNC,S$GLB,, | Performed by: FAMILY MEDICINE

## 2019-07-03 RX ORDER — AMOXICILLIN AND CLAVULANATE POTASSIUM 875; 125 MG/1; MG/1
1 TABLET, FILM COATED ORAL EVERY 12 HOURS
Qty: 20 TABLET | Refills: 0 | Status: SHIPPED | OUTPATIENT
Start: 2019-07-03 | End: 2021-03-11

## 2019-07-03 NOTE — PROGRESS NOTES
Subjective:       Patient ID: Good Murillo Jr. is a 89 y.o. male.    Chief Complaint: Follow-up    F/U:      Pt is a 89 year old diabetic out of control but is non compliant with food. Pt last hga1C was 8. Pt also has infected tooth that is causing some pain. No fever.    Review of Systems   Constitutional: Negative.    Respiratory: Negative.    Cardiovascular: Negative.    Genitourinary: Negative.    Musculoskeletal: Negative.    Neurological: Negative.    Hematological: Negative.    Psychiatric/Behavioral: Negative.        Objective:      Physical Exam   Constitutional: He appears well-developed and well-nourished.   Cardiovascular: Normal rate and regular rhythm. Exam reveals no friction rub.   No murmur heard.  Pulmonary/Chest: Effort normal and breath sounds normal. No stridor. He has no wheezes.   Abdominal: Soft. Bowel sounds are normal.   Musculoskeletal: Normal range of motion. He exhibits no edema or deformity.   Neurological: He displays normal reflexes. Coordination normal.   Skin: Skin is warm and dry.       Assessment:       1. Type 2 diabetes mellitus with diabetic polyneuropathy, with long-term current use of insulin    2. Panlobular emphysema    3. Hypertension associated with diabetes    4. B12 deficiency    5. Dental caries        Plan:       Type 2 diabetes mellitus with diabetic polyneuropathy, with long-term current use of insulin  Comments:  Will continue on the metformin 1000 mg bid. Get a HgA1C  Orders:  -     Hemoglobin A1c; Future; Expected date: 07/03/2019    Panlobular emphysema  Comments:  pt is on inhaler    Hypertension associated with diabetes  Comments:  Pt BP is well controlled    B12 deficiency  Comments:  Will recheck his b12  Orders:  -     Vitamin B12; Future; Expected date: 07/03/2019    Dental caries  Comments:  Will start on augmentin    Other orders  -     amoxicillin-clavulanate 875-125mg (AUGMENTIN) 875-125 mg per tablet; Take 1 tablet by mouth every 12 (twelve) hours.   Dispense: 20 tablet; Refill: 0

## 2019-07-16 RX ORDER — ALENDRONATE SODIUM 70 MG/1
TABLET ORAL
Qty: 12 TABLET | Refills: 3 | Status: SHIPPED | OUTPATIENT
Start: 2019-07-16 | End: 2020-06-11

## 2019-08-13 RX ORDER — AMOXICILLIN AND CLAVULANATE POTASSIUM 875; 125 MG/1; MG/1
1 TABLET, FILM COATED ORAL EVERY 12 HOURS
Qty: 20 TABLET | Refills: 0 | OUTPATIENT
Start: 2019-08-13

## 2019-08-14 DIAGNOSIS — E78.5 HYPERLIPIDEMIA ASSOCIATED WITH TYPE 2 DIABETES MELLITUS: ICD-10-CM

## 2019-08-14 DIAGNOSIS — E11.69 HYPERLIPIDEMIA ASSOCIATED WITH TYPE 2 DIABETES MELLITUS: ICD-10-CM

## 2019-08-14 RX ORDER — METFORMIN HYDROCHLORIDE 1000 MG/1
TABLET ORAL
Qty: 180 TABLET | Refills: 1 | Status: SHIPPED | OUTPATIENT
Start: 2019-08-14 | End: 2020-02-10

## 2019-08-14 RX ORDER — PRAVASTATIN SODIUM 40 MG/1
TABLET ORAL
Qty: 90 TABLET | Refills: 1 | Status: SHIPPED | OUTPATIENT
Start: 2019-08-14 | End: 2020-02-10

## 2019-08-14 RX ORDER — LEVOTHYROXINE SODIUM 25 UG/1
TABLET ORAL
Qty: 90 TABLET | Refills: 2 | Status: SHIPPED | OUTPATIENT
Start: 2019-08-14 | End: 2020-05-12

## 2019-09-16 RX ORDER — CALCIUM CITRATE/VITAMIN D3 200MG-6.25
TABLET ORAL
Qty: 300 STRIP | Refills: 3 | Status: SHIPPED | OUTPATIENT
Start: 2019-09-16 | End: 2020-08-20 | Stop reason: SDUPTHER

## 2019-09-16 RX ORDER — INSULIN ASPART 100 [IU]/ML
INJECTION, SOLUTION INTRAVENOUS; SUBCUTANEOUS
Qty: 15 ML | Refills: 10 | Status: SHIPPED | OUTPATIENT
Start: 2019-09-16 | End: 2020-10-08

## 2019-09-17 ENCOUNTER — TELEPHONE (OUTPATIENT)
Dept: INTERNAL MEDICINE | Facility: CLINIC | Age: 84
End: 2019-09-17

## 2019-09-17 NOTE — TELEPHONE ENCOUNTER
----- Message from Anne Joyner sent at 9/17/2019  9:27 AM CDT -----  Contact: [facility]([place])-[phone]  Type:  Pharmacy Calling to Clarify an RX    Name of Caller:[place]  Pharmacy Name:[facility]  Prescription Name:Novolog  What do they need to clarify?:Max daily dose  Best Call Back Number:[phone]  Additional Information:

## 2019-09-17 NOTE — TELEPHONE ENCOUNTER
I returned call to [facility], they stated they were just calling to figure out what the max amount of Novolog the pt can take per day. I clarified with Dr. Shelton and let them know. Pharmacist thanked me and ended call. //BJ

## 2019-10-02 ENCOUNTER — IMMUNIZATION (OUTPATIENT)
Dept: INTERNAL MEDICINE | Facility: CLINIC | Age: 84
End: 2019-10-02
Payer: MEDICARE

## 2019-10-02 PROCEDURE — 99999 PR PBB SHADOW E&M-EST. PATIENT-LVL III: ICD-10-PCS | Mod: PBBFAC,HCNC,,

## 2019-10-02 PROCEDURE — G0008 ADMIN INFLUENZA VIRUS VAC: HCPCS | Mod: HCNC,S$GLB,, | Performed by: FAMILY MEDICINE

## 2019-10-02 PROCEDURE — G0008 FLU VACCINE - HIGH DOSE (65+) PRESERVATIVE FREE IM: ICD-10-PCS | Mod: HCNC,S$GLB,, | Performed by: FAMILY MEDICINE

## 2019-10-02 PROCEDURE — 90662 FLU VACCINE - HIGH DOSE (65+) PRESERVATIVE FREE IM: ICD-10-PCS | Mod: HCNC,S$GLB,, | Performed by: FAMILY MEDICINE

## 2019-10-02 PROCEDURE — 90662 IIV NO PRSV INCREASED AG IM: CPT | Mod: HCNC,S$GLB,, | Performed by: FAMILY MEDICINE

## 2019-10-02 PROCEDURE — 99999 PR PBB SHADOW E&M-EST. PATIENT-LVL III: CPT | Mod: PBBFAC,HCNC,,

## 2019-11-13 DIAGNOSIS — E11.40 TYPE 2 DIABETES, CONTROLLED, WITH NEUROPATHY: ICD-10-CM

## 2019-11-14 RX ORDER — ASPIRIN 81 MG/1
TABLET ORAL
Qty: 90 TABLET | Refills: 1 | Status: SHIPPED | OUTPATIENT
Start: 2019-11-14 | End: 2020-05-12

## 2019-11-14 RX ORDER — LORATADINE 10 MG/1
TABLET ORAL
Qty: 90 TABLET | Refills: 1 | Status: SHIPPED | OUTPATIENT
Start: 2019-11-14 | End: 2020-05-12

## 2019-11-14 RX ORDER — FERROUS SULFATE 325(65) MG
TABLET ORAL
Qty: 90 TABLET | Refills: 1 | Status: SHIPPED | OUTPATIENT
Start: 2019-11-14 | End: 2020-05-12

## 2019-11-14 RX ORDER — NATEGLINIDE 60 MG/1
TABLET ORAL
Qty: 270 TABLET | Refills: 1 | Status: SHIPPED | OUTPATIENT
Start: 2019-11-14 | End: 2020-05-12

## 2019-11-14 RX ORDER — CALCIUM CARBONATE 500 MG/1
TABLET, CHEWABLE ORAL
Qty: 90 TABLET | Refills: 1 | Status: SHIPPED | OUTPATIENT
Start: 2019-11-14 | End: 2020-05-12

## 2019-12-04 ENCOUNTER — OFFICE VISIT (OUTPATIENT)
Dept: PULMONOLOGY | Facility: CLINIC | Age: 84
End: 2019-12-04
Payer: MEDICARE

## 2019-12-04 VITALS
DIASTOLIC BLOOD PRESSURE: 58 MMHG | HEIGHT: 68 IN | WEIGHT: 162.5 LBS | HEART RATE: 102 BPM | SYSTOLIC BLOOD PRESSURE: 138 MMHG | RESPIRATION RATE: 18 BRPM | BODY MASS INDEX: 24.63 KG/M2 | OXYGEN SATURATION: 90 %

## 2019-12-04 DIAGNOSIS — J44.9 CHRONIC OBSTRUCTIVE PULMONARY DISEASE, UNSPECIFIED COPD TYPE: Primary | ICD-10-CM

## 2019-12-04 DIAGNOSIS — J30.0 VASOMOTOR RHINITIS: ICD-10-CM

## 2019-12-04 DIAGNOSIS — J43.1 PANLOBULAR EMPHYSEMA: ICD-10-CM

## 2019-12-04 DIAGNOSIS — J96.11 CHRONIC RESPIRATORY FAILURE WITH HYPOXIA: ICD-10-CM

## 2019-12-04 PROCEDURE — 99999 PR PBB SHADOW E&M-EST. PATIENT-LVL V: ICD-10-PCS | Mod: PBBFAC,HCNC,, | Performed by: INTERNAL MEDICINE

## 2019-12-04 PROCEDURE — 99499 UNLISTED E&M SERVICE: CPT | Mod: S$GLB,,, | Performed by: INTERNAL MEDICINE

## 2019-12-04 PROCEDURE — 99999 PR PBB SHADOW E&M-EST. PATIENT-LVL V: CPT | Mod: PBBFAC,HCNC,, | Performed by: INTERNAL MEDICINE

## 2019-12-04 PROCEDURE — 1159F PR MEDICATION LIST DOCUMENTED IN MEDICAL RECORD: ICD-10-PCS | Mod: HCNC,S$GLB,, | Performed by: INTERNAL MEDICINE

## 2019-12-04 PROCEDURE — 1101F PT FALLS ASSESS-DOCD LE1/YR: CPT | Mod: HCNC,CPTII,S$GLB, | Performed by: INTERNAL MEDICINE

## 2019-12-04 PROCEDURE — 99499 RISK ADDL DX/OHS AUDIT: ICD-10-PCS | Mod: S$GLB,,, | Performed by: INTERNAL MEDICINE

## 2019-12-04 PROCEDURE — 1159F MED LIST DOCD IN RCRD: CPT | Mod: HCNC,S$GLB,, | Performed by: INTERNAL MEDICINE

## 2019-12-04 PROCEDURE — 99214 OFFICE O/P EST MOD 30 MIN: CPT | Mod: HCNC,S$GLB,, | Performed by: INTERNAL MEDICINE

## 2019-12-04 PROCEDURE — 99214 PR OFFICE/OUTPT VISIT, EST, LEVL IV, 30-39 MIN: ICD-10-PCS | Mod: HCNC,S$GLB,, | Performed by: INTERNAL MEDICINE

## 2019-12-04 PROCEDURE — 1101F PR PT FALLS ASSESS DOC 0-1 FALLS W/OUT INJ PAST YR: ICD-10-PCS | Mod: HCNC,CPTII,S$GLB, | Performed by: INTERNAL MEDICINE

## 2019-12-04 RX ORDER — IPRATROPIUM BROMIDE 42 UG/1
2 SPRAY, METERED NASAL 4 TIMES DAILY
Qty: 15 ML | Refills: 11 | Status: SHIPPED | OUTPATIENT
Start: 2019-12-04 | End: 2020-12-08

## 2019-12-04 RX ORDER — ALBUTEROL SULFATE 90 UG/1
2 AEROSOL, METERED RESPIRATORY (INHALATION) EVERY 6 HOURS PRN
Qty: 3 INHALER | Refills: 4 | Status: SHIPPED | OUTPATIENT
Start: 2019-12-04 | End: 2020-12-08

## 2019-12-04 NOTE — PROGRESS NOTES
Subjective:       Patient ID: Good Murillo Jr. is a 89 y.o. male.    Chief Complaint: He       COPD    COPD   Associated symptoms include congestion, coughing and fatigue. Pertinent negatives include no abdominal pain, chest pain, fever, nausea, rash or weakness.    Staying at the [facility]  Using oxygen more frequently  Neuropathy in feet is worse    Sinus congestion  COPD  He presents for evaluation and treatment of COPD. The patient is not currently have symptoms / an exacerbation. The patient has COPD for approximately many years. Symptoms in previous episodes have included dyspnea, cough and wheezing, and typically last 2 weeks. Previous episodes have been exacerbated by any exercise. Current treatment includes albuterol nebulizer, ipratropium inhaler and salmeterol/fluticasone inhaler, which generally provides some relief of symptoms.   He uses 1 pillows at night. Patient currently is on oxygen at 3- 4 L/min per nasal cannula.. The patient is having no constitutional symptoms, denying fever, chills, anorexia, or weight loss. The patient has been hospitalized for this condition before. He quit smoking approximately many years ago. The patient is experiencing exercise intolerance (difficulty walking 3 blocks on flat ground).    Past Medical History:   Diagnosis Date    Abnormal EKG 8/22/2014    Allergy     Arthritis     Back pain     Bilateral carotid artery disease 1/17/2017    Cholelithiasis     US retroperitoneal 12/2016    Chronic respiratory failure with hypoxia 12/4/2019    COPD (chronic obstructive pulmonary disease)     Diabetes with neurologic complications     Diverticulosis     hosp/divert bleed    Ex-smoker     Glaucoma     suspect    Heart murmur     History of pneumothorax     bilat    Hyperlipidemia     Hypothyroid     Iron deficiency anemia     nathan    Osteoporosis     Polyneuropathy     Prostate cancer     PVD (peripheral vascular disease) 8/22/2014    Urinary incontinence       Past Surgical History:   Procedure Laterality Date    CATARACT EXTRACTION      PCIOL OU Bilateral 2011 OD/2010 OS    DR. DELGADILLO    PROSTATE SURGERY   approx    brachytherapy    SPINAL CORD STIMULATOR IMPLANT      TENS Unit     Social History     Socioeconomic History    Marital status:      Spouse name: Not on file    Number of children: 2    Years of education: Not on file    Highest education level: Not on file   Occupational History    Occupation: retired     Comment: self employed   Social Needs    Financial resource strain: Not on file    Food insecurity:     Worry: Not on file     Inability: Not on file    Transportation needs:     Medical: Not on file     Non-medical: Not on file   Tobacco Use    Smoking status: Former Smoker     Packs/day: 1.00     Types: Cigarettes     Last attempt to quit: 1993     Years since quittin.2    Smokeless tobacco: Never Used   Substance and Sexual Activity    Alcohol use: Yes     Comment: Champagne on     Drug use: No    Sexual activity: Never   Lifestyle    Physical activity:     Days per week: Not on file     Minutes per session: Not on file    Stress: Not on file   Relationships    Social connections:     Talks on phone: Not on file     Gets together: Not on file     Attends Buddhism service: Not on file     Active member of club or organization: Not on file     Attends meetings of clubs or organizations: Not on file     Relationship status: Not on file   Other Topics Concern    Not on file   Social History Narrative    Patient is a [facility]. He is retired he was self employed.     Review of Systems   Constitutional: Positive for fatigue. Negative for fever.   HENT: Positive for postnasal drip, rhinorrhea and congestion.    Eyes: Negative for redness and itching.   Respiratory: Positive for cough, sputum production, shortness of breath, dyspnea on extertion, use of rescue inhaler and Paroxysmal  "Nocturnal Dyspnea.    Cardiovascular: Negative for chest pain, palpitations and leg swelling.   Genitourinary: Negative for difficulty urinating and hematuria.   Endocrine: Negative for cold intolerance and heat intolerance.    Skin: Negative for rash.   Gastrointestinal: Negative for nausea and abdominal pain.   Neurological: Negative for dizziness, syncope, weakness and light-headedness.   Hematological: Negative for adenopathy. Does not bruise/bleed easily.   Psychiatric/Behavioral: Negative for sleep disturbance. The patient is not nervous/anxious.        Objective:      BP (!) 138/58   Pulse 102   Resp 18   Ht 5' 7.5" (1.715 m)   Wt 73.7 kg (162 lb 7.7 oz)   SpO2 (!) 90%   BMI 25.07 kg/m²   Physical Exam   Constitutional: He is oriented to person, place, and time. He appears well-developed and well-nourished.   HENT:   Head: Normocephalic and atraumatic.   Mouth/Throat: Oropharyngeal exudate present.   Eyes: Pupils are equal, round, and reactive to light. Conjunctivae are normal.   Neck: Neck supple. No JVD present. No tracheal deviation present. No thyromegaly present.   Cardiovascular: Normal rate, regular rhythm and normal heart sounds.   No murmur heard.  Pulmonary/Chest: Effort normal. He has decreased breath sounds. He has wheezes in the right lower field and the left lower field. He has no rhonchi. He has no rales.   Abdominal: Soft. Bowel sounds are normal.   Musculoskeletal: Normal range of motion. He exhibits no edema or tenderness.   Lymphadenopathy:     He has no cervical adenopathy.   Neurological: He is alert and oriented to person, place, and time.   Skin: Skin is warm and dry.   Nursing note and vitals reviewed.    Personal Diagnostic Review  Chest x-ray: hyperinflation    CT Chest Without Contrast  Narrative: EXAMINATION:  CT CHEST WITHOUT CONTRAST    CLINICAL HISTORY:  Emphysema;Rales;Intercostal pain;Ped, pneumothorax, known, xray done, blebs or bullae suspected;Bullae; Panlobular " emphysema    TECHNIQUE:  Low dose axial images, sagittal and coronal reformations were obtained from the thoracic inlet to the lung bases. Contrast was not administered.    COMPARISON:  December 14, 2016    FINDINGS:  Extensive bullous emphysematous changes again noted without significant interval change.  No new or developing area of consolidation or effusion.  Extensive did bullous disease in the apices and upper lobes with pleuroparenchymal scarring similar to prior exam.  Prominent findings in the apices, greater on the left.  No mass lesion, bulky or matted adenopathy.    Heart size within normal limits.  No pericardial effusion.  Extensive atherosclerotic calcification noted throughout the aorta and its branches including coronary arteries.  Distal thoracic aorta is tortuous in part related to underlying scoliotic curvature.    No pneumothorax.  Thyroid partially visualized and appears within normal.    Diaphragmatic hernia along the posteromedial margin left base with extension of retroperitoneal fat into the lung base.    Included upper abdomen stable in appearance.    Rotary scoliosis and extensive spondylosis.  Impression: Stable exam compared to December 14, 2016 without acute area of consolidation, mass lesion, effusion or pneumothorax identified.    Extensive bullous emphysematous disease with upper lobe predominance and scattered areas of the pleuroparenchymal scarring bilaterally as detailed above.    Additional chronic findings as above.    Electronically signed by: Golden Tate MD  Date:    06/06/2019  Time:    11:41      Office Spirometry Results:     No flowsheet data found.  Pulmonary Studies Review 12/4/2019   SpO2 90   Ordering Provider -   Interpreting Provider -   Performing nurse/tech/RT -   Diagnosis -   Height 67.500   Weight 2599.66   BMI (Calculated) 25.1   Predicted Distance 207.71   Patient Race -   6MWT Status -   Patient Reported -   Was O2 used? -   Delivery Method -   6MW Distance  walked (feet) -   Distance walked (meters) -   Did patient stop? -   Type of assistive device(s) used? -   Is extra documentation required for this patient? -   Oxygen Saturation -   Supplemental Oxygen -   Heart Rate -   Blood Pressure -   Sari Dyspnea Rating  -   Oxygen Saturation -   Supplemental Oxygen -   Heart Rate -   Blood Pressure -   Sari Dyspnea Rating  -   Recovery Time (seconds) -   Oxygen Saturation -   Supplemental Oxygen -   Heart Rate -   Blood Pressure -   Sari Dyspnea Rating  -   Is procedure ready for interpretation? -   Did the patient stop or pause? -   Total Time Walked (Calculated) -   Total Laps Walked -   Final Partial Lap Distance (feet) -   Total Distance Feet (Calculated) -   Total Distance Meters (Calculated) -   Predicted Distance Meters (Calculated) 397.7   Percentage of Predicted (Calculated) -   Peak VO2 (Calculated) -   Mets -   Has The Patient Had a Previous Six Minute Walk Test? -   Oxygen Qualification? -   Oxygen Saturation -   Supplemental Oxygen -   Heart Rate -   Blood Pressure -   Sari Dyspnea Rating  -   Oxygen Saturation -   Supplemental Oxygen -   Heart Rate -   Blood Pressure -   Sari Dyspnea Rating  -   Recovery Time (seconds) -   Oxygen Saturation -   Supplemental Oxygen -   Heart Rate -   Blood Pressure -   Sari Dyspnea Rating  -         Assessment:       Chronic obstructive pulmonary disease, unspecified COPD type  -     fluticasone-umeclidin-vilanter (TRELEGY ELLIPTA) 100-62.5-25 mcg DsDv; Inhale 1 puff into the lungs once daily.  Dispense: 60 each; Refill: 11  -     X-Ray Chest PA And Lateral; Future; Expected date: 12/04/2019  -     Stress test, pulmonary; Future; Expected date: 12/04/2019    Chronic respiratory failure with hypoxia  -     X-Ray Chest PA And Lateral; Future; Expected date: 12/04/2019  -     Stress test, pulmonary; Future; Expected date: 12/04/2019    Panlobular emphysema  -     albuterol (VENTOLIN HFA) 90 mcg/actuation inhaler; Inhale 2 puffs  into the lungs every 6 (six) hours as needed for Wheezing. Rescue  Dispense: 3 Inhaler; Refill: 4    Vasomotor rhinitis  -     ipratropium (ATROVENT) 42 mcg (0.06 %) nasal spray; Use 2 sprays by nasal route 4 (four) times daily as needed for rhinitis  Dispense: 15 mL; Refill: 11    Chronic respiratory failure with hypoxia  Contineu oxygen , recheck 6 min walk on f/u          Outpatient Encounter Medications as of 12/4/2019   Medication Sig Dispense Refill    albuterol (VENTOLIN HFA) 90 mcg/actuation inhaler Inhale 2 puffs into the lungs every 6 (six) hours as needed for Wheezing. Rescue 3 Inhaler 4    alendronate (FOSAMAX) 70 MG tablet TAKE ONE TABLET BY MOUTH EVERY 7 DAYS ON SUNDAYS 12 tablet 3    amoxicillin-clavulanate 875-125mg (AUGMENTIN) 875-125 mg per tablet Take 1 tablet by mouth twice daily 20 tablet 0    amoxicillin-clavulanate 875-125mg (AUGMENTIN) 875-125 mg per tablet Take 1 tablet by mouth every 12 (twelve) hours. 20 tablet 0    aspirin (ECOTRIN) 81 MG EC tablet TAKE ONE TABLET BY MOUTH EVERY DAY 90 tablet 1    aspirin 81 mg Tab Take 1 tablet by mouth Daily. Over counter. To help prevent stroke/ heart attack      CALCIUM ANTACID 200 mg calcium (500 mg) chewable tablet TAKE ONE TABLET BY MOUTH EVERY DAY 90 tablet 1    calcium carbonate (OS-TANGELA) 500 mg calcium (1,250 mg) tablet Take 1 tablet by mouth once daily.      clotrimazole-betamethasone 1-0.05% (LOTRISONE) cream Apply topically 2 (two) times daily. (Patient taking differently: Apply topically as needed. ) 45 g 0    ferrous sulfate (FEOSOL) 325 mg (65 mg iron) Tab tablet TAKE ONE TABLET BY MOUTH EVERY DAY 90 tablet 1    furosemide (LASIX) 20 MG tablet 1/2 tab a day for 2-3 days only if increase swelling 20 tablet 0    gabapentin (NEURONTIN) 100 MG capsule TAKE ONE CAPSULE BY MOUTH TWICE DAILY ; 180 capsule 2    insulin (LANTUS SOLOSTAR U-100 INSULIN) glargine 100 units/mL (3mL) SubQ pen INJECT 25 UNITS UNDER THE SKIN ONCE DAILY 15 mL  10    insulin lispro (HUMALOG) 100 unit/mL injection Sliding Scale:    150-200: 6 units  201-250: 8 units  251- 300 10 unit  301-350:  12 units  351 or greater 14 units 10 mL 2    ipratropium (ATROVENT) 42 mcg (0.06 %) nasal spray Use 2 sprays by nasal route 4 (four) times daily as needed for rhinitis 15 mL 11    ketoconazole (NIZORAL) 2 % cream Apply to affected area twice daily for rash in groin 60 g 3    lancets 33 gauge Misc 1 lancet by Misc.(Non-Drug; Combo Route) route 3 (three) times daily. 100 each 11    levothyroxine (SYNTHROID) 25 MCG tablet TAKE ONE TABLET BY MOUTH EVERY DAY 90 tablet 2    loratadine (CLARITIN) 10 mg tablet TAKE ONE TABLET BY MOUTH EVERY DAY 90 tablet 1    metFORMIN (GLUCOPHAGE) 1000 MG tablet TAKE ONE TABLET BY MOUTH TWICE DAILY ; 180 tablet 1    nateglinide (STARLIX) 60 MG tablet TAKE ONE TABLET BY MOUTH THREE TIMES DAILY BEFORE A MEAL ;; 270 tablet 1    NOVOLOG FLEXPEN U-100 INSULIN 100 unit/mL (3 mL) InPn pen INJECT THREE TIMES DAILY BASED ON SLIDING SCALE 150-200 6U, 201-250 8U, 251-300 10U, 301-350 12U, + 14U 15 mL 10    nystatin (MYCOSTATIN) ointment APPLY TO AFFECTED AREA TWO TIMES A DAY 30 g 0    OXYGEN-AIR DELIVERY SYSTEMS [facility] Inhale 2 L into the lungs as needed. Oxygen 2L via nasal canula as needed.      pantoprazole (PROTONIX) 40 MG tablet TAKE ONE TABLET BY MOUTH EVERY DAY 90 tablet 2    pravastatin (PRAVACHOL) 40 MG tablet TAKE ONE TABLET BY MOUTH EVERY DAY 90 tablet 1    tamsulosin (FLOMAX) 0.4 mg Cap Take 1 capsule by mouth daily 30 capsule 11    tamsulosin (FLOMAX) 0.4 mg Cp24 1 capsule PO QD 30 capsule 6    TENS unit and electrodes Cmpk 1 Device by Misc.(Non-Drug; Combo Route) route daily as needed. 1 each 0    triamcinolone acetonide 0.1% (KENALOG) 0.1 % cream Apply to affected area twice daily as needed for rash on legs 80 g 1    TRUE METRIX GLUCOSE TEST STRIP Strp USE 1 STRIP THREE TIMES DAILY 300 strip 3    [DISCONTINUED] albuterol  (VENTOLIN HFA) 90 mcg/actuation inhaler Inhale 2 puffs into the lungs every 6 (six) hours as needed for Wheezing. Rescue 3 Inhaler 4    [DISCONTINUED] ipratropium (ATROVENT) 42 mcg (0.06 %) nasal spray Use 2 sprays by nasal route 4 (four) times daily as needed for rhinitis 15 mL 11    fluticasone-umeclidin-vilanter (TRELEGY ELLIPTA) 100-62.5-25 mcg DsDv Inhale 1 puff into the lungs once daily. 60 each 11    miconazole NITRATE 2 % (ZEASORB AF) 2 % top powder Use two to three times daily to prevent rash in groin 71 g 11     No facility-administered encounter medications on file as of 12/4/2019.      Plan:       Requested Prescriptions     Signed Prescriptions Disp Refills    fluticasone-umeclidin-vilanter (TRELEGY ELLIPTA) 100-62.5-25 mcg DsDv 60 each 11     Sig: Inhale 1 puff into the lungs once daily.    albuterol (VENTOLIN HFA) 90 mcg/actuation inhaler 3 Inhaler 4     Sig: Inhale 2 puffs into the lungs every 6 (six) hours as needed for Wheezing. Rescue    ipratropium (ATROVENT) 42 mcg (0.06 %) nasal spray 15 mL 11     Sig: Use 2 sprays by nasal route 4 (four) times daily as needed for rhinitis     Problem List Items Addressed This Visit     Chronic respiratory failure with hypoxia    Current Assessment & Plan     Contineu oxygen , recheck 6 min walk on f/u         Relevant Orders    X-Ray Chest PA And Lateral    Stress test, pulmonary    COPD (chronic obstructive pulmonary disease) - Primary (Chronic)    Relevant Medications    fluticasone-umeclidin-vilanter (TRELEGY ELLIPTA) 100-62.5-25 mcg DsDv    albuterol (VENTOLIN HFA) 90 mcg/actuation inhaler    Other Relevant Orders    X-Ray Chest PA And Lateral    Stress test, pulmonary      Other Visit Diagnoses     Vasomotor rhinitis        Relevant Medications    ipratropium (ATROVENT) 42 mcg (0.06 %) nasal spray             Follow up in about 11 months (around 11/2/2020) for Review CXR, 6 min walk.    MEDICAL DECISION MAKING: Moderate to high complexity.  Overall,  the multiple problems listed are of moderate to high severity that may impact quality of life and activities of daily living. Side effects of medications, treatment plan as well as options and alternatives reviewed and discussed with patient. There was counseling of patient concerning these issues.    Total time spent in face to face counseling and coordination of care - 40  minutes over 50% of time was used in discussion of prognosis, risks, benefits of treatment, instructions and compliance with regimen . Discussion with other physicians or health care providers (DME, NP, pharmacy, respiratory therapy) occurred.

## 2019-12-12 DIAGNOSIS — K92.1 BLOOD IN STOOL: ICD-10-CM

## 2019-12-13 RX ORDER — PANTOPRAZOLE SODIUM 40 MG/1
TABLET, DELAYED RELEASE ORAL
Qty: 90 TABLET | Refills: 1 | Status: SHIPPED | OUTPATIENT
Start: 2019-12-13 | End: 2020-06-11

## 2019-12-13 RX ORDER — GABAPENTIN 100 MG/1
CAPSULE ORAL
Qty: 180 CAPSULE | Refills: 1 | Status: SHIPPED | OUTPATIENT
Start: 2019-12-13 | End: 2020-06-11

## 2020-01-08 ENCOUNTER — OFFICE VISIT (OUTPATIENT)
Dept: INTERNAL MEDICINE | Facility: CLINIC | Age: 85
End: 2020-01-08
Payer: MEDICARE

## 2020-01-08 VITALS
HEART RATE: 95 BPM | DIASTOLIC BLOOD PRESSURE: 70 MMHG | SYSTOLIC BLOOD PRESSURE: 136 MMHG | OXYGEN SATURATION: 95 % | TEMPERATURE: 97 F | HEIGHT: 68 IN | WEIGHT: 162.06 LBS | BODY MASS INDEX: 24.56 KG/M2

## 2020-01-08 DIAGNOSIS — Z79.4 TYPE 2 DIABETES MELLITUS WITH DIABETIC POLYNEUROPATHY, WITH LONG-TERM CURRENT USE OF INSULIN: Primary | ICD-10-CM

## 2020-01-08 DIAGNOSIS — E11.42 DIABETIC POLYNEUROPATHY ASSOCIATED WITH TYPE 2 DIABETES MELLITUS: ICD-10-CM

## 2020-01-08 DIAGNOSIS — J43.1 PANLOBULAR EMPHYSEMA: ICD-10-CM

## 2020-01-08 DIAGNOSIS — I27.20 PULMONARY HYPERTENSION: ICD-10-CM

## 2020-01-08 DIAGNOSIS — J96.11 CHRONIC RESPIRATORY FAILURE WITH HYPOXIA: ICD-10-CM

## 2020-01-08 DIAGNOSIS — N18.4 CHRONIC KIDNEY DISEASE, STAGE 4 (SEVERE): ICD-10-CM

## 2020-01-08 DIAGNOSIS — E11.42 TYPE 2 DIABETES MELLITUS WITH DIABETIC POLYNEUROPATHY, WITH LONG-TERM CURRENT USE OF INSULIN: Primary | ICD-10-CM

## 2020-01-08 DIAGNOSIS — C61 PROSTATE CA: ICD-10-CM

## 2020-01-08 DIAGNOSIS — I73.9 PVD (PERIPHERAL VASCULAR DISEASE): ICD-10-CM

## 2020-01-08 PROCEDURE — 99214 PR OFFICE/OUTPT VISIT, EST, LEVL IV, 30-39 MIN: ICD-10-PCS | Mod: HCNC,S$GLB,, | Performed by: FAMILY MEDICINE

## 2020-01-08 PROCEDURE — 99999 PR PBB SHADOW E&M-EST. PATIENT-LVL III: ICD-10-PCS | Mod: PBBFAC,HCNC,, | Performed by: FAMILY MEDICINE

## 2020-01-08 PROCEDURE — 99499 RISK ADDL DX/OHS AUDIT: ICD-10-PCS | Mod: HCNC,S$GLB,, | Performed by: FAMILY MEDICINE

## 2020-01-08 PROCEDURE — 1159F PR MEDICATION LIST DOCUMENTED IN MEDICAL RECORD: ICD-10-PCS | Mod: HCNC,S$GLB,, | Performed by: FAMILY MEDICINE

## 2020-01-08 PROCEDURE — 1101F PT FALLS ASSESS-DOCD LE1/YR: CPT | Mod: HCNC,CPTII,S$GLB, | Performed by: FAMILY MEDICINE

## 2020-01-08 PROCEDURE — 99214 OFFICE O/P EST MOD 30 MIN: CPT | Mod: HCNC,S$GLB,, | Performed by: FAMILY MEDICINE

## 2020-01-08 PROCEDURE — 1159F MED LIST DOCD IN RCRD: CPT | Mod: HCNC,S$GLB,, | Performed by: FAMILY MEDICINE

## 2020-01-08 PROCEDURE — 1126F AMNT PAIN NOTED NONE PRSNT: CPT | Mod: HCNC,S$GLB,, | Performed by: FAMILY MEDICINE

## 2020-01-08 PROCEDURE — 1126F PR PAIN SEVERITY QUANTIFIED, NO PAIN PRESENT: ICD-10-PCS | Mod: HCNC,S$GLB,, | Performed by: FAMILY MEDICINE

## 2020-01-08 PROCEDURE — 1101F PR PT FALLS ASSESS DOC 0-1 FALLS W/OUT INJ PAST YR: ICD-10-PCS | Mod: HCNC,CPTII,S$GLB, | Performed by: FAMILY MEDICINE

## 2020-01-08 PROCEDURE — 99499 UNLISTED E&M SERVICE: CPT | Mod: HCNC,S$GLB,, | Performed by: FAMILY MEDICINE

## 2020-01-08 PROCEDURE — 99999 PR PBB SHADOW E&M-EST. PATIENT-LVL III: CPT | Mod: PBBFAC,HCNC,, | Performed by: FAMILY MEDICINE

## 2020-01-08 NOTE — PROGRESS NOTES
Subjective:       Patient ID: Good Murillo Jr. is a 89 y.o. male.    Chief Complaint: Follow-up (6 month)    Follow-up.      Pt is a 89 year old here for follow-up. Pt has DM that sugars are elevated with last a1c at 9. Pt is on insulin but still unable to get control of sugars. Pt has had some off an on pain for the last 6 months in the substernal area. Saw Cardiology about 6 months.    Review of Systems   Constitutional: Negative.    Respiratory: Negative.    Cardiovascular: Negative.    Genitourinary: Negative.    Neurological: Negative.    Hematological: Negative.    Psychiatric/Behavioral: Negative.        Objective:      Physical Exam   Constitutional: He is oriented to person, place, and time. He appears well-developed and well-nourished.   Cardiovascular: Normal rate and regular rhythm. Exam reveals no friction rub.   No murmur heard.  Pulmonary/Chest: Effort normal and breath sounds normal. No stridor. He has no wheezes.   Abdominal: Soft. Bowel sounds are normal.   Neurological: He is alert and oriented to person, place, and time.       Assessment:       1. Type 2 diabetes mellitus with diabetic polyneuropathy, with long-term current use of insulin    2. Prostate CA    3. Diabetic polyneuropathy associated with type 2 diabetes mellitus    4. Chronic respiratory failure with hypoxia    5. Pulmonary hypertension    6. PVD (peripheral vascular disease)    7. Panlobular emphysema    8. Chronic kidney disease, stage 4 (severe)        Plan:       Type 2 diabetes mellitus with diabetic polyneuropathy, with long-term current use of insulin    Prostate CA  Comments:  stable    Diabetic polyneuropathy associated with type 2 diabetes mellitus  Comments:  Bad neuropathy. Have nothing further to offer    Chronic respiratory failure with hypoxia  Comments:  stable    Pulmonary hypertension  Comments:  stable    PVD (peripheral vascular disease)  Comments:  stable    Panlobular emphysema  Comments:  Pt is seeing  pulmonary    Chronic kidney disease, stage 4 (severe)  -     Comprehensive metabolic panel; Future; Expected date: 01/08/2020  -     CBC auto differential; Future; Expected date: 01/08/2020

## 2020-02-03 DIAGNOSIS — I10 ESSENTIAL HYPERTENSION: Primary | ICD-10-CM

## 2020-02-05 ENCOUNTER — CLINICAL SUPPORT (OUTPATIENT)
Dept: CARDIOLOGY | Facility: CLINIC | Age: 85
End: 2020-02-05
Payer: MEDICARE

## 2020-02-05 ENCOUNTER — OFFICE VISIT (OUTPATIENT)
Dept: CARDIOLOGY | Facility: CLINIC | Age: 85
End: 2020-02-05
Payer: MEDICARE

## 2020-02-05 VITALS — BODY MASS INDEX: 25.15 KG/M2 | SYSTOLIC BLOOD PRESSURE: 144 MMHG | WEIGHT: 163 LBS | DIASTOLIC BLOOD PRESSURE: 82 MMHG

## 2020-02-05 DIAGNOSIS — E78.5 HYPERLIPIDEMIA ASSOCIATED WITH TYPE 2 DIABETES MELLITUS: Chronic | ICD-10-CM

## 2020-02-05 DIAGNOSIS — E11.69 HYPERLIPIDEMIA ASSOCIATED WITH TYPE 2 DIABETES MELLITUS: Chronic | ICD-10-CM

## 2020-02-05 DIAGNOSIS — I70.203 ATHEROSCLEROSIS OF NATIVE ARTERY OF BOTH LOWER EXTREMITIES, WITH UNSPECIFIED PRESENCE OF CLINICAL MANIFESTATION: ICD-10-CM

## 2020-02-05 DIAGNOSIS — I27.20 PULMONARY HYPERTENSION: ICD-10-CM

## 2020-02-05 DIAGNOSIS — I15.2 HYPERTENSION ASSOCIATED WITH DIABETES: Chronic | ICD-10-CM

## 2020-02-05 DIAGNOSIS — I10 ESSENTIAL HYPERTENSION: ICD-10-CM

## 2020-02-05 DIAGNOSIS — J96.11 CHRONIC RESPIRATORY FAILURE WITH HYPOXIA: ICD-10-CM

## 2020-02-05 DIAGNOSIS — Z85.46 HISTORY OF PROSTATE CANCER: ICD-10-CM

## 2020-02-05 DIAGNOSIS — I65.23 BILATERAL CAROTID ARTERY STENOSIS: Chronic | ICD-10-CM

## 2020-02-05 DIAGNOSIS — I71.40 ABDOMINAL AORTIC ANEURYSM (AAA) WITHOUT RUPTURE: ICD-10-CM

## 2020-02-05 DIAGNOSIS — E11.59 HYPERTENSION ASSOCIATED WITH DIABETES: Chronic | ICD-10-CM

## 2020-02-05 DIAGNOSIS — N18.4 CHRONIC KIDNEY DISEASE, STAGE 4 (SEVERE): ICD-10-CM

## 2020-02-05 DIAGNOSIS — R07.89 ATYPICAL CHEST PAIN: Primary | ICD-10-CM

## 2020-02-05 DIAGNOSIS — I73.9 PVD (PERIPHERAL VASCULAR DISEASE): Chronic | ICD-10-CM

## 2020-02-05 DIAGNOSIS — J43.1 PANLOBULAR EMPHYSEMA: Chronic | ICD-10-CM

## 2020-02-05 PROCEDURE — 1159F MED LIST DOCD IN RCRD: CPT | Mod: HCNC,S$GLB,, | Performed by: PHYSICIAN ASSISTANT

## 2020-02-05 PROCEDURE — 1159F PR MEDICATION LIST DOCUMENTED IN MEDICAL RECORD: ICD-10-PCS | Mod: HCNC,S$GLB,, | Performed by: PHYSICIAN ASSISTANT

## 2020-02-05 PROCEDURE — 1101F PR PT FALLS ASSESS DOC 0-1 FALLS W/OUT INJ PAST YR: ICD-10-PCS | Mod: HCNC,CPTII,S$GLB, | Performed by: PHYSICIAN ASSISTANT

## 2020-02-05 PROCEDURE — 99999 PR PBB SHADOW E&M-EST. PATIENT-LVL V: CPT | Mod: PBBFAC,HCNC,, | Performed by: PHYSICIAN ASSISTANT

## 2020-02-05 PROCEDURE — 99214 PR OFFICE/OUTPT VISIT, EST, LEVL IV, 30-39 MIN: ICD-10-PCS | Mod: HCNC,S$GLB,, | Performed by: PHYSICIAN ASSISTANT

## 2020-02-05 PROCEDURE — 1101F PT FALLS ASSESS-DOCD LE1/YR: CPT | Mod: HCNC,CPTII,S$GLB, | Performed by: PHYSICIAN ASSISTANT

## 2020-02-05 PROCEDURE — 99999 PR PBB SHADOW E&M-EST. PATIENT-LVL V: ICD-10-PCS | Mod: PBBFAC,HCNC,, | Performed by: PHYSICIAN ASSISTANT

## 2020-02-05 PROCEDURE — 99214 OFFICE O/P EST MOD 30 MIN: CPT | Mod: HCNC,S$GLB,, | Performed by: PHYSICIAN ASSISTANT

## 2020-02-05 PROCEDURE — 93005 ELECTROCARDIOGRAM TRACING: CPT | Mod: HCNC,S$GLB,, | Performed by: INTERNAL MEDICINE

## 2020-02-05 PROCEDURE — 93010 ELECTROCARDIOGRAM REPORT: CPT | Mod: HCNC,S$GLB,, | Performed by: INTERNAL MEDICINE

## 2020-02-05 PROCEDURE — 93005 EKG 12-LEAD: ICD-10-PCS | Mod: HCNC,S$GLB,, | Performed by: INTERNAL MEDICINE

## 2020-02-05 PROCEDURE — 93010 EKG 12-LEAD: ICD-10-PCS | Mod: HCNC,S$GLB,, | Performed by: INTERNAL MEDICINE

## 2020-02-05 RX ORDER — FUROSEMIDE 20 MG/1
TABLET ORAL
Qty: 20 TABLET | Refills: 1 | Status: SHIPPED | OUTPATIENT
Start: 2020-02-05 | End: 2021-03-11

## 2020-02-05 NOTE — PROGRESS NOTES
Subjective:    Patient ID:  Good Murillo Jr. is a 89 y.o. male who presents for follow-up of Chest Pain      HPI   Mr. Murillo is a 89 year old male patient whose current medical conditions include DM type II, neuropathy, HTN, COPD, AAA, hyperlipidemia, HTN, and CKD who presents today for follow-up. Patient last seen in clinic by Dr. Goodwin in 8/17. He returns today and states he is doing ok. Complains of episodes of substernal chest discomfort. Hard to describe per patient. Comes and goes. Cannot identify any precipitating or alleviating factors. Not related to eating or exertion. Lasts a few minutes and then spontaneously resolves. Denies any nausea, diaphoresis, or radiation of pain. Reports chronic SOB in setting of COPD. Uses continuous oxygen, 2 L. Gets occasional leg swelling. No overt claudication, does have neuropathy. No near syncope, syncope, or falls. Uses a walker. BP stable for age. Patient is compliant with his medications. EKG today shows SR with occasional PAC's.       Review of Systems   Constitution: Negative for chills, decreased appetite, fever and malaise/fatigue.   HENT: Negative for congestion, hoarse voice and sore throat.    Eyes: Negative for blurred vision and discharge.   Cardiovascular: Positive for dyspnea on exertion. Negative for chest pain, claudication, cyanosis, irregular heartbeat, leg swelling, near-syncope, orthopnea, palpitations and paroxysmal nocturnal dyspnea.   Respiratory: Positive for shortness of breath and wheezing. Negative for cough, hemoptysis, snoring and sputum production.    Endocrine: Negative for cold intolerance and heat intolerance.   Hematologic/Lymphatic: Negative for bleeding problem. Does not bruise/bleed easily.   Skin: Negative for rash.   Musculoskeletal: Positive for arthritis and joint pain. Negative for back pain, joint swelling, muscle cramps, muscle weakness and myalgias.   Gastrointestinal: Negative for abdominal pain, constipation, diarrhea,  heartburn, melena and nausea.   Genitourinary: Negative for hematuria.   Neurological: Positive for numbness. Negative for dizziness, focal weakness, headaches, light-headedness, loss of balance, paresthesias, seizures and weakness.   Psychiatric/Behavioral: Negative for memory loss. The patient does not have insomnia.    Allergic/Immunologic: Negative for hives.     BP (!) 144/82 (BP Location: Left arm, Patient Position: Sitting, BP Method: Medium (Manual))   Wt 73.9 kg (163 lb)   BMI 25.15 kg/m²     Objective:    Physical Exam   Constitutional: He is oriented to person, place, and time. He appears well-developed and well-nourished. No distress.   On supplemental O2   HENT:   Head: Normocephalic and atraumatic.   Eyes: Pupils are equal, round, and reactive to light. Right eye exhibits no discharge. Left eye exhibits no discharge.   Neck: Neck supple. No JVD present.   Cardiovascular: Normal rate, regular rhythm, S1 normal, S2 normal and normal heart sounds.   No murmur heard.  Pulmonary/Chest: Effort normal and breath sounds normal. No respiratory distress. He has no wheezes. He has no rales.   Abdominal: Soft. He exhibits no distension.   Musculoskeletal: He exhibits no edema.   Neurological: He is alert and oriented to person, place, and time.   Skin: Skin is warm and dry. He is not diaphoretic. No erythema.   Psychiatric: He has a normal mood and affect. His behavior is normal. Thought content normal.   Nursing note and vitals reviewed.      Chemistry        Component Value Date/Time     01/24/2018 1426    K 4.7 01/24/2018 1426     01/24/2018 1426    CO2 29 01/24/2018 1426    BUN 25 (H) 01/24/2018 1426    CREATININE 1.5 (H) 01/24/2018 1426     (H) 01/24/2018 1426        Component Value Date/Time    CALCIUM 9.9 01/24/2018 1426    ALKPHOS 83 01/24/2018 1426    AST 11 01/24/2018 1426    ALT 8 (L) 01/24/2018 1426    BILITOT 0.1 01/24/2018 1426    ESTGFRAFRICA 46.7 (A) 01/24/2018 1426     EGFRNONAA 40.4 (A) 01/24/2018 1426        Lab Results   Component Value Date    CHOL 159 01/24/2018    CHOL 159 05/08/2017    CHOL 199 03/15/2017     Lab Results   Component Value Date    HDL 46 01/24/2018    HDL 40 05/08/2017    HDL 41 03/15/2017     Lab Results   Component Value Date    LDLCALC 84.2 01/24/2018    LDLCALC 97.6 05/08/2017    LDLCALC 136.2 03/15/2017     Lab Results   Component Value Date    TRIG 144 01/24/2018    TRIG 107 05/08/2017    TRIG 109 03/15/2017     Lab Results   Component Value Date    CHOLHDL 28.9 01/24/2018    CHOLHDL 25.2 05/08/2017    CHOLHDL 20.6 03/15/2017       Assessment:       1. Atypical chest pain    2. Abdominal aortic aneurysm (AAA) without rupture    3. Bilateral carotid artery stenosis    4. Atherosclerosis of native artery of both lower extremities, with unspecified presence of clinical manifestation    5. Hyperlipidemia associated with type 2 diabetes mellitus    6. Hypertension associated with diabetes    7. PVD (peripheral vascular disease)    8. Chronic kidney disease, stage 4 (severe)    9. History of prostate cancer    10. Pulmonary hypertension    11. Panlobular emphysema    12. Chronic respiratory failure with hypoxia      Patient presents for f/u. Doing well but having some atypical chest pain symptoms. Given history and risk factors, will check MPI stress test and 2D echo for further evaluation. Needs repeat abdominal U/S as well given history of AAA. Continue same cardiac meds. Ok with BP in 140 range given his age.   Plan:   -2D echo, MPI stress test, abdominal U/S due to AAA  -Continue current medical management and risk factor modification  -Cardiac, low salt diet  -Small dose of Lasix given, advised to use sparingly on as needed basis  -Follow-up TBA post-review of tests

## 2020-02-06 ENCOUNTER — CLINICAL SUPPORT (OUTPATIENT)
Dept: CARDIOLOGY | Facility: CLINIC | Age: 85
End: 2020-02-06
Attending: PHYSICIAN ASSISTANT
Payer: MEDICARE

## 2020-02-06 ENCOUNTER — HOSPITAL ENCOUNTER (OUTPATIENT)
Dept: RADIOLOGY | Facility: HOSPITAL | Age: 85
Discharge: HOME | End: 2020-02-06
Attending: PHYSICIAN ASSISTANT
Payer: MEDICARE

## 2020-02-06 DIAGNOSIS — I71.40 ABDOMINAL AORTIC ANEURYSM (AAA) WITHOUT RUPTURE: ICD-10-CM

## 2020-02-06 DIAGNOSIS — E11.59 HYPERTENSION ASSOCIATED WITH DIABETES: Chronic | ICD-10-CM

## 2020-02-06 DIAGNOSIS — I27.20 PULMONARY HYPERTENSION: ICD-10-CM

## 2020-02-06 DIAGNOSIS — I73.9 PVD (PERIPHERAL VASCULAR DISEASE): Chronic | ICD-10-CM

## 2020-02-06 DIAGNOSIS — I73.9 PVD (PERIPHERAL VASCULAR DISEASE): ICD-10-CM

## 2020-02-06 DIAGNOSIS — E78.5 HYPERLIPIDEMIA ASSOCIATED WITH TYPE 2 DIABETES MELLITUS: ICD-10-CM

## 2020-02-06 DIAGNOSIS — E78.5 HYPERLIPIDEMIA ASSOCIATED WITH TYPE 2 DIABETES MELLITUS: Chronic | ICD-10-CM

## 2020-02-06 DIAGNOSIS — I65.23 BILATERAL CAROTID ARTERY STENOSIS: Chronic | ICD-10-CM

## 2020-02-06 DIAGNOSIS — E11.69 HYPERLIPIDEMIA ASSOCIATED WITH TYPE 2 DIABETES MELLITUS: Chronic | ICD-10-CM

## 2020-02-06 DIAGNOSIS — I15.2 HYPERTENSION ASSOCIATED WITH DIABETES: ICD-10-CM

## 2020-02-06 DIAGNOSIS — I70.203 ATHEROSCLEROSIS OF NATIVE ARTERY OF BOTH LOWER EXTREMITIES, WITH UNSPECIFIED PRESENCE OF CLINICAL MANIFESTATION: ICD-10-CM

## 2020-02-06 DIAGNOSIS — I65.23 BILATERAL CAROTID ARTERY STENOSIS: ICD-10-CM

## 2020-02-06 DIAGNOSIS — E11.59 HYPERTENSION ASSOCIATED WITH DIABETES: ICD-10-CM

## 2020-02-06 DIAGNOSIS — R07.89 ATYPICAL CHEST PAIN: ICD-10-CM

## 2020-02-06 DIAGNOSIS — I15.2 HYPERTENSION ASSOCIATED WITH DIABETES: Chronic | ICD-10-CM

## 2020-02-06 DIAGNOSIS — N18.4 CHRONIC KIDNEY DISEASE, STAGE 4 (SEVERE): ICD-10-CM

## 2020-02-06 DIAGNOSIS — E11.69 HYPERLIPIDEMIA ASSOCIATED WITH TYPE 2 DIABETES MELLITUS: ICD-10-CM

## 2020-02-06 PROCEDURE — A9502 TC99M TETROFOSMIN: HCPCS | Mod: HCNC

## 2020-02-06 PROCEDURE — 93015 CV STRESS TEST SUPVJ I&R: CPT | Mod: HCNC,S$GLB,, | Performed by: INTERNAL MEDICINE

## 2020-02-06 PROCEDURE — 78452 NM MULTI PHARM STRESS CARDIAC COMPONENT: ICD-10-PCS | Mod: 26,HCNC,, | Performed by: INTERNAL MEDICINE

## 2020-02-06 PROCEDURE — 78452 HT MUSCLE IMAGE SPECT MULT: CPT | Mod: 26,HCNC,, | Performed by: INTERNAL MEDICINE

## 2020-02-06 PROCEDURE — 93015 NM MULTI PHARM STRESS CARDIAC COMPONENT: ICD-10-PCS | Mod: HCNC,S$GLB,, | Performed by: INTERNAL MEDICINE

## 2020-02-10 DIAGNOSIS — E11.69 HYPERLIPIDEMIA ASSOCIATED WITH TYPE 2 DIABETES MELLITUS: ICD-10-CM

## 2020-02-10 DIAGNOSIS — E78.5 HYPERLIPIDEMIA ASSOCIATED WITH TYPE 2 DIABETES MELLITUS: ICD-10-CM

## 2020-02-10 LAB — DIASTOLIC DYSFUNCTION: NO

## 2020-02-10 RX ORDER — PRAVASTATIN SODIUM 40 MG/1
TABLET ORAL
Qty: 90 TABLET | Refills: 1 | Status: SHIPPED | OUTPATIENT
Start: 2020-02-10 | End: 2020-11-09

## 2020-02-10 RX ORDER — METFORMIN HYDROCHLORIDE 1000 MG/1
TABLET ORAL
Qty: 180 TABLET | Refills: 1 | Status: SHIPPED | OUTPATIENT
Start: 2020-02-10 | End: 2020-11-09

## 2020-02-11 ENCOUNTER — TELEPHONE (OUTPATIENT)
Dept: RADIOLOGY | Facility: HOSPITAL | Age: 85
End: 2020-02-11

## 2020-02-11 ENCOUNTER — TELEPHONE (OUTPATIENT)
Dept: CARDIOLOGY | Facility: HOSPITAL | Age: 85
End: 2020-02-11

## 2020-02-11 NOTE — TELEPHONE ENCOUNTER
I left a voicemail for Mr Funk to call the office to review his test results and schedule a 3-4 month follow up appt with Dr Goodwin

## 2020-02-11 NOTE — TELEPHONE ENCOUNTER
Please phone patient. He passed his stress test.    Needs f/u with Dr. Goodwin in 3-4 months    Please arrange    THanks

## 2020-02-12 ENCOUNTER — HOSPITAL ENCOUNTER (OUTPATIENT)
Dept: RADIOLOGY | Facility: HOSPITAL | Age: 85
Discharge: HOME | End: 2020-02-12
Attending: PHYSICIAN ASSISTANT
Payer: MEDICARE

## 2020-02-12 DIAGNOSIS — I71.40 ABDOMINAL AORTIC ANEURYSM (AAA) WITHOUT RUPTURE: ICD-10-CM

## 2020-02-12 PROCEDURE — 76775 US ABDOMINAL AORTA: ICD-10-PCS | Mod: 26,HCNC,, | Performed by: RADIOLOGY

## 2020-02-12 PROCEDURE — 76775 US EXAM ABDO BACK WALL LIM: CPT | Mod: TC,HCNC

## 2020-02-12 PROCEDURE — 76775 US EXAM ABDO BACK WALL LIM: CPT | Mod: 26,HCNC,, | Performed by: RADIOLOGY

## 2020-02-28 ENCOUNTER — TELEPHONE (OUTPATIENT)
Dept: CARDIOLOGY | Facility: CLINIC | Age: 85
End: 2020-02-28

## 2020-02-28 NOTE — TELEPHONE ENCOUNTER
Please phone patient. AAA has grown slightly in size but overall stable.    Continue same mgmt. Needs 6 mo f/u appt scheduled with Dr. Goodwin or myself.    Please arrange    Thanks

## 2020-02-28 NOTE — TELEPHONE ENCOUNTER
The patient has been notified of this information and all questions answered.    A 6 month follow up has been scheduled with Dr Goodwin for 8/28/20 at [facility]

## 2020-03-31 RX ORDER — INSULIN LISPRO 100 [IU]/ML
INJECTION, SOLUTION INTRAVENOUS; SUBCUTANEOUS
Qty: 10 ML | Refills: 2 | Status: CANCELLED | OUTPATIENT
Start: 2020-03-31

## 2020-05-12 DIAGNOSIS — E11.40 TYPE 2 DIABETES, CONTROLLED, WITH NEUROPATHY: ICD-10-CM

## 2020-05-12 RX ORDER — CALCIUM CARBONATE 500 MG/1
TABLET, CHEWABLE ORAL
Qty: 90 TABLET | Refills: 1 | Status: SHIPPED | OUTPATIENT
Start: 2020-05-12 | End: 2021-02-08

## 2020-05-12 RX ORDER — ASPIRIN 81 MG/1
TABLET ORAL
Qty: 90 TABLET | Refills: 1 | Status: SHIPPED | OUTPATIENT
Start: 2020-05-12 | End: 2021-02-08

## 2020-05-12 RX ORDER — LEVOTHYROXINE SODIUM 25 UG/1
TABLET ORAL
Qty: 90 TABLET | Refills: 1 | Status: SHIPPED | OUTPATIENT
Start: 2020-05-12 | End: 2021-02-08

## 2020-05-12 RX ORDER — FERROUS SULFATE 325(65) MG
TABLET ORAL
Qty: 90 TABLET | Refills: 1 | Status: SHIPPED | OUTPATIENT
Start: 2020-05-12 | End: 2021-02-08

## 2020-05-12 RX ORDER — LORATADINE 10 MG/1
TABLET ORAL
Qty: 90 TABLET | Refills: 1 | Status: SHIPPED | OUTPATIENT
Start: 2020-05-12 | End: 2021-02-08

## 2020-05-12 RX ORDER — NATEGLINIDE 60 MG/1
TABLET ORAL
Qty: 270 TABLET | Refills: 1 | Status: SHIPPED | OUTPATIENT
Start: 2020-05-12 | End: 2021-02-08

## 2020-05-12 RX ORDER — INSULIN GLARGINE 100 [IU]/ML
INJECTION, SOLUTION SUBCUTANEOUS
Qty: 15 ML | Refills: 10 | Status: SHIPPED | OUTPATIENT
Start: 2020-05-12

## 2020-06-11 DIAGNOSIS — K92.1 BLOOD IN STOOL: ICD-10-CM

## 2020-06-11 RX ORDER — PANTOPRAZOLE SODIUM 40 MG/1
TABLET, DELAYED RELEASE ORAL
Qty: 90 TABLET | Refills: 1 | Status: SHIPPED | OUTPATIENT
Start: 2020-06-11 | End: 2021-03-15

## 2020-06-11 RX ORDER — GABAPENTIN 100 MG/1
CAPSULE ORAL
Qty: 180 CAPSULE | Refills: 1 | Status: SHIPPED | OUTPATIENT
Start: 2020-06-11 | End: 2021-03-15

## 2020-06-11 RX ORDER — ALENDRONATE SODIUM 70 MG/1
TABLET ORAL
Qty: 12 TABLET | Refills: 4 | Status: SHIPPED | OUTPATIENT
Start: 2020-06-11 | End: 2021-07-13 | Stop reason: SDUPTHER

## 2020-08-19 ENCOUNTER — PATIENT MESSAGE (OUTPATIENT)
Dept: INTERNAL MEDICINE | Facility: CLINIC | Age: 85
End: 2020-08-19

## 2020-08-21 RX ORDER — CALCIUM CITRATE/VITAMIN D3 200MG-6.25
TABLET ORAL
Qty: 300 STRIP | Refills: 3 | Status: SHIPPED | OUTPATIENT
Start: 2020-08-21

## 2020-09-11 ENCOUNTER — PES CALL (OUTPATIENT)
Dept: ADMINISTRATIVE | Facility: CLINIC | Age: 85
End: 2020-09-11

## 2020-09-28 ENCOUNTER — PATIENT MESSAGE (OUTPATIENT)
Dept: PULMONOLOGY | Facility: CLINIC | Age: 85
End: 2020-09-28

## 2020-09-30 ENCOUNTER — PES CALL (OUTPATIENT)
Dept: ADMINISTRATIVE | Facility: CLINIC | Age: 85
End: 2020-09-30

## 2020-10-08 RX ORDER — INSULIN ASPART 100 [IU]/ML
INJECTION, SOLUTION INTRAVENOUS; SUBCUTANEOUS
Qty: 15 ML | Refills: 10 | Status: SHIPPED | OUTPATIENT
Start: 2020-10-08

## 2020-11-06 DIAGNOSIS — E11.69 HYPERLIPIDEMIA ASSOCIATED WITH TYPE 2 DIABETES MELLITUS: ICD-10-CM

## 2020-11-06 DIAGNOSIS — E78.5 HYPERLIPIDEMIA ASSOCIATED WITH TYPE 2 DIABETES MELLITUS: ICD-10-CM

## 2020-11-09 RX ORDER — METFORMIN HYDROCHLORIDE 1000 MG/1
TABLET ORAL
Qty: 60 TABLET | Refills: 10 | Status: SHIPPED | OUTPATIENT
Start: 2020-11-09 | End: 2021-03-15

## 2020-11-09 RX ORDER — PRAVASTATIN SODIUM 40 MG/1
TABLET ORAL
Qty: 30 TABLET | Refills: 10 | Status: SHIPPED | OUTPATIENT
Start: 2020-11-09

## 2020-12-17 ENCOUNTER — PES CALL (OUTPATIENT)
Dept: ADMINISTRATIVE | Facility: CLINIC | Age: 85
End: 2020-12-17

## 2021-02-01 ENCOUNTER — TELEPHONE (OUTPATIENT)
Dept: PULMONOLOGY | Facility: CLINIC | Age: 86
End: 2021-02-01

## 2021-03-02 ENCOUNTER — PATIENT MESSAGE (OUTPATIENT)
Dept: INTERNAL MEDICINE | Facility: CLINIC | Age: 86
End: 2021-03-02

## 2021-03-10 DIAGNOSIS — E11.65 TYPE 2 DIABETES MELLITUS WITH HYPERGLYCEMIA, WITH LONG-TERM CURRENT USE OF INSULIN: ICD-10-CM

## 2021-03-10 DIAGNOSIS — Z79.4 TYPE 2 DIABETES MELLITUS WITH HYPERGLYCEMIA, WITH LONG-TERM CURRENT USE OF INSULIN: ICD-10-CM

## 2021-03-10 DIAGNOSIS — E11.42 TYPE 2 DIABETES MELLITUS WITH DIABETIC POLYNEUROPATHY, WITH LONG-TERM CURRENT USE OF INSULIN: Primary | ICD-10-CM

## 2021-03-10 DIAGNOSIS — Z79.4 TYPE 2 DIABETES MELLITUS WITH DIABETIC POLYNEUROPATHY, WITH LONG-TERM CURRENT USE OF INSULIN: Primary | ICD-10-CM

## 2021-03-11 ENCOUNTER — OFFICE VISIT (OUTPATIENT)
Dept: INTERNAL MEDICINE | Facility: CLINIC | Age: 86
End: 2021-03-11
Payer: MEDICARE

## 2021-03-11 ENCOUNTER — LAB VISIT (OUTPATIENT)
Dept: LAB | Facility: HOSPITAL | Age: 86
End: 2021-03-11
Attending: FAMILY MEDICINE
Payer: MEDICARE

## 2021-03-11 VITALS
HEART RATE: 98 BPM | HEIGHT: 68 IN | OXYGEN SATURATION: 96 % | BODY MASS INDEX: 23.36 KG/M2 | SYSTOLIC BLOOD PRESSURE: 124 MMHG | WEIGHT: 154.13 LBS | DIASTOLIC BLOOD PRESSURE: 70 MMHG | TEMPERATURE: 99 F

## 2021-03-11 DIAGNOSIS — D50.9 IRON DEFICIENCY ANEMIA, UNSPECIFIED IRON DEFICIENCY ANEMIA TYPE: ICD-10-CM

## 2021-03-11 DIAGNOSIS — E78.5 HYPERLIPIDEMIA ASSOCIATED WITH TYPE 2 DIABETES MELLITUS: ICD-10-CM

## 2021-03-11 DIAGNOSIS — E11.59 HYPERTENSION ASSOCIATED WITH DIABETES: ICD-10-CM

## 2021-03-11 DIAGNOSIS — E11.69 HYPERLIPIDEMIA ASSOCIATED WITH TYPE 2 DIABETES MELLITUS: ICD-10-CM

## 2021-03-11 DIAGNOSIS — Z79.4 TYPE 2 DIABETES MELLITUS WITH DIABETIC POLYNEUROPATHY, WITH LONG-TERM CURRENT USE OF INSULIN: Primary | ICD-10-CM

## 2021-03-11 DIAGNOSIS — Z79.4 TYPE 2 DIABETES MELLITUS WITH DIABETIC POLYNEUROPATHY, WITH LONG-TERM CURRENT USE OF INSULIN: ICD-10-CM

## 2021-03-11 DIAGNOSIS — E11.42 TYPE 2 DIABETES MELLITUS WITH DIABETIC POLYNEUROPATHY, WITH LONG-TERM CURRENT USE OF INSULIN: ICD-10-CM

## 2021-03-11 DIAGNOSIS — E11.65 TYPE 2 DIABETES MELLITUS WITH HYPERGLYCEMIA, WITH LONG-TERM CURRENT USE OF INSULIN: ICD-10-CM

## 2021-03-11 DIAGNOSIS — N18.4 CHRONIC KIDNEY DISEASE, STAGE 4 (SEVERE): ICD-10-CM

## 2021-03-11 DIAGNOSIS — E03.4 HYPOTHYROIDISM DUE TO ACQUIRED ATROPHY OF THYROID: ICD-10-CM

## 2021-03-11 DIAGNOSIS — I15.2 HYPERTENSION ASSOCIATED WITH DIABETES: ICD-10-CM

## 2021-03-11 DIAGNOSIS — Z79.4 TYPE 2 DIABETES MELLITUS WITH HYPERGLYCEMIA, WITH LONG-TERM CURRENT USE OF INSULIN: ICD-10-CM

## 2021-03-11 DIAGNOSIS — E11.42 TYPE 2 DIABETES MELLITUS WITH DIABETIC POLYNEUROPATHY, WITH LONG-TERM CURRENT USE OF INSULIN: Primary | ICD-10-CM

## 2021-03-11 LAB
ALBUMIN SERPL BCP-MCNC: 3.8 G/DL (ref 3.5–5.2)
ALP SERPL-CCNC: 64 U/L (ref 55–135)
ALT SERPL W/O P-5'-P-CCNC: 9 U/L (ref 10–44)
ANION GAP SERPL CALC-SCNC: 11 MMOL/L (ref 8–16)
AST SERPL-CCNC: 12 U/L (ref 10–40)
BILIRUB SERPL-MCNC: 0.3 MG/DL (ref 0.1–1)
BUN SERPL-MCNC: 23 MG/DL (ref 10–30)
CALCIUM SERPL-MCNC: 9.4 MG/DL (ref 8.7–10.5)
CHLORIDE SERPL-SCNC: 103 MMOL/L (ref 95–110)
CHOLEST SERPL-MCNC: 153 MG/DL (ref 120–199)
CHOLEST/HDLC SERPL: 4.3 {RATIO} (ref 2–5)
CO2 SERPL-SCNC: 27 MMOL/L (ref 23–29)
CREAT SERPL-MCNC: 1.4 MG/DL (ref 0.5–1.4)
ERYTHROCYTE [DISTWIDTH] IN BLOOD BY AUTOMATED COUNT: 13.9 % (ref 11.5–14.5)
EST. GFR  (AFRICAN AMERICAN): 49.7 ML/MIN/1.73 M^2
EST. GFR  (NON AFRICAN AMERICAN): 43 ML/MIN/1.73 M^2
GLUCOSE SERPL-MCNC: 260 MG/DL (ref 70–110)
HCT VFR BLD AUTO: 36.6 % (ref 40–54)
HDLC SERPL-MCNC: 36 MG/DL (ref 40–75)
HDLC SERPL: 23.5 % (ref 20–50)
HGB BLD-MCNC: 11.7 G/DL (ref 14–18)
LDLC SERPL CALC-MCNC: 75.4 MG/DL (ref 63–159)
MCH RBC QN AUTO: 31.2 PG (ref 27–31)
MCHC RBC AUTO-ENTMCNC: 32 G/DL (ref 32–36)
MCV RBC AUTO: 98 FL (ref 82–98)
NONHDLC SERPL-MCNC: 117 MG/DL
PLATELET # BLD AUTO: 367 K/UL (ref 150–350)
PMV BLD AUTO: 11.5 FL (ref 9.2–12.9)
POTASSIUM SERPL-SCNC: 4.4 MMOL/L (ref 3.5–5.1)
PROT SERPL-MCNC: 7.2 G/DL (ref 6–8.4)
RBC # BLD AUTO: 3.75 M/UL (ref 4.6–6.2)
SODIUM SERPL-SCNC: 141 MMOL/L (ref 136–145)
TRIGL SERPL-MCNC: 208 MG/DL (ref 30–150)
TSH SERPL DL<=0.005 MIU/L-ACNC: 3.29 UIU/ML (ref 0.4–4)
WBC # BLD AUTO: 8.78 K/UL (ref 3.9–12.7)

## 2021-03-11 PROCEDURE — 3288F PR FALLS RISK ASSESSMENT DOCUMENTED: ICD-10-PCS | Mod: CPTII,S$GLB,, | Performed by: FAMILY MEDICINE

## 2021-03-11 PROCEDURE — 1126F PR PAIN SEVERITY QUANTIFIED, NO PAIN PRESENT: ICD-10-PCS | Mod: S$GLB,,, | Performed by: FAMILY MEDICINE

## 2021-03-11 PROCEDURE — 1101F PR PT FALLS ASSESS DOC 0-1 FALLS W/OUT INJ PAST YR: ICD-10-PCS | Mod: CPTII,S$GLB,, | Performed by: FAMILY MEDICINE

## 2021-03-11 PROCEDURE — 80053 COMPREHEN METABOLIC PANEL: CPT | Performed by: FAMILY MEDICINE

## 2021-03-11 PROCEDURE — 1101F PT FALLS ASSESS-DOCD LE1/YR: CPT | Mod: CPTII,S$GLB,, | Performed by: FAMILY MEDICINE

## 2021-03-11 PROCEDURE — 99999 PR PBB SHADOW E&M-EST. PATIENT-LVL III: CPT | Mod: PBBFAC,,, | Performed by: FAMILY MEDICINE

## 2021-03-11 PROCEDURE — 1159F PR MEDICATION LIST DOCUMENTED IN MEDICAL RECORD: ICD-10-PCS | Mod: S$GLB,,, | Performed by: FAMILY MEDICINE

## 2021-03-11 PROCEDURE — 3288F FALL RISK ASSESSMENT DOCD: CPT | Mod: CPTII,S$GLB,, | Performed by: FAMILY MEDICINE

## 2021-03-11 PROCEDURE — 1126F AMNT PAIN NOTED NONE PRSNT: CPT | Mod: S$GLB,,, | Performed by: FAMILY MEDICINE

## 2021-03-11 PROCEDURE — 36415 COLL VENOUS BLD VENIPUNCTURE: CPT | Performed by: FAMILY MEDICINE

## 2021-03-11 PROCEDURE — 99214 PR OFFICE/OUTPT VISIT, EST, LEVL IV, 30-39 MIN: ICD-10-PCS | Mod: S$GLB,,, | Performed by: FAMILY MEDICINE

## 2021-03-11 PROCEDURE — 80061 LIPID PANEL: CPT | Performed by: FAMILY MEDICINE

## 2021-03-11 PROCEDURE — 83036 HEMOGLOBIN GLYCOSYLATED A1C: CPT | Performed by: FAMILY MEDICINE

## 2021-03-11 PROCEDURE — 99214 OFFICE O/P EST MOD 30 MIN: CPT | Mod: S$GLB,,, | Performed by: FAMILY MEDICINE

## 2021-03-11 PROCEDURE — 99499 RISK ADDL DX/OHS AUDIT: ICD-10-PCS | Mod: S$GLB,,, | Performed by: FAMILY MEDICINE

## 2021-03-11 PROCEDURE — 99499 UNLISTED E&M SERVICE: CPT | Mod: S$GLB,,, | Performed by: FAMILY MEDICINE

## 2021-03-11 PROCEDURE — 85027 COMPLETE CBC AUTOMATED: CPT | Performed by: FAMILY MEDICINE

## 2021-03-11 PROCEDURE — 1157F ADVNC CARE PLAN IN RCRD: CPT | Mod: S$GLB,,, | Performed by: FAMILY MEDICINE

## 2021-03-11 PROCEDURE — 1157F PR ADVANCE CARE PLAN OR EQUIV PRESENT IN MEDICAL RECORD: ICD-10-PCS | Mod: S$GLB,,, | Performed by: FAMILY MEDICINE

## 2021-03-11 PROCEDURE — 84443 ASSAY THYROID STIM HORMONE: CPT | Performed by: FAMILY MEDICINE

## 2021-03-11 PROCEDURE — 1159F MED LIST DOCD IN RCRD: CPT | Mod: S$GLB,,, | Performed by: FAMILY MEDICINE

## 2021-03-11 PROCEDURE — 99999 PR PBB SHADOW E&M-EST. PATIENT-LVL III: ICD-10-PCS | Mod: PBBFAC,,, | Performed by: FAMILY MEDICINE

## 2021-03-12 ENCOUNTER — PATIENT MESSAGE (OUTPATIENT)
Dept: INTERNAL MEDICINE | Facility: CLINIC | Age: 86
End: 2021-03-12

## 2021-03-12 LAB
ESTIMATED AVG GLUCOSE: 237 MG/DL (ref 68–131)
HBA1C MFR BLD: 9.9 % (ref 4–5.6)

## 2021-03-15 DIAGNOSIS — E11.65 TYPE 2 DIABETES MELLITUS WITH HYPERGLYCEMIA, WITH LONG-TERM CURRENT USE OF INSULIN: ICD-10-CM

## 2021-03-15 DIAGNOSIS — K92.1 BLOOD IN STOOL: ICD-10-CM

## 2021-03-15 DIAGNOSIS — Z79.4 TYPE 2 DIABETES MELLITUS WITH DIABETIC POLYNEUROPATHY, WITH LONG-TERM CURRENT USE OF INSULIN: Primary | ICD-10-CM

## 2021-03-15 DIAGNOSIS — Z79.4 TYPE 2 DIABETES MELLITUS WITH HYPERGLYCEMIA, WITH LONG-TERM CURRENT USE OF INSULIN: ICD-10-CM

## 2021-03-15 DIAGNOSIS — E11.42 TYPE 2 DIABETES MELLITUS WITH DIABETIC POLYNEUROPATHY, WITH LONG-TERM CURRENT USE OF INSULIN: Primary | ICD-10-CM

## 2021-03-15 DIAGNOSIS — K21.9 GASTROESOPHAGEAL REFLUX DISEASE WITHOUT ESOPHAGITIS: ICD-10-CM

## 2021-03-15 RX ORDER — GABAPENTIN 100 MG/1
100 CAPSULE ORAL 2 TIMES DAILY
Qty: 60 CAPSULE | Refills: 11 | Status: SHIPPED | OUTPATIENT
Start: 2021-03-15 | End: 2022-03-15

## 2021-03-15 RX ORDER — SEMAGLUTIDE 1.34 MG/ML
0.25 INJECTION, SOLUTION SUBCUTANEOUS
Qty: 1 SYRINGE | Refills: 11 | Status: SHIPPED | OUTPATIENT
Start: 2021-03-15 | End: 2021-06-09

## 2021-03-15 RX ORDER — PANTOPRAZOLE SODIUM 40 MG/1
40 TABLET, DELAYED RELEASE ORAL DAILY
Qty: 30 TABLET | Refills: 11 | Status: SHIPPED | OUTPATIENT
Start: 2021-03-15 | End: 2022-03-15

## 2021-03-17 ENCOUNTER — PES CALL (OUTPATIENT)
Dept: ADMINISTRATIVE | Facility: CLINIC | Age: 86
End: 2021-03-17

## 2021-03-18 ENCOUNTER — TELEPHONE (OUTPATIENT)
Dept: INTERNAL MEDICINE | Facility: CLINIC | Age: 86
End: 2021-03-18

## 2021-03-23 ENCOUNTER — TELEPHONE (OUTPATIENT)
Dept: PULMONOLOGY | Facility: CLINIC | Age: 86
End: 2021-03-23

## 2021-03-27 ENCOUNTER — PES CALL (OUTPATIENT)
Dept: ADMINISTRATIVE | Facility: CLINIC | Age: 86
End: 2021-03-27

## 2021-04-02 ENCOUNTER — PATIENT MESSAGE (OUTPATIENT)
Dept: INTERNAL MEDICINE | Facility: CLINIC | Age: 86
End: 2021-04-02

## 2021-04-02 ENCOUNTER — PATIENT MESSAGE (OUTPATIENT)
Dept: DIABETES | Facility: CLINIC | Age: 86
End: 2021-04-02

## 2021-04-08 DIAGNOSIS — E11.65 TYPE 2 DIABETES MELLITUS WITH HYPERGLYCEMIA, WITH LONG-TERM CURRENT USE OF INSULIN: ICD-10-CM

## 2021-04-08 DIAGNOSIS — E11.42 TYPE 2 DIABETES MELLITUS WITH DIABETIC POLYNEUROPATHY, WITH LONG-TERM CURRENT USE OF INSULIN: Primary | ICD-10-CM

## 2021-04-08 DIAGNOSIS — Z79.4 TYPE 2 DIABETES MELLITUS WITH HYPERGLYCEMIA, WITH LONG-TERM CURRENT USE OF INSULIN: ICD-10-CM

## 2021-04-08 DIAGNOSIS — Z79.4 TYPE 2 DIABETES MELLITUS WITH DIABETIC POLYNEUROPATHY, WITH LONG-TERM CURRENT USE OF INSULIN: Primary | ICD-10-CM

## 2021-04-12 RX ORDER — FLASH GLUCOSE SCANNING READER
EACH MISCELLANEOUS
Qty: 6 EACH | Refills: 3 | Status: SHIPPED | OUTPATIENT
Start: 2021-04-12

## 2021-04-12 RX ORDER — CALCIUM CITRATE/VITAMIN D3 200MG-6.25
TABLET ORAL
Qty: 300 STRIP | Refills: 3 | Status: CANCELLED | OUTPATIENT
Start: 2021-04-12

## 2021-04-12 RX ORDER — FLASH GLUCOSE SENSOR
KIT MISCELLANEOUS
Qty: 1 KIT | Refills: 0 | Status: SHIPPED | OUTPATIENT
Start: 2021-04-12

## 2021-04-20 PROBLEM — N18.32 STAGE 3B CHRONIC KIDNEY DISEASE: Status: ACTIVE | Noted: 2020-01-08

## 2021-04-20 PROBLEM — K21.9 GASTROESOPHAGEAL REFLUX DISEASE WITHOUT ESOPHAGITIS: Status: ACTIVE | Noted: 2021-04-20

## 2021-04-21 ENCOUNTER — TELEPHONE (OUTPATIENT)
Dept: DIABETES | Facility: CLINIC | Age: 86
End: 2021-04-21

## 2021-04-21 ENCOUNTER — PATIENT MESSAGE (OUTPATIENT)
Dept: OPHTHALMOLOGY | Facility: CLINIC | Age: 86
End: 2021-04-21

## 2021-04-21 ENCOUNTER — OFFICE VISIT (OUTPATIENT)
Dept: INTERNAL MEDICINE | Facility: CLINIC | Age: 86
End: 2021-04-21
Payer: MEDICARE

## 2021-04-21 VITALS
DIASTOLIC BLOOD PRESSURE: 68 MMHG | WEIGHT: 155 LBS | HEIGHT: 68 IN | BODY MASS INDEX: 23.49 KG/M2 | SYSTOLIC BLOOD PRESSURE: 120 MMHG

## 2021-04-21 DIAGNOSIS — I15.2 HYPERTENSION ASSOCIATED WITH DIABETES: Chronic | ICD-10-CM

## 2021-04-21 DIAGNOSIS — I70.203 ATHEROSCLEROSIS OF NATIVE ARTERY OF BOTH LOWER EXTREMITIES, WITH UNSPECIFIED PRESENCE OF CLINICAL MANIFESTATION: ICD-10-CM

## 2021-04-21 DIAGNOSIS — N18.32 STAGE 3B CHRONIC KIDNEY DISEASE: ICD-10-CM

## 2021-04-21 DIAGNOSIS — Z79.4 TYPE 2 DIABETES MELLITUS WITH DIABETIC POLYNEUROPATHY, WITH LONG-TERM CURRENT USE OF INSULIN: ICD-10-CM

## 2021-04-21 DIAGNOSIS — E11.69 HYPERLIPIDEMIA ASSOCIATED WITH TYPE 2 DIABETES MELLITUS: Chronic | ICD-10-CM

## 2021-04-21 DIAGNOSIS — J96.11 CHRONIC RESPIRATORY FAILURE WITH HYPOXIA: ICD-10-CM

## 2021-04-21 DIAGNOSIS — I65.23 BILATERAL CAROTID ARTERY STENOSIS: Chronic | ICD-10-CM

## 2021-04-21 DIAGNOSIS — Z99.89 DEPENDENCE ON OTHER ENABLING MACHINES AND DEVICES: ICD-10-CM

## 2021-04-21 DIAGNOSIS — C61 PROSTATE CA: ICD-10-CM

## 2021-04-21 DIAGNOSIS — H40.013 OPEN ANGLE WITH BORDERLINE FINDINGS, LOW RISK, BILATERAL: ICD-10-CM

## 2021-04-21 DIAGNOSIS — Z91.89 POTENTIAL FOR COGNITIVE IMPAIRMENT: ICD-10-CM

## 2021-04-21 DIAGNOSIS — Z99.81 DEPENDENCE ON SUPPLEMENTAL OXYGEN: ICD-10-CM

## 2021-04-21 DIAGNOSIS — J43.1 PANLOBULAR EMPHYSEMA: Chronic | ICD-10-CM

## 2021-04-21 DIAGNOSIS — I27.20 PULMONARY HYPERTENSION: ICD-10-CM

## 2021-04-21 DIAGNOSIS — E53.8 B12 DEFICIENCY: ICD-10-CM

## 2021-04-21 DIAGNOSIS — K21.9 GASTROESOPHAGEAL REFLUX DISEASE WITHOUT ESOPHAGITIS: ICD-10-CM

## 2021-04-21 DIAGNOSIS — D50.9 IRON DEFICIENCY ANEMIA, UNSPECIFIED IRON DEFICIENCY ANEMIA TYPE: Chronic | ICD-10-CM

## 2021-04-21 DIAGNOSIS — E11.42 TYPE 2 DIABETES MELLITUS WITH DIABETIC POLYNEUROPATHY, WITH LONG-TERM CURRENT USE OF INSULIN: ICD-10-CM

## 2021-04-21 DIAGNOSIS — M54.50 LUMBAR PAIN: ICD-10-CM

## 2021-04-21 DIAGNOSIS — D64.9 NORMOCYTIC ANEMIA: ICD-10-CM

## 2021-04-21 DIAGNOSIS — I70.0 CALCIFICATION OF AORTA: Chronic | ICD-10-CM

## 2021-04-21 DIAGNOSIS — I73.9 PVD (PERIPHERAL VASCULAR DISEASE): Chronic | ICD-10-CM

## 2021-04-21 DIAGNOSIS — I71.40 ABDOMINAL AORTIC ANEURYSM (AAA) WITHOUT RUPTURE: ICD-10-CM

## 2021-04-21 DIAGNOSIS — R26.9 ABNORMALITY OF GAIT AND MOBILITY: ICD-10-CM

## 2021-04-21 DIAGNOSIS — Z00.00 ENCOUNTER FOR PREVENTIVE HEALTH EXAMINATION: Primary | ICD-10-CM

## 2021-04-21 DIAGNOSIS — E03.4 HYPOTHYROIDISM DUE TO ACQUIRED ATROPHY OF THYROID: Chronic | ICD-10-CM

## 2021-04-21 DIAGNOSIS — M81.0 AGE-RELATED OSTEOPOROSIS WITHOUT CURRENT PATHOLOGICAL FRACTURE: Chronic | ICD-10-CM

## 2021-04-21 DIAGNOSIS — E78.5 HYPERLIPIDEMIA ASSOCIATED WITH TYPE 2 DIABETES MELLITUS: Chronic | ICD-10-CM

## 2021-04-21 DIAGNOSIS — E11.59 HYPERTENSION ASSOCIATED WITH DIABETES: Chronic | ICD-10-CM

## 2021-04-21 PROCEDURE — 1126F AMNT PAIN NOTED NONE PRSNT: CPT | Mod: S$GLB,,, | Performed by: PHYSICIAN ASSISTANT

## 2021-04-21 PROCEDURE — 1101F PT FALLS ASSESS-DOCD LE1/YR: CPT | Mod: CPTII,S$GLB,, | Performed by: PHYSICIAN ASSISTANT

## 2021-04-21 PROCEDURE — 99499 RISK ADDL DX/OHS AUDIT: ICD-10-PCS | Mod: S$GLB,,, | Performed by: PHYSICIAN ASSISTANT

## 2021-04-21 PROCEDURE — 99999 PR PBB SHADOW E&M-EST. PATIENT-LVL III: ICD-10-PCS | Mod: PBBFAC,,, | Performed by: PHYSICIAN ASSISTANT

## 2021-04-21 PROCEDURE — 99999 PR PBB SHADOW E&M-EST. PATIENT-LVL III: CPT | Mod: PBBFAC,,, | Performed by: PHYSICIAN ASSISTANT

## 2021-04-21 PROCEDURE — 3288F PR FALLS RISK ASSESSMENT DOCUMENTED: ICD-10-PCS | Mod: CPTII,S$GLB,, | Performed by: PHYSICIAN ASSISTANT

## 2021-04-21 PROCEDURE — 99499 UNLISTED E&M SERVICE: CPT | Mod: S$GLB,,, | Performed by: PHYSICIAN ASSISTANT

## 2021-04-21 PROCEDURE — 1157F PR ADVANCE CARE PLAN OR EQUIV PRESENT IN MEDICAL RECORD: ICD-10-PCS | Mod: S$GLB,,, | Performed by: PHYSICIAN ASSISTANT

## 2021-04-21 PROCEDURE — 1126F PR PAIN SEVERITY QUANTIFIED, NO PAIN PRESENT: ICD-10-PCS | Mod: S$GLB,,, | Performed by: PHYSICIAN ASSISTANT

## 2021-04-21 PROCEDURE — 1101F PR PT FALLS ASSESS DOC 0-1 FALLS W/OUT INJ PAST YR: ICD-10-PCS | Mod: CPTII,S$GLB,, | Performed by: PHYSICIAN ASSISTANT

## 2021-04-21 PROCEDURE — 1157F ADVNC CARE PLAN IN RCRD: CPT | Mod: S$GLB,,, | Performed by: PHYSICIAN ASSISTANT

## 2021-04-21 PROCEDURE — 3288F FALL RISK ASSESSMENT DOCD: CPT | Mod: CPTII,S$GLB,, | Performed by: PHYSICIAN ASSISTANT

## 2021-04-21 PROCEDURE — G0439 PR MEDICARE ANNUAL WELLNESS SUBSEQUENT VISIT: ICD-10-PCS | Mod: S$GLB,,, | Performed by: PHYSICIAN ASSISTANT

## 2021-04-21 PROCEDURE — G0439 PPPS, SUBSEQ VISIT: HCPCS | Mod: S$GLB,,, | Performed by: PHYSICIAN ASSISTANT

## 2021-04-22 ENCOUNTER — TELEPHONE (OUTPATIENT)
Dept: INTERNAL MEDICINE | Facility: CLINIC | Age: 86
End: 2021-04-22

## 2021-04-29 ENCOUNTER — PATIENT OUTREACH (OUTPATIENT)
Dept: ADMINISTRATIVE | Facility: OTHER | Age: 86
End: 2021-04-29

## 2021-05-24 ENCOUNTER — PATIENT MESSAGE (OUTPATIENT)
Dept: DIABETES | Facility: CLINIC | Age: 86
End: 2021-05-24

## 2021-05-24 DIAGNOSIS — E11.42 TYPE 2 DIABETES MELLITUS WITH DIABETIC POLYNEUROPATHY, WITH LONG-TERM CURRENT USE OF INSULIN: Primary | ICD-10-CM

## 2021-05-24 DIAGNOSIS — Z79.4 TYPE 2 DIABETES MELLITUS WITH DIABETIC POLYNEUROPATHY, WITH LONG-TERM CURRENT USE OF INSULIN: Primary | ICD-10-CM

## 2021-06-01 ENCOUNTER — PATIENT MESSAGE (OUTPATIENT)
Dept: DIABETES | Facility: CLINIC | Age: 86
End: 2021-06-01

## 2021-06-03 ENCOUNTER — PATIENT OUTREACH (OUTPATIENT)
Dept: ADMINISTRATIVE | Facility: OTHER | Age: 86
End: 2021-06-03

## 2021-06-04 ENCOUNTER — OFFICE VISIT (OUTPATIENT)
Dept: OPHTHALMOLOGY | Facility: CLINIC | Age: 86
End: 2021-06-04
Payer: MEDICARE

## 2021-06-04 DIAGNOSIS — H40.013 OPEN ANGLE WITH BORDERLINE FINDINGS, LOW RISK, BILATERAL: Primary | ICD-10-CM

## 2021-06-04 DIAGNOSIS — S05.02XA ABRASION OF LEFT CORNEA, INITIAL ENCOUNTER: ICD-10-CM

## 2021-06-04 DIAGNOSIS — Z96.1 PSEUDOPHAKIA: ICD-10-CM

## 2021-06-04 PROCEDURE — 1157F ADVNC CARE PLAN IN RCRD: CPT | Mod: S$GLB,,, | Performed by: OPHTHALMOLOGY

## 2021-06-04 PROCEDURE — 99202 PR OFFICE/OUTPT VISIT, NEW, LEVL II, 15-29 MIN: ICD-10-PCS | Mod: S$GLB,,, | Performed by: OPHTHALMOLOGY

## 2021-06-04 PROCEDURE — 92133 POSTERIOR SEGMENT OCT OPTIC NERVE(OCULAR COHERENCE TOMOGRAPHY) - OU - BOTH EYES: ICD-10-PCS | Mod: S$GLB,,, | Performed by: OPHTHALMOLOGY

## 2021-06-04 PROCEDURE — 99202 OFFICE O/P NEW SF 15 MIN: CPT | Mod: S$GLB,,, | Performed by: OPHTHALMOLOGY

## 2021-06-04 PROCEDURE — 1159F MED LIST DOCD IN RCRD: CPT | Mod: S$GLB,,, | Performed by: OPHTHALMOLOGY

## 2021-06-04 PROCEDURE — 99999 PR PBB SHADOW E&M-EST. PATIENT-LVL III: CPT | Mod: PBBFAC,,, | Performed by: OPHTHALMOLOGY

## 2021-06-04 PROCEDURE — 92133 CPTRZD OPH DX IMG PST SGM ON: CPT | Mod: S$GLB,,, | Performed by: OPHTHALMOLOGY

## 2021-06-04 PROCEDURE — 99999 PR PBB SHADOW E&M-EST. PATIENT-LVL III: ICD-10-PCS | Mod: PBBFAC,,, | Performed by: OPHTHALMOLOGY

## 2021-06-04 PROCEDURE — 1157F PR ADVANCE CARE PLAN OR EQUIV PRESENT IN MEDICAL RECORD: ICD-10-PCS | Mod: S$GLB,,, | Performed by: OPHTHALMOLOGY

## 2021-06-04 PROCEDURE — 1159F PR MEDICATION LIST DOCUMENTED IN MEDICAL RECORD: ICD-10-PCS | Mod: S$GLB,,, | Performed by: OPHTHALMOLOGY

## 2021-06-09 ENCOUNTER — LAB VISIT (OUTPATIENT)
Dept: LAB | Facility: HOSPITAL | Age: 86
End: 2021-06-09
Attending: PHYSICIAN ASSISTANT
Payer: MEDICARE

## 2021-06-09 ENCOUNTER — PATIENT MESSAGE (OUTPATIENT)
Dept: DIABETES | Facility: CLINIC | Age: 86
End: 2021-06-09

## 2021-06-09 ENCOUNTER — TELEPHONE (OUTPATIENT)
Dept: DIABETES | Facility: CLINIC | Age: 86
End: 2021-06-09

## 2021-06-09 ENCOUNTER — OFFICE VISIT (OUTPATIENT)
Dept: DIABETES | Facility: CLINIC | Age: 86
End: 2021-06-09
Payer: MEDICARE

## 2021-06-09 VITALS
DIASTOLIC BLOOD PRESSURE: 82 MMHG | HEART RATE: 91 BPM | WEIGHT: 156.75 LBS | BODY MASS INDEX: 23.76 KG/M2 | HEIGHT: 68 IN | SYSTOLIC BLOOD PRESSURE: 173 MMHG | RESPIRATION RATE: 18 BRPM

## 2021-06-09 DIAGNOSIS — E11.42 DIABETIC POLYNEUROPATHY ASSOCIATED WITH TYPE 2 DIABETES MELLITUS: ICD-10-CM

## 2021-06-09 DIAGNOSIS — E11.42 TYPE 2 DIABETES MELLITUS WITH DIABETIC POLYNEUROPATHY, WITH LONG-TERM CURRENT USE OF INSULIN: ICD-10-CM

## 2021-06-09 DIAGNOSIS — I15.2 HYPERTENSION ASSOCIATED WITH DIABETES: ICD-10-CM

## 2021-06-09 DIAGNOSIS — E03.4 HYPOTHYROIDISM DUE TO ACQUIRED ATROPHY OF THYROID: ICD-10-CM

## 2021-06-09 DIAGNOSIS — Z79.4 TYPE 2 DIABETES MELLITUS WITH DIABETIC POLYNEUROPATHY, WITH LONG-TERM CURRENT USE OF INSULIN: ICD-10-CM

## 2021-06-09 DIAGNOSIS — Z79.4 TYPE 2 DIABETES MELLITUS WITH HYPERGLYCEMIA, WITH LONG-TERM CURRENT USE OF INSULIN: Primary | ICD-10-CM

## 2021-06-09 DIAGNOSIS — M81.0 AGE-RELATED OSTEOPOROSIS WITHOUT CURRENT PATHOLOGICAL FRACTURE: ICD-10-CM

## 2021-06-09 DIAGNOSIS — N18.32 STAGE 3B CHRONIC KIDNEY DISEASE: ICD-10-CM

## 2021-06-09 DIAGNOSIS — J44.9 CHRONIC OBSTRUCTIVE PULMONARY DISEASE, UNSPECIFIED COPD TYPE: ICD-10-CM

## 2021-06-09 DIAGNOSIS — E78.5 HYPERLIPIDEMIA ASSOCIATED WITH TYPE 2 DIABETES MELLITUS: ICD-10-CM

## 2021-06-09 DIAGNOSIS — I73.9 PVD (PERIPHERAL VASCULAR DISEASE): ICD-10-CM

## 2021-06-09 DIAGNOSIS — Z85.46 HISTORY OF PROSTATE CANCER: ICD-10-CM

## 2021-06-09 DIAGNOSIS — E11.65 TYPE 2 DIABETES MELLITUS WITH HYPERGLYCEMIA, WITH LONG-TERM CURRENT USE OF INSULIN: Primary | ICD-10-CM

## 2021-06-09 DIAGNOSIS — E11.59 HYPERTENSION ASSOCIATED WITH DIABETES: ICD-10-CM

## 2021-06-09 DIAGNOSIS — E11.69 HYPERLIPIDEMIA ASSOCIATED WITH TYPE 2 DIABETES MELLITUS: ICD-10-CM

## 2021-06-09 DIAGNOSIS — R60.0 BILATERAL LOWER EXTREMITY EDEMA: ICD-10-CM

## 2021-06-09 DIAGNOSIS — E53.8 B12 DEFICIENCY: ICD-10-CM

## 2021-06-09 LAB
GLUCOSE SERPL-MCNC: 193 MG/DL (ref 70–110)
GLUCOSE SERPL-MCNC: 223 MG/DL (ref 70–110)

## 2021-06-09 PROCEDURE — 3288F FALL RISK ASSESSMENT DOCD: CPT | Mod: CPTII,S$GLB,, | Performed by: PHYSICIAN ASSISTANT

## 2021-06-09 PROCEDURE — 99999 PR PBB SHADOW E&M-EST. PATIENT-LVL V: CPT | Mod: PBBFAC,,, | Performed by: PHYSICIAN ASSISTANT

## 2021-06-09 PROCEDURE — 1126F AMNT PAIN NOTED NONE PRSNT: CPT | Mod: S$GLB,,, | Performed by: PHYSICIAN ASSISTANT

## 2021-06-09 PROCEDURE — 3288F PR FALLS RISK ASSESSMENT DOCUMENTED: ICD-10-PCS | Mod: CPTII,S$GLB,, | Performed by: PHYSICIAN ASSISTANT

## 2021-06-09 PROCEDURE — 1159F MED LIST DOCD IN RCRD: CPT | Mod: S$GLB,,, | Performed by: PHYSICIAN ASSISTANT

## 2021-06-09 PROCEDURE — 83036 HEMOGLOBIN GLYCOSYLATED A1C: CPT | Performed by: PHYSICIAN ASSISTANT

## 2021-06-09 PROCEDURE — 1126F PR PAIN SEVERITY QUANTIFIED, NO PAIN PRESENT: ICD-10-PCS | Mod: S$GLB,,, | Performed by: PHYSICIAN ASSISTANT

## 2021-06-09 PROCEDURE — 99999 PR PBB SHADOW E&M-EST. PATIENT-LVL V: ICD-10-PCS | Mod: PBBFAC,,, | Performed by: PHYSICIAN ASSISTANT

## 2021-06-09 PROCEDURE — 1101F PT FALLS ASSESS-DOCD LE1/YR: CPT | Mod: CPTII,S$GLB,, | Performed by: PHYSICIAN ASSISTANT

## 2021-06-09 PROCEDURE — 1157F ADVNC CARE PLAN IN RCRD: CPT | Mod: S$GLB,,, | Performed by: PHYSICIAN ASSISTANT

## 2021-06-09 PROCEDURE — 82962 POCT GLUCOSE, HAND-HELD DEVICE: ICD-10-PCS | Mod: S$GLB,,, | Performed by: PHYSICIAN ASSISTANT

## 2021-06-09 PROCEDURE — 1101F PR PT FALLS ASSESS DOC 0-1 FALLS W/OUT INJ PAST YR: ICD-10-PCS | Mod: CPTII,S$GLB,, | Performed by: PHYSICIAN ASSISTANT

## 2021-06-09 PROCEDURE — 36415 COLL VENOUS BLD VENIPUNCTURE: CPT | Performed by: PHYSICIAN ASSISTANT

## 2021-06-09 PROCEDURE — 1157F PR ADVANCE CARE PLAN OR EQUIV PRESENT IN MEDICAL RECORD: ICD-10-PCS | Mod: S$GLB,,, | Performed by: PHYSICIAN ASSISTANT

## 2021-06-09 PROCEDURE — 1159F PR MEDICATION LIST DOCUMENTED IN MEDICAL RECORD: ICD-10-PCS | Mod: S$GLB,,, | Performed by: PHYSICIAN ASSISTANT

## 2021-06-09 PROCEDURE — 82962 GLUCOSE BLOOD TEST: CPT | Mod: S$GLB,,, | Performed by: PHYSICIAN ASSISTANT

## 2021-06-09 PROCEDURE — 99205 PR OFFICE/OUTPT VISIT, NEW, LEVL V, 60-74 MIN: ICD-10-PCS | Mod: S$GLB,,, | Performed by: PHYSICIAN ASSISTANT

## 2021-06-09 PROCEDURE — 99205 OFFICE O/P NEW HI 60 MIN: CPT | Mod: S$GLB,,, | Performed by: PHYSICIAN ASSISTANT

## 2021-06-09 RX ORDER — FUROSEMIDE 20 MG/1
20 TABLET ORAL
Qty: 30 TABLET | Refills: 0 | Status: SHIPPED | OUTPATIENT
Start: 2021-06-09 | End: 2021-06-09

## 2021-06-09 RX ORDER — FUROSEMIDE 20 MG/1
20 TABLET ORAL
Qty: 20 TABLET | Refills: 0 | Status: SHIPPED | OUTPATIENT
Start: 2021-06-09 | End: 2022-06-09

## 2021-06-09 RX ORDER — SEMAGLUTIDE 1.34 MG/ML
0.5 INJECTION, SOLUTION SUBCUTANEOUS
Qty: 3 PEN | Refills: 3 | Status: SHIPPED | OUTPATIENT
Start: 2021-06-09 | End: 2022-06-09

## 2021-06-10 LAB
ESTIMATED AVG GLUCOSE: 235 MG/DL (ref 68–131)
HBA1C MFR BLD: 9.8 % (ref 4–5.6)

## 2021-06-14 ENCOUNTER — PATIENT MESSAGE (OUTPATIENT)
Dept: INTERNAL MEDICINE | Facility: CLINIC | Age: 86
End: 2021-06-14

## 2021-06-21 ENCOUNTER — PATIENT MESSAGE (OUTPATIENT)
Dept: PULMONOLOGY | Facility: CLINIC | Age: 86
End: 2021-06-21

## 2021-06-21 ENCOUNTER — TELEPHONE (OUTPATIENT)
Dept: PULMONOLOGY | Facility: CLINIC | Age: 86
End: 2021-06-21

## 2021-06-29 DIAGNOSIS — J44.9 CHRONIC OBSTRUCTIVE PULMONARY DISEASE, UNSPECIFIED COPD TYPE: Primary | ICD-10-CM

## 2021-06-30 ENCOUNTER — CLINICAL SUPPORT (OUTPATIENT)
Dept: PULMONOLOGY | Facility: CLINIC | Age: 86
End: 2021-06-30
Payer: MEDICARE

## 2021-06-30 VITALS — WEIGHT: 155.19 LBS | HEIGHT: 67 IN | BODY MASS INDEX: 24.36 KG/M2

## 2021-06-30 DIAGNOSIS — J44.9 CHRONIC OBSTRUCTIVE PULMONARY DISEASE, UNSPECIFIED COPD TYPE: ICD-10-CM

## 2021-06-30 PROCEDURE — 94618 PULMONARY STRESS TESTING: ICD-10-PCS | Mod: S$GLB,,, | Performed by: INTERNAL MEDICINE

## 2021-06-30 PROCEDURE — 94618 PULMONARY STRESS TESTING: CPT | Mod: S$GLB,,, | Performed by: INTERNAL MEDICINE

## 2021-07-01 PROBLEM — J96.11 CHRONIC RESPIRATORY FAILURE WITH HYPOXIA: Chronic | Status: ACTIVE | Noted: 2019-12-04

## 2021-07-01 PROBLEM — I27.20 PULMONARY HYPERTENSION: Chronic | Status: ACTIVE | Noted: 2018-08-31

## 2021-07-01 PROBLEM — R05.9 COUGH: Status: RESOLVED | Noted: 2019-02-01 | Resolved: 2021-07-01

## 2021-07-01 PROBLEM — I71.40 ABDOMINAL AORTIC ANEURYSM (AAA) WITHOUT RUPTURE: Chronic | Status: ACTIVE | Noted: 2018-08-31

## 2021-07-01 PROBLEM — K21.9 GASTROESOPHAGEAL REFLUX DISEASE WITHOUT ESOPHAGITIS: Chronic | Status: ACTIVE | Noted: 2021-04-20

## 2021-07-01 PROBLEM — K02.9 DENTAL CARIES: Status: RESOLVED | Noted: 2019-07-03 | Resolved: 2021-07-01

## 2021-07-01 PROBLEM — C61 PROSTATE CA: Status: RESOLVED | Noted: 2020-01-08 | Resolved: 2021-07-01

## 2021-07-01 PROBLEM — E53.8 B12 DEFICIENCY: Chronic | Status: ACTIVE | Noted: 2017-07-10

## 2021-07-01 PROBLEM — N18.32 STAGE 3B CHRONIC KIDNEY DISEASE: Chronic | Status: ACTIVE | Noted: 2020-01-08

## 2021-07-09 ENCOUNTER — PATIENT MESSAGE (OUTPATIENT)
Dept: INTERNAL MEDICINE | Facility: CLINIC | Age: 86
End: 2021-07-09

## 2021-07-12 ENCOUNTER — TELEPHONE (OUTPATIENT)
Dept: INTERNAL MEDICINE | Facility: CLINIC | Age: 86
End: 2021-07-12

## 2021-07-13 ENCOUNTER — PATIENT MESSAGE (OUTPATIENT)
Dept: INTERNAL MEDICINE | Facility: CLINIC | Age: 86
End: 2021-07-13

## 2021-07-13 RX ORDER — ALENDRONATE SODIUM 70 MG/1
70 TABLET ORAL
Qty: 12 TABLET | Refills: 3 | Status: SHIPPED | OUTPATIENT
Start: 2021-07-13 | End: 2022-07-13

## 2021-07-16 ENCOUNTER — PATIENT MESSAGE (OUTPATIENT)
Dept: INTERNAL MEDICINE | Facility: CLINIC | Age: 86
End: 2021-07-16

## 2021-07-27 ENCOUNTER — PATIENT MESSAGE (OUTPATIENT)
Dept: PULMONOLOGY | Facility: CLINIC | Age: 86
End: 2021-07-27

## 2021-07-30 ENCOUNTER — PATIENT MESSAGE (OUTPATIENT)
Dept: PULMONOLOGY | Facility: CLINIC | Age: 86
End: 2021-07-30

## 2021-07-30 DIAGNOSIS — J44.9 CHRONIC OBSTRUCTIVE PULMONARY DISEASE, UNSPECIFIED COPD TYPE: Primary | ICD-10-CM

## 2021-08-05 ENCOUNTER — HOSPITAL ENCOUNTER (OUTPATIENT)
Dept: RADIOLOGY | Facility: HOSPITAL | Age: 86
Discharge: HOME | End: 2021-08-05
Attending: INTERNAL MEDICINE
Payer: MEDICARE

## 2021-08-05 ENCOUNTER — OFFICE VISIT (OUTPATIENT)
Dept: PULMONOLOGY | Facility: CLINIC | Age: 86
End: 2021-08-05
Payer: MEDICARE

## 2021-08-05 VITALS
HEART RATE: 45 BPM | BODY MASS INDEX: 23.77 KG/M2 | OXYGEN SATURATION: 95 % | WEIGHT: 151.44 LBS | DIASTOLIC BLOOD PRESSURE: 76 MMHG | SYSTOLIC BLOOD PRESSURE: 130 MMHG | HEIGHT: 67 IN | RESPIRATION RATE: 18 BRPM

## 2021-08-05 DIAGNOSIS — I27.20 PULMONARY HYPERTENSION: Chronic | ICD-10-CM

## 2021-08-05 DIAGNOSIS — J43.1 PANLOBULAR EMPHYSEMA: ICD-10-CM

## 2021-08-05 DIAGNOSIS — J30.0 VASOMOTOR RHINITIS: ICD-10-CM

## 2021-08-05 DIAGNOSIS — J44.9 CHRONIC OBSTRUCTIVE PULMONARY DISEASE, UNSPECIFIED COPD TYPE: ICD-10-CM

## 2021-08-05 DIAGNOSIS — J96.11 CHRONIC RESPIRATORY FAILURE WITH HYPOXIA: Primary | Chronic | ICD-10-CM

## 2021-08-05 PROCEDURE — 3288F FALL RISK ASSESSMENT DOCD: CPT | Mod: CPTII,S$GLB,, | Performed by: INTERNAL MEDICINE

## 2021-08-05 PROCEDURE — 99214 PR OFFICE/OUTPT VISIT, EST, LEVL IV, 30-39 MIN: ICD-10-PCS | Mod: S$GLB,,, | Performed by: INTERNAL MEDICINE

## 2021-08-05 PROCEDURE — 1101F PT FALLS ASSESS-DOCD LE1/YR: CPT | Mod: CPTII,S$GLB,, | Performed by: INTERNAL MEDICINE

## 2021-08-05 PROCEDURE — 1159F MED LIST DOCD IN RCRD: CPT | Mod: CPTII,S$GLB,, | Performed by: INTERNAL MEDICINE

## 2021-08-05 PROCEDURE — 1159F PR MEDICATION LIST DOCUMENTED IN MEDICAL RECORD: ICD-10-PCS | Mod: CPTII,S$GLB,, | Performed by: INTERNAL MEDICINE

## 2021-08-05 PROCEDURE — 99999 PR PBB SHADOW E&M-EST. PATIENT-LVL IV: CPT | Mod: PBBFAC,,, | Performed by: INTERNAL MEDICINE

## 2021-08-05 PROCEDURE — 71046 XR CHEST PA AND LATERAL: ICD-10-PCS | Mod: 26,,, | Performed by: RADIOLOGY

## 2021-08-05 PROCEDURE — 3288F PR FALLS RISK ASSESSMENT DOCUMENTED: ICD-10-PCS | Mod: CPTII,S$GLB,, | Performed by: INTERNAL MEDICINE

## 2021-08-05 PROCEDURE — 99999 PR PBB SHADOW E&M-EST. PATIENT-LVL IV: ICD-10-PCS | Mod: PBBFAC,,, | Performed by: INTERNAL MEDICINE

## 2021-08-05 PROCEDURE — 1157F PR ADVANCE CARE PLAN OR EQUIV PRESENT IN MEDICAL RECORD: ICD-10-PCS | Mod: CPTII,S$GLB,, | Performed by: INTERNAL MEDICINE

## 2021-08-05 PROCEDURE — 71046 X-RAY EXAM CHEST 2 VIEWS: CPT | Mod: 26,,, | Performed by: RADIOLOGY

## 2021-08-05 PROCEDURE — 1157F ADVNC CARE PLAN IN RCRD: CPT | Mod: CPTII,S$GLB,, | Performed by: INTERNAL MEDICINE

## 2021-08-05 PROCEDURE — 99499 UNLISTED E&M SERVICE: CPT | Mod: S$GLB,,, | Performed by: INTERNAL MEDICINE

## 2021-08-05 PROCEDURE — 99214 OFFICE O/P EST MOD 30 MIN: CPT | Mod: S$GLB,,, | Performed by: INTERNAL MEDICINE

## 2021-08-05 PROCEDURE — 71046 X-RAY EXAM CHEST 2 VIEWS: CPT | Mod: TC

## 2021-08-05 PROCEDURE — 1101F PR PT FALLS ASSESS DOC 0-1 FALLS W/OUT INJ PAST YR: ICD-10-PCS | Mod: CPTII,S$GLB,, | Performed by: INTERNAL MEDICINE

## 2021-08-05 PROCEDURE — 99499 RISK ADDL DX/OHS AUDIT: ICD-10-PCS | Mod: S$GLB,,, | Performed by: INTERNAL MEDICINE

## 2021-08-05 RX ORDER — ALBUTEROL SULFATE 90 UG/1
AEROSOL, METERED RESPIRATORY (INHALATION)
Qty: 54 G | Refills: 3 | Status: SHIPPED | OUTPATIENT
Start: 2021-08-05

## 2021-08-05 RX ORDER — IPRATROPIUM BROMIDE 42 UG/1
SPRAY, METERED NASAL
Qty: 45 ML | Refills: 3 | Status: SHIPPED | OUTPATIENT
Start: 2021-08-05

## 2021-08-09 ENCOUNTER — PATIENT OUTREACH (OUTPATIENT)
Dept: ADMINISTRATIVE | Facility: OTHER | Age: 86
End: 2021-08-09

## 2021-08-10 ENCOUNTER — OFFICE VISIT (OUTPATIENT)
Dept: OPHTHALMOLOGY | Facility: CLINIC | Age: 86
End: 2021-08-10
Payer: MEDICARE

## 2021-08-10 DIAGNOSIS — S05.02XA ABRASION OF LEFT CORNEA, INITIAL ENCOUNTER: Primary | ICD-10-CM

## 2021-08-10 PROCEDURE — 1160F RVW MEDS BY RX/DR IN RCRD: CPT | Mod: CPTII,S$GLB,, | Performed by: OPTOMETRIST

## 2021-08-10 PROCEDURE — 92002 PR EYE EXAM, NEW PATIENT,INTERMED: ICD-10-PCS | Mod: S$GLB,,, | Performed by: OPTOMETRIST

## 2021-08-10 PROCEDURE — 1159F PR MEDICATION LIST DOCUMENTED IN MEDICAL RECORD: ICD-10-PCS | Mod: CPTII,S$GLB,, | Performed by: OPTOMETRIST

## 2021-08-10 PROCEDURE — 99999 PR PBB SHADOW E&M-EST. PATIENT-LVL I: CPT | Mod: PBBFAC,,, | Performed by: OPTOMETRIST

## 2021-08-10 PROCEDURE — 1159F MED LIST DOCD IN RCRD: CPT | Mod: CPTII,S$GLB,, | Performed by: OPTOMETRIST

## 2021-08-10 PROCEDURE — 1160F PR REVIEW ALL MEDS BY PRESCRIBER/CLIN PHARMACIST DOCUMENTED: ICD-10-PCS | Mod: CPTII,S$GLB,, | Performed by: OPTOMETRIST

## 2021-08-10 PROCEDURE — 1157F PR ADVANCE CARE PLAN OR EQUIV PRESENT IN MEDICAL RECORD: ICD-10-PCS | Mod: CPTII,S$GLB,, | Performed by: OPTOMETRIST

## 2021-08-10 PROCEDURE — 99999 PR PBB SHADOW E&M-EST. PATIENT-LVL I: ICD-10-PCS | Mod: PBBFAC,,, | Performed by: OPTOMETRIST

## 2021-08-10 PROCEDURE — 1157F ADVNC CARE PLAN IN RCRD: CPT | Mod: CPTII,S$GLB,, | Performed by: OPTOMETRIST

## 2021-08-10 PROCEDURE — 92002 INTRM OPH EXAM NEW PATIENT: CPT | Mod: S$GLB,,, | Performed by: OPTOMETRIST

## 2021-08-10 RX ORDER — ERYTHROMYCIN 5 MG/G
OINTMENT OPHTHALMIC 2 TIMES DAILY
Qty: 1 TUBE | Refills: 0 | Status: SHIPPED | OUTPATIENT
Start: 2021-08-10 | End: 2021-08-17

## 2021-08-16 ENCOUNTER — PATIENT MESSAGE (OUTPATIENT)
Dept: DIABETES | Facility: CLINIC | Age: 86
End: 2021-08-16

## 2021-08-16 ENCOUNTER — TELEPHONE (OUTPATIENT)
Dept: DIABETES | Facility: CLINIC | Age: 86
End: 2021-08-16

## 2022-03-09 ENCOUNTER — PATIENT MESSAGE (OUTPATIENT)
Dept: ADMINISTRATIVE | Facility: HOSPITAL | Age: 87
End: 2022-03-09
Payer: MEDICARE

## 2022-04-27 ENCOUNTER — PATIENT MESSAGE (OUTPATIENT)
Dept: ADMINISTRATIVE | Facility: HOSPITAL | Age: 87
End: 2022-04-27
Payer: MEDICARE

## 2022-07-08 ENCOUNTER — PATIENT OUTREACH (OUTPATIENT)
Dept: ADMINISTRATIVE | Facility: HOSPITAL | Age: 87
End: 2022-07-08
Payer: MEDICARE

## 2022-07-27 ENCOUNTER — PATIENT MESSAGE (OUTPATIENT)
Dept: ADMINISTRATIVE | Facility: HOSPITAL | Age: 87
End: 2022-07-27
Payer: MEDICARE

## 2022-07-28 ENCOUNTER — PATIENT MESSAGE (OUTPATIENT)
Dept: PULMONOLOGY | Facility: CLINIC | Age: 87
End: 2022-07-28
Payer: MEDICARE

## 2022-07-28 DIAGNOSIS — J44.9 CHRONIC OBSTRUCTIVE PULMONARY DISEASE, UNSPECIFIED COPD TYPE: Primary | ICD-10-CM

## 2022-08-11 NOTE — PROGRESS NOTES
Called daughter today and got the name of PCP after daughter gave me permission to call and ask nursing home in [place], [place].

## 2022-10-18 ENCOUNTER — TELEPHONE (OUTPATIENT)
Dept: PULMONOLOGY | Facility: CLINIC | Age: 87
End: 2022-10-18
Payer: MEDICARE

## 2022-11-21 ENCOUNTER — PATIENT MESSAGE (OUTPATIENT)
Dept: PULMONOLOGY | Facility: CLINIC | Age: 87
End: 2022-11-21
Payer: MEDICARE

## 2023-10-09 NOTE — PROGRESS NOTES
Subjective:       Patient ID: Good Murillo Jr. is a 89 y.o. male.    Chief Complaint: Skin Problem    HPI Comments: 89 year old male c/o small bump to L lateral trunk X 2 weeks. He reports bump has enlarged since onset and has not been improved with Neosporin. He reports no pain, itching, drainage, known injury, blister, numbness/tingling, muscular weakness, or other associated symptoms. He also reports lesion to floor of mouth X 2-3 weeks. He reports seeing his dentist for that and having xrays by dentist. He was told it looked okay. He has stopped wearing his partial for the last 3 days due to it irritating the affected area. He says he ate some peanuts a few weeks ago that may have caused the lesion. He reports some discomfort to mouth lesion but no severe pain. He reports no swelling, drainage, LAD, fever, chills, or other medical complaints.    Past Medical History:  8/22/2014: Abnormal EKG  No date: Allergy  No date: Arthritis      Comment: back  1/17/2017: Bilateral carotid artery disease  No date: Cholelithiasis      Comment: US retroperitoneal 12/2016  No date: COPD (chronic obstructive pulmonary disease)  No date: Diabetes with neurologic complications  No date: Diverticulitis  No date: Diverticulosis      Comment: hosp/divert bleed  No date: Ex-smoker  No date: Glaucoma      Comment: suspect  No date: Heart murmur  No date: History of pneumothorax      Comment: bilat  No date: HTN (hypertension)  No date: Hyperlipidemia  8/22/2014: Hyperlipidemia  No date: Hypothyroid  No date: Iron deficiency anemia      Comment: venita  12/16 zeenat 12/18: Osteoporosis, unspecified  No date: Polyneuropathy  No date: Prostate CA  8/22/2014: PVD (peripheral vascular disease)  No date: Type 2 diabetes with peripheral circulatory di*  Diagnosed 1994: Type 2 diabetes, controlled, with neuropathy  No date: Urinary incontinence        Review of Systems   Constitutional: Negative for chills and fever.   Respiratory: Negative for  cough and shortness of breath.    Cardiovascular: Negative for chest pain, palpitations and leg swelling.   Gastrointestinal: Negative for nausea and vomiting.   Skin: Negative for rash.   Neurological: Negative for dizziness, weakness, numbness and headaches.   Psychiatric/Behavioral: Negative for confusion.       Objective:      Physical Exam   Constitutional: He is oriented to person, place, and time. He appears well-developed and well-nourished. No distress.   HENT:   Head: Normocephalic and atraumatic.   Floor of mouth near base of front teeth with lesion of aphthous ulcer appearance; no drainage, swelling, or erythema   Eyes: EOM are normal. No scleral icterus.   Neck: Neck supple.   Cardiovascular: Normal rate and regular rhythm.    Pulmonary/Chest: Effort normal and breath sounds normal. No respiratory distress. He has no decreased breath sounds. He has no wheezes. He has no rhonchi. He has no rales.   Musculoskeletal: Normal range of motion.   Neurological: He is alert and oriented to person, place, and time. No cranial nerve deficit.   Skin: Skin is dry. No rash noted. He is not diaphoretic.   Left lateral trunk with localized heart-shaped lesion approx. 2cm in diameter; circumferential erythema and central purulence vs vesicle formation?; no fluctuance, drainage, open wounds, or crepitus   Psychiatric: He has a normal mood and affect. His speech is normal and behavior is normal. Thought content normal.       Assessment:       1. Skin lesion    2. Mouth lesion    3. Hypertension associated with diabetes    4. Type 2 diabetes mellitus with diabetic polyneuropathy, with long-term current use of insulin        Plan:         1. Triamcinolone oropharyngeal paste as directed to mouth ulcer. Leave partial out for one week. F/u with ENT for further eval if not completed resolved in one week.  2. Refer to dermatology for evaluation of skin lesion. Dermatology Clinic info given to pt.  3. F/u with PCP for health  yes management. ER if severe sxs occur.   full range of motion in all extremities